# Patient Record
Sex: MALE | Race: BLACK OR AFRICAN AMERICAN | NOT HISPANIC OR LATINO | Employment: UNEMPLOYED | URBAN - METROPOLITAN AREA
[De-identification: names, ages, dates, MRNs, and addresses within clinical notes are randomized per-mention and may not be internally consistent; named-entity substitution may affect disease eponyms.]

---

## 2017-01-09 DIAGNOSIS — M25.561 ACUTE PAIN OF RIGHT KNEE: Primary | ICD-10-CM

## 2017-01-09 NOTE — TELEPHONE ENCOUNTER
Naproxen 500mg      Last Written Prescription Date: 11/26/2016  Last Fill Quantity: 60,  # refills: 1   Last Office Visit with [facility] or  Health prescribing provider: 08/31/2016                                             Percocet 5-325mg - last prescribed by CESAR shipman      Last Written Prescription Date: 8  Last Fill Quantity: 12/11/2016,  # refills: 0   Last Office Visit with [facility] or  Health prescribing provider: 08/31/2016                                             Dionna Berkowitz, Pharmacy Technician  [facility]  [phone]

## 2017-01-10 RX ORDER — OXYCODONE AND ACETAMINOPHEN 5; 325 MG/1; MG/1
1 TABLET ORAL EVERY 6 HOURS PRN
Qty: 8 TABLET | Refills: 0 | Status: SHIPPED | OUTPATIENT
Start: 2017-01-10 | End: 2017-02-07

## 2017-01-10 RX ORDER — NAPROXEN 500 MG/1
500 TABLET ORAL 2 TIMES DAILY PRN
Qty: 60 TABLET | Refills: 1 | Status: SHIPPED | OUTPATIENT
Start: 2017-01-10 | End: 2017-04-17

## 2017-01-31 ENCOUNTER — HOSPITAL ENCOUNTER (EMERGENCY)
Facility: CLINIC | Age: 47
Discharge: HOME | End: 2017-01-31
Attending: FAMILY MEDICINE | Admitting: FAMILY MEDICINE
Payer: COMMERCIAL

## 2017-01-31 VITALS
BODY MASS INDEX: 40.81 KG/M2 | RESPIRATION RATE: 18 BRPM | WEIGHT: 315 LBS | OXYGEN SATURATION: 95 % | HEART RATE: 66 BPM | TEMPERATURE: 97.3 F | DIASTOLIC BLOOD PRESSURE: 88 MMHG | SYSTOLIC BLOOD PRESSURE: 152 MMHG

## 2017-01-31 DIAGNOSIS — H65.93 BILATERAL NON-SUPPURATIVE OTITIS MEDIA: ICD-10-CM

## 2017-01-31 DIAGNOSIS — H92.02 OTALGIA OF LEFT EAR: ICD-10-CM

## 2017-01-31 PROCEDURE — 99282 EMERGENCY DEPT VISIT SF MDM: CPT

## 2017-01-31 PROCEDURE — 99283 EMERGENCY DEPT VISIT LOW MDM: CPT | Performed by: FAMILY MEDICINE

## 2017-01-31 PROCEDURE — 25000132 ZZH RX MED GY IP 250 OP 250 PS 637: Performed by: FAMILY MEDICINE

## 2017-01-31 RX ORDER — TETRACAINE HYDROCHLORIDE 5 MG/ML
4-5 SOLUTION OPHTHALMIC ONCE
Status: COMPLETED | OUTPATIENT
Start: 2017-01-31 | End: 2017-01-31

## 2017-01-31 RX ORDER — CETIRIZINE HYDROCHLORIDE, PSEUDOEPHEDRINE HYDROCHLORIDE 5; 120 MG/1; MG/1
1 TABLET, FILM COATED, EXTENDED RELEASE ORAL 2 TIMES DAILY
Qty: 14 TABLET | Refills: 0 | Status: SHIPPED | OUTPATIENT
Start: 2017-01-31 | End: 2017-11-02

## 2017-01-31 RX ORDER — AMOXICILLIN 500 MG/1
1000 CAPSULE ORAL 2 TIMES DAILY
Qty: 28 CAPSULE | Refills: 0 | Status: SHIPPED | OUTPATIENT
Start: 2017-01-31 | End: 2017-02-07

## 2017-01-31 RX ADMIN — TETRACAINE HYDROCHLORIDE 5 DROP: 5 SOLUTION OPHTHALMIC at 22:23

## 2017-01-31 ASSESSMENT — ENCOUNTER SYMPTOMS
FEVER: 0
APPETITE CHANGE: 0

## 2017-01-31 NOTE — ED AVS SNAPSHOT
[facility] Emergency Department    [address] [place] [address]    Phone:  [phone]    Fax:  [phone]                                       Humberto Roberts   MRN: [id number]    Department:  [facility] Emergency Department   Date of Visit:  1/31/2017           Patient Information     Date Of Birth          1970        Your diagnoses for this visit were:     Otalgia of left ear     Bilateral non-suppurative otitis media        You were seen by Skinny Herrera DO.      Follow-up Information     Follow up with Mariana Ortega APRN CNP.    Specialty:  NURSE PRACTITIONER - OBSTETRICS & GYNECOLOGY    Why:  As needed, if not improved in 3-5 days    Contact information:    [facility]  [address] [place] [id number] [phone]          Discharge Instructions       Please read and follow the handout(s) instructions. Return, if needed, for increased or worsening symptoms and as directed by the handout(s).    I would expect you to be improved in the next 3-4 days.    Yogurt orally twice a day while on the antibiotics may help prevent diarrhea and secondary infections caused by the antibiotic use.    Increase your fluid intake.     I hope you feel better soon!    Electronically signed, Skinny Herrera DO        Discharge References/Attachments     OTITIS MEDIA, ABX TX (ADULT) (ENGLISH)    OTITIS MEDIA (MIDDLE-EAR INFECTION) IN ADULTS (ENGLISH)      24 Hour Appointment Hotline       To make an appointment at any [facility], call [phone]). If you don't have a family doctor or clinic, we will help you find one. [facility] clinics are conveniently located to serve the needs of you and your family.             Review of your medicines      START taking        Dose / Directions Last dose taken    amoxicillin 500 MG capsule   Commonly known as:  AMOXIL   Dose:  1000 mg   Quantity:  28 capsule        Take 2 capsules (1,000 mg) by  mouth 2 times daily for 7 days   Refills:  0        cetirizine-psuedoePHEDrine 5-120 MG per 12 hr tablet   Commonly known as:  zyrTEC-D   Dose:  1 tablet   Quantity:  14 tablet        Take 1 tablet by mouth 2 times daily   Refills:  0          Our records show that you are taking the medicines listed below. If these are incorrect, please call your family doctor or clinic.        Dose / Directions Last dose taken    atenolol 50 MG tablet   Commonly known as:  TENORMIN   Dose:  50 mg   Quantity:  30 tablet        Take 1 tablet (50 mg) by mouth daily   Refills:  5        cetirizine 10 MG tablet   Commonly known as:  ZYRTEC ALLERGY   Dose:  10 mg   Quantity:  30 tablet        Take 1 tablet (10 mg) by mouth daily as needed for allergies   Refills:  6        cyclobenzaprine 10 MG tablet   Commonly known as:  FLEXERIL   Dose:  10 mg   Quantity:  30 tablet        Take 1 tablet (10 mg) by mouth 3 times daily as needed for muscle spasms   Refills:  1        diclofenac 75 MG EC tablet   Commonly known as:  VOLTAREN   Dose:  75 mg   Quantity:  60 tablet        Take 1 tablet (75 mg) by mouth 2 times daily   Refills:  1        fluticasone 50 MCG/ACT spray   Commonly known as:  FLONASE   Dose:  1-2 spray   Quantity:  1 Package        Spray 1-2 sprays into both nostrils daily   Refills:  3        hydrochlorothiazide 25 MG tablet   Commonly known as:  HYDRODIURIL   Dose:  25 mg   Quantity:  30 tablet        Take 1 tablet (25 mg) by mouth daily Due for clinic follow-up and lab work   Refills:  5        naproxen 500 MG tablet   Commonly known as:  NAPROSYN   Dose:  500 mg   Quantity:  60 tablet        Take 1 tablet (500 mg) by mouth 2 times daily as needed for moderate pain   Refills:  1        omeprazole 20 MG CR capsule   Commonly known as:  priLOSEC   Dose:  20 mg   Quantity:  180 capsule        Take 1 capsule (20 mg) by mouth 2 times daily   Refills:  2        * order for DME   Dose:  1 Device        1 Device Auto CPAP @@ 7-15 cm  with heated humidity via mask of choice   Refills:  0        * order for DME   Quantity:  1 Device        Dispensed 1 Pneumatic Walking Brace, Size large, with [facility] agreement signed by patient. Lay Contreras CMA, July 22, 2015   Refills:  0        oxyCODONE-acetaminophen 5-325 MG per tablet   Commonly known as:  PERCOCET   Dose:  1 tablet   Quantity:  8 tablet        Take 1 tablet by mouth every 6 hours as needed   Refills:  0        zolpidem 5 MG tablet   Commonly known as:  AMBIEN   Quantity:  30 tablet        Take 1 tablet at bedtime as needed for sleep   Refills:  3        * Notice:  This list has 2 medication(s) that are the same as other medications prescribed for you. Read the directions carefully, and ask your doctor or other care provider to review them with you.            Prescriptions were sent or printed at these locations (2 Prescriptions)                   [facility]   [address] [address]    Telephone:  [phone]   Fax:  [phone]   Hours:                  Printed at Department/Unit printer (1 of 2)         cetirizine-psuedoePHEDrine (ZYRTEC-D) 5-120 MG per 12 hr tablet                 These medications are not ready yet because we are checking if your insurance will help you pay for them. Call your pharmacy to confirm that your medication is ready for pickup. It may take up to 24 hours for them to receive the prescription. If the prescription is not ready within 3 business days, please contact your clinic or your provider (1 of 2)         amoxicillin (AMOXIL) 500 MG capsule                Orders Needing Specimen Collection     None      Pending Results     No orders found from 1/30/2017 to 2/1/2017.            Pending Culture Results     No orders found from 1/30/2017 to 2/1/2017.            Thank you for choosing [facility]       Thank you for choosing [facility] for your care. Our goal is always to provide you with excellent care. Hearing back  "from our patients is one way we can continue to improve our services. Please take a few minutes to complete the written survey that you may receive in the mail after you visit with us. Thank you!        [facility] Information     [employer] lets you send messages to your doctor, view your test results, renew your prescriptions, schedule appointments and more. To sign up, go to [email or web address] . Click on \"Log in\" on the left side of the screen, which will take you to the Welcome page. Then click on \"Sign up Now\" on the right side of the page.     You will be asked to enter the access code listed below, as well as some personal information. Please follow the directions to create your username and password.     Your access code is: [email or web address]  Expires: 3/11/2017  9:05 PM     Your access code will  in 90 days. If you need help or a new code, please call your [facility] clinic or [phone].        Care EveryWhere ID     This is your Care EveryWhere ID. This could be used by other organizations to access your [facility] medical records  [id number]        After Visit Summary       This is your record. Keep this with you and show to your community pharmacist(s) and doctor(s) at your next visit.                  "

## 2017-01-31 NOTE — ED AVS SNAPSHOT
[facility] Emergency Department    [address] [place] [address]    Phone:  [phone]    Fax:  [phone]                                       Humberto Roberts   MRN: [id number]    Department:  [facility] Emergency Department   Date of Visit:  1/31/2017           After Visit Summary Signature Page     I have received my discharge instructions, and my questions have been answered. I have discussed any challenges I see with this plan with the nurse or doctor.    ..........................................................................................................................................  Patient/Patient Representative Signature      ..........................................................................................................................................  Patient Representative Print Name and Relationship to Patient    ..................................................               ................................................  Date                                            Time    ..........................................................................................................................................  Reviewed by Signature/Title    ...................................................              ..............................................  Date                                                            Time

## 2017-02-01 NOTE — ED PROVIDER NOTES
History     Chief Complaint   Patient presents with     Otalgia     HPI  Humberto Roberts is a 46 year old male who presents to the ER with concerns about left ear pain that started 3 days ago.  He states that he has a muffled sound to the hearing in the left ear.  He's had cold symptoms/nasal congestion for last 3-4 days as well.   Denies fever.  Denies injury or drainage from the ear.      I have reviewed the Medications, Allergies, Past Medical and Surgical History, and Social History in the Epic system.  Patient Active Problem List   Diagnosis     Seasonal allergic rhinitis     GERD (gastroesophageal reflux disease)     Hypertension goal BP (blood pressure) < 140/90     CARDIOVASCULAR SCREENING; LDL GOAL LESS THAN 160     Elevated serum creatinine     Insomnia       Past Medical History   Diagnosis Date     Acid reflux disease since 2006     Hypertension      Back pain chronic     Cellulitis of left upper arm and forearm 11/22/2013        Past Surgical History   Procedure Laterality Date     Ent surgery         History reviewed. No pertinent family history.    Social History     Social History     Marital Status: Significant other     Spouse Name: N/A     Number of Children: N/A     Years of Education: N/A     Occupational History     Not on file.     Social History Main Topics     Smoking status: Never Smoker      Smokeless tobacco: Never Used     Alcohol Use: No     Drug Use: No     Sexual Activity:     Partners: Female     Other Topics Concern     Not on file     Social History Narrative       Current Outpatient Rx   Name  Route  Sig  Dispense  Refill     amoxicillin (AMOXIL) 500 MG capsule    Oral    Take 2 capsules (1,000 mg) by mouth 2 times daily for 7 days    28 capsule    0       cetirizine-psuedoePHEDrine (ZYRTEC-D) 5-120 MG per 12 hr tablet    Oral    Take 1 tablet by mouth 2 times daily    14 tablet    0       naproxen (NAPROSYN) 500 MG tablet    Oral    Take 1 tablet (500 mg) by mouth 2 times daily as  needed for moderate pain    60 tablet    1       hydrochlorothiazide (HYDRODIURIL) 25 MG tablet    Oral    Take 1 tablet (25 mg) by mouth daily Due for clinic follow-up and lab work    30 tablet    5       cetirizine (ZYRTEC ALLERGY) 10 MG tablet    Oral    Take 1 tablet (10 mg) by mouth daily as needed for allergies    30 tablet    6       atenolol (TENORMIN) 50 MG tablet    Oral    Take 1 tablet (50 mg) by mouth daily    30 tablet    5       omeprazole (PRILOSEC) 20 MG capsule    Oral    Take 1 capsule (20 mg) by mouth 2 times daily    180 capsule    2       cyclobenzaprine (FLEXERIL) 10 MG tablet    Oral    Take 1 tablet (10 mg) by mouth 3 times daily as needed for muscle spasms    30 tablet    1       zolpidem (AMBIEN) 5 MG tablet        Take 1 tablet at bedtime as needed for sleep    30 tablet    3       diclofenac (VOLTAREN) 75 MG EC tablet    Oral    Take 1 tablet (75 mg) by mouth 2 times daily    60 tablet    1       fluticasone (FLONASE) 50 MCG/ACT nasal spray    Both Nostrils    Spray 1-2 sprays into both nostrils daily    1 Package    3       oxyCODONE-acetaminophen (PERCOCET) 5-325 MG per tablet    Oral    Take 1 tablet by mouth every 6 hours as needed    8 tablet    0       [facility]       order for DME        1 Device Auto CPAP @@ 7-15 cm with heated humidity via mask of choice               order for DME        Dispensed 1 Pneumatic Walking Brace, Size large, with [facility] agreement signed by patient. Lay Contreras [facility], July 22, 2015    1 Device    0         No Known Allergies    Immunization History   Administered Date(s) Administered     TDAP (BOOSTRIX AGES 10-64) 11/22/2013       Review of Systems   Constitutional: Negative for fever and appetite change. Activity change: Difficult to sleep.   HENT: Positive for congestion, ear pain (left) and hearing loss (muffled hearing left).    All other systems reviewed and are negative.      Physical Exam   BP: 152/88 mmHg  Pulse: 66  Temp: 97.3  F (36.3   C)  Resp: 18  Weight: (!) 144.244 kg (318 lb)  SpO2: 95 %  Physical Exam   Constitutional: He appears well-developed and well-nourished. No distress.   HENT:   Right Ear: No drainage or swelling. Tympanic membrane is erythematous and retracted. A middle ear effusion is present.   Left Ear: No drainage or swelling. Tympanic membrane is erythematous and bulging. A middle ear effusion is present.   Nose: Mucosal edema and rhinorrhea present.       ED Course   Procedures             Critical Care time:  none                 Assessments & Plan (with Medical Decision Making)  Tetracaine ophthalmic drops placed in the left artery canal to help with his pain symptoms.  Medications prescribed for the otitis media as listed below.   Increase fluids encouraged.  Oral yogurt or other culture food products encouraged while on the antibiotic.     I have reviewed the nursing notes.    I have reviewed the findings, diagnosis, plan and need for follow up with the patient.    New Prescriptions    AMOXICILLIN (AMOXIL) 500 MG CAPSULE    Take 2 capsules (1,000 mg) by mouth 2 times daily for 7 days    CETIRIZINE-PSUEDOEPHEDRINE (ZYRTEC-D) 5-120 MG PER 12 HR TABLET    Take 1 tablet by mouth 2 times daily            I verbally discussed the findings of the evaluation today in the ER. I have verbally discussed with Humberto the suggested treatment(s) as described in the discharge instructions and handouts. I have prescribed the above listed medications and instructed him on appropriate use of these medications.      I have verbally suggested he follow-up in his clinic or return to the ER for increased symptoms. See the follow-up recommendations documented  in the after visit summary in this visit's EPIC chart.    Final diagnoses:   Otalgia of left ear   Bilateral non-suppurative otitis media       1/31/2017   [facility] EMERGENCY DEPARTMENT      Skinny Herrera,   01/31/17 [id number]

## 2017-02-01 NOTE — DISCHARGE INSTRUCTIONS
Please read and follow the handout(s) instructions. Return, if needed, for increased or worsening symptoms and as directed by the handout(s).    I would expect you to be improved in the next 3-4 days.    Yogurt orally twice a day while on the antibiotics may help prevent diarrhea and secondary infections caused by the antibiotic use.    Increase your fluid intake.     I hope you feel better soon!    Electronically signed, Skinny Herrera DO

## 2017-02-07 ENCOUNTER — OFFICE VISIT (OUTPATIENT)
Dept: FAMILY MEDICINE | Facility: CLINIC | Age: 47
End: 2017-02-07
Payer: COMMERCIAL

## 2017-02-07 VITALS
HEIGHT: 74 IN | SYSTOLIC BLOOD PRESSURE: 132 MMHG | DIASTOLIC BLOOD PRESSURE: 82 MMHG | WEIGHT: 315 LBS | BODY MASS INDEX: 40.43 KG/M2 | TEMPERATURE: 97 F | RESPIRATION RATE: 18 BRPM | HEART RATE: 80 BPM | OXYGEN SATURATION: 96 %

## 2017-02-07 DIAGNOSIS — H69.92 DYSFUNCTION OF EUSTACHIAN TUBE, LEFT: Primary | ICD-10-CM

## 2017-02-07 PROCEDURE — 99213 OFFICE O/P EST LOW 20 MIN: CPT | Performed by: FAMILY MEDICINE

## 2017-02-07 ASSESSMENT — PAIN SCALES - GENERAL: PAINLEVEL: NO PAIN (0)

## 2017-02-07 NOTE — PROGRESS NOTES
SUBJECTIVE:                                                    Humberto Roberts is a 46 year old male who presents to clinic today for the following health issues:    Acute Illness   Acute illness concerns: left ear  Onset: 10 days     Fever: no    Chills/Sweats: no    Headache (location?): YES    Sinus Pressure:no    Conjunctivitis:  no    Ear Pain: no    Rhinorrhea: YES    Congestion: YES    Sore Throat: no     Cough: YES-non-productive    Wheeze: no     Decreased Appetite: no    Nausea: no    Vomiting: no    Diarrhea:  no    Dysuria/Freq.: no    Fatigue/Achiness: no    Sick/Strep Exposure: no     Therapies Tried and outcome: amoxicillin, zyrtec-D, ear drops    Left ear.  Use Q-tip. No pain. Not able to hear out of it. No drainage.  No sinus pressure - little running or nasal congestion. Echoing.  Had similar problem before. No exposure to loud noise.  No headache or dizziness. Was treated for sinus problem a week ago and is still on Amoxicillin. No recent hx of being on high elevation or in an airplane     Problem list and histories reviewed & adjusted, as indicated.  Additional history: as documented    Patient Active Problem List   Diagnosis     Seasonal allergic rhinitis     GERD (gastroesophageal reflux disease)     Hypertension goal BP (blood pressure) < 140/90     CARDIOVASCULAR SCREENING; LDL GOAL LESS THAN 160     Elevated serum creatinine     Insomnia     Past Surgical History   Procedure Laterality Date     Ent surgery         Social History   Substance Use Topics     Smoking status: Never Smoker      Smokeless tobacco: Never Used     Alcohol Use: No     No family history on file.      Current Outpatient Prescriptions   Medication Sig Dispense Refill     amoxicillin (AMOXIL) 500 MG capsule Take 2 capsules (1,000 mg) by mouth 2 times daily for 7 days 28 capsule 0     cetirizine-psuedoePHEDrine (ZYRTEC-D) 5-120 MG per 12 hr tablet Take 1 tablet by mouth 2 times daily 14 tablet 0     naproxen (NAPROSYN) 500  "MG tablet Take 1 tablet (500 mg) by mouth 2 times daily as needed for moderate pain 60 tablet 1     hydrochlorothiazide (HYDRODIURIL) 25 MG tablet Take 1 tablet (25 mg) by mouth daily Due for clinic follow-up and lab work 30 tablet 5     atenolol (TENORMIN) 50 MG tablet Take 1 tablet (50 mg) by mouth daily 30 tablet 5     omeprazole (PRILOSEC) 20 MG capsule Take 1 capsule (20 mg) by mouth 2 times daily 180 capsule 2     cyclobenzaprine (FLEXERIL) 10 MG tablet Take 1 tablet (10 mg) by mouth 3 times daily as needed for muscle spasms 30 tablet 1     zolpidem (AMBIEN) 5 MG tablet Take 1 tablet at bedtime as needed for sleep 30 tablet 3     diclofenac (VOLTAREN) 75 MG EC tablet Take 1 tablet (75 mg) by mouth 2 times daily 60 tablet 1     order for DME 1 Device Auto CPAP @@ 7-15 cm with heated humidity via mask of choice       order for DME Dispensed 1 Pneumatic Walking Brace, Size large, with [facility] agreement signed by patient. Lay Contreras [facility], July 22, 2015 1 Device 0     fluticasone (FLONASE) 50 MCG/ACT nasal spray Spray 1-2 sprays into both nostrils daily 1 Package 3     cetirizine (ZYRTEC ALLERGY) 10 MG tablet Take 1 tablet (10 mg) by mouth daily as needed for allergies 30 tablet 6     No Known Allergies    ROS:  Constitutional, HEENT, cardiovascular, pulmonary, gi and gu systems are negative, except as otherwise noted.    OBJECTIVE:                                                    /82 mmHg  Pulse 80  Temp(Src) 97  F (36.1  C) (Temporal)  Resp 18  Ht 6' 2\" (1.88 m)  Wt 321 lb 11.2 oz (145.922 kg)  BMI 41.29 kg/m2  SpO2 96%  Body mass index is 41.29 kg/(m^2).   GENERAL: healthy, alert and no distress.   HENT: ear canals and TM's normal.  Nares are congested with clear drainage.  Oropharynx is pink and moist.  No tonsilar redness, exudate or hypertrophy.  No tender with palpation to the sinuses.  NECK: no adenopathy.  RESP: lungs clear to auscultation - no rales, rhonchi or wheezes  CV: regular rate and " rhythm, no murmur.    Diagnostic Test Results:  none      ASSESSMENT/PLAN:                                                        ICD-10-CM    1. Dysfunction of eustachian tube, left H69.82      Discussed with Humberto about the nature of the condition.  Reassure him that there is no ear infection and the symptoms are expected to resolved on their own slowly.  Continue with Zyrtec-D and Flonase.  Also recommend try to release the pressure by chewing gum, yawning or blow pressure into the ears.  Tylenol or Ibuprofen as needed for pain.  Call in or follow up if symptoms persist or worse in the next several days.  All of her questions were answered.      Calos Ty Mai, MD  [facility]

## 2017-02-07 NOTE — MR AVS SNAPSHOT
"              After Visit Summary   2017    Humberto Roberts    MRN: [id number]           Patient Information     Date Of Birth          1970        Visit Information        Provider Department      2017 8:00 AM Calos Hester MD [facility]        Today's Diagnoses     Dysfunction of eustachian tube, left    -  1        Follow-ups after your visit        Follow-up notes from your care team     Return if symptoms worsen or fail to improve.      Who to contact     If you have questions or need follow up information about today's clinic visit or your schedule please contact [facility] directly at [phone].  Normal or non-critical lab and imaging results will be communicated to you by HackerOnehart, letter or phone within 4 business days after the clinic has received the results. If you do not hear from us within 7 days, please contact the clinic through HackerOnehart or phone. If you have a critical or abnormal lab result, we will notify you by phone as soon as possible.  Submit refill requests through LineaQuattro or call your pharmacy and they will forward the refill request to us. Please allow 3 business days for your refill to be completed.          Additional Information About Your Visit        MyChart Information     LineaQuattro lets you send messages to your doctor, view your test results, renew your prescriptions, schedule appointments and more. To sign up, go to [email or web address] . Click on \"Log in\" on the left side of the screen, which will take you to the Welcome page. Then click on \"Sign up Now\" on the right side of the page.     You will be asked to enter the access code listed below, as well as some personal information. Please follow the directions to create your username and password.     Your access code is: [email or web address]  Expires: 3/11/2017  9:05 PM     Your access code will  in 90 days. If you need help or a new code, please call your [facility] or " "[phone].        Care EveryWhere ID     This is your Care EveryWhere ID. This could be used by other organizations to access your [place] medical records  [id number]        Your Vitals Were     Pulse Temperature Respirations Height BMI (Body Mass Index) Pulse Oximetry    80 97  F (36.1  C) (Temporal) 18 6' 2\" (1.88 m) 41.29 kg/m2 96%       Blood Pressure from Last 3 Encounters:   02/07/17 132/82   01/31/17 152/88   12/11/16 148/91    Weight from Last 3 Encounters:   02/07/17 321 lb 11.2 oz (145.922 kg)   01/31/17 318 lb (144.244 kg)   12/11/16 309 lb (140.161 kg)              Today, you had the following     No orders found for display       Primary Care Provider Office Phone # Fax #    RADHA Romeo [phone]       [facility]  [address] [place] [address]        Thank you!     Thank you for choosing [facility]  for your care. Our goal is always to provide you with excellent care. Hearing back from our patients is one way we can continue to improve our services. Please take a few minutes to complete the written survey that you may receive in the mail after your visit with us. Thank you!             Your Updated Medication List - Protect others around you: Learn how to safely use, store and throw away your medicines at www.disposemymeds.org.          This list is accurate as of: 2/7/17  5:07 PM.  Always use your most recent med list.                   Brand Name Dispense Instructions for use    amoxicillin 500 MG capsule    AMOXIL    28 capsule    Take 2 capsules (1,000 mg) by mouth 2 times daily for 7 days       atenolol 50 MG tablet    TENORMIN    30 tablet    Take 1 tablet (50 mg) by mouth daily       cetirizine 10 MG tablet    ZYRTEC ALLERGY    30 tablet    Take 1 tablet (10 mg) by mouth daily as needed for allergies       cetirizine-psuedoePHEDrine 5-120 MG per 12 hr tablet    zyrTEC-D    14 tablet    Take 1 tablet by mouth 2 times daily "       cyclobenzaprine 10 MG tablet    FLEXERIL    30 tablet    Take 1 tablet (10 mg) by mouth 3 times daily as needed for muscle spasms       diclofenac 75 MG EC tablet    VOLTAREN    60 tablet    Take 1 tablet (75 mg) by mouth 2 times daily       fluticasone 50 MCG/ACT spray    FLONASE    1 Package    Spray 1-2 sprays into both nostrils daily       hydrochlorothiazide 25 MG tablet    HYDRODIURIL    30 tablet    Take 1 tablet (25 mg) by mouth daily Due for clinic follow-up and lab work       naproxen 500 MG tablet    NAPROSYN    60 tablet    Take 1 tablet (500 mg) by mouth 2 times daily as needed for moderate pain       omeprazole 20 MG CR capsule    priLOSEC    180 capsule    Take 1 capsule (20 mg) by mouth 2 times daily       * order for DME      1 Device Auto CPAP @@ 7-15 cm with heated humidity via mask of choice       * order for DME     1 Device    Dispensed 1 Pneumatic Walking Brace, Size large, with [facility] agreement signed by patient. Lay Contreras CMA, July 22, 2015       zolpidem 5 MG tablet    AMBIEN    30 tablet    Take 1 tablet at bedtime as needed for sleep       * Notice:  This list has 2 medication(s) that are the same as other medications prescribed for you. Read the directions carefully, and ask your doctor or other care provider to review them with you.

## 2017-02-07 NOTE — NURSING NOTE
"Chief Complaint   Patient presents with     Ear Problem     left ear, x10 days       Initial /82 mmHg  Pulse 80  Temp(Src) 97  F (36.1  C) (Temporal)  Resp 18  Ht 6' 2\" (1.88 m)  Wt 321 lb 11.2 oz (145.922 kg)  BMI 41.29 kg/m2  SpO2 96% Estimated body mass index is 41.29 kg/(m^2) as calculated from the following:    Height as of this encounter: 6' 2\" (1.88 m).    Weight as of this encounter: 321 lb 11.2 oz (145.922 kg).  Medication Reconciliation: complete     Health Maintenance Due   Topic Date Due     INFLUENZA VACCINE (SYSTEM ASSIGNED)  09/01/2016     Jose Otto CMA      "

## 2017-02-13 DIAGNOSIS — F51.01 PRIMARY INSOMNIA: ICD-10-CM

## 2017-02-13 RX ORDER — ZOLPIDEM TARTRATE 5 MG/1
TABLET ORAL
Qty: 30 TABLET | Refills: 3 | Status: SHIPPED | OUTPATIENT
Start: 2017-02-13 | End: 2017-07-19

## 2017-02-13 NOTE — TELEPHONE ENCOUNTER
Zolpidem      Last Written Prescription Date: 5/24/16  Last Fill Quantity: 30,  # refills: 3   Last Office Visit with [facility], [facility] or [facility] prescribing provider: 2/7/17

## 2017-04-17 DIAGNOSIS — I10 ESSENTIAL HYPERTENSION WITH GOAL BLOOD PRESSURE LESS THAN 140/90: ICD-10-CM

## 2017-04-17 DIAGNOSIS — M25.561 ACUTE PAIN OF RIGHT KNEE: ICD-10-CM

## 2017-04-17 NOTE — TELEPHONE ENCOUNTER
Atenolol 50mg      Last Written Prescription Date: 8/32/2016  Last Fill Quantity: 30, # refills: 5    Last Office Visit with [facility] or [facility] prescribing provider:  8/31/2016   Future Office Visit:        BP Readings from Last 3 Encounters:   02/07/17 132/82   01/31/17 152/88   12/11/16 (!) 148/91     Hydrochlorothiazide 25mg      Last Written Prescription Date: 8/31/2016  Last Fill Quantity: 30, # refills: 5  Last Office Visit with [facility], [facility] or [facility] prescribing provider: 8/31/2016       Potassium   Date Value Ref Range Status   08/31/2016 3.8 3.4 - 5.3 mmol/L Final     Creatinine   Date Value Ref Range Status   08/31/2016 0.99 0.66 - 1.25 mg/dL Final     BP Readings from Last 3 Encounters:   02/07/17 132/82   01/31/17 152/88   12/11/16 (!) 148/91     Naproxen 500mg      Last Written Prescription Date: 1/10/2017  Last Quantity: 60, # refills: 1  Last Office Visit with [facility], [facility] or [facility] prescribing provider: 8/31/2016       Creatinine   Date Value Ref Range Status   08/31/2016 0.99 0.66 - 1.25 mg/dL Final     Lab Results   Component Value Date    AST 28.0 06/29/2011     Lab Results   Component Value Date    ALT 38.0 06/29/2011     BP Readings from Last 3 Encounters:   02/07/17 132/82   01/31/17 152/88   12/11/16 (!) 148/91     Unable to approve per medication refill protocol. Forwarded to prescriber  Labs outside of refill parameter on Naproxen for pharmacist protocol    Jessica Juarez [facility]  [phone]

## 2017-04-18 RX ORDER — ATENOLOL 50 MG/1
50 TABLET ORAL DAILY
Qty: 30 TABLET | Refills: 5 | Status: SHIPPED | OUTPATIENT
Start: 2017-04-18 | End: 2017-10-21

## 2017-04-18 RX ORDER — HYDROCHLOROTHIAZIDE 25 MG/1
25 TABLET ORAL DAILY
Qty: 30 TABLET | Refills: 5 | Status: SHIPPED | OUTPATIENT
Start: 2017-04-18 | End: 2017-10-21

## 2017-04-18 RX ORDER — NAPROXEN 500 MG/1
500 TABLET ORAL 2 TIMES DAILY PRN
Qty: 60 TABLET | Refills: 1 | Status: SHIPPED | OUTPATIENT
Start: 2017-04-18 | End: 2017-06-20

## 2017-05-19 DIAGNOSIS — J30.1 SEASONAL ALLERGIC RHINITIS DUE TO POLLEN: Primary | ICD-10-CM

## 2017-05-19 RX ORDER — CETIRIZINE HYDROCHLORIDE 10 MG/1
10 TABLET ORAL DAILY PRN
Qty: 30 TABLET | Refills: 6 | Status: SHIPPED | OUTPATIENT
Start: 2017-05-19 | End: 2018-01-22

## 2017-05-19 NOTE — TELEPHONE ENCOUNTER
Cetirizine 10mg      Last Written Prescription Date: 08/31/2016  Last Fill Quantity: 30,  # refills: 6   Last Office Visit with [facility] or [facility] prescribing provider: 02/17/2017    Dionna Berkowitz, Pharmacy Technician  [facility]  [phone]

## 2017-06-20 DIAGNOSIS — M25.561 ACUTE PAIN OF RIGHT KNEE: ICD-10-CM

## 2017-06-20 NOTE — TELEPHONE ENCOUNTER
Naproxen      Last Written Prescription Date: 4/18/17  Last Fill Quantity: 60,  # refills: 1   Last Office Visit with [facility], [facility] or [facility] prescribing provider: 2/7/17

## 2017-06-21 NOTE — TELEPHONE ENCOUNTER
Routing refill request to provider for review/approval because:  Labs not current:  AST/ALT - ordered  BP out of range             Creatinine   Date Value Ref Range Status   08/31/2016 0.99 0.66 - 1.25 mg/dL Final     Lab Results   Component Value Date    AST 28.0 06/29/2011     Lab Results   Component Value Date    ALT 38.0 06/29/2011     BP Readings from Last 3 Encounters:   02/07/17 132/82   01/31/17 152/88   12/11/16 (!) 148/91

## 2017-06-22 RX ORDER — NAPROXEN 500 MG/1
500 TABLET ORAL 2 TIMES DAILY PRN
Qty: 60 TABLET | Refills: 1 | Status: SHIPPED | OUTPATIENT
Start: 2017-06-22 | End: 2018-01-22

## 2017-07-19 DIAGNOSIS — F51.01 PRIMARY INSOMNIA: ICD-10-CM

## 2017-07-19 DIAGNOSIS — K21.9 GASTROESOPHAGEAL REFLUX DISEASE WITHOUT ESOPHAGITIS: ICD-10-CM

## 2017-07-19 NOTE — TELEPHONE ENCOUNTER
omeprazole (PRILOSEC) 20 MG CR capsule   Last Written Prescription Date: 08/31/2016  Last Fill Quantity: 180,  # refills: 2   Last Office Visit with [facility], [facility] or [facility] prescribing provider: 02/07/2017                                             Zolpidem     Last Written Prescription Date: 2/13/17  Last Fill Quantity: 30,  # refills: 3   Last Office Visit with [facility], [facility] or [facility] prescribing provider: 2/7/17    Monae Minaya  Pharmacy [facility].  [facility]  [phone]

## 2017-07-20 RX ORDER — ZOLPIDEM TARTRATE 5 MG/1
TABLET ORAL
Qty: 30 TABLET | Refills: 3 | Status: SHIPPED | OUTPATIENT
Start: 2017-07-20 | End: 2017-11-02 | Stop reason: DRUGHIGH

## 2017-08-29 ENCOUNTER — APPOINTMENT (OUTPATIENT)
Dept: GENERAL RADIOLOGY | Facility: CLINIC | Age: 47
End: 2017-08-29
Attending: PHYSICIAN ASSISTANT
Payer: COMMERCIAL

## 2017-08-29 ENCOUNTER — HOSPITAL ENCOUNTER (EMERGENCY)
Facility: CLINIC | Age: 47
Discharge: HOME | End: 2017-08-29
Attending: PHYSICIAN ASSISTANT | Admitting: PHYSICIAN ASSISTANT
Payer: COMMERCIAL

## 2017-08-29 VITALS
HEART RATE: 88 BPM | OXYGEN SATURATION: 98 % | SYSTOLIC BLOOD PRESSURE: 146 MMHG | TEMPERATURE: 98.3 F | DIASTOLIC BLOOD PRESSURE: 83 MMHG | RESPIRATION RATE: 18 BRPM

## 2017-08-29 DIAGNOSIS — M25.512 ACUTE PAIN OF LEFT SHOULDER: ICD-10-CM

## 2017-08-29 PROCEDURE — 99284 EMERGENCY DEPT VISIT MOD MDM: CPT | Mod: 25 | Performed by: PHYSICIAN ASSISTANT

## 2017-08-29 PROCEDURE — 73030 X-RAY EXAM OF SHOULDER: CPT | Mod: TC,LT

## 2017-08-29 PROCEDURE — 99284 EMERGENCY DEPT VISIT MOD MDM: CPT | Mod: Z6 | Performed by: PHYSICIAN ASSISTANT

## 2017-08-29 PROCEDURE — 96372 THER/PROPH/DIAG INJ SC/IM: CPT | Performed by: PHYSICIAN ASSISTANT

## 2017-08-29 PROCEDURE — 25000128 H RX IP 250 OP 636: Performed by: PHYSICIAN ASSISTANT

## 2017-08-29 RX ORDER — OXYCODONE AND ACETAMINOPHEN 5; 325 MG/1; MG/1
1 TABLET ORAL EVERY 6 HOURS PRN
Qty: 10 TABLET | Refills: 0 | Status: SHIPPED | OUTPATIENT
Start: 2017-08-29 | End: 2017-09-21

## 2017-08-29 RX ORDER — KETOROLAC TROMETHAMINE 30 MG/ML
30 INJECTION, SOLUTION INTRAMUSCULAR; INTRAVENOUS ONCE
Status: COMPLETED | OUTPATIENT
Start: 2017-08-29 | End: 2017-08-29

## 2017-08-29 RX ADMIN — KETOROLAC TROMETHAMINE 30 MG: 30 INJECTION, SOLUTION INTRAMUSCULAR at 23:02

## 2017-08-29 NOTE — ED AVS SNAPSHOT
[facility] Emergency Department    [address] [place] [address]    Phone:  [phone]    Fax:  [phone]                                       Humberto Roberts   MRN: [id number]    Department:  [facility] Emergency Department   Date of Visit:  8/29/2017           Patient Information     Date Of Birth          1970        Your diagnoses for this visit were:     Acute pain of left shoulder        You were seen by Alcon Kaufman PA-C.        Discharge Instructions       1.  Please ice your shoulder for 20 minutes every 2 hours if possible to help with the pain and inflammation.  2.  Please use the sling for comfort for the next 4-5 days.  3.  Please start the range of motion exercises that we discussed in the next couple of days.   4.  Please see the Sports Medicine Specialist, Dr. Dietrich, next week if your symptoms are not improving with this treatment.  5.  It is okay to take Ibuprofen 800 mg ( 4 tabs of the over the counter 200 mg tabs) every 8 hours as needed for pain and inflammation.  6.  If the Ibuprofen is not helping, then you could use the Oxycodone as needed for breakthrough pain.                                          Rotator Cuff Injury   What is a rotator cuff injury?   A rotator cuff injury is a strain or tear in the group of tendons and muscles that hold your shoulder joint together and help move your shoulder.   How does it occur?   A rotator cuff injury may result from:     using your arm to break a fall     falling onto your arm     lifting a heavy object     use of your shoulder in sports with a repetitive overhead movement, such as swimming, baseball (mainly pitchers), football, and tennis, which gradually strains the tendon     manual labor such as painting, plastering, raking leaves, or housework   What are the symptoms?   The symptoms of a torn rotator cuff are:     arm and shoulder pain     shoulder weakness     shoulder tenderness     loss of  shoulder movement, especially overhead   How is it diagnosed?   Your healthcare provider will examine you and check your shoulder for pain, tenderness, and loss of motion as you move your arm in all directions. Your provider will ask if your shoulder pain began suddenly or gradually. You may have an X-ray to make sure there are not any fractures or bone spurs.   Based on these results, you may have other tests or procedures right away or later, such as:     magnetic resonance imaging (MRI), which creates images of your shoulder and surrounding structures with sound waves     an arthrogram, which is an X-ray or MRI that is taken after a special dye has been injected into your shoulder joint to outline its soft structures     arthroscopy, a surgical procedure in which a small instrument is inserted into your shoulder joint so your provider can look directly at your rotator cuff   What is the treatment?   A tendon in your shoulder can be inflamed, partially torn, or completely torn. What is done about it depends on how torn it is and how much it hurts.   If your tear is a minor one, it can be left to heal by itself if it does not interfere with your everyday activities. Your treatment plan should include:     proper sitting posture, in which your head and shoulders are balanced     rest for your shoulder, which means avoiding strenuous activity or any overhead motion that causes pain     ice packs at least once a day, and preferably 2 or 3 times a day     doing the exercises your healthcare provider gives you     anti-inflammatory drugs. Adults aged 65 years and older should not take non-steroidal anti-inflammatory medicine for more than 7 days without their healthcare provider's approval.     physical therapy to strengthen your shoulder as it heals   If you have a bad tear, you may need to have it repaired by arthroscopy. Arthroscopy can be used to perform surgery on a joint as well as to see inside the joint. The  rough edges of a torn tendon can be trimmed and left to heal. Larger tears can be stitched back together. After surgery, your treatment plan will include physical therapy to strengthen your shoulder as it heals.   How long will the effects last?   Full recovery depends on what is torn and how it is treated.   When can I return to my normal activities?   Everyone recovers from an injury at a different rate. Return to your activities will be determined by how soon your shoulder recovers, not by how many days or weeks it has been since your injury has occurred. In general, the longer you have symptoms before you start treatment, the longer it will take to get better. The goal of rehabilitation is to return you to your normal activities as soon as is safely possible. If you return too soon you may worsen your injury.   You may safely return to your normal activities when:     Your injured shoulder has full range of motion without pain.     Your injured shoulder has regained normal strength compared to the uninjured shoulder.   What can be done to help prevent this from recurring?   The best way to prevent a recurrence is to strengthen your shoulder muscles and keep them in peak condition with shoulder exercises.           Rotator Cuff Strain Rehabilitation Exercises                  You may do all of these exercises right away.   Isometric shoulder external rotation:  a doorway with your elbow bent 90 degrees and the back of the wrist on your injured side pressed against the door frame. Try to press your hand outward into the door frame. Hold for 5 seconds. Do 3 sets of 10.   Isometric shoulder internal rotation:  a doorway with your elbow bent 90 degrees and the front of the wrist on your injured side pressed against the door frame. Try to press your palm into the door frame. Hold for 5 seconds. Do 3 sets of 10.   Wand exercise: Flexion: Stand upright and hold a stick in both hands, palms down. Stretch  your arms by lifting them over your head, keeping your arms straight. Hold for 5 seconds and return to the starting position. Repeat 10 times.   Wand exercise: Extension: Stand upright and hold a stick in both hands behind your back. Move the stick away from your back. Hold the end position for 5 seconds. Relax and return to the starting position. Repeat 10 times.   Wand exercise: External rotation: Lie on your back and hold a stick in both hands, palms up. Your upper arms should be resting on the floor with your elbows at your sides and bent 90 degrees. Use your uninjured arm to push your injured arm out away from your body. Keep the elbow of your injured arm at your side while it is being pushed. Hold the stretch for 5 seconds. Repeat 10 times.   Wand exercise: Shoulder abduction and adduction: Stand and hold a stick with both hands, palms facing away from your body. Rest the stick against the front of your thighs. Use your uninjured arm to push your injured arm out to the side and up as high as possible. Keep your arms straight. Hold for 5 seconds. Repeat 10 times.   Resisted shoulder external rotation: Stand sideways next to a door with your injured arm farther from the door. Tie a knot in the end of the tubing and shut the knot in the door at waist level. Hold the other end of the tubing with the hand of your injured arm. Rest the hand of your injured arm across your stomach. Keeping your elbow in at your side, rotate your arm outward and away from your waist. Make sure you keep your elbow bent 90 degrees and your forearm parallel to the floor. Repeat 10 times. Build up to 3 sets of 10.   Resisted shoulder internal rotation: Stand sideways next to a door with your injured arm closest to the door. Tie a knot in the end of the tubing and shut the knot in the door at waist level. Hold the other end of the tubing with the hand of your injured arm. Bend the elbow of your injured arm 90 degrees. Keeping your elbow in  "at your side, rotate your forearm across your body and then back to the starting position. Make sure you keep your forearm parallel to the floor. Do 3 sets of 10.   Scaption: Stand with your arms at your sides and with your elbows straight. Slowly raise your arms to eye level. As you raise your arms, spread them apart so that they are only slightly in front of your body (at about a 30-degree angle to the front of your body). Point your thumbs toward the ceiling. Hold for 2 seconds and lower your arms slowly. Do 3 sets of 10. Progress to holding a soup can or light weight when you are doing the exercise and increase the weight as the exercise gets easier.   Side-lying external rotation: Lie on your uninjured side with your injured arm at your side and your elbow bent 90 degrees. Keeping your elbow against your side, raise your forearm toward the ceiling and hold for 2 seconds. Slowly lower your arm. Do 3 sets of 10. You can start doing this exercise holding a soup can or light weight and gradually increase the weight as long as there is no pain.   Horizontal abduction: Lie on your stomach on a table or the edge of a bed with the arm on your injured side hanging down over the edge. Raise your arm out to the side, with your thumb pointed toward the ceiling, until your arm is parallel to the floor. Hold for 2 seconds and then lower it slowly. Start this exercise with no weight. As you get stronger, add a light weight or hold a soup can. Do 3 sets of 10.   Push-up with a plus: Begin on the floor on your hands and knees. Keep your arms a shoulder width apart and lift your feet off the floor. Arch your back as high as possible and round your shoulders (this is the \"plus\" part or the exercise). Bend your elbows and lower your body to the floor. Return to the starting position and arch your back again. Do 3 sets of 10.   Published by [employer].  This content is reviewed periodically and is subject to change as new health " information becomes available. The information is intended to inform and educate and is not a replacement for medical evaluation, advice, diagnosis or treatment by a healthcare professional.   Written by Chanell Castillo MS, PT, and Elva Balderas PT, [facility], [facility], for [facility].   ? 2010 [facility] and/or its affiliates. All Rights Reserved.   Copyright   [employer] 2011  Adult Health Advisor                   24 Hour Appointment Hotline       To make an appointment at any [facility] call [phone] ([phone]). If you don't have a family doctor or clinic, we will help you find one. [facility] clinics are conveniently located to serve the needs of you and your family.          ED Discharge Orders     ARM SLING XL           ORTHO  REFERRAL       [facility] is referring you to the Orthopedic  Services at [facility].       The  Representative will assist you in the coordination of your Orthopedic and Musculoskeletal Care as prescribed by your physician.    The  Representative will call you within 1 business day to help schedule your appointment, or you may contact the  Representative at:    All areas ~ [phone]     Type of Referral : Non Surgical       Timeframe requested: Within 2 weeks    Coverage of these services is subject to the terms and limitations of your health insurance plan.  Please call member services at your health plan with any benefit or coverage questions.      If X-rays, CT or MRI's have been performed, please contact the facility where they were done to arrange for , prior to your scheduled appointment.  Please bring this referral request to your appointment and present it to your specialist.                     Review of your medicines      START taking        Dose / Directions Last dose taken    oxyCODONE-acetaminophen 5-325 MG per tablet   Commonly known as:  PERCOCET    Dose:  1 tablet   Quantity:  10 tablet        Take 1 tablet by mouth every 6 hours as needed   Refills:  0          Our records show that you are taking the medicines listed below. If these are incorrect, please call your family doctor or clinic.        Dose / Directions Last dose taken    atenolol 50 MG tablet   Commonly known as:  TENORMIN   Dose:  50 mg   Quantity:  30 tablet        Take 1 tablet (50 mg) by mouth daily   Refills:  5        cetirizine 10 MG tablet   Commonly known as:  ZYRTEC ALLERGY   Dose:  10 mg   Quantity:  30 tablet        Take 1 tablet (10 mg) by mouth daily as needed for allergies   Refills:  6        cetirizine-psuedoePHEDrine 5-120 MG per 12 hr tablet   Commonly known as:  zyrTEC-D   Dose:  1 tablet   Quantity:  14 tablet        Take 1 tablet by mouth 2 times daily   Refills:  0        cyclobenzaprine 10 MG tablet   Commonly known as:  FLEXERIL   Dose:  10 mg   Quantity:  30 tablet        Take 1 tablet (10 mg) by mouth 3 times daily as needed for muscle spasms   Refills:  1        diclofenac 75 MG EC tablet   Commonly known as:  VOLTAREN   Dose:  75 mg   Quantity:  60 tablet        Take 1 tablet (75 mg) by mouth 2 times daily   Refills:  1        fluticasone 50 MCG/ACT spray   Commonly known as:  FLONASE   Dose:  1-2 spray   Quantity:  1 Package        Spray 1-2 sprays into both nostrils daily   Refills:  3        hydrochlorothiazide 25 MG tablet   Commonly known as:  HYDRODIURIL   Dose:  25 mg   Quantity:  30 tablet        Take 1 tablet (25 mg) by mouth daily Due for clinic follow-up and lab work   Refills:  5        naproxen 500 MG tablet   Commonly known as:  NAPROSYN   Dose:  500 mg   Quantity:  60 tablet        Take 1 tablet (500 mg) by mouth 2 times daily as needed for moderate pain   Refills:  1        omeprazole 20 MG CR capsule   Commonly known as:  priLOSEC   Quantity:  180 capsule        TAKE ONE CAPSULE BY MOUTH TWICE A DAY   Refills:  2        * order for DME   Dose:  1  Device        1 Device Auto CPAP @@ 7-15 cm with heated humidity via mask of choice   Refills:  0        * order for DME   Quantity:  1 Device        Dispensed 1 Pneumatic Walking Brace, Size large, with [facility] agreement signed by patient. Lay Contreras CMA, July 22, 2015   Refills:  0        zolpidem 5 MG tablet   Commonly known as:  AMBIEN   Quantity:  30 tablet        Take 1 tablet at bedtime as needed for sleep   Refills:  3        * Notice:  This list has 2 medication(s) that are the same as other medications prescribed for you. Read the directions carefully, and ask your doctor or other care provider to review them with you.            Prescriptions were sent or printed at these locations (1 Prescription)                   Other Prescriptions                Printed at Department/Unit printer (1 of 1)         oxyCODONE-acetaminophen (PERCOCET) 5-325 MG per tablet                Procedures and tests performed during your visit     XR Shoulder Left 3 Views      Orders Needing Specimen Collection     None      Pending Results     Date and Time Order Name Status Description    8/29/2017 2251 XR Shoulder Left 3 Views Preliminary             Pending Culture Results     No orders found from 8/27/2017 to 8/30/2017.            Pending Results Instructions     If you had any lab results that were not finalized at the time of your Discharge, you can call the ED Lab Result RN at [phone]. You will be contacted by this team for any positive Lab results or changes in treatment. The nurses are available 7 days a week from 10A to 6:30P.  You can leave a message 24 hours per day and they will return your call.        Thank you for choosing [facility]       Thank you for choosing [facility] for your care. Our goal is always to provide you with excellent care. Hearing back from our patients is one way we can continue to improve our services. Please take a few minutes to complete the written survey that you may receive in the mail  "after you visit with us. Thank you!        [email or web address] Information     [employer] lets you send messages to your doctor, view your test results, renew your prescriptions, schedule appointments and more. To sign up, go to [email or web address] . Click on \"Log in\" on the left side of the screen, which will take you to the Welcome page. Then click on \"Sign up Now\" on the right side of the page.     You will be asked to enter the access code listed below, as well as some personal information. Please follow the directions to create your username and password.     Your access code is: [email or web address]  Expires: 2017 11:43 PM     Your access code will  in 90 days. If you need help or a new code, please call your [facility] clinic or [phone].        Care EveryWhere ID     This is your Care EveryWhere ID. This could be used by other organizations to access your [facility] medical records  [id number]        Equal Access to Services     KEON URIARTE : Hadii aad ku hadasho Sonilsali, waaxda luqadaha, qaybta kaalmada adepratibha, mareila austin . So [facility] [phone].    ATENCIÓN: Si habla español, tiene a varela disposición servicios gratuitos de asistencia lingüística. Llame al [phone].    We comply with applicable federal civil rights laws and [place] laws. We do not discriminate on the basis of race, color, national origin, age, disability sex, sexual orientation or gender identity.            After Visit Summary       This is your record. Keep this with you and show to your community pharmacist(s) and doctor(s) at your next visit.                  "

## 2017-08-29 NOTE — ED AVS SNAPSHOT
[facility] Emergency Department    [address] [place] [address]    Phone:  [phone]    Fax:  [phone]                                       Humberto Roberts   MRN: [id number]    Department:  [facility] Emergency Department   Date of Visit:  8/29/2017           After Visit Summary Signature Page     I have received my discharge instructions, and my questions have been answered. I have discussed any challenges I see with this plan with the nurse or doctor.    ..........................................................................................................................................  Patient/Patient Representative Signature      ..........................................................................................................................................  Patient Representative Print Name and Relationship to Patient    ..................................................               ................................................  Date                                            Time    ..........................................................................................................................................  Reviewed by Signature/Title    ...................................................              ..............................................  Date                                                            Time

## 2017-08-30 NOTE — DISCHARGE INSTRUCTIONS
1.  Please ice your shoulder for 20 minutes every 2 hours if possible to help with the pain and inflammation.  2.  Please use the sling for comfort for the next 4-5 days.  3.  Please start the range of motion exercises that we discussed in the next couple of days.   4.  Please see the Sports Medicine Specialist, Dr. Dietrich, next week if your symptoms are not improving with this treatment.  5.  It is okay to take Ibuprofen 800 mg ( 4 tabs of the over the counter 200 mg tabs) every 8 hours as needed for pain and inflammation.  6.  If the Ibuprofen is not helping, then you could use the Oxycodone as needed for breakthrough pain.                                          Rotator Cuff Injury   What is a rotator cuff injury?   A rotator cuff injury is a strain or tear in the group of tendons and muscles that hold your shoulder joint together and help move your shoulder.   How does it occur?   A rotator cuff injury may result from:     using your arm to break a fall     falling onto your arm     lifting a heavy object     use of your shoulder in sports with a repetitive overhead movement, such as swimming, baseball (mainly pitchers), football, and tennis, which gradually strains the tendon     manual labor such as painting, plastering, raking leaves, or housework   What are the symptoms?   The symptoms of a torn rotator cuff are:     arm and shoulder pain     shoulder weakness     shoulder tenderness     loss of shoulder movement, especially overhead   How is it diagnosed?   Your healthcare provider will examine you and check your shoulder for pain, tenderness, and loss of motion as you move your arm in all directions. Your provider will ask if your shoulder pain began suddenly or gradually. You may have an X-ray to make sure there are not any fractures or bone spurs.   Based on these results, you may have other tests or procedures right away or later, such as:     magnetic resonance imaging (MRI), which creates images of  your shoulder and surrounding structures with sound waves     an arthrogram, which is an X-ray or MRI that is taken after a special dye has been injected into your shoulder joint to outline its soft structures     arthroscopy, a surgical procedure in which a small instrument is inserted into your shoulder joint so your provider can look directly at your rotator cuff   What is the treatment?   A tendon in your shoulder can be inflamed, partially torn, or completely torn. What is done about it depends on how torn it is and how much it hurts.   If your tear is a minor one, it can be left to heal by itself if it does not interfere with your everyday activities. Your treatment plan should include:     proper sitting posture, in which your head and shoulders are balanced     rest for your shoulder, which means avoiding strenuous activity or any overhead motion that causes pain     ice packs at least once a day, and preferably 2 or 3 times a day     doing the exercises your healthcare provider gives you     anti-inflammatory drugs. Adults aged 65 years and older should not take non-steroidal anti-inflammatory medicine for more than 7 days without their healthcare provider's approval.     physical therapy to strengthen your shoulder as it heals   If you have a bad tear, you may need to have it repaired by arthroscopy. Arthroscopy can be used to perform surgery on a joint as well as to see inside the joint. The rough edges of a torn tendon can be trimmed and left to heal. Larger tears can be stitched back together. After surgery, your treatment plan will include physical therapy to strengthen your shoulder as it heals.   How long will the effects last?   Full recovery depends on what is torn and how it is treated.   When can I return to my normal activities?   Everyone recovers from an injury at a different rate. Return to your activities will be determined by how soon your shoulder recovers, not by how many days or weeks it  has been since your injury has occurred. In general, the longer you have symptoms before you start treatment, the longer it will take to get better. The goal of rehabilitation is to return you to your normal activities as soon as is safely possible. If you return too soon you may worsen your injury.   You may safely return to your normal activities when:     Your injured shoulder has full range of motion without pain.     Your injured shoulder has regained normal strength compared to the uninjured shoulder.   What can be done to help prevent this from recurring?   The best way to prevent a recurrence is to strengthen your shoulder muscles and keep them in peak condition with shoulder exercises.           Rotator Cuff Strain Rehabilitation Exercises                  You may do all of these exercises right away.   Isometric shoulder external rotation:  a doorway with your elbow bent 90 degrees and the back of the wrist on your injured side pressed against the door frame. Try to press your hand outward into the door frame. Hold for 5 seconds. Do 3 sets of 10.   Isometric shoulder internal rotation:  a doorway with your elbow bent 90 degrees and the front of the wrist on your injured side pressed against the door frame. Try to press your palm into the door frame. Hold for 5 seconds. Do 3 sets of 10.   Wand exercise: Flexion: Stand upright and hold a stick in both hands, palms down. Stretch your arms by lifting them over your head, keeping your arms straight. Hold for 5 seconds and return to the starting position. Repeat 10 times.   Wand exercise: Extension: Stand upright and hold a stick in both hands behind your back. Move the stick away from your back. Hold the end position for 5 seconds. Relax and return to the starting position. Repeat 10 times.   Wand exercise: External rotation: Lie on your back and hold a stick in both hands, palms up. Your upper arms should be resting on the floor with your  elbows at your sides and bent 90 degrees. Use your uninjured arm to push your injured arm out away from your body. Keep the elbow of your injured arm at your side while it is being pushed. Hold the stretch for 5 seconds. Repeat 10 times.   Wand exercise: Shoulder abduction and adduction: Stand and hold a stick with both hands, palms facing away from your body. Rest the stick against the front of your thighs. Use your uninjured arm to push your injured arm out to the side and up as high as possible. Keep your arms straight. Hold for 5 seconds. Repeat 10 times.   Resisted shoulder external rotation: Stand sideways next to a door with your injured arm farther from the door. Tie a knot in the end of the tubing and shut the knot in the door at waist level. Hold the other end of the tubing with the hand of your injured arm. Rest the hand of your injured arm across your stomach. Keeping your elbow in at your side, rotate your arm outward and away from your waist. Make sure you keep your elbow bent 90 degrees and your forearm parallel to the floor. Repeat 10 times. Build up to 3 sets of 10.   Resisted shoulder internal rotation: Stand sideways next to a door with your injured arm closest to the door. Tie a knot in the end of the tubing and shut the knot in the door at waist level. Hold the other end of the tubing with the hand of your injured arm. Bend the elbow of your injured arm 90 degrees. Keeping your elbow in at your side, rotate your forearm across your body and then back to the starting position. Make sure you keep your forearm parallel to the floor. Do 3 sets of 10.   Scaption: Stand with your arms at your sides and with your elbows straight. Slowly raise your arms to eye level. As you raise your arms, spread them apart so that they are only slightly in front of your body (at about a 30-degree angle to the front of your body). Point your thumbs toward the ceiling. Hold for 2 seconds and lower your arms slowly. Do 3  "sets of 10. Progress to holding a soup can or light weight when you are doing the exercise and increase the weight as the exercise gets easier.   Side-lying external rotation: Lie on your uninjured side with your injured arm at your side and your elbow bent 90 degrees. Keeping your elbow against your side, raise your forearm toward the ceiling and hold for 2 seconds. Slowly lower your arm. Do 3 sets of 10. You can start doing this exercise holding a soup can or light weight and gradually increase the weight as long as there is no pain.   Horizontal abduction: Lie on your stomach on a table or the edge of a bed with the arm on your injured side hanging down over the edge. Raise your arm out to the side, with your thumb pointed toward the ceiling, until your arm is parallel to the floor. Hold for 2 seconds and then lower it slowly. Start this exercise with no weight. As you get stronger, add a light weight or hold a soup can. Do 3 sets of 10.   Push-up with a plus: Begin on the floor on your hands and knees. Keep your arms a shoulder width apart and lift your feet off the floor. Arch your back as high as possible and round your shoulders (this is the \"plus\" part or the exercise). Bend your elbows and lower your body to the floor. Return to the starting position and arch your back again. Do 3 sets of 10.   Published by [employer].  This content is reviewed periodically and is subject to change as new health information becomes available. The information is intended to inform and educate and is not a replacement for medical evaluation, advice, diagnosis or treatment by a healthcare professional.   Written by Chanell Castillo, MS, PT, and Elva Balderas PT, [facility], [facility], for [employer].   ? 2010 [employer] and/or its affiliates. All Rights Reserved.   Copyright   [employer] 2011  Adult Health Advisor                 "

## 2017-08-30 NOTE — ED PROVIDER NOTES
History     Chief Complaint   Patient presents with     Shoulder Pain     HPI  Humberto Roberts is a 47 year old male who presents for evaluation of left shoulder pain starting yesterday when she tried to back and be away from himself. He had immediate onset of pain to the superior shoulder area without an audible pop or snap. He had a similar episode of this in the past, but did improve with time. He reports that he had trouble sleeping last evening secondary to the pain. He did try ibuprofen and Tylenol. He is also attempted icing. He reports the pain being 8 on a scale of 10 currently. He is able to move the extremity, but any movement causes increased symptoms. No recent trauma to the shoulder region. He denies any chronic shoulder pain in the past.    I have reviewed the Medications, Allergies, Past Medical and Surgical History, and Social History in the Epic system.       Past Medical History:   Diagnosis Date     Acid reflux disease since 2006     Back pain chronic     Cellulitis of left upper arm and forearm 11/22/2013     Hypertension         Patient Active Problem List   Diagnosis     Seasonal allergic rhinitis     GERD (gastroesophageal reflux disease)     Hypertension goal BP (blood pressure) < 140/90     CARDIOVASCULAR SCREENING; LDL GOAL LESS THAN 160     Elevated serum creatinine     Insomnia        Past Surgical History:   Procedure Laterality Date     ENT SURGERY          No current facility-administered medications for this encounter.      Current Outpatient Prescriptions   Medication     oxyCODONE-acetaminophen (PERCOCET) 5-325 MG per tablet     atenolol (TENORMIN) 50 MG tablet     cyclobenzaprine (FLEXERIL) 10 MG tablet     omeprazole (PRILOSEC) 20 MG CR capsule     zolpidem (AMBIEN) 5 MG tablet     naproxen (NAPROSYN) 500 MG tablet     cetirizine (ZYRTEC ALLERGY) 10 MG tablet     hydrochlorothiazide (HYDRODIURIL) 25 MG tablet     cetirizine-psuedoePHEDrine (ZYRTEC-D) 5-120 MG per 12 hr tablet      diclofenac (VOLTAREN) 75 MG EC tablet     order for DME     order for DME     fluticasone (FLONASE) 50 MCG/ACT nasal spray         No Known Allergies       Review of Systems   All other systems reviewed and are negative.      Physical Exam   BP: 146/83  Pulse: 72  Temp: 98.3  F (36.8  C)  Resp: 18  SpO2: 97 %  Physical Exam  Generally healthy appearing male in NAD who is active and non-toxic appearing. Obese.  Heart:  RRR with normal S1 and S2.  No S3 or S4.  No murmur, rub, gallop, or click.  PMI is nondisplaced.   Lungs:  CTA bilaterally without wheezes, rales, or rhonchi.  Good breath sounds heard throughout all lung fields.  Tympanitic to percussion with no areas of dullness.   Chest : No chest wall deformities or tenderness.   Left shoulder:  No obvious abnormality, ecchymosis, or swelling on inspection of the shoulder.  Very limited ROM with pain.  No crepitus noted.  Nontender to palpation along all the bone structures of the shoulder.  Slight tenderness to palpation over the superior humerus.  left SHOULDER EXAM:Impingment sign with internal rotation  Pain with external rotation  Pain with flexion  Pain with abduction  weakness with external rotation, weakness with flexion and weakness with abduction  EXT:  Strength is equal and appropriate on testing of the biceps, triceps, and  strength.  Distal pulses are 2+. Sensation intact to light touch intact.       ED Course     ED Course     Procedures             Critical Care time:  none             Results for orders placed or performed during the hospital encounter of 08/29/17   XR Shoulder Left 3 Views    Narrative    XR SHOULDER LT G/E 3 VW  8/29/2017 11:03 PM      HISTORY: Left shoulder pain.    COMPARISON: None.      Impression    IMPRESSION: No acute fracture or dislocation.        Labs Ordered and Resulted from Time of ED Arrival Up to the Time of Departure from the ED - No data to display    Assessments & Plan (with Medical Decision Making)  Acute  pain of left shoulder     47 year old male presents for evaluation of shoulder pain starting yesterday when he swiped at a beat. No previous trauma. He has trialed ibuprofen and Tylenol without any significant improvement. Alternating ice and heat. He was unable to sleep last night, and he felt like tonight was going to be another night like that. On exam his blood pressure is 146/83, pulse 88, temperature 98.3. Pain with any range of motion of the shoulder. Tender to palpation of the superior humerus. X-rays of the shoulder were negative for as location, fracture, or spurring. 30 mg Toradol IM was administered with some success. He likely has a rotator cuff strain. Given his acute inflammation, it is difficult to ascertain if he has an actual rotator cuff tear. We discussed anti-inflammatory treatment with ibuprofen OTC. I did give him a small prescription for oxycodone as needed for breakthrough pain. Ice the shoulder for 20 minutes every 2-3 hours if possible. A sling was provided to give him support. Outpatient therapy exercises demonstrated and discussed with him to keep mobility and prevent adhesive capsulitis. Follow up with sports medicine in 1 week if symptoms not improving. The patient was in agreement with this plan and was suitable for discharge.      I have reviewed the nursing notes.    I have reviewed the findings, diagnosis, plan and need for follow up with the patient.       Discharge Medication List as of 8/29/2017 11:43 PM      START taking these medications    Details   oxyCODONE-acetaminophen (PERCOCET) 5-325 MG per tablet Take 1 tablet by mouth every 6 hours as needed, Disp-10 tablet, R-0, Local Print             Final diagnoses:   Acute pain of left shoulder     Disclaimer: This note consists of symbols derived from keyboarding, dictation and/or voice recognition software. As a result, there may be errors in the script that have gone undetected. Please consider this when interpreting  information found in this chart.     8/29/2017   Alcon Kaufman PA-C   [facility] EMERGENCY DEPARTMENT     Alcon Kaufman PA-C  08/30/17 0047

## 2017-08-30 NOTE — ED NOTES
Pt having pain in the left shoulder after swatting at a bee and now having uncontrolled pain and not able to sleep

## 2017-09-21 DIAGNOSIS — M25.519 ACUTE SHOULDER PAIN, UNSPECIFIED LATERALITY: Primary | ICD-10-CM

## 2017-09-21 NOTE — TELEPHONE ENCOUNTER
Percocet 5-325MG TABS      Last Written Prescription Date:  08/29/2017  Last Fill Quantity: 10,   # refills: 0  Last Office Visit with [facility] prescribing provider: 02/07/2017  Future Office visit:       Routing refill request to provider for review/approval because:  Drug not on the [facility] refill protocol or controlled substance    Thank You  Cj Casas  Pharmacy Technician  [facility]    On behalf of [facility]

## 2017-09-26 RX ORDER — OXYCODONE AND ACETAMINOPHEN 5; 325 MG/1; MG/1
1 TABLET ORAL EVERY 6 HOURS PRN
Qty: 10 TABLET | Refills: 0 | Status: SHIPPED | OUTPATIENT
Start: 2017-09-26 | End: 2017-11-02

## 2017-09-26 NOTE — TELEPHONE ENCOUNTER
Lm for pt to call clinic back, see msg below. Rx walked to [facility] pharmacy. Dolores Cuadra CMA

## 2017-09-26 NOTE — TELEPHONE ENCOUNTER
Patient called back. Info was given. He is going to make an appt with sports medicine.  Thank you,  Rachana Acosta   for [facility]

## 2017-09-26 NOTE — TELEPHONE ENCOUNTER
If he still is having pain to require narcotics, he should follow up with sports medicine as discussed in the ED. A referral has been placed. Please make appointment. I will refill prescription for a few pills

## 2017-11-02 ENCOUNTER — OFFICE VISIT (OUTPATIENT)
Dept: FAMILY MEDICINE | Facility: CLINIC | Age: 47
End: 2017-11-02
Payer: COMMERCIAL

## 2017-11-02 VITALS
DIASTOLIC BLOOD PRESSURE: 82 MMHG | TEMPERATURE: 97.8 F | SYSTOLIC BLOOD PRESSURE: 126 MMHG | BODY MASS INDEX: 40.62 KG/M2 | HEART RATE: 50 BPM | WEIGHT: 315 LBS

## 2017-11-02 DIAGNOSIS — R79.89 ELEVATED SERUM CREATININE: ICD-10-CM

## 2017-11-02 DIAGNOSIS — G47.00 INSOMNIA, UNSPECIFIED TYPE: ICD-10-CM

## 2017-11-02 DIAGNOSIS — I10 HYPERTENSION GOAL BP (BLOOD PRESSURE) < 140/90: Primary | ICD-10-CM

## 2017-11-02 DIAGNOSIS — M25.519 ACUTE SHOULDER PAIN, UNSPECIFIED LATERALITY: ICD-10-CM

## 2017-11-02 DIAGNOSIS — K21.9 GASTROESOPHAGEAL REFLUX DISEASE WITHOUT ESOPHAGITIS: ICD-10-CM

## 2017-11-02 DIAGNOSIS — J30.2 SEASONAL ALLERGIC RHINITIS, UNSPECIFIED CHRONICITY, UNSPECIFIED TRIGGER: ICD-10-CM

## 2017-11-02 DIAGNOSIS — I10 ESSENTIAL HYPERTENSION WITH GOAL BLOOD PRESSURE LESS THAN 140/90: ICD-10-CM

## 2017-11-02 LAB
ANION GAP SERPL CALCULATED.3IONS-SCNC: 5 MMOL/L (ref 3–14)
BUN SERPL-MCNC: 13 MG/DL (ref 7–30)
CALCIUM SERPL-MCNC: 9.2 MG/DL (ref 8.5–10.1)
CHLORIDE SERPL-SCNC: 104 MMOL/L (ref 94–109)
CO2 SERPL-SCNC: 30 MMOL/L (ref 20–32)
CREAT SERPL-MCNC: 1.17 MG/DL (ref 0.66–1.25)
CREAT UR-MCNC: 109 MG/DL
GFR SERPL CREATININE-BSD FRML MDRD: 67 ML/MIN/1.7M2
GLUCOSE SERPL-MCNC: 95 MG/DL (ref 70–99)
MICROALBUMIN UR-MCNC: 5 MG/L
MICROALBUMIN/CREAT UR: 4.73 MG/G CR (ref 0–17)
POTASSIUM SERPL-SCNC: 3.9 MMOL/L (ref 3.4–5.3)
SODIUM SERPL-SCNC: 139 MMOL/L (ref 133–144)

## 2017-11-02 PROCEDURE — 36415 COLL VENOUS BLD VENIPUNCTURE: CPT | Performed by: NURSE PRACTITIONER

## 2017-11-02 PROCEDURE — 99213 OFFICE O/P EST LOW 20 MIN: CPT | Performed by: NURSE PRACTITIONER

## 2017-11-02 PROCEDURE — 80048 BASIC METABOLIC PNL TOTAL CA: CPT | Performed by: NURSE PRACTITIONER

## 2017-11-02 PROCEDURE — 82043 UR ALBUMIN QUANTITATIVE: CPT | Performed by: NURSE PRACTITIONER

## 2017-11-02 RX ORDER — OXYCODONE AND ACETAMINOPHEN 5; 325 MG/1; MG/1
1 TABLET ORAL EVERY 6 HOURS PRN
Qty: 10 TABLET | Refills: 0 | Status: SHIPPED | OUTPATIENT
Start: 2017-11-02 | End: 2017-12-06

## 2017-11-02 RX ORDER — FLUTICASONE PROPIONATE 50 MCG
1-2 SPRAY, SUSPENSION (ML) NASAL DAILY
Qty: 1 BOTTLE | Refills: 11 | Status: SHIPPED | OUTPATIENT
Start: 2017-11-02 | End: 2018-11-29

## 2017-11-02 RX ORDER — ATENOLOL 50 MG/1
50 TABLET ORAL DAILY
Qty: 30 TABLET | Refills: 11 | Status: SHIPPED | OUTPATIENT
Start: 2017-11-02 | End: 2018-11-29

## 2017-11-02 RX ORDER — HYDROCHLOROTHIAZIDE 25 MG/1
25 TABLET ORAL DAILY
Qty: 30 TABLET | Refills: 11 | Status: SHIPPED | OUTPATIENT
Start: 2017-11-02 | End: 2018-11-29

## 2017-11-02 RX ORDER — ZOLPIDEM TARTRATE 10 MG/1
10 TABLET ORAL
Qty: 30 TABLET | Refills: 5 | Status: SHIPPED | OUTPATIENT
Start: 2017-11-02 | End: 2018-05-30

## 2017-11-02 NOTE — NURSING NOTE
"Chief Complaint   Patient presents with     Hypertension       Initial There were no vitals taken for this visit. Estimated body mass index is 41.3 kg/(m^2) as calculated from the following:    Height as of 2/7/17: 6' 2\" (1.88 m).    Weight as of 2/7/17: 321 lb 11.2 oz (145.9 kg).  Medication Reconciliation: complete  "

## 2017-11-02 NOTE — PROGRESS NOTES
SUBJECTIVE:   Humberto Roberts is a 47 year old male who presents to clinic today for the following health issues:      Hypertension Follow-up      Outpatient blood pressures are being checked at random, at dentist.  Results are within normal limits.    Low Salt Diet: no added salt        Amount of exercise or physical activity: None    Problems taking medications regularly: No    Medication side effects: none    Diet: regular (no restrictions)        Humberto is here for follow-up management of hypertension. He is due for lab work. Is trying to watch his diet, limiting salt. He denies chest pain or irregular heartbeats. He denies having swelling of the feet or ankles. He does on occasion have foot pain secondary to bone spurs. He has requested a refill of Percocet which he takes infrequently. Lipid profile was checked one year ago and was very good. He is not taking an anti-lipidemic. Trying to increase activity, has lost 5 pounds since spring    Problem list and histories reviewed & adjusted, as indicated.  Additional history: as documented    BP Readings from Last 3 Encounters:   11/02/17 126/82   08/29/17 146/83   02/07/17 132/82    Wt Readings from Last 3 Encounters:   11/02/17 (!) 316 lb 6.4 oz (143.5 kg)   02/07/17 (!) 321 lb 11.2 oz (145.9 kg)   01/31/17 (!) 318 lb (144.2 kg)                      Reviewed and updated as needed this visit by clinical staffTobacco  Allergies  Meds  Med Hx  Surg Hx  Fam Hx  Soc Hx      Reviewed and updated as needed this visit by Provider         ROS:  Constitutional, HEENT, cardiovascular, pulmonary, gi and gu systems are negative, except as otherwise noted.      OBJECTIVE:   /82  Pulse 50  Temp 97.8  F (36.6  C) (Tympanic)  Wt (!) 316 lb 6.4 oz (143.5 kg)  BMI 40.62 kg/m2  Body mass index is 40.62 kg/(m^2).   GENERAL: Very pleasant overweight male in no acute distress  EYES: Eyes grossly normal to inspection, PERRL and conjunctivae and sclerae normal  NECK: no  adenopathy, no asymmetry, masses, or scars and thyroid normal to palpation  RESP: lungs clear to auscultation - no rales, rhonchi or wheezes  CV: regular rate and rhythm, normal S1 S2, no S3 or S4, no murmur, click or rub, no peripheral edema and peripheral pulses strong  MS: no gross musculoskeletal defects noted, no edema    Diagnostic Test Results:  Results for orders placed or performed in visit on 11/02/17 (from the past 24 hour(s))   Basic metabolic panel   Result Value Ref Range    Sodium 139 133 - 144 mmol/L    Potassium 3.9 3.4 - 5.3 mmol/L    Chloride 104 94 - 109 mmol/L    Carbon Dioxide 30 20 - 32 mmol/L    Anion Gap 5 3 - 14 mmol/L    Glucose 95 70 - 99 mg/dL    Urea Nitrogen 13 7 - 30 mg/dL    Creatinine 1.17 0.66 - 1.25 mg/dL    GFR Estimate 67 >60 mL/min/1.7m2    GFR Estimate If Black 81 >60 mL/min/1.7m2    Calcium 9.2 8.5 - 10.1 mg/dL   Albumin Random Urine Quantitative with Creat Ratio   Result Value Ref Range    Creatinine Urine 109 mg/dL    Albumin Urine mg/L 5 mg/L    Albumin Urine mg/g Cr 4.73 0 - 17 mg/g Cr       ASSESSMENT/PLAN:     Problem List Items Addressed This Visit        Medium    Seasonal allergic rhinitis    Relevant Medications    fluticasone (FLONASE) 50 MCG/ACT spray    Insomnia    Relevant Medications    zolpidem (AMBIEN) 10 MG tablet    Hypertension goal BP (blood pressure) < 140/90 - Primary    Relevant Medications    atenolol (TENORMIN) 50 MG tablet    hydrochlorothiazide (HYDRODIURIL) 25 MG tablet    Other Relevant Orders    Basic metabolic panel (Completed)    Albumin Random Urine Quantitative with Creat Ratio (Completed)    GERD (gastroesophageal reflux disease)    Relevant Medications    omeprazole (PRILOSEC) 20 MG CR capsule    Elevated serum creatinine    Relevant Orders    Basic metabolic panel (Completed)    Albumin Random Urine Quantitative with Creat Ratio (Completed)      Other Visit Diagnoses     Essential hypertension with goal blood pressure less than 140/90         Relevant Medications    atenolol (TENORMIN) 50 MG tablet    hydrochlorothiazide (HYDRODIURIL) 25 MG tablet    Acute shoulder pain, unspecified laterality        Relevant Medications    oxyCODONE-acetaminophen (PERCOCET) 5-325 MG per tablet           Continue current medications at present dose  Continue to work on weight loss    RADHA Romeo [facility]

## 2017-11-02 NOTE — MR AVS SNAPSHOT
"              After Visit Summary   11/2/2017    Humberto Roberts    MRN: [id number]           Patient Information     Date Of Birth          1970        Visit Information        Provider Department      11/2/2017 10:00 AM Mariana Ortega APRN CNP [facility]        Today's Diagnoses     Hypertension goal BP (blood pressure) < 140/90    -  1    Elevated serum creatinine        Essential hypertension with goal blood pressure less than 140/90        Acute shoulder pain, unspecified laterality        Gastroesophageal reflux disease without esophagitis        Insomnia, unspecified type        Seasonal allergic rhinitis, unspecified chronicity, unspecified trigger           Follow-ups after your visit        Who to contact     If you have questions or need follow up information about today's clinic visit or your schedule please contact [facility] directly at [phone].  Normal or non-critical lab and imaging results will be communicated to you by MyChart, letter or phone within 4 business days after the clinic has received the results. If you do not hear from us within 7 days, please contact the clinic through St[employer] or phone. If you have a critical or abnormal lab result, we will notify you by phone as soon as possible.  Submit refill requests through [employer] or call your pharmacy and they will forward the refill request to us. Please allow 3 business days for your refill to be completed.          Additional Information About Your Visit        MyChart Information     [employer] lets you send messages to your doctor, view your test results, renew your prescriptions, schedule appointments and more. To sign up, go to [email or web address] . Click on \"Log in\" on the left side of the screen, which will take you to the Welcome page. Then click on \"Sign up Now\" on the right side of the page.     You will be asked to enter the access code listed below, as well as some personal " information. Please follow the directions to create your username and password.     Your access code is: [email or web address]  Expires: 2017 11:43 PM     Your access code will  in 90 days. If you need help or a new code, please call your [facility] clinic or [phone].        Care EveryWhere ID     This is your Care EveryWhere ID. This could be used by other organizations to access your [facility] medical records  [id number]        Your Vitals Were     Pulse Temperature BMI (Body Mass Index)             50 97.8  F (36.6  C) (Tympanic) 40.62 kg/m2          Blood Pressure from Last 3 Encounters:   17 126/82   17 146/83   17 132/82    Weight from Last 3 Encounters:   17 (!) 316 lb 6.4 oz (143.5 kg)   17 (!) 321 lb 11.2 oz (145.9 kg)   17 (!) 318 lb (144.2 kg)              We Performed the Following     Albumin Random Urine Quantitative with Creat Ratio     Basic metabolic panel          Today's Medication Changes          These changes are accurate as of: 17  1:10 PM.  If you have any questions, ask your nurse or doctor.               These medicines have changed or have updated prescriptions.        Dose/Directions    atenolol 50 MG tablet   Commonly known as:  TENORMIN   This may have changed:  additional instructions   Used for:  Essential hypertension with goal blood pressure less than 140/90   Changed by:  Mariana Ortega APRN CNP        Dose:  50 mg   Take 1 tablet (50 mg) by mouth daily   Quantity:  30 tablet   Refills:  11       hydrochlorothiazide 25 MG tablet   Commonly known as:  HYDRODIURIL   This may have changed:  additional instructions   Used for:  Essential hypertension with goal blood pressure less than 140/90   Changed by:  Mariana Ortega APRN CNP        Dose:  25 mg   Take 1 tablet (25 mg) by mouth daily   Quantity:  30 tablet   Refills:  11       omeprazole 20 MG CR capsule   Commonly known as:  priLOSEC   This may have changed:  See the new  instructions.   Used for:  Gastroesophageal reflux disease without esophagitis   Changed by:  Mariana Ortega APRN CNP        TAKE ONE CAPSULE BY MOUTH TWICE A DAY   Quantity:  60 capsule   Refills:  11       zolpidem 10 MG tablet   Commonly known as:  AMBIEN   This may have changed:    - medication strength  - how much to take  - how to take this  - when to take this  - reasons to take this  - additional instructions   Used for:  Insomnia, unspecified type   Changed by:  Mariana Ortega APRN CNP        Dose:  10 mg   Take 1 tablet (10 mg) by mouth nightly as needed for sleep   Quantity:  30 tablet   Refills:  5            Where to get your medicines      These medications were sent to [facility], [place] - [address] [facility]   [address] [place] [facility] [address]     Phone:  [phone]     atenolol 50 MG tablet    fluticasone 50 MCG/ACT spray    hydrochlorothiazide 25 MG tablet    omeprazole 20 MG CR capsule         Some of these will need a paper prescription and others can be bought over the counter.  Ask your nurse if you have questions.     Bring a paper prescription for each of these medications     oxyCODONE-acetaminophen 5-325 MG per tablet    zolpidem 10 MG tablet                Primary Care Provider Office Phone # Fax #    RADHA Romeo [phone]       [address] [facility]   [place] [address]        Equal Access to Services     KEON URIARTE : Chris callejaso Soomaali, waaxda luqadaha, qaybta kaalmada adeegyada, mariela dominguez. So [facility] [phone].    ATENCIÓN: Si habla español, tiene a varela disposición servicios gratuitos de asistencia lingüística. Claudiaame al [phone].    We comply with applicable federal civil rights laws and [place] laws. We do not discriminate on the basis of race, color, national origin, age, disability, sex, sexual orientation, or gender identity.            Thank you!     Thank you for  choosing [facility]  for your care. Our goal is always to provide you with excellent care. Hearing back from our patients is one way we can continue to improve our services. Please take a few minutes to complete the written survey that you may receive in the mail after your visit with us. Thank you!             Your Updated Medication List - Protect others around you: Learn how to safely use, store and throw away your medicines at www.disposemymeds.org.          This list is accurate as of: 11/2/17  1:10 PM.  Always use your most recent med list.                   Brand Name Dispense Instructions for use Diagnosis    atenolol 50 MG tablet    TENORMIN    30 tablet    Take 1 tablet (50 mg) by mouth daily    Essential hypertension with goal blood pressure less than 140/90       cetirizine 10 MG tablet    ZYRTEC ALLERGY    30 tablet    Take 1 tablet (10 mg) by mouth daily as needed for allergies    Seasonal allergic rhinitis due to pollen       cyclobenzaprine 10 MG tablet    FLEXERIL    30 tablet    Take 1 tablet (10 mg) by mouth 3 times daily as needed for muscle spasms    Cervicalgia       fluticasone 50 MCG/ACT spray    FLONASE    1 Bottle    Spray 1-2 sprays into both nostrils daily    Seasonal allergic rhinitis, unspecified chronicity, unspecified trigger       hydrochlorothiazide 25 MG tablet    HYDRODIURIL    30 tablet    Take 1 tablet (25 mg) by mouth daily    Essential hypertension with goal blood pressure less than 140/90       naproxen 500 MG tablet    NAPROSYN    60 tablet    Take 1 tablet (500 mg) by mouth 2 times daily as needed for moderate pain    Acute pain of right knee       omeprazole 20 MG CR capsule    priLOSEC    60 capsule    TAKE ONE CAPSULE BY MOUTH TWICE A DAY    Gastroesophageal reflux disease without esophagitis       * order for DME      1 Device Auto CPAP @@ 7-15 cm with heated humidity via mask of choice        * order for DME     1 Device    Dispensed 1 Pneumatic Walking  Brace, Size large, with [facility] agreement signed by patient. Lay Contreras [facility], July 22, 2015    Posterior tibial tendonitis, left       oxyCODONE-acetaminophen 5-325 MG per tablet    PERCOCET    10 tablet    Take 1 tablet by mouth every 6 hours as needed    Acute shoulder pain, unspecified laterality       zolpidem 10 MG tablet    AMBIEN    30 tablet    Take 1 tablet (10 mg) by mouth nightly as needed for sleep    Insomnia, unspecified type       * Notice:  This list has 2 medication(s) that are the same as other medications prescribed for you. Read the directions carefully, and ask your doctor or other care provider to review them with you.

## 2017-11-02 NOTE — LETTER
[address] [id number] [phone]       November 2, 2017    Humberto Roberts  [address] [address] [place] [id number]          Dear Humberto,    The results of your recent lab tests were normal.     Results for orders placed or performed in visit on 11/02/17   Basic metabolic panel   Result Value Ref Range    Sodium 139 133 - 144 mmol/L    Potassium 3.9 3.4 - 5.3 mmol/L    Chloride 104 94 - 109 mmol/L    Carbon Dioxide 30 20 - 32 mmol/L    Anion Gap 5 3 - 14 mmol/L    Glucose 95 70 - 99 mg/dL    Urea Nitrogen 13 7 - 30 mg/dL    Creatinine 1.17 0.66 - 1.25 mg/dL    GFR Estimate 67 >60 mL/min/1.7m2    GFR Estimate If Black 81 >60 mL/min/1.7m2    Calcium 9.2 8.5 - 10.1 mg/dL   Albumin Random Urine Quantitative with Creat Ratio   Result Value Ref Range    Creatinine Urine 109 mg/dL    Albumin Urine mg/L 5 mg/L    Albumin Urine mg/g Cr 4.73 0 - 17 mg/g Cr       It was a pleasure to see you at your last appointment.    If you have any questions or concerns, please call [phone].    Sincerely,      Mariana Ortega [facility] care team

## 2017-12-06 ENCOUNTER — OFFICE VISIT (OUTPATIENT)
Dept: FAMILY MEDICINE | Facility: CLINIC | Age: 47
End: 2017-12-06
Payer: COMMERCIAL

## 2017-12-06 VITALS
WEIGHT: 315 LBS | HEART RATE: 76 BPM | RESPIRATION RATE: 18 BRPM | TEMPERATURE: 97.7 F | OXYGEN SATURATION: 100 % | DIASTOLIC BLOOD PRESSURE: 68 MMHG | SYSTOLIC BLOOD PRESSURE: 124 MMHG | BODY MASS INDEX: 41.02 KG/M2

## 2017-12-06 DIAGNOSIS — S46.919A: Primary | ICD-10-CM

## 2017-12-06 DIAGNOSIS — E66.01 MORBID OBESITY (H): ICD-10-CM

## 2017-12-06 PROCEDURE — 99213 OFFICE O/P EST LOW 20 MIN: CPT | Performed by: FAMILY MEDICINE

## 2017-12-06 RX ORDER — METHOCARBAMOL 750 MG/1
750 TABLET, FILM COATED ORAL 3 TIMES DAILY PRN
Qty: 90 TABLET | Refills: 0 | Status: SHIPPED | OUTPATIENT
Start: 2017-12-06 | End: 2018-01-22

## 2017-12-06 ASSESSMENT — PAIN SCALES - GENERAL: PAINLEVEL: EXTREME PAIN (8)

## 2017-12-06 NOTE — NURSING NOTE
"Chief Complaint   Patient presents with     Shoulder Pain     rt x1wk       Initial /68 (BP Location: Left arm, Patient Position: Chair, Cuff Size: Adult Large)  Pulse 76  Temp 97.7  F (36.5  C) (Tympanic)  Resp 18  Wt (!) 319 lb 8 oz (144.9 kg)  SpO2 100%  BMI 41.02 kg/m2 Estimated body mass index is 41.02 kg/(m^2) as calculated from the following:    Height as of 2/7/17: 6' 2\" (1.88 m).    Weight as of this encounter: 319 lb 8 oz (144.9 kg).  Medication Reconciliation: complete   Health Maintenance Due   Topic Date Due     INFLUENZA VACCINE (SYSTEM ASSIGNED)  09/01/2017     Christy Dorman, [facility]      "

## 2017-12-06 NOTE — MR AVS SNAPSHOT
"              After Visit Summary   2017    Humberto Roberts    MRN: [id number]           Patient Information     Date Of Birth          1970        Visit Information        Provider Department      2017 5:00 PM Ángel Acosta MD [facility]        Today's Diagnoses     Morbid obesity (H)    -  1    Muscle strain of scapular region, initial encounter           Follow-ups after your visit        Follow-up notes from your care team     Return if symptoms worsen or fail to improve.      Who to contact     If you have questions or need follow up information about today's clinic visit or your schedule please contact [facility] directly at [phone].  Normal or non-critical lab and imaging results will be communicated to you by SDIhart, letter or phone within 4 business days after the clinic has received the results. If you do not hear from us within 7 days, please contact the clinic through SDIhart or phone. If you have a critical or abnormal lab result, we will notify you by phone as soon as possible.  Submit refill requests through [employer] or call your pharmacy and they will forward the refill request to us. Please allow 3 business days for your refill to be completed.          Additional Information About Your Visit        MyChart Information     [employer] lets you send messages to your doctor, view your test results, renew your prescriptions, schedule appointments and more. To sign up, go to [email or web address] . Click on \"Log in\" on the left side of the screen, which will take you to the Welcome page. Then click on \"Sign up Now\" on the right side of the page.     You will be asked to enter the access code listed below, as well as some personal information. Please follow the directions to create your username and password.     Your access code is: [email or web address]  Expires: 3/6/2018  5:25 PM     Your access code will  in 90 days. If you need help or a new code, please " call your [facility] clinic or [phone].        Care EveryWhere ID     This is your Care EveryWhere ID. This could be used by other organizations to access your [facility] medical records  [id number]        Your Vitals Were     Pulse Temperature Respirations Pulse Oximetry BMI (Body Mass Index)       76 97.7  F (36.5  C) (Tympanic) 18 100% 41.02 kg/m2        Blood Pressure from Last 3 Encounters:   12/06/17 124/68   11/02/17 126/82   08/29/17 146/83    Weight from Last 3 Encounters:   12/06/17 (!) 319 lb 8 oz (144.9 kg)   11/02/17 (!) 316 lb 6.4 oz (143.5 kg)   02/07/17 (!) 321 lb 11.2 oz (145.9 kg)              Today, you had the following     No orders found for display         Today's Medication Changes          These changes are accurate as of: 12/6/17  5:25 PM.  If you have any questions, ask your nurse or doctor.               Start taking these medicines.        Dose/Directions    methocarbamol 750 MG tablet   Commonly known as:  ROBAXIN   Used for:  Muscle strain of scapular region, initial encounter   Started by:  Ángel Acosta MD        Dose:  750 mg   Take 1 tablet (750 mg) by mouth 3 times daily as needed for muscle spasms   Quantity:  90 tablet   Refills:  0         Stop taking these medicines if you haven't already. Please contact your care team if you have questions.     cyclobenzaprine 10 MG tablet   Commonly known as:  FLEXERIL   Stopped by:  Ángel Acosta MD                Where to get your medicines      [facility] [place] [address] [facility] [place] [address]     Phone:  [phone]     methocarbamol 750 MG tablet                Primary Care Provider Office Phone # Fax #    RADHA Romeo [facility] [phone]       [address] [place] [address] [address]AR [address]        Equal Access to Services     CANDIDO URIARTE AH: Chris Ohara, cassidyda luqadaha, qaybfay faust, mariela chaudhari  keira cuevasjosephosiel calderon'aan ah. So [facility] [phone].    ATENCIÓN: Si judyla megan, tiene a varela disposición servicios gratuitos de asistencia lingüística. Teofilo tovar [phone].    We comply with applicable federal civil rights laws and [place] laws. We do not discriminate on the basis of race, color, national origin, age, disability, sex, sexual orientation, or gender identity.            Thank you!     Thank you for choosing [facility]  for your care. Our goal is always to provide you with excellent care. Hearing back from our patients is one way we can continue to improve our services. Please take a few minutes to complete the written survey that you may receive in the mail after your visit with us. Thank you!             Your Updated Medication List - Protect others around you: Learn how to safely use, store and throw away your medicines at www.disposemymeds.org.          This list is accurate as of: 12/6/17  5:25 PM.  Always use your most recent med list.                   Brand Name Dispense Instructions for use Diagnosis    atenolol 50 MG tablet    TENORMIN    30 tablet    Take 1 tablet (50 mg) by mouth daily    Essential hypertension with goal blood pressure less than 140/90       cetirizine 10 MG tablet    ZYRTEC ALLERGY    30 tablet    Take 1 tablet (10 mg) by mouth daily as needed for allergies    Seasonal allergic rhinitis due to pollen       fluticasone 50 MCG/ACT spray    FLONASE    1 Bottle    Spray 1-2 sprays into both nostrils daily    Seasonal allergic rhinitis, unspecified chronicity, unspecified trigger       hydrochlorothiazide 25 MG tablet    HYDRODIURIL    30 tablet    Take 1 tablet (25 mg) by mouth daily    Essential hypertension with goal blood pressure less than 140/90       methocarbamol 750 MG tablet    ROBAXIN    90 tablet    Take 1 tablet (750 mg) by mouth 3 times daily as needed for muscle spasms    Muscle strain of scapular region, initial encounter       naproxen 500 MG  tablet    NAPROSYN    60 tablet    Take 1 tablet (500 mg) by mouth 2 times daily as needed for moderate pain    Acute pain of right knee       omeprazole 20 MG CR capsule    priLOSEC    60 capsule    TAKE ONE CAPSULE BY MOUTH TWICE A DAY    Gastroesophageal reflux disease without esophagitis       order for DME      1 Device Auto CPAP @@ 7-15 cm with heated humidity via mask of choice        zolpidem 10 MG tablet    AMBIEN    30 tablet    Take 1 tablet (10 mg) by mouth nightly as needed for sleep    Insomnia, unspecified type

## 2017-12-06 NOTE — PROGRESS NOTES
SUBJECTIVE:   Humberto Roberts is a 47 year old male who presents to clinic today for the following health issues:      Joint Pain    Onset: x1w    Description:   Location: right shoulder (lower)  Character: Sharp- especially with movement    Intensity: severe, 8/10    Progression of Symptoms: worse    Accompanying Signs & Symptoms:  Other symptoms: tingling, weakness of rt arm in the mornings and swelling    History:   Previous similar pain: Prior history of shoulder strains.     Precipitating factors:   Trauma or overuse: Recalls pulling starter cord on  several weeks ago. Right handed.    Alleviating factors:  Improved by: rest/inactivity    Therapies Tried and outcome: naproxen and advil- didn't help at all              Problem list and histories reviewed & adjusted, as indicated.  Additional history: as documented    Patient Active Problem List   Diagnosis     Seasonal allergic rhinitis     GERD (gastroesophageal reflux disease)     Hypertension goal BP (blood pressure) < 140/90     CARDIOVASCULAR SCREENING; LDL GOAL LESS THAN 160     Elevated serum creatinine     Insomnia     Past Surgical History:   Procedure Laterality Date     ENT SURGERY         Social History   Substance Use Topics     Smoking status: Never Smoker     Smokeless tobacco: Never Used     Alcohol use No     No family history on file.      Current Outpatient Prescriptions   Medication Sig Dispense Refill     atenolol (TENORMIN) 50 MG tablet Take 1 tablet (50 mg) by mouth daily 30 tablet 11     hydrochlorothiazide (HYDRODIURIL) 25 MG tablet Take 1 tablet (25 mg) by mouth daily 30 tablet 11     omeprazole (PRILOSEC) 20 MG CR capsule TAKE ONE CAPSULE BY MOUTH TWICE A DAY 60 capsule 11     zolpidem (AMBIEN) 10 MG tablet Take 1 tablet (10 mg) by mouth nightly as needed for sleep 30 tablet 5     fluticasone (FLONASE) 50 MCG/ACT spray Spray 1-2 sprays into both nostrils daily 1 Bottle 11     naproxen (NAPROSYN) 500 MG tablet Take 1 tablet  (500 mg) by mouth 2 times daily as needed for moderate pain 60 tablet 1     cetirizine (ZYRTEC ALLERGY) 10 MG tablet Take 1 tablet (10 mg) by mouth daily as needed for allergies 30 tablet 6     cyclobenzaprine (FLEXERIL) 10 MG tablet Take 1 tablet (10 mg) by mouth 3 times daily as needed for muscle spasms 30 tablet 1     order for DME 1 Device Auto CPAP @@ 7-15 cm with heated humidity via mask of choice       No Known Allergies  Recent Labs   Lab Test  11/02/17   1033  08/31/16   0922   10/02/14   1020   06/29/11   1444   A1C   --    --    --    --    --   5.5   LDL   --   67   --   87   --   75.0   HDL   --   30*   --    --    --   27.0*   TRIG   --   150*   --    --    --   232.0*   ALT   --    --    --    --    --   38.0   CR  1.17  0.99   < >  1.08   < >   --    GFRESTIMATED  67  81   < >  74   < >   --    GFRESTBLACK  81  >90   GFR Calc     < >  90   < >   --    POTASSIUM  3.9  3.8   < >  3.7   < >   --     < > = values in this interval not displayed.      BP Readings from Last 3 Encounters:   12/06/17 124/68   11/02/17 126/82   08/29/17 146/83    Wt Readings from Last 3 Encounters:   12/06/17 (!) 319 lb 8 oz (144.9 kg)   11/02/17 (!) 316 lb 6.4 oz (143.5 kg)   02/07/17 (!) 321 lb 11.2 oz (145.9 kg)                  Labs reviewed in EPIC          Reviewed and updated as needed this visit by clinical staffTobacco  Allergies  Meds  Problems  Soc Hx      Reviewed and updated as needed this visit by Provider  Allergies  Meds  Problems         ROS:  Constitutional, HEENT, cardiovascular, pulmonary, gi and gu systems are negative, except as otherwise noted.      OBJECTIVE:   /68 (BP Location: Left arm, Patient Position: Chair, Cuff Size: Adult Large)  Pulse 76  Temp 97.7  F (36.5  C) (Tympanic)  Resp 18  Wt (!) 319 lb 8 oz (144.9 kg)  SpO2 100%  BMI 41.02 kg/m2  Body mass index is 41.02 kg/(m^2).  GENERAL: alert, no distress and obese  MS: Shoulder exam shows no impingement signs  "are present with pain at high arc of abduction and forward flexion on bilaterally. There is tenderness of the .Right rhomboid attachment to medial scapula    Diagnostic Test Results:  none     ASSESSMENT/PLAN:     Muscle strain of scapular region, initial encounter  Rhomboid strain. Rest the affected painful area as much as possible.  Apply ice for 15-20 minutes intermittently as needed and especially after any offending activity. Daily stretching.  As pain recedes, begin normal activities slowly as tolerated. The current medical regimen is effective;  continue present plan and medications.   Consider Physical Therapy if symptoms not better with symptomatic care.  - methocarbamol (ROBAXIN) 750 MG tablet; Take 1 tablet (750 mg) by mouth 3 times daily as needed for muscle spasms  Dispense: 90 tablet; Refill: 0    Morbid obesity (H)  BMI:   Estimated body mass index is 41.02 kg/(m^2) as calculated from the following:    Height as of 2/7/17: 6' 2\" (1.88 m).    Weight as of this encounter: 319 lb 8 oz (144.9 kg).   Weight management plan: Discussed healthy diet and exercise guidelines and patient will follow up in 1 month in clinic to re-evaluate.      Work on weight loss    Ángel Acosta MD  [facility]    "

## 2017-12-08 ENCOUNTER — TRANSFERRED RECORDS (OUTPATIENT)
Dept: HEALTH INFORMATION MANAGEMENT | Facility: CLINIC | Age: 47
End: 2017-12-08

## 2017-12-08 ENCOUNTER — TELEPHONE (OUTPATIENT)
Dept: FAMILY MEDICINE | Facility: CLINIC | Age: 47
End: 2017-12-08

## 2017-12-08 NOTE — LETTER
[address]   [address]  Tel. [phone] / Fax [phone]    December 8, 2017        Humberto Roberts  [address] [place] [id number]          Dear Humberto,    We are unable to reach you by phone.     Please call the clinic at your earliest convenience.    Sincerely,        Mariana Ortega, NP care team

## 2017-12-08 NOTE — TELEPHONE ENCOUNTER
Unable to reach patient regarding form. Letter mailed for patient to call clinic. Please relay message below.      Our goal is to have forms completed with 72 hours, however some forms may require a visit or additional information.    Who is the form from?: Patient  Where the form came from: Patient or family brought in     What clinic location was the form placed at?: [place]  Where the form was placed: 's Box  What number is listed as a contact on the form?: patient      Phone call message- patient request for a letter, form or note:    Date needed: as soon as possible    Has the patient signed a consent form for release of information? NO    Additional comments: patient dropped off Application for Disability Parking Certificate. Per provider, this will not be completed, patient does not meet specific requirements/criteria to have form completed. Tried to call patient, number listed is not in service. Will send a letter for patient. Alan/MA      Call taken on 12/8/2017 at 1:12 PM by Ashleigh CROSS Case    Type of letter, form or note: Application for Disability Parking Certificate

## 2017-12-12 NOTE — TELEPHONE ENCOUNTER
Patient calls to check status of request.  Is informed of provider message below. Phone number verified, and patient states this is a correct number.

## 2017-12-15 ENCOUNTER — TELEPHONE (OUTPATIENT)
Dept: FAMILY MEDICINE | Facility: CLINIC | Age: 47
End: 2017-12-15

## 2017-12-15 NOTE — TELEPHONE ENCOUNTER
Humberto returns call and message is relayed.  An appt is made for pt with Mariana to discuss shower chair.

## 2017-12-15 NOTE — TELEPHONE ENCOUNTER
Spoke with Samara at [facility]. She states she did not ask a reason as to why he needs the shower chair. He had just mentioned that it was something he discussed with his last care coordinator, so she said she would reach out to us.

## 2017-12-15 NOTE — TELEPHONE ENCOUNTER
Mariana would like to see patient in clinic to discuss. Left message for patient to call back. Please assist in scheduling and appt (30 minutes).

## 2017-12-22 ENCOUNTER — OFFICE VISIT (OUTPATIENT)
Dept: FAMILY MEDICINE | Facility: CLINIC | Age: 47
End: 2017-12-22
Payer: COMMERCIAL

## 2017-12-22 VITALS
BODY MASS INDEX: 40.49 KG/M2 | HEART RATE: 60 BPM | TEMPERATURE: 97.6 F | WEIGHT: 315 LBS | DIASTOLIC BLOOD PRESSURE: 80 MMHG | SYSTOLIC BLOOD PRESSURE: 124 MMHG

## 2017-12-22 DIAGNOSIS — M79.672 FOOT PAIN, BILATERAL: ICD-10-CM

## 2017-12-22 DIAGNOSIS — R05.9 COUGH: Primary | ICD-10-CM

## 2017-12-22 DIAGNOSIS — M79.671 FOOT PAIN, BILATERAL: ICD-10-CM

## 2017-12-22 DIAGNOSIS — E66.01 MORBID OBESITY (H): ICD-10-CM

## 2017-12-22 PROCEDURE — 99213 OFFICE O/P EST LOW 20 MIN: CPT | Performed by: NURSE PRACTITIONER

## 2017-12-22 RX ORDER — CODEINE PHOSPHATE AND GUAIFENESIN 10; 100 MG/5ML; MG/5ML
2 SOLUTION ORAL EVERY 4 HOURS PRN
Qty: 240 ML | Refills: 0 | Status: SHIPPED | OUTPATIENT
Start: 2017-12-22 | End: 2018-04-12

## 2017-12-22 NOTE — NURSING NOTE
"Chief Complaint   Patient presents with     Orders     asking for order for shower chair       Initial There were no vitals taken for this visit. Estimated body mass index is 41.02 kg/(m^2) as calculated from the following:    Height as of 2/7/17: 6' 2\" (1.88 m).    Weight as of 12/6/17: 319 lb 8 oz (144.9 kg).  Medication Reconciliation: complete  "

## 2017-12-22 NOTE — MR AVS SNAPSHOT
After Visit Summary   12/22/2017    Humberto Roberts    MRN: [id number]           Patient Information     Date Of Birth          1970        Visit Information        Provider Department      12/22/2017 8:30 AM Mariana Ortega APRN CNP [facility]        Today's Diagnoses     Cough    -  1    Morbid obesity (H)        Foot pain, bilateral           Follow-ups after your visit        Additional Services     NUTRITION REFERRAL       Your provider has referred you to: [facility]: [facility] [phone]   [email or web address]    Please be aware that coverage of these services is subject to the terms and limitations of your health insurance plan.  Call member services at your health plan with any benefit or coverage questions.      Please bring the following with you to your appointment:    (1) This referral request  (2) Any documents given to you regarding this referral  (3) Any specific questions you have about diet and/or food choices                  Who to contact     If you have questions or need follow up information about today's clinic visit or your schedule please contact [facility] directly at [phone].  Normal or non-critical lab and imaging results will be communicated to you by MyChart, letter or phone within 4 business days after the clinic has received the results. If you do not hear from us within 7 days, please contact the clinic through [facility] or phone. If you have a critical or abnormal lab result, we will notify you by phone as soon as possible.  Submit refill requests through Ember or call your pharmacy and they will forward the refill request to us. Please allow 3 business days for your refill to be completed.          Additional Information About Your Visit        MyChart Information     Ember lets you send messages to your doctor, view your test results, renew your prescriptions, schedule  "appointments and more. To sign up, go to [email or web address] . Click on \"Log in\" on the left side of the screen, which will take you to the Welcome page. Then click on \"Sign up Now\" on the right side of the page.     You will be asked to enter the access code listed below, as well as some personal information. Please follow the directions to create your username and password.     Your access code is: [email or web address]  Expires: 3/6/2018  5:25 PM     Your access code will  in 90 days. If you need help or a new code, please call your [place] clinic or [phone].        Care EveryWhere ID     This is your Care EveryWhere ID. This could be used by other organizations to access your [place] medical records  [id number]        Your Vitals Were     Pulse Temperature BMI (Body Mass Index)             60 97.6  F (36.4  C) (Tympanic) 40.49 kg/m2          Blood Pressure from Last 3 Encounters:   17 124/80   17 124/68   17 126/82    Weight from Last 3 Encounters:   17 (!) 315 lb 6.4 oz (143.1 kg)   17 (!) 319 lb 8 oz (144.9 kg)   17 (!) 316 lb 6.4 oz (143.5 kg)              We Performed the Following     NUTRITION REFERRAL          Today's Medication Changes          These changes are accurate as of: 17  9:09 AM.  If you have any questions, ask your nurse or doctor.               Start taking these medicines.        Dose/Directions    guaiFENesin-codeine 100-10 MG/5ML Soln solution   Commonly known as:  ROBITUSSIN AC   Used for:  Cough   Started by:  Mariana Ortega APRN CNP        Dose:  2 tsp.   Take 10 mLs by mouth every 4 hours as needed   Quantity:  240 mL   Refills:  0         These medicines have changed or have updated prescriptions.        Dose/Directions    * order for DME   This may have changed:  Another medication with the same name was added. Make sure you understand how and when to take each.        Dose:  1 Device   1 Device Auto CPAP @@ 7-15 cm with " heated humidity via mask of choice   Refills:  0       * order for DME   This may have changed:  You were already taking a medication with the same name, and this prescription was added. Make sure you understand how and when to take each.   Used for:  Morbid obesity (H), Foot pain, bilateral   Changed by:  Mariana Ortega APRN CNP        Equipment being ordered: shower chair   Quantity:  1 Device   Refills:  0       * Notice:  This list has 2 medication(s) that are the same as other medications prescribed for you. Read the directions carefully, and ask your doctor or other care provider to review them with you.         Where to get your medicines      Some of these will need a paper prescription and others can be bought over the counter.  Ask your nurse if you have questions.     Bring a paper prescription for each of these medications     guaiFENesin-codeine 100-10 MG/5ML Soln solution    order for DME                Primary Care Provider Office Phone # Fax #    RADHA Romeo [phone] [address] [place] [address]        Equal Access to Services     [facility] AH: Hadii aad ku hadasho Soomaali, waaxda luqadaha, qaybta kaalmada adeegyada, waxay idiin haykerrie austin . So [facility] [phone].    ATENCIÓN: Si habla español, tiene a varela disposición servicios gratuitos de asistencia lingüística. [facility] [phone].    We comply with applicable federal civil rights laws and [place] laws. We do not discriminate on the basis of race, color, national origin, age, disability, sex, sexual orientation, or gender identity.            Thank you!     Thank you for choosing [facility]  for your care. Our goal is always to provide you with excellent care. Hearing back from our patients is one way we can continue to improve our services. Please take a few minutes to complete the written survey that you may receive in the mail after your visit with us.  Thank you!             Your Updated Medication List - Protect others around you: Learn how to safely use, store and throw away your medicines at www.disposemymeds.org.          This list is accurate as of: 12/22/17  9:09 AM.  Always use your most recent med list.                   Brand Name Dispense Instructions for use Diagnosis    atenolol 50 MG tablet    TENORMIN    30 tablet    Take 1 tablet (50 mg) by mouth daily    Essential hypertension with goal blood pressure less than 140/90       cetirizine 10 MG tablet    ZYRTEC ALLERGY    30 tablet    Take 1 tablet (10 mg) by mouth daily as needed for allergies    Seasonal allergic rhinitis due to pollen       fluticasone 50 MCG/ACT spray    FLONASE    1 Bottle    Spray 1-2 sprays into both nostrils daily    Seasonal allergic rhinitis, unspecified chronicity, unspecified trigger       guaiFENesin-codeine 100-10 MG/5ML Soln solution    ROBITUSSIN AC    240 mL    Take 10 mLs by mouth every 4 hours as needed    Cough       hydrochlorothiazide 25 MG tablet    HYDRODIURIL    30 tablet    Take 1 tablet (25 mg) by mouth daily    Essential hypertension with goal blood pressure less than 140/90       methocarbamol 750 MG tablet    ROBAXIN    90 tablet    Take 1 tablet (750 mg) by mouth 3 times daily as needed for muscle spasms    Muscle strain of scapular region, initial encounter       naproxen 500 MG tablet    NAPROSYN    60 tablet    Take 1 tablet (500 mg) by mouth 2 times daily as needed for moderate pain    Acute pain of right knee       omeprazole 20 MG CR capsule    priLOSEC    60 capsule    TAKE ONE CAPSULE BY MOUTH TWICE A DAY    Gastroesophageal reflux disease without esophagitis       * order for DME      1 Device Auto CPAP @@ 7-15 cm with heated humidity via mask of choice        * order for DME     1 Device    Equipment being ordered: shower chair    Morbid obesity (H), Foot pain, bilateral       zolpidem 10 MG tablet    AMBIEN    30 tablet    Take 1 tablet (10 mg)  by mouth nightly as needed for sleep    Insomnia, unspecified type       * Notice:  This list has 2 medication(s) that are the same as other medications prescribed for you. Read the directions carefully, and ask your doctor or other care provider to review them with you.

## 2017-12-22 NOTE — PROGRESS NOTES
SUBJECTIVE:   Humberto Roberts is a 47 year old male who presents to clinic today for the following health issues:      Asking for order for shower chair. Patient states legs, feet problems, unable to stand up at times, feels like they give out    Humberto states he has difficulty standing in the shower at times because his feet become extremely painful due to periodic episodes of gout, he also has bone spurs.  He reports a similar kind of discomfort when walking any distance, such as in a parking lot.  He has previously requested disability parking, but this request was denied.  He was recently seen in clinic with a shoulder muscle issue, that has resolved  He was seen last month to follow-up hypertension.  All lab work was normal at that time.  Medication continued at present dose since his blood pressure is within goal.  We have talked about his weight  At each appointment, he did lose 5 pounds this year, but is aware this is minimal compared to where he needs to go.  He states he has quit drinking pop, has not had one can of pop since we began discussing dietary changes.  He also has decreased his intake of red meat.  He states he does not care much for sweets or snacks, portion control is probably the biggest issue.  Also, he is not in the habit of getting physical exercise, but states he is joining a gym that will be paid by his insurance.    Problem list and histories reviewed & adjusted, as indicated.  Additional history: as documented    BP Readings from Last 3 Encounters:   12/22/17 124/80   12/06/17 124/68   11/02/17 126/82    Wt Readings from Last 3 Encounters:   12/22/17 (!) 315 lb 6.4 oz (143.1 kg)   12/06/17 (!) 319 lb 8 oz (144.9 kg)   11/02/17 (!) 316 lb 6.4 oz (143.5 kg)                      Reviewed and updated as needed this visit by clinical staff       Reviewed and updated as needed this visit by Provider         ROS:  Constitutional, HEENT, cardiovascular, pulmonary, gi and gu systems are  negative, except as otherwise noted.      OBJECTIVE:   /80  Pulse 60  Temp 97.6  F (36.4  C) (Tympanic)  Wt (!) 315 lb 6.4 oz (143.1 kg)  BMI 40.49 kg/m2  Body mass index is 40.49 kg/(m^2).   GENERAL: Very pleasant morbidly obese male in no acute distress  NECK: no adenopathy, no asymmetry, masses, or scars and thyroid normal to palpation  RESP: lungs clear to auscultation - no rales, rhonchi or wheezes  CV: regular rate and rhythm, normal S1 S2, no S3 or S4, no murmur, click or rub, no peripheral edema and peripheral pulses strong  MS: no gross musculoskeletal defects noted, no edema.  Ambulatory without any difficulty        ASSESSMENT/PLAN:     Problem List Items Addressed This Visit        Medium    Morbid obesity (H)    Relevant Medications    order for DME    Other Relevant Orders    NUTRITION REFERRAL      Other Visit Diagnoses     Cough    -  Primary    Relevant Medications    guaiFENesin-codeine (ROBITUSSIN AC) 100-10 MG/5ML SOLN solution    Foot pain, bilateral        Relevant Medications    order for DME           He was given a prescription for a shower chair, will take this to a medical supply store.  I am concerned for the potential of a fall in the shower, and would not want that to happen.  In regards to having foot pain when he is ambulating from the parking lot, I re-enforced the need for weight loss, since excessive weight on his feet increases pressure on the bone spurs, which consequently is causing increased foot pain.  This is not a reason for disability parking.  I think we need to focus on the main problem here which is his weight.  Potential for heart disease, diabetes, and other obesity related health concerns was reviewed with the patient.  We talked once more about healthy diet, limiting snacks, no fatty foods, portion control.  I am making a referral to a nutritionist to further review dietary recommendations with him.  Also encouraged him to join the gym, engage in aerobic  weight loss program, such as walking or an elliptical.  Also weight lifting would be helpful to maintain muscle mass and help burn calories.  He verbalizes agreement with this plan, will hopefully make some positive strides.  We did look at the BMI index, and his weight should be around 200 pounds.    Just prior to leaving he also requested something for cough.  His lungs are clear, most likely a viral issue.  He was given a prescription for some Robitussin with [facility]

## 2018-01-22 DIAGNOSIS — J30.1 SEASONAL ALLERGIC RHINITIS DUE TO POLLEN, UNSPECIFIED CHRONICITY: Primary | ICD-10-CM

## 2018-01-22 DIAGNOSIS — M25.561 ACUTE PAIN OF RIGHT KNEE: ICD-10-CM

## 2018-01-22 DIAGNOSIS — S46.919A: ICD-10-CM

## 2018-01-22 DIAGNOSIS — J30.1 SEASONAL ALLERGIC RHINITIS DUE TO POLLEN: ICD-10-CM

## 2018-01-22 NOTE — TELEPHONE ENCOUNTER
"Requested Prescriptions   Pending Prescriptions Disp Refills     cetirizine (ZYRTEC) 10 MG tablet [Pharmacy Med Name: CETIRIZINE HCL 10MG TABS] 30 tablet 6     Sig: TAKE ONE TABLET BY MOUTH EVERY DAY AS NEEDED FOR ALLERGIES    Antihistamines Protocol Passed    1/22/2018  9:18 AM       Passed - Recent or future visit with authorizing provider's specialty    Patient had office visit in the last year or has a visit in the next 30 days with authorizing provider.  See \"Patient Info\" tab in inbasket, or \"Choose Columns\" in Meds & Orders section of the refill encounter.            Passed - Patient is age 3 or older    Apply age and/or weight-based dosing for peds patients age 3 and older.    Forward request to provider for patients under the age of 3.          naproxen (NAPROSYN) 500 MG tablet [Pharmacy Med Name: NAPROXEN 500MG TABS] 60 tablet 1     Sig: TAKE ONE TABLET BY MOUTH TWICE A DAY AS NEEDED FOR MODERATE PAIN.    NSAID Medications Failed    1/22/2018  9:18 AM       Failed - Normal ALT on file in past 12 months    Recent Labs   Lab Test  06/29/11   1444   ALT  38.0            Failed - Normal AST on file in past 12 months    Recent Labs   Lab Test  06/29/11   1444   AST  28.0            Failed - Normal CBC on file in past 12 months    Recent Labs   Lab Test  12/11/16   1957   WBC  6.6   RBC  5.39   HGB  15.0   HCT  44.1   PLT  220            Passed - Blood pressure under 140/90    BP Readings from Last 3 Encounters:   12/22/17 124/80   12/06/17 124/68   11/02/17 126/82                Passed - Recent or future visit with authorizing provider's specialty    Patient had office visit in the last year or has a visit in the next 30 days with authorizing provider.  See \"Patient Info\" tab in inbasket, or \"Choose Columns\" in Meds & Orders section of the refill encounter.            Passed - Patient is age 6-64 years       Passed - Normal serum creatinine on file in past 12 months    Recent Labs   Lab Test  11/02/17   1033 "   JOLYNN  1.17               Last Written Prescription Date:  5/19/17, 6/22/17  Last Fill Quantity: 60, 30,  # refills: 6, 1   Last Office Visit with [facility], [facility] or [facility] prescribing provider:  12/22/17   Future Office Visit:

## 2018-01-22 NOTE — TELEPHONE ENCOUNTER
Methocarbamol 750 MG       Last Written Prescription Date:  12/6/17  Last Fill Quantity: 90,   # refills: 0  Last Office Visit: 12/22/17  Future Office visit:       Routing refill request to provider for review/approval because:  Drug not on the [facility], [facility] or [facility] refill protocol or controlled substance

## 2018-01-23 PROBLEM — J30.1 SEASONAL ALLERGIC RHINITIS DUE TO POLLEN, UNSPECIFIED CHRONICITY: Status: ACTIVE | Noted: 2018-01-23

## 2018-01-23 RX ORDER — CETIRIZINE HYDROCHLORIDE 10 MG/1
TABLET ORAL
Qty: 30 TABLET | Refills: 6 | Status: SHIPPED | OUTPATIENT
Start: 2018-01-23 | End: 2018-08-29

## 2018-01-23 RX ORDER — NAPROXEN 500 MG/1
TABLET ORAL
Qty: 60 TABLET | Refills: 1 | Status: SHIPPED | OUTPATIENT
Start: 2018-01-23 | End: 2018-03-25

## 2018-01-23 RX ORDER — METHOCARBAMOL 750 MG/1
TABLET, FILM COATED ORAL
Qty: 90 TABLET | Refills: 0 | Status: SHIPPED | OUTPATIENT
Start: 2018-01-23 | End: 2018-02-22

## 2018-01-23 NOTE — TELEPHONE ENCOUNTER
Routing refill request to provider for review/approval because:  Labs out of range:  2016  Grace Chase, RN

## 2018-02-04 ENCOUNTER — APPOINTMENT (OUTPATIENT)
Dept: GENERAL RADIOLOGY | Facility: CLINIC | Age: 48
End: 2018-02-04
Attending: FAMILY MEDICINE
Payer: COMMERCIAL

## 2018-02-04 ENCOUNTER — HOSPITAL ENCOUNTER (EMERGENCY)
Facility: CLINIC | Age: 48
Discharge: HOME | End: 2018-02-04
Attending: FAMILY MEDICINE | Admitting: FAMILY MEDICINE
Payer: COMMERCIAL

## 2018-02-04 VITALS
WEIGHT: 300 LBS | DIASTOLIC BLOOD PRESSURE: 85 MMHG | BODY MASS INDEX: 38.52 KG/M2 | SYSTOLIC BLOOD PRESSURE: 134 MMHG | OXYGEN SATURATION: 98 % | TEMPERATURE: 98.4 F

## 2018-02-04 DIAGNOSIS — M10.071 ACUTE IDIOPATHIC GOUT OF RIGHT ANKLE: ICD-10-CM

## 2018-02-04 PROCEDURE — 73610 X-RAY EXAM OF ANKLE: CPT | Mod: TC,RT

## 2018-02-04 PROCEDURE — 99283 EMERGENCY DEPT VISIT LOW MDM: CPT | Performed by: FAMILY MEDICINE

## 2018-02-04 PROCEDURE — 99284 EMERGENCY DEPT VISIT MOD MDM: CPT | Mod: Z6 | Performed by: FAMILY MEDICINE

## 2018-02-04 RX ORDER — OXYCODONE AND ACETAMINOPHEN 5; 325 MG/1; MG/1
1-2 TABLET ORAL EVERY 6 HOURS PRN
Qty: 15 TABLET | Refills: 0 | Status: SHIPPED | OUTPATIENT
Start: 2018-02-04 | End: 2018-04-12

## 2018-02-04 RX ORDER — PREDNISONE 20 MG/1
20 TABLET ORAL 2 TIMES DAILY
Qty: 10 TABLET | Refills: 0 | Status: SHIPPED | OUTPATIENT
Start: 2018-02-04 | End: 2018-02-09

## 2018-02-04 NOTE — ED PROVIDER NOTES
History     Chief Complaint   Patient presents with     Ankle Pain     HPI  Humberto Roberts is a 47 year old male who presents with severe right ankle pain. There was no injury to this ankle. He woke up one day with it swollen and painful. He has been hobbling around on crutches with it. He was told in the past that he had gout and watches his diet because of this. He has had a lot of red meat lately. He has bone spurs in this foot but he is having no pain in the sole of his foot or in his Achilles tendon. The pain is well localized to the joint particularly the talofibular joint.    Problem List:    Patient Active Problem List    Diagnosis Date Noted     Seasonal allergic rhinitis due to pollen, unspecified chronicity 01/23/2018     Priority: Medium     Morbid obesity (H) 12/06/2017     Priority: Medium     Elevated serum creatinine 10/15/2015     Priority: Medium     Insomnia 10/15/2015     Priority: Medium     Hypertension goal BP (blood pressure) < 140/90 05/06/2013     Priority: Medium     CARDIOVASCULAR SCREENING; LDL GOAL LESS THAN 160 05/06/2013     Priority: Medium     Seasonal allergic rhinitis 04/24/2013     Priority: Medium     GERD (gastroesophageal reflux disease) 04/24/2013     Priority: Medium        Past Medical History:    Past Medical History:   Diagnosis Date     Acid reflux disease since 2006     Back pain chronic     Cellulitis of left upper arm and forearm 11/22/2013     Hypertension        Past Surgical History:    Past Surgical History:   Procedure Laterality Date     ENT SURGERY         Family History:    No family history on file.    Social History:  Marital Status:  Significant other [7]  Social History   Substance Use Topics     Smoking status: Never Smoker     Smokeless tobacco: Never Used     Alcohol use No        Medications:      predniSONE (DELTASONE) 20 MG tablet   oxyCODONE-acetaminophen (PERCOCET) 5-325 MG per tablet   cetirizine (ZYRTEC) 10 MG tablet   methocarbamol (ROBAXIN) 750  MG tablet   naproxen (NAPROSYN) 500 MG tablet   guaiFENesin-codeine (ROBITUSSIN AC) 100-10 MG/5ML SOLN solution   order for DME   atenolol (TENORMIN) 50 MG tablet   hydrochlorothiazide (HYDRODIURIL) 25 MG tablet   omeprazole (PRILOSEC) 20 MG CR capsule   zolpidem (AMBIEN) 10 MG tablet   fluticasone (FLONASE) 50 MCG/ACT spray   order for DME         Review of Systems   All other systems reviewed and are negative.      Physical Exam   BP: 134/85  Heart Rate: 72  Temp: 98.4  F (36.9  C)  Weight: 136.1 kg (300 lb)  SpO2: 98 %      Physical Exam   Constitutional: He is oriented to person, place, and time. He appears well-developed and well-nourished.   HENT:   Head: Normocephalic and atraumatic.   Eyes: Conjunctivae are normal.   Neck: Normal range of motion.   Cardiovascular: Normal rate.    Pulmonary/Chest: Effort normal.   Musculoskeletal:   His right ankle shows swelling as compared to his left ankle. He is exquisitely tender at the right talofibular joint. I cannot ascertain redness due to his skin color. It is definitely warm to the touch. Findings consistent with gout.   Neurological: He is alert and oriented to person, place, and time.   Skin: Skin is warm and dry.   Psychiatric: He has a normal mood and affect. His behavior is normal.   Nursing note and vitals reviewed.      ED Course     ED Course     Procedures  Results for orders placed or performed during the hospital encounter of 02/04/18 (from the past 48 hour(s))   XR Ankle Right G/E 3 Views    Narrative    RIGHT ANKLE THREE VIEWS   2/4/2018 8:38 AM     HISTORY: Trauma;     COMPARISON: None.      Impression    IMPRESSION: No evidence for fracture. Mild degenerative changes are  noted in the midfoot and benign traction spurs are seen off the  plantar fascia and Achilles tendon attachment sites.    DOM CABA MD                  Critical Care time:  none               Labs Ordered and Resulted from Time of ED Arrival Up to the Time of Departure from the  ED - No data to display    Assessments & Plan (with Medical Decision Making)   MDM--patient presents with severe ankle pain and no history of any trauma or event or activity. He has a history of gout by his report. He has a red warm swollen grossly tender right talofibular joint. I recommended prednisone 20 mg b.i.d. 5 days. He was also given a prescription of Percocet for pain control. He already has crutches. He should follow-up if he is not somewhat showing some improvement in the next few days. I also recommended changing his diet and following up with his primary care physician for further testing of uric acid etc. I have reviewed the nursing notes.    I have reviewed the findings, diagnosis, plan and need for follow up with the patient.       Discharge Medication List as of 2/4/2018  9:17 AM      START taking these medications    Details   predniSONE (DELTASONE) 20 MG tablet Take 1 tablet (20 mg) by mouth 2 times daily for 5 days, Disp-10 tablet, R-0, Local Print      oxyCODONE-acetaminophen (PERCOCET) 5-325 MG per tablet Take 1-2 tablets by mouth every 6 hours as needed for pain, Disp-15 tablet, R-0, Local Print             Final diagnoses:   Acute idiopathic gout of right ankle       2/4/2018   [facility] EMERGENCY DEPARTMENT     Ivanna, Joe DANIELLE MD  02/04/18 4045

## 2018-02-04 NOTE — ED AVS SNAPSHOT
[facility] Emergency Department    [address] [place] [address]    Phone:  [phone]    Fax:  [phone]                                       Humberto Roberts   MRN: [id number]    Department:  [facility] Emergency Department   Date of Visit:  2/4/2018           After Visit Summary Signature Page     I have received my discharge instructions, and my questions have been answered. I have discussed any challenges I see with this plan with the nurse or doctor.    ..........................................................................................................................................  Patient/Patient Representative Signature      ..........................................................................................................................................  Patient Representative Print Name and Relationship to Patient    ..................................................               ................................................  Date                                            Time    ..........................................................................................................................................  Reviewed by Signature/Title    ...................................................              ..............................................  Date                                                            Time

## 2018-02-04 NOTE — ED AVS SNAPSHOT
[facility]    [address] [place] [address]    Phone:  [phone]    Fax:  [phone]                                       Humberto Roberts   MRN: [id number]    Department:  [facility]   Date of Visit:  2/4/2018           Patient Information     Date Of Birth          1970        Your diagnoses for this visit were:     Acute idiopathic gout of right ankle        You were seen by Ivanna, Joe DANIELLE MD.      Follow-up Information     Follow up with Mariana Ortega APRN CNP.    Specialty:  Nurse Practitioner - Family    Why:  As needed    Contact information:    [address] [place] [address] [phone]          Follow up with [facility].    Specialty:  EMERGENCY MEDICINE    Why:  If symptoms worsen    Contact information:    [address] [place] [address] [phone]    Additional information:    From [address]: Exit at [address] on [address] side of [place]. Turn right on [address]. Turn left at stoplight on [address]. [facility] will be in view two blocks ahead        Discharge Instructions         Gout    Gout is an inflammation of a joint due to a build-up of gout crystals in the joint fluid. This occurs when there is an excess of uric acid (a normal waste product) in the body. Uric acid builds up in the body when the kidneys are unable to filter enough of it from the blood. This may occur with age. It is also associated with kidney disease. Gout occurs more often in people with obesity, diabetes, high blood pressure, or high levels of fats in the blood. It may run in families. Gout tends to come and go. A flare up of gout is called an attack. Drinking alcohol or eating certain foods (such as shellfish or foods with additives such as high-fructose corn syrup) may increase uric acid levels in the blood and cause a gout attack.  During a gout attack, the  affected joint may become a hot, red, swollen and painful. If you have had one attack of gout, you are likely to have another. An attack of gout can be treated with medicine. If these attacks become frequent, a daily medicine may be prescribed to help the kidneys remove uric acid from the body.  Home care  During a gout attack:    Rest painful joints. If gout affects the joints of your foot or leg, you may want to use crutches for the first few days to keep from bearing weight on the affected joint.    When sitting or lying down, raise the painful joint to a level higher than your heart.    Apply an ice pack (ice cubes in a plastic bag wrapped in a thin towel) over the injured area for 20 minutes every 1 to 2 hours the first day for pain relief. Continue this 3 to 4 times a day for swelling and pain.    Avoid alcohol and foods listed below (see Preventing attacks) during a gout attack. Drink extra fluid to help flush the uric acid through your kidneys.    If you were prescribed a medicine to treat gout, take it as your healthcare provider has instructed. Don't skip doses.    Take anti-inflammatory medicine as directed.     If pain medicines have been prescribed, take them exactly as directed.    Preventing attacks    Minimize or avoid alcohol use. Excess alcohol intake can cause a gout attack.    Limit these foods and beverages:    Organ meats, such as kidneys and liver    Certain seafoods (anchovies, sardines, shrimp, scallops, herring, mackerel)    Wild game, meat extracts and meat gravies    Foods and beverages sweetened with high-fructose corn syrup, such as sodas    Eat a healthy diet including low-fat and nonfat dairy, whole grains, and vegetables.    If you are overweight, talk to your healthcare provider about a weight reduction plan. Avoid fasting or extreme low calorie diets (less than 900 calories per day). This will increase uric acid levels in the body.    If you have diabetes or high blood pressure,  work with your doctor to manage these conditions.    Protect the joint from injury. Trauma can trigger a gout attack.  Follow-up care  Follow up with your healthcare provider, or as advised.   When to seek medical advice  Call your healthcare provider if you have any of the following:    Fever over 100.4 F (38. C) with worsening joint pain    Increasing redness around the joint    Pain developing in another joint    Repeated vomiting, abdominal pain, or blood in the vomit or stool (black or red color)  Date Last Reviewed: 3/1/2017    0641-8090 [employer]. [address] [place] [address]. All rights reserved. This information is not intended as a substitute for professional medical care. Always follow your healthcare professional's instructions.          Gout Diet  Gout is a painful condition caused by an excess of uric acid, a waste product made by the body. Uric acid forms crystals that collect in the joints. The immune response to these crystals brings on symptoms of joint pain and swelling. This is called a gout attack. Often, medications and diet changes are combined to manage gout. Below are some guidelines for changing your diet to help you manage gout and prevent attacks. Your health care provider will help you determine the best eating plan for you.     Eating to manage gout  Weight loss for those who are overweight may help reduce gout attacks.  Eat less of these foods  Eating too many foods containing purines may raise the levels of uric acid in your body. This raises your risk for a gout attack. Try to limit these foods and drinks:    Alcohol, such as beer and red wine. You may be told to avoid alcohol completely.    Soft drinks that contain sugar or high fructose corn syrup    Certain fish, including anchovies, sardines, fish eggs, and herring    Shellfish    Certain meats, such as red meat, hot dogs, luncheon meats, and turkey    Organ meats, such as liver, kidneys, and  sweetbreads    Legumes, such as dried beans and peas    Other high fat foods such as gravy, whole milk, and high fat cheeses    Vegetables such as asparagus, cauliflower, spinach, and mushrooms used to be thought to contribute to an increased risk for a gout attack, but recent studies show that high purine vegetables don't increase the risk for a gout attack.  Eat more of these foods  Other foods may be helpful for people with gout. Add some of these foods to your diet:    Cherries contain chemicals that may lower uric acid.    Omega fatty acids. These are found in some fatty fish such as salmon, certain oils (flax, olive, or nut), and nuts themselves. Omega fatty acids may help prevent inflammation due to gout.    Dairy products that are low-fat or fat-free, such as cheese and yogurt    Complex carbohydrate foods, including whole grains, brown rice, oats, and beans    Coffee, in moderation    Water, approximately 64 ounces per day  Follow-up care  Follow up with your healthcare provider as advised.  When to seek medical advice  Call your healthcare provider right away if any of these occur:    Return of gout symptoms, usually at night:    Severe pain, swelling, and heat in a joint, especially the base of the big toe    Affected joint is hard to move    Skin of the affected joint is purple or red    Fever of 100.4 F (38 C) or higher    Pain that doesn't get better even with prescribed medicine   Date Last Reviewed: 1/12/2016 2000-2017 [employer]. [address] [place] [address]. All rights reserved. This information is not intended as a substitute for professional medical care. Always follow your healthcare professional's instructions.          24 Hour Appointment Hotline       To make an appointment at any [facility], call [phone] ([phone]). If you don't have a family doctor or clinic, we will help you find one. [facility] are conveniently located to serve the  needs of you and your family.             Review of your medicines      START taking        Dose / Directions Last dose taken    oxyCODONE-acetaminophen 5-325 MG per tablet   Commonly known as:  PERCOCET   Dose:  1-2 tablet   Quantity:  15 tablet        Take 1-2 tablets by mouth every 6 hours as needed for pain   Refills:  0        predniSONE 20 MG tablet   Commonly known as:  DELTASONE   Dose:  20 mg   Quantity:  10 tablet        Take 1 tablet (20 mg) by mouth 2 times daily for 5 days   Refills:  0          Our records show that you are taking the medicines listed below. If these are incorrect, please call your family doctor or clinic.        Dose / Directions Last dose taken    atenolol 50 MG tablet   Commonly known as:  TENORMIN   Dose:  50 mg   Quantity:  30 tablet        Take 1 tablet (50 mg) by mouth daily   Refills:  11        cetirizine 10 MG tablet   Commonly known as:  zyrTEC   Quantity:  30 tablet        TAKE ONE TABLET BY MOUTH EVERY DAY AS NEEDED FOR ALLERGIES   Refills:  6        fluticasone 50 MCG/ACT spray   Commonly known as:  FLONASE   Dose:  1-2 spray   Quantity:  1 Bottle        Spray 1-2 sprays into both nostrils daily   Refills:  11        guaiFENesin-codeine 100-10 MG/5ML Soln solution   Commonly known as:  ROBITUSSIN AC   Dose:  2 tsp.   Quantity:  240 mL        Take 10 mLs by mouth every 4 hours as needed   Refills:  0        hydrochlorothiazide 25 MG tablet   Commonly known as:  HYDRODIURIL   Dose:  25 mg   Quantity:  30 tablet        Take 1 tablet (25 mg) by mouth daily   Refills:  11        methocarbamol 750 MG tablet   Commonly known as:  ROBAXIN   Quantity:  90 tablet        TAKE ONE TABLET BY MOUTH THREE TIMES A DAY AS NEEDED FOR MUSCLE SPASMS   Refills:  0        naproxen 500 MG tablet   Commonly known as:  NAPROSYN   Quantity:  60 tablet        TAKE ONE TABLET BY MOUTH TWICE A DAY AS NEEDED FOR MODERATE PAIN.   Refills:  1        omeprazole 20 MG CR capsule   Commonly known as:   priLOSEC   Quantity:  60 capsule        TAKE ONE CAPSULE BY MOUTH TWICE A DAY   Refills:  11        * order for DME   Dose:  1 Device        1 Device Auto CPAP @@ 7-15 cm with heated humidity via mask of choice   Refills:  0        * order for DME   Quantity:  1 Device        Equipment being ordered: shower chair   Refills:  0        zolpidem 10 MG tablet   Commonly known as:  AMBIEN   Dose:  10 mg   Quantity:  30 tablet        Take 1 tablet (10 mg) by mouth nightly as needed for sleep   Refills:  5        * Notice:  This list has 2 medication(s) that are the same as other medications prescribed for you. Read the directions carefully, and ask your doctor or other care provider to review them with you.            Prescriptions were sent or printed at these locations (2 Prescriptions)                   [facility] [place], [place] - [address]    [address] [facility] [address]    Telephone:  [phone]   Fax:  [phone]   Hours:                  Printed at Department/Unit printer (2 of 2)         predniSONE (DELTASONE) 20 MG tablet               oxyCODONE-acetaminophen (PERCOCET) 5-325 MG per tablet                Procedures and tests performed during your visit     XR Ankle Right G/E 3 Views      Orders Needing Specimen Collection     None      Pending Results     No orders found from 2/2/2018 to 2/5/2018.            Pending Culture Results     No orders found from 2/2/2018 to 2/5/2018.            Pending Results Instructions     If you had any lab results that were not finalized at the time of your Discharge, you can call the ED Lab Result RN at [phone]. You will be contacted by this team for any positive Lab results or changes in treatment. The nurses are available 7 days a week from 10A to 6:30P.  You can leave a message 24 hours per day and they will return your call.        Thank you for choosing [facility]       Thank you for choosing [facility] for your care. Our goal is always  "to provide you with excellent care. Hearing back from our patients is one way we can continue to improve our services. Please take a few minutes to complete the written survey that you may receive in the mail after you visit with us. Thank you!        [employer] Information     [employer] lets you send messages to your doctor, view your test results, renew your prescriptions, schedule appointments and more. To sign up, go to [email or web address] . Click on \"Log in\" on the left side of the screen, which will take you to the Welcome page. Then click on \"Sign up Now\" on the right side of the page.     You will be asked to enter the access code listed below, as well as some personal information. Please follow the directions to create your username and password.     Your access code is: [email or web address]  Expires: 3/6/2018  5:25 PM     Your access code will  in 90 days. If you need help or a new code, please call your [facility] clinic or [phone].        Care EveryWhere ID     This is your Care EveryWhere ID. This could be used by other organizations to access your [facility] medical records  [id number]        Equal Access to Services     CANDIDO URIARTE : Hadgregg Ohara, wadelano olivares, qafletcher faust, mariela dominguez. So [facility] [phone].    ATENCIÓN: Si habla español, tiene a varela disposición servicios gratuitos de asistencia lingüística. Teofilo al [phone].    We comply with applicable federal civil rights laws and [place] laws. We do not discriminate on the basis of race, color, national origin, age, disability, sex, sexual orientation, or gender identity.            After Visit Summary       This is your record. Keep this with you and show to your community pharmacist(s) and doctor(s) at your next visit.                  "

## 2018-02-22 DIAGNOSIS — S46.919A: ICD-10-CM

## 2018-02-22 RX ORDER — METHOCARBAMOL 750 MG/1
TABLET, FILM COATED ORAL
Qty: 90 TABLET | Refills: 0 | Status: SHIPPED | OUTPATIENT
Start: 2018-02-22 | End: 2018-03-25

## 2018-02-22 NOTE — TELEPHONE ENCOUNTER
Methocarbamol 750 MG       Last Written Prescription Date:  1/23/18  Last Fill Quantity: 90,   # refills: 0  Last Office Visit: 12/22/17  Future Office visit:       Routing refill request to provider for review/approval because:  Drug not on the [facility], [facility] or [facility] refill protocol or controlled substance

## 2018-03-12 ENCOUNTER — HOSPITAL ENCOUNTER (EMERGENCY)
Facility: CLINIC | Age: 48
Discharge: HOME | End: 2018-03-12
Attending: EMERGENCY MEDICINE | Admitting: EMERGENCY MEDICINE
Payer: COMMERCIAL

## 2018-03-12 VITALS
OXYGEN SATURATION: 99 % | DIASTOLIC BLOOD PRESSURE: 95 MMHG | SYSTOLIC BLOOD PRESSURE: 135 MMHG | HEART RATE: 71 BPM | RESPIRATION RATE: 20 BRPM | BODY MASS INDEX: 39.78 KG/M2 | WEIGHT: 310 LBS | HEIGHT: 74 IN

## 2018-03-12 DIAGNOSIS — M79.18 MYOFASCIAL PAIN SYNDROME: ICD-10-CM

## 2018-03-12 LAB
ALBUMIN SERPL-MCNC: 3.6 G/DL (ref 3.4–5)
ALP SERPL-CCNC: 75 U/L (ref 40–150)
ALT SERPL W P-5'-P-CCNC: 32 U/L (ref 0–70)
ANION GAP SERPL CALCULATED.3IONS-SCNC: 6 MMOL/L (ref 3–14)
AST SERPL W P-5'-P-CCNC: 20 U/L (ref 0–45)
BASOPHILS # BLD AUTO: 0.1 10E9/L (ref 0–0.2)
BASOPHILS NFR BLD AUTO: 0.7 %
BILIRUB SERPL-MCNC: 0.3 MG/DL (ref 0.2–1.3)
BUN SERPL-MCNC: 17 MG/DL (ref 7–30)
CALCIUM SERPL-MCNC: 9 MG/DL (ref 8.5–10.1)
CHLORIDE SERPL-SCNC: 103 MMOL/L (ref 94–109)
CO2 SERPL-SCNC: 29 MMOL/L (ref 20–32)
CREAT SERPL-MCNC: 0.98 MG/DL (ref 0.66–1.25)
CRP SERPL-MCNC: 7.4 MG/L (ref 0–8)
DIFFERENTIAL METHOD BLD: NORMAL
EOSINOPHIL # BLD AUTO: 0.2 10E9/L (ref 0–0.7)
EOSINOPHIL NFR BLD AUTO: 2.8 %
ERYTHROCYTE [DISTWIDTH] IN BLOOD BY AUTOMATED COUNT: 14.4 % (ref 10–15)
ERYTHROCYTE [SEDIMENTATION RATE] IN BLOOD BY WESTERGREN METHOD: 6 MM/H (ref 0–15)
GFR SERPL CREATININE-BSD FRML MDRD: 81 ML/MIN/1.7M2
GLUCOSE SERPL-MCNC: 102 MG/DL (ref 70–99)
HCT VFR BLD AUTO: 44.8 % (ref 40–53)
HGB BLD-MCNC: 14.6 G/DL (ref 13.3–17.7)
IMM GRANULOCYTES # BLD: 0 10E9/L (ref 0–0.4)
IMM GRANULOCYTES NFR BLD: 0.1 %
LYMPHOCYTES # BLD AUTO: 2.8 10E9/L (ref 0.8–5.3)
LYMPHOCYTES NFR BLD AUTO: 39.5 %
MAGNESIUM SERPL-MCNC: 2 MG/DL (ref 1.6–2.3)
MCH RBC QN AUTO: 27.1 PG (ref 26.5–33)
MCHC RBC AUTO-ENTMCNC: 32.6 G/DL (ref 31.5–36.5)
MCV RBC AUTO: 83 FL (ref 78–100)
MONOCYTES # BLD AUTO: 0.6 10E9/L (ref 0–1.3)
MONOCYTES NFR BLD AUTO: 8.8 %
NEUTROPHILS # BLD AUTO: 3.4 10E9/L (ref 1.6–8.3)
NEUTROPHILS NFR BLD AUTO: 48.1 %
PLATELET # BLD AUTO: 244 10E9/L (ref 150–450)
POTASSIUM SERPL-SCNC: 3.5 MMOL/L (ref 3.4–5.3)
PROT SERPL-MCNC: 6.8 G/DL (ref 6.8–8.8)
RBC # BLD AUTO: 5.38 10E12/L (ref 4.4–5.9)
SODIUM SERPL-SCNC: 138 MMOL/L (ref 133–144)
WBC # BLD AUTO: 7 10E9/L (ref 4–11)

## 2018-03-12 PROCEDURE — 99284 EMERGENCY DEPT VISIT MOD MDM: CPT | Mod: Z6 | Performed by: EMERGENCY MEDICINE

## 2018-03-12 PROCEDURE — 85652 RBC SED RATE AUTOMATED: CPT | Performed by: EMERGENCY MEDICINE

## 2018-03-12 PROCEDURE — 85025 COMPLETE CBC W/AUTO DIFF WBC: CPT | Performed by: EMERGENCY MEDICINE

## 2018-03-12 PROCEDURE — 96372 THER/PROPH/DIAG INJ SC/IM: CPT | Performed by: EMERGENCY MEDICINE

## 2018-03-12 PROCEDURE — 83735 ASSAY OF MAGNESIUM: CPT | Performed by: EMERGENCY MEDICINE

## 2018-03-12 PROCEDURE — 25000128 H RX IP 250 OP 636: Performed by: EMERGENCY MEDICINE

## 2018-03-12 PROCEDURE — 99284 EMERGENCY DEPT VISIT MOD MDM: CPT | Mod: 25 | Performed by: EMERGENCY MEDICINE

## 2018-03-12 PROCEDURE — 80053 COMPREHEN METABOLIC PANEL: CPT | Performed by: EMERGENCY MEDICINE

## 2018-03-12 PROCEDURE — 86140 C-REACTIVE PROTEIN: CPT | Performed by: EMERGENCY MEDICINE

## 2018-03-12 RX ORDER — KETOROLAC TROMETHAMINE 10 MG/1
10 TABLET, FILM COATED ORAL EVERY 6 HOURS PRN
Qty: 20 TABLET | Refills: 0 | Status: SHIPPED | OUTPATIENT
Start: 2018-03-12 | End: 2018-07-20

## 2018-03-12 RX ORDER — KETOROLAC TROMETHAMINE 30 MG/ML
30 INJECTION, SOLUTION INTRAMUSCULAR; INTRAVENOUS ONCE
Status: COMPLETED | OUTPATIENT
Start: 2018-03-12 | End: 2018-03-12

## 2018-03-12 RX ADMIN — KETOROLAC TROMETHAMINE 30 MG: 30 INJECTION, SOLUTION INTRAMUSCULAR at 03:01

## 2018-03-12 ASSESSMENT — ENCOUNTER SYMPTOMS
WEAKNESS: 0
NUMBNESS: 1
MYALGIAS: 1

## 2018-03-12 NOTE — ED AVS SNAPSHOT
[facility]    [address] [place] [address]    Phone:  [phone]    Fax:  [phone]                                       Humberto Roberts   MRN: [id number]    Department:  [facility]   Date of Visit:  3/12/2018           After Visit Summary Signature Page     I have received my discharge instructions, and my questions have been answered. I have discussed any challenges I see with this plan with the nurse or doctor.    ..........................................................................................................................................  Patient/Patient Representative Signature      ..........................................................................................................................................  Patient Representative Print Name and Relationship to Patient    ..................................................               ................................................  Date                                            Time    ..........................................................................................................................................  Reviewed by Signature/Title    ...................................................              ..............................................  Date                                                            Time

## 2018-03-12 NOTE — ED AVS SNAPSHOT
[facility]    [address] [place] [address]    Phone:  [phone]    Fax:  [phone]                                       Humberto Roberts   MRN: [id number]    Department:  [facility]   Date of Visit:  3/12/2018           Patient Information     Date Of Birth          1970        Your diagnoses for this visit were:     Myofascial pain syndrome        You were seen by Scott Díaz MD.      Follow-up Information     Follow up with Mariana Ortega APRN CNP.    Specialty:  Nurse Practitioner - Family    Why:  As needed    Contact information:    [address] [place] [address] [phone]        Discharge References/Attachments     MYALGIAS (ENGLISH)    MYOFASCIAL PAIN SYNDROME (ENGLISH)      24 Hour Appointment Hotline       To make an appointment at any [facility] clinic, call [phone] ([phone]). If you don't have a family doctor or clinic, we will help you find one. [facility] clinics are conveniently located to serve the needs of you and your family.             Review of your medicines      START taking        Dose / Directions Last dose taken    ketorolac 10 MG tablet   Commonly known as:  TORADOL   Dose:  10 mg   Quantity:  20 tablet        Take 1 tablet (10 mg) by mouth every 6 hours as needed   Refills:  0          Our records show that you are taking the medicines listed below. If these are incorrect, please call your family doctor or clinic.        Dose / Directions Last dose taken    atenolol 50 MG tablet   Commonly known as:  TENORMIN   Dose:  50 mg   Quantity:  30 tablet        Take 1 tablet (50 mg) by mouth daily   Refills:  11        cetirizine 10 MG tablet   Commonly known as:  zyrTEC   Quantity:  30 tablet        TAKE ONE TABLET BY MOUTH EVERY DAY AS NEEDED FOR ALLERGIES   Refills:  6        fluticasone 50 MCG/ACT spray   Commonly known as:  FLONASE   Dose:  1-2 spray   Quantity:  1 Bottle         Spray 1-2 sprays into both nostrils daily   Refills:  11        guaiFENesin-codeine 100-10 MG/5ML Soln solution   Commonly known as:  ROBITUSSIN AC   Dose:  2 tsp.   Quantity:  240 mL        Take 10 mLs by mouth every 4 hours as needed   Refills:  0        hydrochlorothiazide 25 MG tablet   Commonly known as:  HYDRODIURIL   Dose:  25 mg   Quantity:  30 tablet        Take 1 tablet (25 mg) by mouth daily   Refills:  11        methocarbamol 750 MG tablet   Commonly known as:  ROBAXIN   Quantity:  90 tablet        TAKE ONE TABLET BY MOUTH THREE TIMES A DAY AS NEEDED FOR MUSCLE SPASMS   Refills:  0        naproxen 500 MG tablet   Commonly known as:  NAPROSYN   Quantity:  60 tablet        TAKE ONE TABLET BY MOUTH TWICE A DAY AS NEEDED FOR MODERATE PAIN.   Refills:  1        omeprazole 20 MG CR capsule   Commonly known as:  priLOSEC   Quantity:  60 capsule        TAKE ONE CAPSULE BY MOUTH TWICE A DAY   Refills:  11        * order for DME   Dose:  1 Device        1 Device Auto CPAP @@ 7-15 cm with heated humidity via mask of choice   Refills:  0        * order for DME   Quantity:  1 Device        Equipment being ordered: shower chair   Refills:  0        oxyCODONE-acetaminophen 5-325 MG per tablet   Commonly known as:  PERCOCET   Dose:  1-2 tablet   Quantity:  15 tablet        Take 1-2 tablets by mouth every 6 hours as needed for pain   Refills:  0        zolpidem 10 MG tablet   Commonly known as:  AMBIEN   Dose:  10 mg   Quantity:  30 tablet        Take 1 tablet (10 mg) by mouth nightly as needed for sleep   Refills:  5        * Notice:  This list has 2 medication(s) that are the same as other medications prescribed for you. Read the directions carefully, and ask your doctor or other care provider to review them with you.            Prescriptions were sent or printed at these locations (1 Prescription)                   [facility] - [place], [place] - [address]   [address] [place] [address],  "[facility] [address]    Telephone:  [phone]   Fax:  [phone]   Hours:                  E-Prescribed (1 of 1)         ketorolac (TORADOL) 10 MG tablet                Procedures and tests performed during your visit     CBC with platelets differential    CRP inflammation    Comprehensive metabolic panel    Erythrocyte sedimentation rate auto    Magnesium      Orders Needing Specimen Collection     None      Pending Results     No orders found from 3/10/2018 to 3/13/2018.            Pending Culture Results     No orders found from 3/10/2018 to 3/13/2018.            Pending Results Instructions     If you had any lab results that were not finalized at the time of your Discharge, you can call the ED Lab Result RN at [phone]. You will be contacted by this team for any positive Lab results or changes in treatment. The nurses are available 7 days a week from 10A to 6:30P.  You can leave a message 24 hours per day and they will return your call.        Thank you for choosing [facility]       Thank you for choosing [facility] for your care. Our goal is always to provide you with excellent care. Hearing back from our patients is one way we can continue to improve our services. Please take a few minutes to complete the written survey that you may receive in the mail after you visit with us. Thank you!        [employer] Information     [employer] lets you send messages to your doctor, view your test results, renew your prescriptions, schedule appointments and more. To sign up, go to [email or web address] . Click on \"Log in\" on the left side of the screen, which will take you to the Welcome page. Then click on \"Sign up Now\" on the right side of the page.     You will be asked to enter the access code listed below, as well as some personal information. Please follow the directions to create your username and password.     Your access code is: [email or web address]  Expires: 6/10/2018  4:02 AM     Your access code will  in 90 " days. If you need help or a new code, please call your [facility] clinic or [phone].        Care EveryWhere ID     This is your Care EveryWhere ID. This could be used by other organizations to access your [facility] medical records  [id number]        Equal Access to Services     CANDIDO URIARTE : Chris Ohara, wadelano luqadaha, qafletcher kaalmagloria faust, mariela dominguez. So [facility] [phone].    ATENCIÓN: Si habla español, tiene a varela disposición servicios gratuitos de asistencia lingüística. Llame al [phone].    We comply with applicable federal civil rights laws and [place] laws. We do not discriminate on the basis of race, color, national origin, age, disability, sex, sexual orientation, or gender identity.            After Visit Summary       This is your record. Keep this with you and show to your community pharmacist(s) and doctor(s) at your next visit.

## 2018-03-12 NOTE — ED PROVIDER NOTES
History     Chief Complaint   Patient presents with     Arm Pain     The history is provided by the patient.     Humberto Roberts is a 47 year old male who presents ED for evaluation of spasms occurring in his right arm and associated with numbness started a few hours ago when he laid down to go to sleep.  The patient reportedly took his Ambien and try to lay down on these spasms started to occur.  He reports he would like a charley horse and only lasted a few seconds but were occurring frequently.  Certain movements would trigger them.  He was unable to lay on that side.  After about an hour he decided to come in to be evaluated.  He denies any injury to the region.    Problem List:    Patient Active Problem List    Diagnosis Date Noted     Seasonal allergic rhinitis due to pollen, unspecified chronicity 01/23/2018     Priority: Medium     Morbid obesity (H) 12/06/2017     Priority: Medium     Elevated serum creatinine 10/15/2015     Priority: Medium     Insomnia 10/15/2015     Priority: Medium     Hypertension goal BP (blood pressure) < 140/90 05/06/2013     Priority: Medium     CARDIOVASCULAR SCREENING; LDL GOAL LESS THAN 160 05/06/2013     Priority: Medium     Seasonal allergic rhinitis 04/24/2013     Priority: Medium     GERD (gastroesophageal reflux disease) 04/24/2013     Priority: Medium        Past Medical History:    Past Medical History:   Diagnosis Date     Acid reflux disease since 2006     Back pain chronic     Cellulitis of left upper arm and forearm 11/22/2013     Hypertension        Past Surgical History:    Past Surgical History:   Procedure Laterality Date     ENT SURGERY         Family History:    No family history on file.    Social History:  Marital Status:  Significant other [7]  Social History   Substance Use Topics     Smoking status: Never Smoker     Smokeless tobacco: Never Used     Alcohol use No        Medications:      ketorolac (TORADOL) 10 MG tablet   methocarbamol (ROBAXIN) 750 MG  "tablet   oxyCODONE-acetaminophen (PERCOCET) 5-325 MG per tablet   cetirizine (ZYRTEC) 10 MG tablet   naproxen (NAPROSYN) 500 MG tablet   guaiFENesin-codeine (ROBITUSSIN AC) 100-10 MG/5ML SOLN solution   order for DME   atenolol (TENORMIN) 50 MG tablet   hydrochlorothiazide (HYDRODIURIL) 25 MG tablet   omeprazole (PRILOSEC) 20 MG CR capsule   zolpidem (AMBIEN) 10 MG tablet   fluticasone (FLONASE) 50 MCG/ACT spray   order for DME         Review of Systems   Musculoskeletal: Positive for myalgias.   Neurological: Positive for numbness (Right forearm and hand, now gone). Negative for weakness.   All other systems reviewed and are negative.      Physical Exam   BP: (!) 135/95  Pulse: 71  Heart Rate: 71  Resp: 20  Height: 188 cm (6' 2\")  Weight: (!) 140.6 kg (310 lb)  SpO2: 99 %      Physical Exam   Constitutional: He is oriented to person, place, and time. He appears well-developed. No distress.   HENT:   Head: Atraumatic.   Mouth/Throat: Oropharynx is clear and moist.   Eyes: EOM are normal. Pupils are equal, round, and reactive to light.   Neck: Normal range of motion. Neck supple.   Musculoskeletal: Normal range of motion. He exhibits tenderness (Tenderness with palpation over the body of the right tricep).   Pain in the right tricep with flexion of the right elbow.  No evidence for cords, swelling, erythema, or increased temperature.   Neurological: He is alert and oriented to person, place, and time. He has normal reflexes. No cranial nerve deficit. He exhibits normal muscle tone. Coordination normal.   Skin: No rash noted.   Psychiatric: He has a normal mood and affect.   Nursing note and vitals reviewed.      ED Course     ED Course     Procedures                   Results for orders placed or performed during the hospital encounter of 03/12/18 (from the past 24 hour(s))   CBC with platelets differential   Result Value Ref Range    WBC 7.0 4.0 - 11.0 10e9/L    RBC Count 5.38 4.4 - 5.9 10e12/L    Hemoglobin 14.6 " "13.3 - 17.7 g/dL    Hematocrit 44.8 40.0 - 53.0 %    MCV 83 78 - 100 fl    MCH 27.1 26.5 - 33.0 pg    MCHC 32.6 31.5 - 36.5 g/dL    RDW 14.4 10.0 - 15.0 %    Platelet Count 244 150 - 450 10e9/L    Diff Method Automated Method     % Neutrophils 48.1 %    % Lymphocytes 39.5 %    % Monocytes 8.8 %    % Eosinophils 2.8 %    % Basophils 0.7 %    % Immature Granulocytes 0.1 %    Absolute Neutrophil 3.4 1.6 - 8.3 10e9/L    Absolute Lymphocytes 2.8 0.8 - 5.3 10e9/L    Absolute Monocytes 0.6 0.0 - 1.3 10e9/L    Absolute Eosinophils 0.2 0.0 - 0.7 10e9/L    Absolute Basophils 0.1 0.0 - 0.2 10e9/L    Abs Immature Granulocytes 0.0 0 - 0.4 10e9/L   Comprehensive metabolic panel   Result Value Ref Range    Sodium 138 133 - 144 mmol/L    Potassium 3.5 3.4 - 5.3 mmol/L    Chloride 103 94 - 109 mmol/L    Carbon Dioxide 29 20 - 32 mmol/L    Anion Gap 6 3 - 14 mmol/L    Glucose 102 (H) 70 - 99 mg/dL    Urea Nitrogen 17 7 - 30 mg/dL    Creatinine 0.98 0.66 - 1.25 mg/dL    GFR Estimate 81 >60 mL/min/1.7m2    GFR Estimate If Black >90 >60 mL/min/1.7m2    Calcium 9.0 8.5 - 10.1 mg/dL    Bilirubin Total 0.3 0.2 - 1.3 mg/dL    Albumin 3.6 3.4 - 5.0 g/dL    Protein Total 6.8 6.8 - 8.8 g/dL    Alkaline Phosphatase 75 40 - 150 U/L    ALT 32 0 - 70 U/L    AST 20 0 - 45 U/L   CRP inflammation   Result Value Ref Range    CRP Inflammation 7.4 0.0 - 8.0 mg/L   Erythrocyte sedimentation rate auto   Result Value Ref Range    Sed Rate 6 0 - 15 mm/h   Magnesium   Result Value Ref Range    Magnesium 2.0 1.6 - 2.3 mg/dL       Assessments & Plan (with Medical Decision Making)  Humberto Roberts is a 47-year-old male presenting to the ED for evaluation of right arm \"spasms\" and numbness that started a few hours ago and have prevented him from being able to sleep despite the use of his Ambien.  Patient denies any injury but does state that the arm went into knots in that woke him from sleep.  Since then he has been unable to go back to sleep.  On exam, he had " focal tenderness to palpation over the tricep on the right.  Pain worsens with flexion of the elbow.  There appears to be normal range of motion in all the joints.  He has good distal sensation and pulses.  Labs were obtained and show normal CBC, conference of metabolic panel, CRP, erythrocyte sedimentation rate, and magnesium.  Given the complaints problem and the findings on my exam, this most likely is an acute myofascial pain syndrome.  Patient was given IM Toradol with improvement in his symptoms.  We will continue on a as needed basis as an outpatient with Toradol 10 mg 4 times daily as needed.  I did review with him indications return the ED for evaluation.  All questions from patient were answered and is suitable for discharge in satisfactory condition.     I have reviewed the nursing notes.    I have reviewed the findings, diagnosis, plan and need for follow up with the patient.       New Prescriptions    KETOROLAC (TORADOL) 10 MG TABLET    Take 1 tablet (10 mg) by mouth every 6 hours as needed       Final diagnoses:   Myofascial pain syndrome       3/12/2018   [facility] EMERGENCY DEPARTMENT     Scott Díaz MD  03/12/18 0406

## 2018-03-25 DIAGNOSIS — S46.919A: ICD-10-CM

## 2018-03-25 DIAGNOSIS — M25.561 ACUTE PAIN OF RIGHT KNEE: ICD-10-CM

## 2018-03-26 NOTE — TELEPHONE ENCOUNTER
"Requested Prescriptions   Pending Prescriptions Disp Refills     methocarbamol (ROBAXIN) 750 MG tablet [Pharmacy Med Name: METHOCARBAMOL 750MG TABS] 90 tablet 0     Sig: TAKE ONE TABLET BY MOUTH THREE TIMES A DAY AS NEEDED FOR MUSCLE SPASMS    There is no refill protocol information for this order        naproxen (NAPROSYN) 500 MG tablet [Pharmacy Med Name: NAPROXEN 500MG TABS] 60 tablet 1     Sig: TAKE ONE TABLET BY MOUTH TWICE A DAY AS NEEDED FOR MODERATE PAIN    NSAID Medications Failed    3/25/2018 10:22 AM       Failed - Blood pressure under 140/90 in past 12 months    BP Readings from Last 3 Encounters:   03/12/18 (!) 135/95   02/04/18 134/85   12/22/17 124/80                Passed - Normal ALT on file in past 12 months    Recent Labs   Lab Test  03/12/18   0325   ALT  32            Passed - Normal AST on file in past 12 months    Recent Labs   Lab Test  03/12/18   0325   AST  20            Passed - Recent (12 mo) or future (30 days) visit within the authorizing provider's specialty    Patient had office visit in the last 12 months or has a visit in the next 30 days with authorizing provider or within the authorizing provider's specialty.  See \"Patient Info\" tab in inbasket, or \"Choose Columns\" in Meds & Orders section of the refill encounter.           Passed - Patient is age 6-64 years       Passed - Normal CBC on file in past 12 months    Recent Labs   Lab Test  03/12/18   0325   WBC  7.0   RBC  5.38   HGB  14.6   HCT  44.8   PLT  244            Passed - Normal serum creatinine on file in past 12 months    Recent Labs   Lab Test  03/12/18   0325   CR  0.98               Last Written Prescription Date:  1/23/18  Last Fill Quantity: 60,  # refills: 1   Last Office Visit with [facility], [facility] or [facility] prescribing provider:  12/22/17   Future Office Visit:     Methocarbamol 750 MG       Last Written Prescription Date:  2/22/18  Last Fill Quantity: 90,   # refills: 0  Last Office Visit: 12/22/17  Future Office visit: "       Routing refill request to provider for review/approval because:  Drug not on the [facility], [facility] or [facility] refill protocol or controlled substance

## 2018-03-28 RX ORDER — NAPROXEN 500 MG/1
TABLET ORAL
Qty: 60 TABLET | Refills: 1 | Status: SHIPPED | OUTPATIENT
Start: 2018-03-28 | End: 2018-05-29

## 2018-03-28 RX ORDER — METHOCARBAMOL 750 MG/1
TABLET, FILM COATED ORAL
Qty: 90 TABLET | Refills: 0 | Status: SHIPPED | OUTPATIENT
Start: 2018-03-28 | End: 2018-04-25

## 2018-03-28 NOTE — TELEPHONE ENCOUNTER
Routing refill request to provider for review/approval because:  Drug not on the [facility] refill protocol   135/95  Grace Chase RN

## 2018-04-12 ENCOUNTER — OFFICE VISIT (OUTPATIENT)
Dept: FAMILY MEDICINE | Facility: CLINIC | Age: 48
End: 2018-04-12
Payer: COMMERCIAL

## 2018-04-12 ENCOUNTER — HOSPITAL ENCOUNTER (OUTPATIENT)
Dept: GENERAL RADIOLOGY | Facility: CLINIC | Age: 48
Discharge: HOME | End: 2018-04-12
Attending: NURSE PRACTITIONER | Admitting: NURSE PRACTITIONER
Payer: COMMERCIAL

## 2018-04-12 ENCOUNTER — TELEPHONE (OUTPATIENT)
Dept: FAMILY MEDICINE | Facility: CLINIC | Age: 48
End: 2018-04-12

## 2018-04-12 VITALS
SYSTOLIC BLOOD PRESSURE: 136 MMHG | OXYGEN SATURATION: 100 % | HEART RATE: 72 BPM | DIASTOLIC BLOOD PRESSURE: 80 MMHG | TEMPERATURE: 97.1 F | WEIGHT: 315 LBS | BODY MASS INDEX: 40.7 KG/M2

## 2018-04-12 DIAGNOSIS — M79.672 LEFT FOOT PAIN: ICD-10-CM

## 2018-04-12 DIAGNOSIS — M79.672 LEFT FOOT PAIN: Primary | ICD-10-CM

## 2018-04-12 PROCEDURE — 99213 OFFICE O/P EST LOW 20 MIN: CPT | Performed by: NURSE PRACTITIONER

## 2018-04-12 PROCEDURE — 73630 X-RAY EXAM OF FOOT: CPT | Mod: TC

## 2018-04-12 RX ORDER — HYDROCODONE BITARTRATE AND ACETAMINOPHEN 5; 325 MG/1; MG/1
1 TABLET ORAL EVERY 6 HOURS PRN
Qty: 20 TABLET | Refills: 0 | Status: SHIPPED | OUTPATIENT
Start: 2018-04-12 | End: 2018-05-10

## 2018-04-12 RX ORDER — PREDNISONE 20 MG/1
40 TABLET ORAL DAILY
Qty: 10 TABLET | Refills: 0 | Status: SHIPPED | OUTPATIENT
Start: 2018-04-12 | End: 2018-04-17

## 2018-04-12 NOTE — TELEPHONE ENCOUNTER
Reason for Call:  Same Day Appointment, Requested Provider:  Mariana Ortega NP    PCP: Mariana Ortega    Reason for visit: Patient states he needs to be seen for left foot swelling (it was the right foot about 1 mo ago)    Duration of symptoms: 3 days    Have you been treated for this in the past? Yes    Additional comments: No access in [place], can't get to the other clinics if there are openings    Can we leave a detailed message on this number? YES    Phone number patient can be reached at: Home number on file [phone] (home)    Best Time:     Call taken on 4/12/2018 at 7:15 AM by Yissel Hackett

## 2018-04-12 NOTE — PROGRESS NOTES
SUBJECTIVE:   Humberto Roberts is a 48 year old male who presents to clinic today for the following health issues:      Concern - left foot swelling  Onset: 3 days    Description:   Left foot sore, swollen    Intensity: moderate    Progression of Symptoms:  worsening    Accompanying Signs & Symptoms:  none    Previous history of similar problem:   none    Precipitating factors:   Worsened by: walking,     Alleviating factors:  Improved by: none    Therapies Tried and outcome: naproxen, ibuprofen not helping    The patient reports a 3 day history of left foot pain.  He does not recall any traumatic incident.  He states yesterday after being on it all day, the foot became extremely painful.  He has trouble sleeping at night if the covers are putting pressure on the top of the foot.  Otherwise, he does not note pain to palpation anywhere, other than when he is standing.  He has noted some swelling, no redness or increased warmth.  He has had a history of gout in the past, but this does not seem similar to his previous gout flare    Problem list and histories reviewed & adjusted, as indicated.  Additional history: as documented    BP Readings from Last 3 Encounters:   04/12/18 136/80   03/12/18 (!) 135/95   02/04/18 134/85    Wt Readings from Last 3 Encounters:   04/12/18 317 lb (143.8 kg)   03/12/18 (!) 310 lb (140.6 kg)   02/04/18 300 lb (136.1 kg)                    Reviewed and updated as needed this visit by clinical staff  Tobacco  Allergies  Meds  Med Hx  Surg Hx  Fam Hx  Soc Hx      Reviewed and updated as needed this visit by Provider         ROS:  Constitutional, HEENT, cardiovascular, pulmonary, gi and gu systems are negative, except as otherwise noted.    OBJECTIVE:     /80 (BP Location: Right arm, Patient Position: Chair, Cuff Size: Adult Large)  Pulse 72  Temp 97.1  F (36.2  C) (Temporal)  Wt 317 lb (143.8 kg)  SpO2 100%  BMI 40.7 kg/m2  Body mass index is 40.7 kg/(m^2).   GENERAL: Pleasant  morbidly obese male in no acute distress  MS: He ambulates with an antalgic gait favoring the left foot.  Visual inspection of the left foot is unremarkable.  No obvious swelling, no color change, no increased warmth to touch.  He has no pain to palpation, but indicates area of pain to be in a band around the forefoot and distal to the ankle on the dorsal and ventral aspects of the foot when standing.  He is quite flat-footed.    X-rays of the left foot show a lucent irregular area at the proximal fifth metatarsal of uncertain significance.  No other abnormality noted    ASSESSMENT/PLAN:     Problem List Items Addressed This Visit     None      Visit Diagnoses     Left foot pain    -  Primary    Relevant Medications    predniSONE (DELTASONE) 20 MG tablet    HYDROcodone-acetaminophen (NORCO) 5-325 MG per tablet    Other Relevant Orders    XR Foot Left G/E 3 Views (Completed)    POST OP SHOE DELUXE MALE L (Completed)           He was provided with a postop shoe to provide a firm sole with significant cushioning for him.  He was wearing plain canvas tennis shoes without much of an insole.  We will put him on prednisone 40 mg daily for 5 days to help reduce inflammation, he is also given Vicodin #20 to help with pain.  Will refer to podiatry for further evaluation, he may need orthotics.  Weight loss would certainly help with his foot pain as well I believe.  He will follow-up in clinic.    RADHA Romeo [facility]

## 2018-04-12 NOTE — TELEPHONE ENCOUNTER
VIVIAN for patient to call back. Appointment was scheduled. Patient called back, appointment confirmed. Alan/MA

## 2018-04-12 NOTE — MR AVS SNAPSHOT
"              After Visit Summary   2018    Humberto Roberts    MRN: [id number]           Patient Information     Date Of Birth          1970        Visit Information        Provider Department      2018 11:30 AM Mariana Ortega APRN CNP [facility]        Today's Diagnoses     Left foot pain    -  1       Follow-ups after your visit        Future tests that were ordered for you today     Open Future Orders        Priority Expected Expires Ordered    XR Foot Left G/E 3 Views Routine 2018            Who to contact     If you have questions or need follow up information about today's clinic visit or your schedule please contact [facility] directly at [phone].  Normal or non-critical lab and imaging results will be communicated to you by MyChart, letter or phone within 4 business days after the clinic has received the results. If you do not hear from us within 7 days, please contact the clinic through [employer] or phone. If you have a critical or abnormal lab result, we will notify you by phone as soon as possible.  Submit refill requests through [employer] or call your pharmacy and they will forward the refill request to us. Please allow 3 business days for your refill to be completed.          Additional Information About Your Visit        MyChart Information     [employer] lets you send messages to your doctor, view your test results, renew your prescriptions, schedule appointments and more. To sign up, go to [email or web address] . Click on \"Log in\" on the left side of the screen, which will take you to the Welcome page. Then click on \"Sign up Now\" on the right side of the page.     You will be asked to enter the access code listed below, as well as some personal information. Please follow the directions to create your username and password.     Your access code is: [email or web address]  Expires: 6/10/2018  4:02 AM     Your access code will  in 90 " days. If you need help or a new code, please call your [facility] clinic or [phone].        Care EveryWhere ID     This is your Care EveryWhere ID. This could be used by other organizations to access your [facility] medical records  [id number]        Your Vitals Were     Pulse Temperature Pulse Oximetry BMI (Body Mass Index)          72 97.1  F (36.2  C) (Temporal) 100% 40.7 kg/m2         Blood Pressure from Last 3 Encounters:   04/12/18 136/80   03/12/18 (!) 135/95   02/04/18 134/85    Weight from Last 3 Encounters:   04/12/18 317 lb (143.8 kg)   03/12/18 (!) 310 lb (140.6 kg)   02/04/18 300 lb (136.1 kg)              We Performed the Following     POST OP SHOE DELUXE MALE L          Today's Medication Changes          These changes are accurate as of 4/12/18 12:47 PM.  If you have any questions, ask your nurse or doctor.               Start taking these medicines.        Dose/Directions    HYDROcodone-acetaminophen 5-325 MG per tablet   Commonly known as:  NORCO   Used for:  Left foot pain   Started by:  Mariana Ortega APRN CNP        Dose:  1 tablet   Take 1 tablet by mouth every 6 hours as needed for pain maximum 10 tablet(s) per day   Quantity:  20 tablet   Refills:  0       predniSONE 20 MG tablet   Commonly known as:  DELTASONE   Used for:  Left foot pain   Started by:  Mariana Ortega APRN CNP        Dose:  40 mg   Take 2 tablets (40 mg) by mouth daily for 5 days   Quantity:  10 tablet   Refills:  0            Where to get your medicines      These medications were sent to [facility] [place], [place] - [address] [place] [address]     Phone:  [phone]     predniSONE 20 MG tablet         Some of these will need a paper prescription and others can be bought over the counter.  Ask your nurse if you have questions.     Bring a paper prescription for each of these medications     HYDROcodone-acetaminophen 5-325 MG per tablet                Primary Care  Provider Office Phone # Fax #    Mariana Ortega, RADHA [facility] [phone] [address] [place] [address]        Equal Access to Services     CANDIDO URIARTE : Hadii aad ku hadvanessao Sonilsali, waaxda luqadaha, qaybta kaalmada adedrewda, mariela chenfidencio mansfieldsidra reyes geovanna dominguez. So [facility] [phone].    ATENCIÓN: Si habla español, tiene a varela disposición servicios gratuitos de asistencia lingüística. Llame al [phone].    We comply with applicable federal civil rights laws and [place] laws. We do not discriminate on the basis of race, color, national origin, age, disability, sex, sexual orientation, or gender identity.            Thank you!     Thank you for choosing [facility]  for your care. Our goal is always to provide you with excellent care. Hearing back from our patients is one way we can continue to improve our services. Please take a few minutes to complete the written survey that you may receive in the mail after your visit with us. Thank you!             Your Updated Medication List - Protect others around you: Learn how to safely use, store and throw away your medicines at www.disposemymeds.org.          This list is accurate as of 4/12/18 12:47 PM.  Always use your most recent med list.                   Brand Name Dispense Instructions for use Diagnosis    atenolol 50 MG tablet    TENORMIN    30 tablet    Take 1 tablet (50 mg) by mouth daily    Essential hypertension with goal blood pressure less than 140/90       cetirizine 10 MG tablet    zyrTEC    30 tablet    TAKE ONE TABLET BY MOUTH EVERY DAY AS NEEDED FOR ALLERGIES    Seasonal allergic rhinitis due to pollen, unspecified chronicity       fluticasone 50 MCG/ACT spray    FLONASE    1 Bottle    Spray 1-2 sprays into both nostrils daily    Seasonal allergic rhinitis, unspecified chronicity, unspecified trigger       hydrochlorothiazide 25 MG tablet    HYDRODIURIL    30 tablet    Take 1 tablet (25 mg) by mouth  daily    Essential hypertension with goal blood pressure less than 140/90       HYDROcodone-acetaminophen 5-325 MG per tablet    NORCO    20 tablet    Take 1 tablet by mouth every 6 hours as needed for pain maximum 10 tablet(s) per day    Left foot pain       ketorolac 10 MG tablet    TORADOL    20 tablet    Take 1 tablet (10 mg) by mouth every 6 hours as needed        methocarbamol 750 MG tablet    ROBAXIN    90 tablet    TAKE ONE TABLET BY MOUTH THREE TIMES A DAY AS NEEDED FOR MUSCLE SPASMS    Muscle strain of scapular region, initial encounter       naproxen 500 MG tablet    NAPROSYN    60 tablet    TAKE ONE TABLET BY MOUTH TWICE A DAY AS NEEDED FOR MODERATE PAIN    Acute pain of right knee       omeprazole 20 MG CR capsule    priLOSEC    60 capsule    TAKE ONE CAPSULE BY MOUTH TWICE A DAY    Gastroesophageal reflux disease without esophagitis       order for DME      1 Device Auto CPAP @@ 7-15 cm with heated humidity via mask of choice        order for DME     1 Device    Equipment being ordered: shower chair    Morbid obesity (H), Foot pain, bilateral       predniSONE 20 MG tablet    DELTASONE    10 tablet    Take 2 tablets (40 mg) by mouth daily for 5 days    Left foot pain       zolpidem 10 MG tablet    AMBIEN    30 tablet    Take 1 tablet (10 mg) by mouth nightly as needed for sleep    Insomnia, unspecified type

## 2018-04-25 ENCOUNTER — OFFICE VISIT (OUTPATIENT)
Dept: PODIATRY | Facility: CLINIC | Age: 48
End: 2018-04-25
Payer: COMMERCIAL

## 2018-04-25 VITALS
WEIGHT: 313 LBS | SYSTOLIC BLOOD PRESSURE: 132 MMHG | DIASTOLIC BLOOD PRESSURE: 78 MMHG | HEIGHT: 74 IN | BODY MASS INDEX: 40.17 KG/M2

## 2018-04-25 DIAGNOSIS — Q66.6 PES VALGUS: ICD-10-CM

## 2018-04-25 DIAGNOSIS — M10.9 ACUTE GOUT OF LEFT ANKLE, UNSPECIFIED CAUSE: Primary | ICD-10-CM

## 2018-04-25 DIAGNOSIS — S46.919A: ICD-10-CM

## 2018-04-25 PROCEDURE — 99214 OFFICE O/P EST MOD 30 MIN: CPT | Performed by: PODIATRIST

## 2018-04-25 RX ORDER — COLCHICINE 0.6 MG/1
TABLET ORAL
Qty: 30 TABLET | Refills: 1 | Status: SHIPPED | OUTPATIENT
Start: 2018-04-25 | End: 2018-09-12

## 2018-04-25 RX ORDER — METHOCARBAMOL 750 MG/1
TABLET, FILM COATED ORAL
Qty: 90 TABLET | Refills: 0 | Status: SHIPPED | OUTPATIENT
Start: 2018-04-25 | End: 2018-05-29

## 2018-04-25 ASSESSMENT — PAIN SCALES - GENERAL: PAINLEVEL: MODERATE PAIN (5)

## 2018-04-25 NOTE — NURSING NOTE
VO Dr. Guy-large left fracture boot given and explained.................Saima Saucedo CMA  ([facility])

## 2018-04-25 NOTE — PROGRESS NOTES
HPI:  Acute gout that started in the right ankle with pain red hot swollen right ankle.  This resolved about 2 weeks ago and now he has pain in the left ankle for about 1 week.  He is requesting refill of colchicine, fracture boot.    Not working    Weight management plan: Patient was referred to their PCP to discuss a diet and exercise plan.     Patient to follow up with Primary Care provider regarding elevated blood pressure.    ROS:  10 point ROS neg other than the symptoms noted above in the HPI.    PAST MEDICAL HISTORY:   Past Medical History:   Diagnosis Date     Acid reflux disease since 2006     Back pain chronic     Cellulitis of left upper arm and forearm 11/22/2013     Hypertension         PAST SURGICAL HISTORY:   Past Surgical History:   Procedure Laterality Date     ENT SURGERY          MEDICATIONS:   Current Outpatient Prescriptions:      colchicine (COLCRYS) 0.6 MG tablet, Take 2 tablets at the first sign of flare, take 1-2 additional tablets until symptoms improve then stop this medication.  Do not use more than a few days., Disp: 30 tablet, Rfl: 1     atenolol (TENORMIN) 50 MG tablet, Take 1 tablet (50 mg) by mouth daily, Disp: 30 tablet, Rfl: 11     cetirizine (ZYRTEC) 10 MG tablet, TAKE ONE TABLET BY MOUTH EVERY DAY AS NEEDED FOR ALLERGIES, Disp: 30 tablet, Rfl: 6     fluticasone (FLONASE) 50 MCG/ACT spray, Spray 1-2 sprays into both nostrils daily, Disp: 1 Bottle, Rfl: 11     hydrochlorothiazide (HYDRODIURIL) 25 MG tablet, Take 1 tablet (25 mg) by mouth daily, Disp: 30 tablet, Rfl: 11     HYDROcodone-acetaminophen (NORCO) 5-325 MG per tablet, Take 1 tablet by mouth every 6 hours as needed for pain maximum 10 tablet(s) per day, Disp: 20 tablet, Rfl: 0     ketorolac (TORADOL) 10 MG tablet, Take 1 tablet (10 mg) by mouth every 6 hours as needed, Disp: 20 tablet, Rfl: 0     methocarbamol (ROBAXIN) 750 MG tablet, TAKE ONE TABLET BY MOUTH THREE TIMES A DAY AS NEEDED FOR MUSCLE SPASMS, Disp: 90 tablet,  "Rfl: 0     naproxen (NAPROSYN) 500 MG tablet, TAKE ONE TABLET BY MOUTH TWICE A DAY AS NEEDED FOR MODERATE PAIN, Disp: 60 tablet, Rfl: 1     omeprazole (PRILOSEC) 20 MG CR capsule, TAKE ONE CAPSULE BY MOUTH TWICE A DAY, Disp: 60 capsule, Rfl: 11     order for DME, 1 Device Auto CPAP @@ 7-15 cm with heated humidity via mask of choice, Disp: , Rfl:      order for DME, Equipment being ordered: shower chair, Disp: 1 Device, Rfl: 0     zolpidem (AMBIEN) 10 MG tablet, Take 1 tablet (10 mg) by mouth nightly as needed for sleep, Disp: 30 tablet, Rfl: 5     ALLERGIES:  No Known Allergies     SOCIAL HISTORY:   Social History     Social History     Marital status: Significant other     Spouse name: N/A     Number of children: N/A     Years of education: N/A     Occupational History     Not on file.     Social History Main Topics     Smoking status: Never Smoker     Smokeless tobacco: Never Used     Alcohol use No     Drug use: No     Sexual activity: Yes     Partners: Female     Other Topics Concern     Not on file     Social History Narrative        FAMILY HISTORY: No family history on file.     EXAM:Vitals: /78 (BP Location: Left arm, Patient Position: Sitting, Cuff Size: Adult Large)  Ht 6' 2\" (1.88 m)  Wt 313 lb (142 kg)  BMI 40.19 kg/m2  BMI= Body mass index is 40.19 kg/(m^2).    General appearance: Patient is alert and fully cooperative with history & exam.  No sign of distress is noted during the visit.     Psychiatric: Affect is pleasant & appropriate.  Patient appears motivated to improve health.     Respiratory: Breathing is regular & unlabored while sitting.     HEENT: Hearing is intact to spoken word.  Speech is clear.  No gross evidence of visual impairment that would impact ambulation.     Vascular: DP & PT pulses are intact & regular bilaterally.  No significant edema or varicosities noted.  CFT and skin temperature is normal to both lower extremities.     Neurologic: Lower extremity sensation is intact " to light touch.  No evidence of weakness or contracture in the lower extremities.  No evidence of neuropathy.    Dermatologic: Skin is intact to both lower extremities with adequate texture, turgor and tone about the integument.  No paronychia or evidence of soft tissue infection is noted.     Musculoskeletal: Patient is ambulatory without assistive device or brace.  Pain currently noted about the anterior left ankle and dorsal navicular.  Subtle erythema and induration localized to this area as well.  No abrasion or laceration or disruption of the skin.  Slightly guarded range of motion about the left ankle.  Generalized pes valgus was slightly limited range of motion with medial column faulting upon weightbearing.    Radiograph's: 3 views left foot ankle demonstrate osteophytes at the insertion of multiple soft tissue ligaments and on the dorsal aspect of the midfoot lateral.  Generalized pes valgus noted diminished calcaneal inclination angle.     ASSESSMENT:       ICD-10-CM    1. Acute gout of left ankle, unspecified cause M10.9 colchicine (COLCRYS) 0.6 MG tablet     NUTRITION REFERRAL   2. Pes valgus Q66.6 ORTHOTICS REFERRAL        PLAN:  Reviewed patient's chart in [facility].      4/25/2018   Start low purine diet  Order for nutrition consult for low purine diet at his request.  He requests a fracture boot, this was dispensed to be utilized for a few days during acute flareup  He also requests refill of colchicine and this was ordered to be utilized 2-4 tablets per day until symptoms improve.   May consider allopurinol in time and may discuss that with PCP or rheumatologist  RTC with any continued symptoms otherwise as needed    Order for orthotics for more arch support too he has osteophytes noted throughout his foot and ankle with very significantly flat feet.      Perry Guy DPM

## 2018-04-25 NOTE — LETTER
4/25/2018         RE: Humberto Roberts  [address] [address] [place] [id number]        Dear Colleague,    Thank you for referring your patient, Humberto Roberts, to the [facility]. Please see a copy of my visit note below.    HPI:  Acute gout that started in the right ankle with pain red hot swollen right ankle.  This resolved about 2 weeks ago and now he has pain in the left ankle for about 1 week.  He is requesting refill of colchicine, fracture boot.    Not working    Weight management plan: Patient was referred to their PCP to discuss a diet and exercise plan.     Patient to follow up with Primary Care provider regarding elevated blood pressure.    ROS:  10 point ROS neg other than the symptoms noted above in the HPI.    PAST MEDICAL HISTORY:   Past Medical History:   Diagnosis Date     Acid reflux disease since 2006     Back pain chronic     Cellulitis of left upper arm and forearm 11/22/2013     Hypertension         PAST SURGICAL HISTORY:   Past Surgical History:   Procedure Laterality Date     ENT SURGERY          MEDICATIONS:   Current Outpatient Prescriptions:      colchicine (COLCRYS) 0.6 MG tablet, Take 2 tablets at the first sign of flare, take 1-2 additional tablets until symptoms improve then stop this medication.  Do not use more than a few days., Disp: 30 tablet, Rfl: 1     atenolol (TENORMIN) 50 MG tablet, Take 1 tablet (50 mg) by mouth daily, Disp: 30 tablet, Rfl: 11     cetirizine (ZYRTEC) 10 MG tablet, TAKE ONE TABLET BY MOUTH EVERY DAY AS NEEDED FOR ALLERGIES, Disp: 30 tablet, Rfl: 6     fluticasone (FLONASE) 50 MCG/ACT spray, Spray 1-2 sprays into both nostrils daily, Disp: 1 Bottle, Rfl: 11     hydrochlorothiazide (HYDRODIURIL) 25 MG tablet, Take 1 tablet (25 mg) by mouth daily, Disp: 30 tablet, Rfl: 11     HYDROcodone-acetaminophen (NORCO) 5-325 MG per tablet, Take 1 tablet by mouth every 6 hours as needed for pain maximum 10 tablet(s) per day, Disp: 20 tablet, Rfl: 0     ketorolac  "(TORADOL) 10 MG tablet, Take 1 tablet (10 mg) by mouth every 6 hours as needed, Disp: 20 tablet, Rfl: 0     methocarbamol (ROBAXIN) 750 MG tablet, TAKE ONE TABLET BY MOUTH THREE TIMES A DAY AS NEEDED FOR MUSCLE SPASMS, Disp: 90 tablet, Rfl: 0     naproxen (NAPROSYN) 500 MG tablet, TAKE ONE TABLET BY MOUTH TWICE A DAY AS NEEDED FOR MODERATE PAIN, Disp: 60 tablet, Rfl: 1     omeprazole (PRILOSEC) 20 MG CR capsule, TAKE ONE CAPSULE BY MOUTH TWICE A DAY, Disp: 60 capsule, Rfl: 11     order for DME, 1 Device Auto CPAP @@ 7-15 cm with heated humidity via mask of choice, Disp: , Rfl:      order for DME, Equipment being ordered: shower chair, Disp: 1 Device, Rfl: 0     zolpidem (AMBIEN) 10 MG tablet, Take 1 tablet (10 mg) by mouth nightly as needed for sleep, Disp: 30 tablet, Rfl: 5     ALLERGIES:  No Known Allergies     SOCIAL HISTORY:   Social History     Social History     Marital status: Significant other     Spouse name: N/A     Number of children: N/A     Years of education: N/A     Occupational History     Not on file.     Social History Main Topics     Smoking status: Never Smoker     Smokeless tobacco: Never Used     Alcohol use No     Drug use: No     Sexual activity: Yes     Partners: Female     Other Topics Concern     Not on file     Social History Narrative        FAMILY HISTORY: No family history on file.     EXAM:Vitals: /78 (BP Location: Left arm, Patient Position: Sitting, Cuff Size: Adult Large)  Ht 6' 2\" (1.88 m)  Wt 313 lb (142 kg)  BMI 40.19 kg/m2  BMI= Body mass index is 40.19 kg/(m^2).    General appearance: Patient is alert and fully cooperative with history & exam.  No sign of distress is noted during the visit.     Psychiatric: Affect is pleasant & appropriate.  Patient appears motivated to improve health.     Respiratory: Breathing is regular & unlabored while sitting.     HEENT: Hearing is intact to spoken word.  Speech is clear.  No gross evidence of visual impairment that would impact " ambulation.     Vascular: DP & PT pulses are intact & regular bilaterally.  No significant edema or varicosities noted.  CFT and skin temperature is normal to both lower extremities.     Neurologic: Lower extremity sensation is intact to light touch.  No evidence of weakness or contracture in the lower extremities.  No evidence of neuropathy.    Dermatologic: Skin is intact to both lower extremities with adequate texture, turgor and tone about the integument.  No paronychia or evidence of soft tissue infection is noted.     Musculoskeletal: Patient is ambulatory without assistive device or brace.  Pain currently noted about the anterior left ankle and dorsal navicular.  Subtle erythema and induration localized to this area as well.  No abrasion or laceration or disruption of the skin.  Slightly guarded range of motion about the left ankle.  Generalized pes valgus was slightly limited range of motion with medial column faulting upon weightbearing.    Radiograph's: 3 views left foot ankle demonstrate osteophytes at the insertion of multiple soft tissue ligaments and on the dorsal aspect of the midfoot lateral.  Generalized pes valgus noted diminished calcaneal inclination angle.     ASSESSMENT:       ICD-10-CM    1. Acute gout of left ankle, unspecified cause M10.9 colchicine (COLCRYS) 0.6 MG tablet     NUTRITION REFERRAL   2. Pes valgus Q66.6 ORTHOTICS REFERRAL        PLAN:  Reviewed patient's chart in [facility].      4/25/2018   Start low purine diet  Order for nutrition consult for low purine diet at his request.  He requests a fracture boot, this was dispensed to be utilized for a few days during acute flareup  He also requests refill of colchicine and this was ordered to be utilized 2-4 tablets per day until symptoms improve.   May consider allopurinol in time and may discuss that with PCP or rheumatologist  RTC with any continued symptoms otherwise as needed    Order for orthotics for more arch support too he has  osteophytes noted throughout his foot and ankle with very significantly flat feet.      Perry Guy DPM      Again, thank you for allowing me to participate in the care of your patient.        Sincerely,        Perry Guy DPM

## 2018-04-25 NOTE — MR AVS SNAPSHOT
After Visit Summary   4/25/2018    Humberto Roberts    MRN: [id number]           Patient Information     Date Of Birth          1970        Visit Information        Provider Department      4/25/2018 8:00 AM Perry Guy DPM [facility]        Today's Diagnoses     Acute gout of left ankle, unspecified cause    -  1    Pes valgus          Care Instructions    Gout  What Is Gout?  Gout is a disorder that results from the build-up of uric acid in the tissues or a joint. It most often affects the joint of the big toe.  Causes  Gout attacks are caused by deposits of crystallized uric acid in the joint. Uric acid is present in the blood and eliminated in the urine, but in people who have gout, uric acid accumulates and crystallizes in the joints. Uric acid is the result of the breakdown of purines, chemicals that are found naturally in our bodies and in food. Some people develop gout because their kidneys have difficulty eliminating normal amounts of uric acid, while others produce too much uric acid.  Gout occurs most commonly in the big toe because uric acid is sensitive to temperature changes. At cooler temperatures, uric acid turns into crystals. Since the toe is the part of the body that is farthest from the heart, it s also the coolest part of the body - and, thus, the most likely target of gout. However, gout can affect any joint in the body.  The tendency to accumulate uric acid is often inherited. Other factors that put a person at risk for developing gout include: high blood pressure, diabetes, obesity, surgery, chemotherapy, stress, and certain medications and vitamins. For example, the body s ability to remove uric acid can be negatively affected by taking aspirin, some diuretic medications ( water pills ), and the vitamin niacin (also called nicotinic acid). While gout is more common in men aged 40 to 60 years, it can occur in younger men as well as in women.  Consuming  foods and beverages that contain high levels of purines can trigger an attack of gout. Some foods contain more purines than others and have been associated with an increase of uric acid, which leads to gout. You may be able to reduce your chances of getting a gout attack by limiting or avoiding shellfish, organ meats (kidney, liver, etc.), red wine, beer, and red meat.  Symptoms  An attack of gout can be miserable, marked by the following symptoms:  Intense pain that comes on suddenly - often in the middle of the night or upon arising   Signs of inflammation such as redness, swelling, and warmth over the joint.   Diagnosis  To diagnose gout, the foot and ankle surgeon will ask questions about your personal and family medical history, followed by an examination of the affected joint. Laboratory tests and x-rays are sometimes ordered to determine if the inflammation is caused by something other than gout.  Treatment  Initial treatment of an attack of gout typically includes the following:  Medications. Prescription medications or injections are used to treat the pain, swelling, and inflammation.   Dietary restrictions. Foods and beverages that are high in purines should be avoided, since purines are converted in the body to uric acid.   Fluids. Drink plenty of water and other fluids each day, while also avoiding alcoholic beverages, which cause dehydration. Cherry Juice works well to help decrease pain.  Immobilize and elevate the foot. Avoid standing and walking to give your foot a rest. Also, elevate your foot (level with or slightly above the heart) to help reduce swelling.   The symptoms of gout and the inflammatory process usually resolve in three to ten days with treatment. If gout symptoms continue despite the initial treatment, or if repeated attacks occur, see your primary care physician for maintenance treatment that may involve daily medication. In cases of repeated episodes, the underlying problem must be  addressed, as the build-up of uric acid over time can cause arthritic damage to the joint.  Diet details  A gout diet reduces your intake of foods that are high in purines, which helps control your body's production of uric acid. If you're overweight or obese, lose weight. However, avoid fasting and rapid weight loss because these can promote a gout attack. Drink plenty of fluids to help flush uric acid from your body. Also avoid high-protein diets, which can cause you to produce too much uric acid (hyperuricemia).   To follow the diet:   Limit meat, poultry and fish. Animal proteins are high in purine. Avoid or severely limit high-purine foods, such as organ meats, herring, anchovies and mackerel. Red meat (beef, pork and lamb), fatty fish and seafood (tuna, shrimp, lobster and scallops) are associated with increased risk of gout. Because all meat, poultry and fish contain purines, limit your intake to 4 to 6 ounces (113 to 170 grams) daily.   Eat more plant-based proteins. You can increase your protein by including more plant-based sources, such as beans and legumes. This switch will also help you cut down on saturated fats, which may indirectly contribute to obesity and gout.   Limit or avoid alcohol. Alcohol interferes with the elimination of uric acid from your body. Drinking beer, in particular, has been linked to gout attacks. If you're having an attack, avoid alcohol. However, when you're not having an attack, drinking one or two 5-ounce (148 milliliter) servings a day of wine is not likely to increase your risk.   Drink plenty of fluids, particularly water. Fluids can help remove uric acid from your body. Aim for eight to 16 8-ounce (237 milliliter) glasses a day.   Choose low-fat or fat-free dairy products. Some studies have shown that drinking skim or low-fat milk and eating foods made with them, such as yogurt, help reduce the risk of gout. Aim for adequate dairy intake of 16 to 24 fluid ounces (473 to  710 milliliters) daily.   Choose complex carbohydrates. Eat more whole grains and fruits and vegetables and fewer refined carbohydrates, such as white bread, cakes and candy.   Limit or avoid sugar. Too many sweets can leave you with no room for plant-based proteins and low-fat or fat-free dairy products -- the foods you need to avoid gout. Sugary foods also tend to be high in calories, so they make it easier to eat more than you're likely to burn off. Although there's debate about whether sugar has a direct effect on uric acid levels, sweets are definitely linked to overweight and obesity.   There's also some evidence that drinking four to six cups of coffee a day lowers gout risk in men.   Results  Following a gout diet can help you limit your body's uric acid production and increase its elimination. It's not likely to lower the uric acid concentration in your blood enough to treat your gout without medication, but it may help decrease the number of attacks and limit their severity. Following the gout diet and limiting your calories -- particularly if you also add in moderate daily exercise, such as brisk walking -- also can improve your overall health by helping you achieve and maintain a healthy weight.              Follow-ups after your visit        Additional Services     NUTRITION REFERRAL       Your provider has referred you to: FMSHASHI: [facility] [phone]   [email or web address]/    Please be aware that coverage of these services is subject to the terms and limitations of your health insurance plan.  Call member services at your health plan with any benefit or coverage questions.      Please bring the following to your appointment:      >>   This referral request   >>   Any documents given to you for this referral  >>   Any specific questions you have about diet or food choices            ORTHOTICS REFERRAL       [facility] scheduling staff may contact patient  "to arrange appointments for casting of orthotics and often do not leave messages.  The patient may call this number for scheduling at their convenience. Scheduling Phone [phone].      One pair custom functional foot orthotics.   Flexible polypropylene shell with \" Spenco cushioned top cover, crepe rearfoot post, medial density arch fill, AIDA bottom cover.  Aerobic model.                  Who to contact     If you have questions or need follow up information about today's clinic visit or your schedule please contact [facility] directly at [phone].  Normal or non-critical lab and imaging results will be communicated to you by InDMusichart, letter or phone within 4 business days after the clinic has received the results. If you do not hear from us within 7 days, please contact the clinic through InDMusichart or phone. If you have a critical or abnormal lab result, we will notify you by phone as soon as possible.  Submit refill requests through [employer] or call your pharmacy and they will forward the refill request to us. Please allow 3 business days for your refill to be completed.          Additional Information About Your Visit        MyCharOPPRTUNITY Information     [employer] lets you send messages to your doctor, view your test results, renew your prescriptions, schedule appointments and more. To sign up, go to [email or web address] . Click on \"Log in\" on the left side of the screen, which will take you to the Welcome page. Then click on \"Sign up Now\" on the right side of the page.     You will be asked to enter the access code listed below, as well as some personal information. Please follow the directions to create your username and password.     Your access code is: [email or web address]  Expires: 6/10/2018  4:02 AM     Your access code will  in 90 days. If you need help or a new code, please call your [facility] or [phone].        Care EveryWhere ID     This is your Care EveryWhere ID. This " "could be used by other organizations to access your [facility] medical records  [id number]        Your Vitals Were     Height BMI (Body Mass Index)                6' 2\" (1.88 m) 40.19 kg/m2           Blood Pressure from Last 3 Encounters:   04/25/18 132/78   04/12/18 136/80   03/12/18 (!) 135/95    Weight from Last 3 Encounters:   04/25/18 313 lb (142 kg)   04/12/18 317 lb (143.8 kg)   03/12/18 (!) 310 lb (140.6 kg)              We Performed the Following     NUTRITION REFERRAL     ORTHOTICS REFERRAL          Today's Medication Changes          These changes are accurate as of 4/25/18 10:11 AM.  If you have any questions, ask your nurse or doctor.               Start taking these medicines.        Dose/Directions    colchicine 0.6 MG tablet   Commonly known as:  COLCRYS   Used for:  Acute gout of left ankle, unspecified cause   Started by:  Perry Guy DPM        Take 2 tablets at the first sign of flare, take 1-2 additional tablets until symptoms improve then stop this medication.  Do not use more than a few days.   Quantity:  30 tablet   Refills:  1            Where to get your medicines      These medications were sent to [facility] [place] [id number] [facility]   [address] [facility] [address]     Phone:  [phone]     methocarbamol 750 MG tablet         Some of these will need a paper prescription and others can be bought over the counter.  Ask your nurse if you have questions.     Bring a paper prescription for each of these medications     colchicine 0.6 MG tablet                Primary Care Provider Office Phone # Fax #    RADHA Romeo [facility] [phone]       [address]   [place] [address]        Equal Access to Services     [facility] AH: Hadii amy Ohara, waaxda luqadaha, qaybta kaalmada govind, mariela dominguez. So [place] [phone].    ATENCIÓN: Si habla español, tiene a varela disposición servicios " jimmy de asistencia lingüística. Teofilo tovar [phone].    We comply with applicable federal civil rights laws and [place] laws. We do not discriminate on the basis of race, color, national origin, age, disability, sex, sexual orientation, or gender identity.            Thank you!     Thank you for choosing [facility]  for your care. Our goal is always to provide you with excellent care. Hearing back from our patients is one way we can continue to improve our services. Please take a few minutes to complete the written survey that you may receive in the mail after your visit with us. Thank you!             Your Updated Medication List - Protect others around you: Learn how to safely use, store and throw away your medicines at www.disposemymeds.org.          This list is accurate as of 4/25/18 10:11 AM.  Always use your most recent med list.                   Brand Name Dispense Instructions for use Diagnosis    atenolol 50 MG tablet    TENORMIN    30 tablet    Take 1 tablet (50 mg) by mouth daily    Essential hypertension with goal blood pressure less than 140/90       cetirizine 10 MG tablet    zyrTEC    30 tablet    TAKE ONE TABLET BY MOUTH EVERY DAY AS NEEDED FOR ALLERGIES    Seasonal allergic rhinitis due to pollen, unspecified chronicity       colchicine 0.6 MG tablet    COLCRYS    30 tablet    Take 2 tablets at the first sign of flare, take 1-2 additional tablets until symptoms improve then stop this medication.  Do not use more than a few days.    Acute gout of left ankle, unspecified cause       fluticasone 50 MCG/ACT spray    FLONASE    1 Bottle    Spray 1-2 sprays into both nostrils daily    Seasonal allergic rhinitis, unspecified chronicity, unspecified trigger       hydrochlorothiazide 25 MG tablet    HYDRODIURIL    30 tablet    Take 1 tablet (25 mg) by mouth daily    Essential hypertension with goal blood pressure less than 140/90       HYDROcodone-acetaminophen 5-325 MG per tablet     NORCO    20 tablet    Take 1 tablet by mouth every 6 hours as needed for pain maximum 10 tablet(s) per day    Left foot pain       ketorolac 10 MG tablet    TORADOL    20 tablet    Take 1 tablet (10 mg) by mouth every 6 hours as needed        methocarbamol 750 MG tablet    ROBAXIN    90 tablet    TAKE ONE TABLET BY MOUTH THREE TIMES A DAY AS NEEDED FOR MUSCLE SPASMS    Muscle strain of scapular region, initial encounter       naproxen 500 MG tablet    NAPROSYN    60 tablet    TAKE ONE TABLET BY MOUTH TWICE A DAY AS NEEDED FOR MODERATE PAIN    Acute pain of right knee       omeprazole 20 MG CR capsule    priLOSEC    60 capsule    TAKE ONE CAPSULE BY MOUTH TWICE A DAY    Gastroesophageal reflux disease without esophagitis       order for DME      1 Device Auto CPAP @@ 7-15 cm with heated humidity via mask of choice        order for DME     1 Device    Equipment being ordered: shower chair    Morbid obesity (H), Foot pain, bilateral       zolpidem 10 MG tablet    AMBIEN    30 tablet    Take 1 tablet (10 mg) by mouth nightly as needed for sleep    Insomnia, unspecified type

## 2018-04-25 NOTE — TELEPHONE ENCOUNTER
Methocarbamol 750 MG       Last Written Prescription Date:  3/28/18  Last Fill Quantity: 90,   # refills: 0  Last Office Visit: 4/12/18  Future Office visit:       Routing refill request to provider for review/approval because:  Drug not on the [facility], [facility] or [facility] refill protocol or controlled substance

## 2018-04-25 NOTE — NURSING NOTE
"Chief Complaint   Patient presents with     Pain     left foot pain -more discomfort on top of foot . no Hx of injury       Initial /78 (BP Location: Left arm, Patient Position: Sitting, Cuff Size: Adult Large)  Ht 6' 2\" (1.88 m)  Wt 313 lb (142 kg)  BMI 40.19 kg/m2 Estimated body mass index is 40.19 kg/(m^2) as calculated from the following:    Height as of this encounter: 6' 2\" (1.88 m).    Weight as of this encounter: 313 lb (142 kg).  Medication Reconciliation: complete    "

## 2018-04-25 NOTE — PATIENT INSTRUCTIONS
Gout  What Is Gout?  Gout is a disorder that results from the build-up of uric acid in the tissues or a joint. It most often affects the joint of the big toe.  Causes  Gout attacks are caused by deposits of crystallized uric acid in the joint. Uric acid is present in the blood and eliminated in the urine, but in people who have gout, uric acid accumulates and crystallizes in the joints. Uric acid is the result of the breakdown of purines, chemicals that are found naturally in our bodies and in food. Some people develop gout because their kidneys have difficulty eliminating normal amounts of uric acid, while others produce too much uric acid.  Gout occurs most commonly in the big toe because uric acid is sensitive to temperature changes. At cooler temperatures, uric acid turns into crystals. Since the toe is the part of the body that is farthest from the heart, it s also the coolest part of the body - and, thus, the most likely target of gout. However, gout can affect any joint in the body.  The tendency to accumulate uric acid is often inherited. Other factors that put a person at risk for developing gout include: high blood pressure, diabetes, obesity, surgery, chemotherapy, stress, and certain medications and vitamins. For example, the body s ability to remove uric acid can be negatively affected by taking aspirin, some diuretic medications ( water pills ), and the vitamin niacin (also called nicotinic acid). While gout is more common in men aged 40 to 60 years, it can occur in younger men as well as in women.  Consuming foods and beverages that contain high levels of purines can trigger an attack of gout. Some foods contain more purines than others and have been associated with an increase of uric acid, which leads to gout. You may be able to reduce your chances of getting a gout attack by limiting or avoiding shellfish, organ meats (kidney, liver, etc.), red wine, beer, and red meat.  Symptoms  An attack of gout  can be miserable, marked by the following symptoms:  Intense pain that comes on suddenly - often in the middle of the night or upon arising   Signs of inflammation such as redness, swelling, and warmth over the joint.   Diagnosis  To diagnose gout, the foot and ankle surgeon will ask questions about your personal and family medical history, followed by an examination of the affected joint. Laboratory tests and x-rays are sometimes ordered to determine if the inflammation is caused by something other than gout.  Treatment  Initial treatment of an attack of gout typically includes the following:  Medications. Prescription medications or injections are used to treat the pain, swelling, and inflammation.   Dietary restrictions. Foods and beverages that are high in purines should be avoided, since purines are converted in the body to uric acid.   Fluids. Drink plenty of water and other fluids each day, while also avoiding alcoholic beverages, which cause dehydration. Cherry Juice works well to help decrease pain.  Immobilize and elevate the foot. Avoid standing and walking to give your foot a rest. Also, elevate your foot (level with or slightly above the heart) to help reduce swelling.   The symptoms of gout and the inflammatory process usually resolve in three to ten days with treatment. If gout symptoms continue despite the initial treatment, or if repeated attacks occur, see your primary care physician for maintenance treatment that may involve daily medication. In cases of repeated episodes, the underlying problem must be addressed, as the build-up of uric acid over time can cause arthritic damage to the joint.  Diet details  A gout diet reduces your intake of foods that are high in purines, which helps control your body's production of uric acid. If you're overweight or obese, lose weight. However, avoid fasting and rapid weight loss because these can promote a gout attack. Drink plenty of fluids to help flush uric  acid from your body. Also avoid high-protein diets, which can cause you to produce too much uric acid (hyperuricemia).   To follow the diet:   Limit meat, poultry and fish. Animal proteins are high in purine. Avoid or severely limit high-purine foods, such as organ meats, herring, anchovies and mackerel. Red meat (beef, pork and lamb), fatty fish and seafood (tuna, shrimp, lobster and scallops) are associated with increased risk of gout. Because all meat, poultry and fish contain purines, limit your intake to 4 to 6 ounces (113 to 170 grams) daily.   Eat more plant-based proteins. You can increase your protein by including more plant-based sources, such as beans and legumes. This switch will also help you cut down on saturated fats, which may indirectly contribute to obesity and gout.   Limit or avoid alcohol. Alcohol interferes with the elimination of uric acid from your body. Drinking beer, in particular, has been linked to gout attacks. If you're having an attack, avoid alcohol. However, when you're not having an attack, drinking one or two 5-ounce (148 milliliter) servings a day of wine is not likely to increase your risk.   Drink plenty of fluids, particularly water. Fluids can help remove uric acid from your body. Aim for eight to 16 8-ounce (237 milliliter) glasses a day.   Choose low-fat or fat-free dairy products. Some studies have shown that drinking skim or low-fat milk and eating foods made with them, such as yogurt, help reduce the risk of gout. Aim for adequate dairy intake of 16 to 24 fluid ounces (473 to 710 milliliters) daily.   Choose complex carbohydrates. Eat more whole grains and fruits and vegetables and fewer refined carbohydrates, such as white bread, cakes and candy.   Limit or avoid sugar. Too many sweets can leave you with no room for plant-based proteins and low-fat or fat-free dairy products -- the foods you need to avoid gout. Sugary foods also tend to be high in calories, so they make  it easier to eat more than you're likely to burn off. Although there's debate about whether sugar has a direct effect on uric acid levels, sweets are definitely linked to overweight and obesity.   There's also some evidence that drinking four to six cups of coffee a day lowers gout risk in men.   Results  Following a gout diet can help you limit your body's uric acid production and increase its elimination. It's not likely to lower the uric acid concentration in your blood enough to treat your gout without medication, but it may help decrease the number of attacks and limit their severity. Following the gout diet and limiting your calories -- particularly if you also add in moderate daily exercise, such as brisk walking -- also can improve your overall health by helping you achieve and maintain a healthy weight.

## 2018-05-10 DIAGNOSIS — M79.672 LEFT FOOT PAIN: ICD-10-CM

## 2018-05-10 NOTE — TELEPHONE ENCOUNTER
Norco 5-325 MG       Last Written Prescription Date:  4/12/18  Last Fill Quantity: 20,   # refills: 0  Last Office Visit: 4/12/18  Future Office visit:       Routing refill request to provider for review/approval because:  Drug not on the [facility], [facility] or [facility] refill protocol or controlled substance

## 2018-05-11 RX ORDER — HYDROCODONE BITARTRATE AND ACETAMINOPHEN 5; 325 MG/1; MG/1
1 TABLET ORAL EVERY 6 HOURS PRN
Qty: 20 TABLET | Refills: 0 | Status: SHIPPED | OUTPATIENT
Start: 2018-05-11 | End: 2018-09-25

## 2018-05-29 DIAGNOSIS — M25.561 ACUTE PAIN OF RIGHT KNEE: ICD-10-CM

## 2018-05-29 DIAGNOSIS — S46.919A: ICD-10-CM

## 2018-05-29 RX ORDER — NAPROXEN 500 MG/1
TABLET ORAL
Qty: 60 TABLET | Refills: 1 | Status: SHIPPED | OUTPATIENT
Start: 2018-05-29 | End: 2018-07-20

## 2018-05-29 RX ORDER — METHOCARBAMOL 750 MG/1
TABLET, FILM COATED ORAL
Qty: 90 TABLET | Refills: 0 | Status: SHIPPED | OUTPATIENT
Start: 2018-05-29 | End: 2018-06-29

## 2018-05-29 NOTE — TELEPHONE ENCOUNTER
"Requested Prescriptions   Pending Prescriptions Disp Refills     naproxen (NAPROSYN) 500 MG tablet [Pharmacy Med Name: NAPROXEN 500MG TABS] 60 tablet 1     Sig: TAKE ONE TABLET BY MOUTH TWICE A DAY AS NEEDED FOR MODERATE PAIN    NSAID Medications Passed    5/29/2018 10:01 AM       Passed - Blood pressure under 140/90 in past 12 months    BP Readings from Last 3 Encounters:   04/25/18 132/78   04/12/18 136/80   03/12/18 (!) 135/95                Passed - Normal ALT on file in past 12 months    Recent Labs   Lab Test  03/12/18   0325   ALT  32            Passed - Normal AST on file in past 12 months    Recent Labs   Lab Test  03/12/18   0325   AST  20            Passed - Recent (12 mo) or future (30 days) visit within the authorizing provider's specialty    Patient had office visit in the last 12 months or has a visit in the next 30 days with authorizing provider or within the authorizing provider's specialty.  See \"Patient Info\" tab in inbasket, or \"Choose Columns\" in Meds & Orders section of the refill encounter.           Passed - Patient is age 6-64 years       Passed - Normal CBC on file in past 12 months    Recent Labs   Lab Test  03/12/18   0325   WBC  7.0   RBC  5.38   HGB  14.6   HCT  44.8   PLT  244            Passed - Normal serum creatinine on file in past 12 months    Recent Labs   Lab Test  03/12/18   0325   CR  0.98             methocarbamol (ROBAXIN) 750 MG tablet [Pharmacy Med Name: METHOCARBAMOL 750MG TABS] 90 tablet 0     Sig: TAKE ONE TABLET BY MOUTH THREE TIMES A DAY AS NEEDED FOR MUSCLE SPASMS    There is no refill protocol information for this order          Last Written Prescription Date:  3/23/18  Last Fill Quantity: 60,  # refills: 1   Last Office Visit with [facility], [facility] or [facility] prescribing provider:  4/12/18   Future Office Visit:       Methocarbamol      Last Written Prescription Date:  4/25/18  Last Fill Quantity: 90,   # refills: 0  Last Office Visit: 4/12/18  Future Office visit:   "     Routing refill request to provider for review/approval because:  Drug not on the [facility], [facility] or [facility] refill protocol or controlled substance

## 2018-05-30 DIAGNOSIS — G47.00 INSOMNIA, UNSPECIFIED TYPE: ICD-10-CM

## 2018-05-31 NOTE — TELEPHONE ENCOUNTER
Zolpidem      Last Written Prescription Date:  11/02/2017  Last Fill Quantity: 30,   # refills: 5  Last Office Visit: 4/12/2018  Future Office visit:       Routing refill request to provider for review/approval because:  Drug not on the [facility], [facility] or [facility] refill protocol or controlled substance

## 2018-06-05 RX ORDER — ZOLPIDEM TARTRATE 10 MG/1
10 TABLET ORAL
Qty: 30 TABLET | Refills: 5 | Status: SHIPPED | OUTPATIENT
Start: 2018-06-05 | End: 2018-11-29

## 2018-06-29 DIAGNOSIS — S46.919A: ICD-10-CM

## 2018-06-29 RX ORDER — METHOCARBAMOL 750 MG/1
TABLET, FILM COATED ORAL
Qty: 90 TABLET | Refills: 0 | Status: SHIPPED | OUTPATIENT
Start: 2018-06-29 | End: 2018-07-31

## 2018-06-29 NOTE — TELEPHONE ENCOUNTER
Methocarbamol 750 MG       Last Written Prescription Date:  5/29/18  Last Fill Quantity: 90,   # refills: 0  Last Office Visit: 4/12/18  Future Office visit:       Routing refill request to provider for review/approval because:  Drug not on the [facility], [facility] or [facility] refill protocol or controlled substance

## 2018-07-20 ENCOUNTER — HOSPITAL ENCOUNTER (EMERGENCY)
Facility: CLINIC | Age: 48
Discharge: HOME | End: 2018-07-20
Attending: PHYSICIAN ASSISTANT | Admitting: PHYSICIAN ASSISTANT
Payer: COMMERCIAL

## 2018-07-20 ENCOUNTER — APPOINTMENT (OUTPATIENT)
Dept: GENERAL RADIOLOGY | Facility: CLINIC | Age: 48
End: 2018-07-20
Attending: PHYSICIAN ASSISTANT
Payer: COMMERCIAL

## 2018-07-20 VITALS
OXYGEN SATURATION: 96 % | SYSTOLIC BLOOD PRESSURE: 148 MMHG | WEIGHT: 313.8 LBS | TEMPERATURE: 97.8 F | BODY MASS INDEX: 40.29 KG/M2 | RESPIRATION RATE: 20 BRPM | DIASTOLIC BLOOD PRESSURE: 83 MMHG

## 2018-07-20 DIAGNOSIS — M77.11 RIGHT LATERAL EPICONDYLITIS: ICD-10-CM

## 2018-07-20 DIAGNOSIS — M19.049 ARTHRITIS OF CARPOMETACARPAL JOINT: ICD-10-CM

## 2018-07-20 PROCEDURE — 73080 X-RAY EXAM OF ELBOW: CPT | Mod: TC,RT

## 2018-07-20 PROCEDURE — 29125 APPL SHORT ARM SPLINT STATIC: CPT | Mod: LT | Performed by: PHYSICIAN ASSISTANT

## 2018-07-20 PROCEDURE — 73140 X-RAY EXAM OF FINGER(S): CPT | Mod: TC,LT

## 2018-07-20 PROCEDURE — 99284 EMERGENCY DEPT VISIT MOD MDM: CPT | Mod: Z6 | Performed by: PHYSICIAN ASSISTANT

## 2018-07-20 PROCEDURE — 99284 EMERGENCY DEPT VISIT MOD MDM: CPT | Performed by: PHYSICIAN ASSISTANT

## 2018-07-20 RX ORDER — MELOXICAM 15 MG/1
15 TABLET ORAL DAILY
Qty: 15 TABLET | Refills: 0 | Status: SHIPPED | OUTPATIENT
Start: 2018-07-20 | End: 2018-08-09

## 2018-07-20 NOTE — ED AVS SNAPSHOT
[facility] Emergency Department    [address] [place] [address]    Phone:  [phone]    Fax:  [phone]                                       Humberto Roberts   MRN: [id number]    Department:  [facility] Emergency Department   Date of Visit:  7/20/2018           After Visit Summary Signature Page     I have received my discharge instructions, and my questions have been answered. I have discussed any challenges I see with this plan with the nurse or doctor.    ..........................................................................................................................................  Patient/Patient Representative Signature      ..........................................................................................................................................  Patient Representative Print Name and Relationship to Patient    ..................................................               ................................................  Date                                            Time    ..........................................................................................................................................  Reviewed by Signature/Title    ...................................................              ..............................................  Date                                                            Time

## 2018-07-20 NOTE — ED AVS SNAPSHOT
[facility] Emergency Department    [address] [place] [address]    Phone:  [phone]    Fax:  [phone]                                       Humberto Roberts   MRN: [id number]    Department:  [facility] Emergency Department   Date of Visit:  7/20/2018           Patient Information     Date Of Birth          1970        Your diagnoses for this visit were:     Right lateral epicondylitis     Arthritis of carpometacarpal joint        You were seen by Akiko Prieto PA-C.      Follow-up Information     Follow up with [facility] Emergency Department.    Specialty:  EMERGENCY MEDICINE    Why:  If symptoms worsen    Contact information:    [address]   [place] [address] [phone]    Additional information:    From [address]: Exit at [email or web address] on south side of [place]. Turn right on [address]. Turn left at stoplight on [address]. [facility] will be in view two blocks ahead        Follow up with Mariana Ortega, RADHA CNP.    Specialty:  Nurse Practitioner - Family    Why:  For ER follow up    Contact information:    [address]   [place] [address]  [phone]          Discharge Instructions       Stop taking the naproxen and try the meloxicam prescribed today instead. This is a different type of anti-inflammatory medication that will hopefully help your joint pains.  Wear the wrist splint particularly at night, but you can wear it during the day as well if it helps relieve some of your thumb pain.  Ice the affected areas as well.  You can also use Tylenol as needed for pain control.  Follow-up at your scheduled appointment for reevaluation.  Return to the emergency department for any worsening symptoms.    Thank you for choosing [facility]'s Emergency Department. It was a pleasure taking care of you today. If you have any questions, please call [phone].    Akiko Prieto PA-C      Your next 10  appointments already scheduled     Jul 31, 2018  1:30 PM CDT   [facility]    [address] [address] [phone]           Bring a current list of meds and any records pertaining to this visit. For Physicals, please bring immunization records and any forms needing to be filled out. Please arrive 10 minutes early to complete paperwork.              24 Hour Appointment Hotline       To make an appointment at any [facility], call [phone] ([phone]). If you don't have a family doctor or clinic, we will help you find one. [facility] are conveniently located to serve the needs of you and your family.          ED Discharge Orders     Titan wrist support w/thumb                    Review of your medicines      START taking        Dose / Directions Last dose taken    meloxicam 15 MG tablet   Commonly known as:  MOBIC   Dose:  15 mg   Quantity:  15 tablet        Take 1 tablet (15 mg) by mouth daily for 14 days   Refills:  0          Our records show that you are taking the medicines listed below. If these are incorrect, please call your family doctor or clinic.        Dose / Directions Last dose taken    atenolol 50 MG tablet   Commonly known as:  TENORMIN   Dose:  50 mg   Quantity:  30 tablet        Take 1 tablet (50 mg) by mouth daily   Refills:  11        cetirizine 10 MG tablet   Commonly known as:  zyrTEC   Quantity:  30 tablet        TAKE ONE TABLET BY MOUTH EVERY DAY AS NEEDED FOR ALLERGIES   Refills:  6        colchicine 0.6 MG tablet   Commonly known as:  COLCRYS   Quantity:  30 tablet        Take 2 tablets at the first sign of flare, take 1-2 additional tablets until symptoms improve then stop this medication.  Do not use more than a few days.   Refills:  1        fluticasone 50 MCG/ACT spray   Commonly known as:  FLONASE   Dose:  1-2 spray   Quantity:  1 Bottle        Spray  1-2 sprays into both nostrils daily   Refills:  11        hydrochlorothiazide 25 MG tablet   Commonly known as:  HYDRODIURIL   Dose:  25 mg   Quantity:  30 tablet        Take 1 tablet (25 mg) by mouth daily   Refills:  11        HYDROcodone-acetaminophen 5-325 MG per tablet   Commonly known as:  NORCO   Dose:  1 tablet   Quantity:  20 tablet        Take 1 tablet by mouth every 6 hours as needed for pain maximum 10 tablet(s) per day   Refills:  0        methocarbamol 750 MG tablet   Commonly known as:  ROBAXIN   Quantity:  90 tablet        TAKE ONE TABLET BY MOUTH THREE TIMES A DAY AS NEEDED FOR MUSCLE SPASMS   Refills:  0        omeprazole 20 MG CR capsule   Commonly known as:  priLOSEC   Quantity:  60 capsule        TAKE ONE CAPSULE BY MOUTH TWICE A DAY   Refills:  11        order for DME   Dose:  1 Device        1 Device Auto CPAP @@ 7-15 cm with heated humidity via mask of choice   Refills:  0        order for DME   Quantity:  1 Device        Equipment being ordered: shower chair   Refills:  0        zolpidem 10 MG tablet   Commonly known as:  AMBIEN   Dose:  10 mg   Quantity:  30 tablet        Take 1 tablet (10 mg) by mouth nightly as needed for sleep   Refills:  5          STOP taking        Dose Reason for stopping Comments    ketorolac 10 MG tablet   Commonly known as:  TORADOL              naproxen 500 MG tablet   Commonly known as:  NAPROSYN                      Prescriptions were sent or printed at these locations (1 Prescription)                   [facility] [place] [id number]    [address]  [facility] [address]    Telephone:  [phone]   Fax:  [phone]   Hours:                  E-Prescribed (1 of 1)         meloxicam (MOBIC) 15 MG tablet                Procedures and tests performed during your visit     X-ray lt finger 2-3 view    X-ray rt Elbow G/E 3 vws      Orders Needing Specimen Collection     None      Pending Results     Date and Time Order Name Status  "Description    2018 2244 X-ray lt finger 2-3 view Preliminary     2018 2244 X-ray rt Elbow G/E 3 vws Preliminary             Pending Culture Results     No orders found from 2018 to 2018.            Pending Results Instructions     If you had any lab results that were not finalized at the time of your Discharge, you can call the ED Lab Result RN at [phone]. You will be contacted by this team for any positive Lab results or changes in treatment. The nurses are available 7 days a week from 10A to 6:30P.  You can leave a message 24 hours per day and they will return your call.        Thank you for choosing [facility]       Thank you for choosing [facility] for your care. Our goal is always to provide you with excellent care. Hearing back from our patients is one way we can continue to improve our services. Please take a few minutes to complete the written survey that you may receive in the mail after you visit with us. Thank you!        [facility] [employer] Information     [email or web address] lets you send messages to your doctor, view your test results, renew your prescriptions, schedule appointments and more. To sign up, go to [email or web address] . Click on \"Log in\" on the left side of the screen, which will take you to the Welcome page. Then click on \"Sign up Now\" on the right side of the page.     You will be asked to enter the access code listed below, as well as some personal information. Please follow the directions to create your username and password.     Your access code is: [email or web address]  Expires: 10/18/2018 11:30 PM     Your access code will  in 90 days. If you need help or a new code, please call your [facility] clinic or [phone].        Care EveryWhere ID     This is your Care EveryWhere ID. This could be used by other organizations to access your [facility] medical records  [id number]        Equal Access to Services     CANDIDO URIARTE AH: jean palu Davis qaybta " mariela johnson ah. So [facility] [phone].    ATENCIÓN: Si habla español, tiene a varela disposición servicios gratuitos de asistencia lingüística. Llame al [phone].    We comply with applicable federal civil rights laws and [place] laws. We do not discriminate on the basis of race, color, national origin, age, disability, sex, sexual orientation, or gender identity.            After Visit Summary       This is your record. Keep this with you and show to your community pharmacist(s) and doctor(s) at your next visit.

## 2018-07-21 NOTE — ED PROVIDER NOTES
"  History     Chief Complaint   Patient presents with     Joint Pain     HPI  Humberto Roberts is a 48 year old male who presents to the emergency department complaining of right elbow and left thumb pain. Patient reports the last several weeks to a month he has noticed pain in these areas.  The elbow pain he describes as a dull throb that comes and goes.  He cannot recall injuring the elbow but thinks maybe he bumped it or something.  He has tried working out more frequently but due to the elbow pain he has not been able to.  He has been taking naproxen, muscle relaxers, leftover hydrocodone, and Tylenol with only mild relief.  He denies any numbness or weakness in his right arm.  He is able to move the elbow fully but it is sore with certain movements.  In regard to the left thumb pain, this has also been ongoing for the last several weeks to a month.  He states when he wakes up in the morning it feels stiff and he needs to stretch it out with his other hand.  At times the thumb feels a little numb.  He denies any weakness in the thumb.  His daughter thought he may be had that \"carpal tunnel thing.\"  He has an appointment in the clinic but because pain is not well controlled he decided to present to the ED to have these looked at.        Problem List:    Patient Active Problem List    Diagnosis Date Noted     Seasonal allergic rhinitis due to pollen, unspecified chronicity 01/23/2018     Priority: Medium     Morbid obesity (H) 12/06/2017     Priority: Medium     Elevated serum creatinine 10/15/2015     Priority: Medium     Insomnia 10/15/2015     Priority: Medium     Hypertension goal BP (blood pressure) < 140/90 05/06/2013     Priority: Medium     CARDIOVASCULAR SCREENING; LDL GOAL LESS THAN 160 05/06/2013     Priority: Medium     Seasonal allergic rhinitis 04/24/2013     Priority: Medium     GERD (gastroesophageal reflux disease) 04/24/2013     Priority: Medium        Past Medical History:    Past Medical History: "   Diagnosis Date     Acid reflux disease since 2006     Back pain chronic     Cellulitis of left upper arm and forearm 11/22/2013     Hypertension        Past Surgical History:    Past Surgical History:   Procedure Laterality Date     ENT SURGERY         Family History:    No family history on file.    Social History:  Marital Status:  Significant other [7]  Social History   Substance Use Topics     Smoking status: Never Smoker     Smokeless tobacco: Never Used     Alcohol use No        Medications:      atenolol (TENORMIN) 50 MG tablet   cetirizine (ZYRTEC) 10 MG tablet   colchicine (COLCRYS) 0.6 MG tablet   fluticasone (FLONASE) 50 MCG/ACT spray   hydrochlorothiazide (HYDRODIURIL) 25 MG tablet   HYDROcodone-acetaminophen (NORCO) 5-325 MG per tablet   meloxicam (MOBIC) 15 MG tablet   methocarbamol (ROBAXIN) 750 MG tablet   omeprazole (PRILOSEC) 20 MG CR capsule   zolpidem (AMBIEN) 10 MG tablet   order for DME   order for DME         Review of Systems   All other systems reviewed and are negative.      Physical Exam   BP: (!) 167/98  Heart Rate: 61  Temp: 98.1  F (36.7  C)  Resp: 20  Weight: 142.3 kg (313 lb 12.8 oz)  SpO2: 98 %      Physical Exam   Constitutional: He is oriented to person, place, and time. No distress.   HENT:   Head: Normocephalic and atraumatic.   Eyes: Conjunctivae are normal.   Neck: Neck supple.   Cardiovascular: Intact distal pulses.    Pulmonary/Chest: Effort normal. No respiratory distress.   Musculoskeletal:   Right elbow: Normal range of motion without evidence of pain.  He has mild tenderness to the lateral aspect of the elbow.  No anterior, posterior, or medial elbow tenderness.  Pain with varus stress to the elbow.  Normal strength in right hand, normal distal pulse and sensation.  Left hand: Mild tenderness to the MCP joint of the thumb.  Negative Phalen's test, tingling elicited with Tinel test.  Normal strength in the thumb, normal range of motion, brisk capillary refill.    Neurological: He is alert and oriented to person, place, and time.   Skin: Skin is warm and dry. He is not diaphoretic.   Psychiatric: He has a normal mood and affect.   Nursing note and vitals reviewed.      ED Course     ED Course     Procedures     Results for orders placed or performed during the hospital encounter of 07/20/18 (from the past 24 hour(s))   X-ray rt Elbow G/E 3 vws    Narrative    RIGHT ELBOW THREE VIEWS   7/20/2018 10:56 PM     HISTORY: Lateral elbow pain.    COMPARISON: None.      Impression    IMPRESSION:   1. No visualized acute fracture, malalignment, or other acute osseous  abnormality of the right elbow.  2. No right elbow joint effusion.  3. A small enthesophyte is present in the olecranon process.   X-ray lt finger 2-3 view    Narrative    LEFT FIRST FINGER THREE VIEWS   7/20/2018 10:56 PM     HISTORY: Pain at the base of the thumb.    COMPARISON: None.      Impression    IMPRESSION:   1.  No visualized acute fracture, malalignment, or other acute osseous  abnormality of the left thumb.  2. Mild degenerative changes of the left first carpometacarpal joint.       Medications - No data to display    Assessments & Plan (with Medical Decision Making)  Humberto Roberts is a 48 year old male who presented to the ED complaining of left thumb pain and right elbow pain that is been ongoing for the last few weeks.  No known injury.  On exam today the left thumb had tenderness at the MCP joint and a positive Tinel test.  Right elbow with tenderness on the lateral aspect.  Neurovascularly intact in both upper extremities with normal strength and normal range of motion to areas concern.  X-ray of the thumb in the right elbow were obtained and did not show any acute bony abnormalities.  He did have mild degenerative changes in the left carpometacarpal joint.  Results were discussed with the patient.  In regard to his thumb pain, I suspect it is secondary to arthritic changes given the stiffness he  experiences in the morning.  However I cannot rule out carpal tunnel syndrome either.  Patient was agreeable to managing his symptoms in the meantime with a thumb spica Velcro splint to be worn particularly at night, but throughout the day as needed.  His right elbow exam findings suggest a lateral epicondylitis.  He reported that naproxen really does not work for him so after discussing it further he was agreeable to trying meloxicam instead for pain relief so this was prescribed.  I encouraged continued use of Tylenol, icing the affected areas, and resting the arm.  He reports that he has a follow-up appointment already scheduled with his PCP for further management.  Advise returning the ED for any worsening concerns in the meantime.  Patient in agreement with plan and was discharged home in suitable condition.     I have reviewed the nursing notes.    I have reviewed the findings, diagnosis, plan and need for follow up with the patient.      New Prescriptions    MELOXICAM (MOBIC) 15 MG TABLET    Take 1 tablet (15 mg) by mouth daily for 14 days       Final diagnoses:   Right lateral epicondylitis   Arthritis of carpometacarpal joint     Note: Chart documentation done in part with Dragon Voice Recognition software. Although reviewed after completion, some word and grammatical errors may remain.      7/20/2018   [facility] EMERGENCY DEPARTMENT     Akiko Prieto PA-C  07/20/18 [id number]

## 2018-07-21 NOTE — ED TRIAGE NOTES
Pt reports he has been having pain to right elbow and left thumb for the past couple weeks, states pain medication have not been helpful. Pt made an appointment to been seen in the clinic but states he is unable to tolerate the pain any longer.

## 2018-07-31 DIAGNOSIS — S46.919A: ICD-10-CM

## 2018-07-31 DIAGNOSIS — M25.561 ACUTE PAIN OF RIGHT KNEE: ICD-10-CM

## 2018-07-31 RX ORDER — NAPROXEN 500 MG/1
TABLET ORAL
Qty: 60 TABLET | Refills: 1 | Status: SHIPPED | OUTPATIENT
Start: 2018-07-31 | End: 2018-12-20

## 2018-07-31 RX ORDER — METHOCARBAMOL 750 MG/1
TABLET, FILM COATED ORAL
Qty: 90 TABLET | Refills: 0 | Status: SHIPPED | OUTPATIENT
Start: 2018-07-31 | End: 2018-08-29

## 2018-07-31 NOTE — TELEPHONE ENCOUNTER
"Requested Prescriptions   Pending Prescriptions Disp Refills     naproxen (NAPROSYN) 500 MG tablet [Pharmacy Med Name: NAPROXEN 500MG TABS] 60 tablet 1    Last Written Prescription Date:    Last Fill Quantity: ,   # refills:   Last Office Visit: 4/12/18  Future Office visit:    Next 5 appointments (look out 90 days)     Jul 31, 2018  1:30 PM CDT   Office Visit with RADHA Romeo CNP   [facility]    [address] [place] [address] [phone]                   Routing refill request to provider for review/approval because:  Drug not active on patient's medication list   Sig: TAKE ONE TABLET BY MOUTH TWICE A DAY AS NEEDED FOR MODERATE PAIN    NSAID Medications Failed    7/31/2018  8:27 AM       Failed - Blood pressure under 140/90 in past 12 months    BP Readings from Last 3 Encounters:   07/20/18 148/83   04/25/18 132/78   04/12/18 136/80                Passed - Normal ALT on file in past 12 months    Recent Labs   Lab Test  03/12/18   0325   ALT  32            Passed - Normal AST on file in past 12 months    Recent Labs   Lab Test  03/12/18   0325   AST  20            Passed - Recent (12 mo) or future (30 days) visit within the authorizing provider's specialty    Patient had office visit in the last 12 months or has a visit in the next 30 days with authorizing provider or within the authorizing provider's specialty.  See \"Patient Info\" tab in inbasket, or \"Choose Columns\" in Meds & Orders section of the refill encounter.           Passed - Patient is age 6-64 years       Passed - Normal CBC on file in past 12 months    Recent Labs   Lab Test  03/12/18   0325   WBC  7.0   RBC  5.38   HGB  14.6   HCT  44.8   PLT  244       For [facility] ONLY: [id number] = WBC, [id number] = RBC         Passed - Normal serum creatinine on file in past 12 months    Recent Labs   Lab Test  03/12/18   0325   CR  0.98             methocarbamol (ROBAXIN) 750 MG tablet [Pharmacy Med " Name: METHOCARBAMOL 750MG TABS] 90 tablet 0    Last Written Prescription Date:  6-29-18  Last Fill Quantity: 90,  # refills: 0   Last office visit: 4/12/2018 with prescribing provider:  4/12/18   Future Office Visit:   Next 5 appointments (look out 90 days)     Jul 31, 2018  1:30 PM CDT   Office Visit with RADHA Romeo CNP   [facility]    [address] [place] [id number] [phone]                  Sig: TAKE ONE TABLET BY MOUTH THREE TIMES A DAY AS NEEDED FOR MUSCLE SPASMS    There is no refill protocol information for this order

## 2018-08-09 DIAGNOSIS — M77.01 MEDIAL EPICONDYLITIS OF ELBOW, RIGHT: Primary | ICD-10-CM

## 2018-08-09 NOTE — TELEPHONE ENCOUNTER
"Requested Prescriptions   Pending Prescriptions Disp Refills     meloxicam (MOBIC) 15 MG tablet 15 tablet 0    Last Written Prescription Date:  07/20/2018  Last Fill Quantity: 15,  # refills: 0   Last office visit: 4/12/2018 with prescribing provider:  04/12/2018   Future Office Visit:     Sig: Take 1 tablet (15 mg) by mouth daily    NSAID Medications Failed    8/9/2018  4:28 PM       Failed - Blood pressure under 140/90 in past 12 months    BP Readings from Last 3 Encounters:   07/20/18 148/83   04/25/18 132/78   04/12/18 136/80                Passed - Normal ALT on file in past 12 months    Recent Labs   Lab Test  03/12/18   0325   ALT  32            Passed - Normal AST on file in past 12 months    Recent Labs   Lab Test  03/12/18   0325   AST  20            Passed - Recent (12 mo) or future (30 days) visit within the authorizing provider's specialty    Patient had office visit in the last 12 months or has a visit in the next 30 days with authorizing provider or within the authorizing provider's specialty.  See \"Patient Info\" tab in inbasket, or \"Choose Columns\" in Meds & Orders section of the refill encounter.           Passed - Patient is age 6-64 years       Passed - Normal CBC on file in past 12 months    Recent Labs   Lab Test  03/12/18   0325   WBC  7.0   RBC  5.38   HGB  14.6   HCT  44.8   PLT  244       For [facility] ONLY: [id number] = WBC, [id number] = RBC         Passed - Normal serum creatinine on file in past 12 months    Recent Labs   Lab Test  03/12/18   0325   CR  0.98               "

## 2018-08-13 NOTE — TELEPHONE ENCOUNTER
Routing refill request to provider for review/approval because:  BP not in range, see below.     .Destiny Kingston RN. . .  8/13/2018, 3:41 PM

## 2018-08-14 RX ORDER — MELOXICAM 15 MG/1
15 TABLET ORAL DAILY
Qty: 15 TABLET | Refills: 0 | Status: SHIPPED | OUTPATIENT
Start: 2018-08-14 | End: 2018-09-04

## 2018-08-29 DIAGNOSIS — S46.919A: ICD-10-CM

## 2018-08-29 DIAGNOSIS — J30.1 SEASONAL ALLERGIC RHINITIS DUE TO POLLEN, UNSPECIFIED CHRONICITY: ICD-10-CM

## 2018-08-30 RX ORDER — METHOCARBAMOL 750 MG/1
TABLET, FILM COATED ORAL
Qty: 90 TABLET | Refills: 0 | Status: SHIPPED | OUTPATIENT
Start: 2018-08-30 | End: 2018-09-29

## 2018-08-30 RX ORDER — CETIRIZINE HYDROCHLORIDE 10 MG/1
TABLET ORAL
Qty: 30 TABLET | Refills: 6 | Status: SHIPPED | OUTPATIENT
Start: 2018-08-30 | End: 2019-04-03

## 2018-08-30 NOTE — TELEPHONE ENCOUNTER
"Requested Prescriptions   Pending Prescriptions Disp Refills     methocarbamol (ROBAXIN) 750 MG tablet [Pharmacy Med Name: METHOCARBAMOL 750MG TABS] 90 tablet 0     Sig: TAKE ONE TABLET BY MOUTH THREE TIMES A DAY AS NEEDED FOR MUSCLE SPASMS    There is no refill protocol information for this order        cetirizine (ZYRTEC) 10 MG tablet [Pharmacy Med Name: CETIRIZINE HCL 10MG TABS] 30 tablet 6     Sig: TAKE ONE TABLET BY MOUTH EVERY DAY AS NEEDED FOR ALLERGIES    Antihistamines Protocol Passed    8/29/2018  8:02 PM       Passed - Patient is 3-64 years of age    Apply weight-based dosing for peds patients age 3 - 12 years of age.    Forward request to provider for patients under the age of 3 or over the age of 64.         Passed - Recent (12 mo) or future (30 days) visit within the authorizing provider's specialty    Patient had office visit in the last 12 months or has a visit in the next 30 days with authorizing provider or within the authorizing provider's specialty.  See \"Patient Info\" tab in inbasket, or \"Choose Columns\" in Meds & Orders section of the refill encounter.                    methocarbamol,   Last Written Prescription Date:  7/31/18  Last Fill Quantity: 90,   # refills: 0  Last Office Visit: 4/12/18  Future Office visit:       Routing refill request to provider for review/approval because:  Drug not on the [facility], P or [facility] refill protocol or controlled substance          zyrtec      Last Written Prescription Date:  1/23/18  Last Fill Quantity: 30,  # refills: 6   Last office visit: 4/12/2018 with prescribing provider:     Future Office Visit:      "

## 2018-09-04 DIAGNOSIS — M77.01 MEDIAL EPICONDYLITIS OF ELBOW, RIGHT: ICD-10-CM

## 2018-09-05 NOTE — TELEPHONE ENCOUNTER
"Requested Prescriptions   Pending Prescriptions Disp Refills     meloxicam (MOBIC) 15 MG tablet [Pharmacy Med Name: MELOXICAM 15MG TABS] 15 tablet 0    Last Written Prescription Date:  8/14/18  Last Fill Quantity: 15,  # refills: 0   Last office visit: 4/12/2018 with prescribing provider:  4/12/18   Future Office Visit:     Sig: TAKE ONE TABLET BY MOUTH EVERY DAY    NSAID Medications Failed    9/4/2018  6:27 PM       Failed - Blood pressure under 140/90 in past 12 months    BP Readings from Last 3 Encounters:   07/20/18 148/83   04/25/18 132/78   04/12/18 136/80                Passed - Normal ALT on file in past 12 months    Recent Labs   Lab Test  03/12/18   0325   ALT  32            Passed - Normal AST on file in past 12 months    Recent Labs   Lab Test  03/12/18   0325   AST  20            Passed - Recent (12 mo) or future (30 days) visit within the authorizing provider's specialty    Patient had office visit in the last 12 months or has a visit in the next 30 days with authorizing provider or within the authorizing provider's specialty.  See \"Patient Info\" tab in inbasket, or \"Choose Columns\" in Meds & Orders section of the refill encounter.           Passed - Patient is age 6-64 years       Passed - Normal CBC on file in past 12 months    Recent Labs   Lab Test  03/12/18   0325   WBC  7.0   RBC  5.38   HGB  14.6   HCT  44.8   PLT  244       For [facility] ONLY: [id number] = WBC, [id number] = RBC         Passed - Normal serum creatinine on file in past 12 months    Recent Labs   Lab Test  03/12/18   0325   CR  0.98               "

## 2018-09-06 RX ORDER — MELOXICAM 15 MG/1
TABLET ORAL
Qty: 15 TABLET | Refills: 0 | Status: SHIPPED | OUTPATIENT
Start: 2018-09-06 | End: 2018-09-25

## 2018-09-06 NOTE — TELEPHONE ENCOUNTER
Routing refill request to provider for review/approval because:  Last Bp out of goal range.   Last written 8/14/18 for 15 tabs and 0 refills    Diamante Arroyo, RN, BSN

## 2018-09-12 DIAGNOSIS — M10.9 ACUTE GOUT OF LEFT ANKLE, UNSPECIFIED CAUSE: ICD-10-CM

## 2018-09-13 RX ORDER — COLCHICINE 0.6 MG/1
TABLET ORAL
Qty: 30 TABLET | Refills: 1 | Status: SHIPPED | OUTPATIENT
Start: 2018-09-13 | End: 2018-10-31

## 2018-09-13 NOTE — TELEPHONE ENCOUNTER
Notified patient that rx was refilled and hard copy taken to [facility]. Lay Contreras CMA, September 13, 2018

## 2018-09-25 ENCOUNTER — OFFICE VISIT (OUTPATIENT)
Dept: FAMILY MEDICINE | Facility: CLINIC | Age: 48
End: 2018-09-25
Payer: COMMERCIAL

## 2018-09-25 VITALS
TEMPERATURE: 96.8 F | WEIGHT: 312.2 LBS | SYSTOLIC BLOOD PRESSURE: 130 MMHG | HEART RATE: 57 BPM | OXYGEN SATURATION: 99 % | BODY MASS INDEX: 40.08 KG/M2 | DIASTOLIC BLOOD PRESSURE: 82 MMHG

## 2018-09-25 DIAGNOSIS — M79.645 THUMB PAIN, LEFT: Primary | ICD-10-CM

## 2018-09-25 DIAGNOSIS — M77.11 LATERAL EPICONDYLITIS OF RIGHT ELBOW: ICD-10-CM

## 2018-09-25 DIAGNOSIS — M65.30 TRIGGER FINGER, ACQUIRED: ICD-10-CM

## 2018-09-25 PROCEDURE — 99213 OFFICE O/P EST LOW 20 MIN: CPT | Mod: 25 | Performed by: FAMILY MEDICINE

## 2018-09-25 PROCEDURE — 20600 DRAIN/INJ JOINT/BURSA W/O US: CPT | Performed by: FAMILY MEDICINE

## 2018-09-25 RX ORDER — MELOXICAM 15 MG/1
15 TABLET ORAL DAILY
Qty: 30 TABLET | Refills: 0 | Status: SHIPPED | OUTPATIENT
Start: 2018-09-25 | End: 2018-10-31

## 2018-09-25 RX ORDER — TRIAMCINOLONE ACETONIDE 40 MG/ML
40 INJECTION, SUSPENSION INTRA-ARTICULAR; INTRAMUSCULAR ONCE
Qty: 1 ML | Refills: 0 | COMMUNITY
Start: 2018-09-25 | End: 2018-12-20

## 2018-09-25 ASSESSMENT — PAIN SCALES - GENERAL: PAINLEVEL: EXTREME PAIN (8)

## 2018-09-25 NOTE — PROGRESS NOTES
SUBJECTIVE:                                                      Chief Complaint   Patient presents with     Musculoskeletal Problem     right arm and hand pain         Location:right arm and hand   Duration of pain-few months   Intensity- Currently 8/10, Worst 15/10  Radiating- n/a  What relives the pain-mobic  Cause-unknown         Humberto is a 48 year old returns with pain.  He has had tennis elbow for a number of years and not improving despite Mobic.  And some rest.  Also had complaints of left thumb pain.  Reviewed his x-rays from a while back.  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    ROS:  Constitutional, HEENT, cardiovascular, pulmonary, gi and gu systems are negative, except as otherwise noted.    OBJECTIVE:                                                    /82 (BP Location: Left arm, Patient Position: Chair, Cuff Size: Adult Regular)  Pulse 57  Temp 96.8  F (36  C) (Temporal)  Wt 312 lb 3.2 oz (141.6 kg)  SpO2 99%  BMI 40.08 kg/m2  Body mass index is 40.08 kg/(m^2).    Well-appearing male no distress.  Left thumb is unremarkable in appearance.  Normal range of motion.  Negative grind test.  Negative Finkelstein's.  He has tenderness about the lateral epicondyle.  Especially with resisted flexion at the wrist.    Procedure-after informed consent I prepped the base left thumb with chlorhexidine and then injected 20 mg Kenalog with one half mL 1% lidocaine into the CMC joint.  He good relief.  Dressing applied.     ASSESSMENT/PLAN:                                                        ICD-10-CM    1. Thumb pain, left M79.645    2. Lateral epicondylitis of right elbow M77.11 meloxicam (MOBIC) 15 MG tablet     OCCUPATIONAL THERAPY REFERRAL   3. Trigger finger, acquired M65.30 Kenalog 40 MG  []     triamcinolone acetonide (KENALOG) 40 MG/ML injection       Thumb pain I think is CMC arthritis.  Given an injection for diagnostic and therapeutic purposes.  See how he does with that.   Occupational therapy referral for his persistent lateral epicondylitis.  See how that goes and may return for injection as well if that does not improve things.  Continue with icing as needed.  Follow-up as needed    Seth Gramajo MD  [facility]

## 2018-09-25 NOTE — NURSING NOTE
Health Maintenance Due   Topic Date Due     HIV SCREEN (SYSTEM ASSIGNED)  03/24/1988     INFLUENZA VACCINE (1) 09/01/2018       Health Maintenance reviewed at today's visit patient asked to schedule/complete:   Patient is aware.    Soila Vargas, CMA

## 2018-09-25 NOTE — MR AVS SNAPSHOT
After Visit Summary   9/25/2018    Humberto Roberts    MRN: [id number]           Patient Information     Date Of Birth          1970        Visit Information        Provider Department      9/25/2018 9:40 AM Seth Gramajo MD [facility]        Today's Diagnoses     Thumb pain, left    -  1    Lateral epicondylitis of right elbow        Trigger finger, acquired           Follow-ups after your visit        Additional Services     OCCUPATIONAL THERAPY REFERRAL       If you have not heard from the scheduling office within 2 business days, please call [phone] for all locations, with the exception of [place], please call [phone] and [place], please call [phone].    Please be aware that coverage of these services is subject to the terms and limitations of your health insurance plan.  Call member services at your health plan with any benefit or coverage questions.                  Future tests that were ordered for you today     Open Future Orders        Priority Expected Expires Ordered    OCCUPATIONAL THERAPY REFERRAL Routine  9/25/2019 9/25/2018            Who to contact     If you have questions or need follow up information about today's clinic visit or your schedule please contact [facility] directly at [phone].  Normal or non-critical lab and imaging results will be communicated to you by MyChart, letter or phone within 4 business days after the clinic has received the results. If you do not hear from us within 7 days, please contact the clinic through MyChart or phone. If you have a critical or abnormal lab result, we will notify you by phone as soon as possible.  Submit refill requests through [employer] or call your pharmacy and they will forward the refill request to us. Please allow 3 business days for your refill to be completed.          Additional Information About Your Visit        Care EveryWhere ID     This is your Care EveryWhere  ID. This could be used by other organizations to access your [place] medical records  [id number]        Your Vitals Were     Pulse Temperature Pulse Oximetry BMI (Body Mass Index)          57 96.8  F (36  C) (Temporal) 99% 40.08 kg/m2         Blood Pressure from Last 3 Encounters:   09/25/18 130/82   07/20/18 148/83   04/25/18 132/78    Weight from Last 3 Encounters:   09/25/18 312 lb 3.2 oz (141.6 kg)   07/20/18 313 lb 12.8 oz (142.3 kg)   04/25/18 313 lb (142 kg)              We Performed the Following     Kenalog 40 MG  []          Today's Medication Changes          These changes are accurate as of 9/25/18  1:00 PM.  If you have any questions, ask your nurse or doctor.               These medicines have changed or have updated prescriptions.        Dose/Directions    meloxicam 15 MG tablet   Commonly known as:  MOBIC   This may have changed:  See the new instructions.   Used for:  Lateral epicondylitis of right elbow   Changed by:  Seth Gramajo MD        Dose:  15 mg   Take 1 tablet (15 mg) by mouth daily   Quantity:  30 tablet   Refills:  0         Stop taking these medicines if you haven't already. Please contact your care team if you have questions.     HYDROcodone-acetaminophen 5-325 MG per tablet   Commonly known as:  NORCO   Stopped by:  Seth Gramajo MD                Where to get your medicines      These medications [facility] [place] [address] [facility] [place]   [facility] [place] , [facility] [address]     Phone:  [phone]     meloxicam 15 MG tablet                Primary Care Provider Office Phone # Fax #    RADHA Romeo [phone]       [facility] [address]        Equal Access to Services     CANDIDO URIARTE AH: Chris Ohara, wacicida marshall, qaybta kaalmada govind, mariela dominguez. So [facility] [phone].    ATENCIÓN: Si habla español, tiene a varela disposición  servicios gratuitos de asistencia lingüística. Teofilo tovar [phone].    We comply with applicable federal civil rights laws and [place] laws. We do not discriminate on the basis of race, color, national origin, age, disability, sex, sexual orientation, or gender identity.            Thank you!     Thank you for choosing [facility]  for your care. Our goal is always to provide you with excellent care. Hearing back from our patients is one way we can continue to improve our services. Please take a few minutes to complete the written survey that you may receive in the mail after your visit with us. Thank you!             Your Updated Medication List - Protect others around you: Learn how to safely use, store and throw away your medicines at www.disposemymeds.org.          This list is accurate as of 9/25/18  1:00 PM.  Always use your most recent med list.                   Brand Name Dispense Instructions for use Diagnosis    atenolol 50 MG tablet    TENORMIN    30 tablet    Take 1 tablet (50 mg) by mouth daily    Essential hypertension with goal blood pressure less than 140/90       cetirizine 10 MG tablet    zyrTEC    30 tablet    TAKE ONE TABLET BY MOUTH EVERY DAY AS NEEDED FOR ALLERGIES    Seasonal allergic rhinitis due to pollen, unspecified chronicity       colchicine 0.6 MG tablet    COLCRYS    30 tablet    Take 2 tablets at the first sign of flare, take 1-2 additional tablets until symptoms improve then stop this medication.  Do not use more than a few days.    Acute gout of left ankle, unspecified cause       fluticasone 50 MCG/ACT spray    FLONASE    1 Bottle    Spray 1-2 sprays into both nostrils daily    Seasonal allergic rhinitis, unspecified chronicity, unspecified trigger       hydrochlorothiazide 25 MG tablet    HYDRODIURIL    30 tablet    Take 1 tablet (25 mg) by mouth daily    Essential hypertension with goal blood pressure less than 140/90       KENALOG 40 MG/ML injection   Generic  drug:  triamcinolone acetonide     1 mL    1 mL (40 mg) by INTRA-ARTICULAR route once for 1 dose    Trigger finger, acquired       meloxicam 15 MG tablet    MOBIC    30 tablet    Take 1 tablet (15 mg) by mouth daily    Lateral epicondylitis of right elbow       methocarbamol 750 MG tablet    ROBAXIN    90 tablet    TAKE ONE TABLET BY MOUTH THREE TIMES A DAY AS NEEDED FOR MUSCLE SPASMS    Muscle strain of scapular region, initial encounter       naproxen 500 MG tablet    NAPROSYN    60 tablet    TAKE ONE TABLET BY MOUTH TWICE A DAY AS NEEDED FOR MODERATE PAIN    Acute pain of right knee       omeprazole 20 MG CR capsule    priLOSEC    60 capsule    TAKE ONE CAPSULE BY MOUTH TWICE A DAY    Gastroesophageal reflux disease without esophagitis       order for DME      1 Device Auto CPAP @@ 7-15 cm with heated humidity via mask of choice        order for DME     1 Device    Equipment being ordered: shower chair    Morbid obesity (H), Foot pain, bilateral       zolpidem 10 MG tablet    AMBIEN    30 tablet    Take 1 tablet (10 mg) by mouth nightly as needed for sleep    Insomnia, unspecified type

## 2018-09-28 ENCOUNTER — HOSPITAL ENCOUNTER (OUTPATIENT)
Dept: OCCUPATIONAL THERAPY | Facility: CLINIC | Age: 48
Setting detail: THERAPIES SERIES
End: 2018-09-28
Attending: FAMILY MEDICINE
Payer: COMMERCIAL

## 2018-09-28 DIAGNOSIS — M77.11 RIGHT LATERAL EPICONDYLITIS: Primary | ICD-10-CM

## 2018-09-28 PROCEDURE — 40000839 ZZH STATISTIC HAND THERAPY VISIT: Performed by: OCCUPATIONAL THERAPIST

## 2018-09-28 PROCEDURE — 97110 THERAPEUTIC EXERCISES: CPT | Mod: GO | Performed by: OCCUPATIONAL THERAPIST

## 2018-09-28 PROCEDURE — 97167 OT EVAL HIGH COMPLEX 60 MIN: CPT | Mod: GO | Performed by: OCCUPATIONAL THERAPIST

## 2018-09-28 NOTE — PROGRESS NOTES
"                                  Occupational Therapy Evaluation     09/28/18 0808   General Information/History   Start Of Care Date 09/28/18   Referring Physician Dr Seth Gramajo   Orders Evaluate And Treat As Indicated   Orders Date 09/25/18   Medical Diagnosis right lateral epiconyliitis   Additional Occupational Profile Info/Pertinent history of current problem The patient is a 47 y/o male who has been experieincing increase right elbow pain and left thumb pain the last 6 months.  He is unable to identify any activities which may have contributed to pain exasperation.  On 9/25, he did receive an injection into the left thumb provided by the physician and he reports at this time minimal change in the thumb pain.  He describes right elbow pain as intense and has limited most of his activiites and is resorting to using the left hand/arm more for daily activities which seems to be exasperating the left shoulder per his report.  Decreased functional use of the right hand/arm for BADL/IADLs.   Previous treatment or current condition thumb spica brace (noc), ice,    Past medical history arthritis left CMC joint, trigger finger, ankle and back pain   How/Where did it occur From insidious onset   Onset date of current episode/exacerbation 07/20/18   Chronicity New   Hand Dominance Right   Affected side Right   Functional limitations perform activities of daily living;perform desired leisure / sports activities   Reported Symptoms Pain;Loss of Motion/Stiffness;Loss of strength;Tingling;Numbness;Edema   QuickDASH [Functional Disability Questionnaire; 0-100 (0=no dysfunction; 100=dysfunction)] (UEFI: 30/80)   Prior level of function Independent ADL;Independent IADL   Important Activities children/granchildren, playing/sport, garage cleaning/working, chopping wood/cooking   [facility]   Patient role/Employment history Unemployed   Patient/Family goals statement \"get arm back where it was and use it\" "   Fall Risk Screen   Fall screen completed by OT   Have you fallen 2 or more times in the past year? No   Pain   Pain Primary Pain Report   Primary Pain Report   Location right elbow   Radiation Does Not Radiate   Pain Quality Burning;Sharp   Frequency Constant   Scale 10/10  (rest 4-5/10)   Pain Is Exacerbated By Activity/movement;Gripping;Pushing;Carrying   Pain Is Relieved By Splints;Ice   Progression Since Onset Unchanged   Edema   Overall  - Right Mild   Palpation   Palpation Assessed   Right Hand Tenderness Present At: ECRL;ECRB Insertion;Lateral epicondyle   ROM   ROM AROM   AROM   Comments All planes of movement with pain increased.   Strength   Strength Strength    Avg - Left 65#    Avg - Right 80#   Lateral Pinch - Left 18#   Lateral Pinch - Right 22#   Education Assessment   Preferred Learning Style Listening;Demonstration   Barriers to Learning No barriers   Therapy Interventions   Planned Therapy Interventions Ultrasound;Iontophoresis (list drug);Strengthening;Stretching;Manual Therapy;Home Program   Therapy plan comments dexamethasone   Clinical Impression   Criteria for Skilled Therapeutic Interventions Met yes;treatment indicated   OT Diagnosis Decreased pain free functional use of the right hand/arm for daily tasks/actiiites during BADL/IADLs   Influenced by the following impairments Pain;Edema;Decreased range of motion;Decreased strength;Impaired sensation   Assessment of Occupational Performance 5 or more Performance Deficits   Identified Performance Deficits dressing, grooming, hygiene, meal prep/clean up, home mgmt, driving, sleeping,  and play, leisure activities.   Clinical Decision Making (Complexity) High complexity   Therapy Frequency 2x week   Predicted Duration of Therapy Intervention (days/wks) 8 week   Risks and Benefits of Treatment have been explained. Yes   Patient, Family & other staff in agreement with plan of care Yes   Hand Goals   Hand Goals  Sleeping;Hygiene/Toileting;Sports/Recreation;Dressing   Dressing   Current Functional Task Dressing LB;Buttoning   Previous Performance Level Independent   Current Performance Level Moderate difficulty   Goal Target Task Don/Doff pants;Don/Doff socks   Goal Target Performance Level No difficulty   Due Date 11/28/18   Hygiene/Toileting   Current Functional Task Brushing/combing hair;Brushing teeth;Shaving;Wiping   Previous Performance Level Independent   Current Performance Level Severe difficulty   Goal Target Task Hold toothbrush and brush teeth;Hold brush/comb and brush/comb hair;Hold razor and shave;Reach around to wipe   Goal Target Performance Level No difficulty   Due Date 11/28/18   Sleeping   Current Functional Task Sleeping through the night   Previous Performance Level No difficulty   Current Performance Level Severe difficulty   Goal Target Task Staying asleep   Goal Target Performance Level No difficulty   Due Date 11/28/18   Sports/Recreation   Current Functional Task Weight bearing;Throwing;Playing   Previous Performance Level Independent   Curent Performance Level Severe difficulty   Goal Target Task (basketball, play with grandchildren)   Goal Target Performance Level No difficulty   Due Date 11/28/18   Total Evaluation Time   Total Evaluation Time (Minutes) 30       Thank you for referring Humberto  To OT services to assist with decrease pain and improve functional use for BADL/IADLs.    If you have any questions or concerns, please contact me at [phone].    Drea Galeas MA, OTR/L  [facility]

## 2018-09-29 DIAGNOSIS — S46.919A: ICD-10-CM

## 2018-10-01 RX ORDER — METHOCARBAMOL 750 MG/1
TABLET, FILM COATED ORAL
Qty: 90 TABLET | Refills: 0 | Status: SHIPPED | OUTPATIENT
Start: 2018-10-01 | End: 2019-09-16

## 2018-10-01 NOTE — TELEPHONE ENCOUNTER
Methocarbamol 750 MG       Last Written Prescription Date:  8/30/18  Last Fill Quantity: 90,   # refills: 0  Last Office Visit: 4/12/18  Future Office visit:       Routing refill request to provider for review/approval because:  Drug not on the [facility], P or [facility] refill protocol or controlled substance

## 2018-10-02 ENCOUNTER — HOSPITAL ENCOUNTER (OUTPATIENT)
Dept: OCCUPATIONAL THERAPY | Facility: CLINIC | Age: 48
Setting detail: THERAPIES SERIES
End: 2018-10-02
Attending: FAMILY MEDICINE
Payer: COMMERCIAL

## 2018-10-02 PROCEDURE — 97140 MANUAL THERAPY 1/> REGIONS: CPT | Mod: GO | Performed by: OCCUPATIONAL THERAPIST

## 2018-10-02 PROCEDURE — 97110 THERAPEUTIC EXERCISES: CPT | Mod: GO | Performed by: OCCUPATIONAL THERAPIST

## 2018-10-02 PROCEDURE — 40000839 ZZH STATISTIC HAND THERAPY VISIT: Performed by: OCCUPATIONAL THERAPIST

## 2018-10-02 PROCEDURE — 97033 APP MDLTY 1+IONTPHRSIS EA 15: CPT | Mod: GO | Performed by: OCCUPATIONAL THERAPIST

## 2018-10-02 PROCEDURE — 97035 APP MDLTY 1+ULTRASOUND EA 15: CPT | Mod: GO | Performed by: OCCUPATIONAL THERAPIST

## 2018-10-08 ENCOUNTER — HOSPITAL ENCOUNTER (OUTPATIENT)
Dept: OCCUPATIONAL THERAPY | Facility: CLINIC | Age: 48
Setting detail: THERAPIES SERIES
End: 2018-10-08
Attending: FAMILY MEDICINE
Payer: COMMERCIAL

## 2018-10-08 PROCEDURE — 97035 APP MDLTY 1+ULTRASOUND EA 15: CPT | Mod: GO | Performed by: OCCUPATIONAL THERAPIST

## 2018-10-08 PROCEDURE — 97140 MANUAL THERAPY 1/> REGIONS: CPT | Mod: GO | Performed by: OCCUPATIONAL THERAPIST

## 2018-10-08 PROCEDURE — 97033 APP MDLTY 1+IONTPHRSIS EA 15: CPT | Mod: GO | Performed by: OCCUPATIONAL THERAPIST

## 2018-10-08 PROCEDURE — 40000839 ZZH STATISTIC HAND THERAPY VISIT: Performed by: OCCUPATIONAL THERAPIST

## 2018-10-10 ENCOUNTER — HOSPITAL ENCOUNTER (OUTPATIENT)
Dept: OCCUPATIONAL THERAPY | Facility: CLINIC | Age: 48
Setting detail: THERAPIES SERIES
End: 2018-10-10
Attending: FAMILY MEDICINE
Payer: COMMERCIAL

## 2018-10-10 PROCEDURE — 97140 MANUAL THERAPY 1/> REGIONS: CPT | Mod: GO | Performed by: OCCUPATIONAL THERAPIST

## 2018-10-10 PROCEDURE — 97035 APP MDLTY 1+ULTRASOUND EA 15: CPT | Mod: GO | Performed by: OCCUPATIONAL THERAPIST

## 2018-10-10 PROCEDURE — 40000839 ZZH STATISTIC HAND THERAPY VISIT: Performed by: OCCUPATIONAL THERAPIST

## 2018-10-10 PROCEDURE — 97033 APP MDLTY 1+IONTPHRSIS EA 15: CPT | Mod: GO | Performed by: OCCUPATIONAL THERAPIST

## 2018-10-18 ENCOUNTER — HOSPITAL ENCOUNTER (OUTPATIENT)
Dept: OCCUPATIONAL THERAPY | Facility: CLINIC | Age: 48
Setting detail: THERAPIES SERIES
End: 2018-10-18
Attending: FAMILY MEDICINE
Payer: COMMERCIAL

## 2018-10-18 DIAGNOSIS — M77.11 LATERAL EPICONDYLITIS OF RIGHT ELBOW: ICD-10-CM

## 2018-10-18 PROCEDURE — 97035 APP MDLTY 1+ULTRASOUND EA 15: CPT | Mod: GO | Performed by: OCCUPATIONAL THERAPIST

## 2018-10-18 PROCEDURE — 97033 APP MDLTY 1+IONTPHRSIS EA 15: CPT | Mod: GO | Performed by: OCCUPATIONAL THERAPIST

## 2018-10-18 PROCEDURE — 97110 THERAPEUTIC EXERCISES: CPT | Mod: GO | Performed by: OCCUPATIONAL THERAPIST

## 2018-10-18 PROCEDURE — 40000839 ZZH STATISTIC HAND THERAPY VISIT: Performed by: OCCUPATIONAL THERAPIST

## 2018-10-18 PROCEDURE — 97140 MANUAL THERAPY 1/> REGIONS: CPT | Mod: GO | Performed by: OCCUPATIONAL THERAPIST

## 2018-10-22 ENCOUNTER — HOSPITAL ENCOUNTER (OUTPATIENT)
Dept: OCCUPATIONAL THERAPY | Facility: CLINIC | Age: 48
Setting detail: THERAPIES SERIES
End: 2018-10-22
Attending: FAMILY MEDICINE
Payer: COMMERCIAL

## 2018-10-22 PROCEDURE — 97110 THERAPEUTIC EXERCISES: CPT | Mod: GO | Performed by: OCCUPATIONAL THERAPIST

## 2018-10-22 PROCEDURE — 97035 APP MDLTY 1+ULTRASOUND EA 15: CPT | Mod: GO | Performed by: OCCUPATIONAL THERAPIST

## 2018-10-22 PROCEDURE — 97140 MANUAL THERAPY 1/> REGIONS: CPT | Mod: GO | Performed by: OCCUPATIONAL THERAPIST

## 2018-10-22 PROCEDURE — 40000839 ZZH STATISTIC HAND THERAPY VISIT: Performed by: OCCUPATIONAL THERAPIST

## 2018-10-24 ENCOUNTER — HOSPITAL ENCOUNTER (OUTPATIENT)
Dept: OCCUPATIONAL THERAPY | Facility: CLINIC | Age: 48
Setting detail: THERAPIES SERIES
End: 2018-10-24
Attending: FAMILY MEDICINE
Payer: COMMERCIAL

## 2018-10-24 PROCEDURE — 97140 MANUAL THERAPY 1/> REGIONS: CPT | Mod: GO | Performed by: OCCUPATIONAL THERAPIST

## 2018-10-24 PROCEDURE — 40000839 ZZH STATISTIC HAND THERAPY VISIT: Performed by: OCCUPATIONAL THERAPIST

## 2018-10-24 PROCEDURE — 97110 THERAPEUTIC EXERCISES: CPT | Mod: GO | Performed by: OCCUPATIONAL THERAPIST

## 2018-10-24 PROCEDURE — 97035 APP MDLTY 1+ULTRASOUND EA 15: CPT | Mod: GO | Performed by: OCCUPATIONAL THERAPIST

## 2018-10-31 ENCOUNTER — HOSPITAL ENCOUNTER (OUTPATIENT)
Dept: OCCUPATIONAL THERAPY | Facility: CLINIC | Age: 48
Setting detail: THERAPIES SERIES
End: 2018-10-31
Attending: FAMILY MEDICINE
Payer: COMMERCIAL

## 2018-10-31 DIAGNOSIS — M10.9 ACUTE GOUT OF LEFT ANKLE, UNSPECIFIED CAUSE: ICD-10-CM

## 2018-10-31 DIAGNOSIS — M77.11 LATERAL EPICONDYLITIS OF RIGHT ELBOW: ICD-10-CM

## 2018-10-31 PROCEDURE — 40000839 ZZH STATISTIC HAND THERAPY VISIT: Performed by: OCCUPATIONAL THERAPIST

## 2018-10-31 PROCEDURE — 97035 APP MDLTY 1+ULTRASOUND EA 15: CPT | Mod: GO | Performed by: OCCUPATIONAL THERAPIST

## 2018-10-31 PROCEDURE — 97140 MANUAL THERAPY 1/> REGIONS: CPT | Mod: GO | Performed by: OCCUPATIONAL THERAPIST

## 2018-10-31 RX ORDER — COLCHICINE 0.6 MG/1
TABLET ORAL
Qty: 30 TABLET | Refills: 0 | Status: SHIPPED | OUTPATIENT
Start: 2018-10-31 | End: 2019-05-29

## 2018-10-31 NOTE — TELEPHONE ENCOUNTER
"colchicine  Last Written Prescription Date:  09/13/2018  Last Fill Quantity: 30,  # refills: 1   Last office visit: 9/25/2018 with prescribing provider:      Future Office Visit:      Requested Prescriptions   Pending Prescriptions Disp Refills     colchicine (COLCRYS) 0.6 MG tablet 30 tablet 1     Sig: Take 2 tablets at the first sign of flare, take 1-2 additional tablets until symptoms improve then stop this medication.  Do not use more than a few days.    Gout Agents Protocol Failed    10/31/2018 11:23 AM       Failed - Has Uric Acid on file in past 12 months and value is less than 6    Recent Labs   Lab Test  12/11/16 1957   URIC  7.1     If level is 6mg/dL or greater, ok to refill one time and refer to provider.          Passed - CBC on file in past 12 months    Recent Labs   Lab Test  03/12/18   0325   WBC  7.0   RBC  5.38   HGB  14.6   HCT  44.8   PLT  244                Passed - ALT on file in past 12 months    Recent Labs   Lab Test  03/12/18   0325   ALT  32            Passed - Recent (12 mo) or future (30 days) visit within the authorizing provider's specialty    Patient had office visit in the last 12 months or has a visit in the next 30 days with authorizing provider or within the authorizing provider's specialty.  See \"Patient Info\" tab in inbasket, or \"Choose Columns\" in Meds & Orders section of the refill encounter.             Passed - Patient is age 18 or older       Passed - Normal serum creatinine on file in the past 12 months    Recent Labs   Lab Test  03/12/18   0325   CR  0.98                 Medication is being filled for 1 time refill only due to:  Patient needs labs Uric acid level.    Grace Chase RN on 10/31/2018 at 11:56 AM    "

## 2018-11-01 RX ORDER — MELOXICAM 15 MG/1
TABLET ORAL
Qty: 30 TABLET | Refills: 4 | Status: SHIPPED | OUTPATIENT
Start: 2018-11-01 | End: 2019-08-05

## 2018-11-01 NOTE — TELEPHONE ENCOUNTER
"FLOR  Last Written Prescription Date:  9/25/18  Last Fill Quantity: 30,  # refills: 0   Last office visit: 9/25/2018 with prescribing provider:     Future Office Visit:  NONE    Requested Prescriptions   Pending Prescriptions Disp Refills     meloxicam (MOBIC) 15 MG tablet [Pharmacy Med Name: MELOXICAM 15MG TABS] 30 tablet 0     Sig: TAKE ONE TABLET BY MOUTH EVERY DAY    NSAID Medications Passed    10/31/2018  8:38 AM       Passed - Blood pressure under 140/90 in past 12 months    BP Readings from Last 3 Encounters:   09/25/18 130/82   07/20/18 148/83   04/25/18 132/78                Passed - Normal ALT on file in past 12 months    Recent Labs   Lab Test  03/12/18   0325   ALT  32            Passed - Normal AST on file in past 12 months    Recent Labs   Lab Test  03/12/18   0325   AST  20            Passed - Recent (12 mo) or future (30 days) visit within the authorizing provider's specialty    Patient had office visit in the last 12 months or has a visit in the next 30 days with authorizing provider or within the authorizing provider's specialty.  See \"Patient Info\" tab in inbasket, or \"Choose Columns\" in Meds & Orders section of the refill encounter.             Passed - Patient is age 6-64 years       Passed - Normal CBC on file in past 12 months    Recent Labs   Lab Test  03/12/18   0325   WBC  7.0   RBC  5.38   HGB  14.6   HCT  44.8   PLT  244                Passed - Normal serum creatinine on file in past 12 months    Recent Labs   Lab Test  03/12/18   0325   CR  0.98             Prescription approved per [facility] [place] [facility] Refill Protocol.  REJI Sanchez      "

## 2018-11-02 ENCOUNTER — HOSPITAL ENCOUNTER (OUTPATIENT)
Dept: OCCUPATIONAL THERAPY | Facility: CLINIC | Age: 48
Setting detail: THERAPIES SERIES
End: 2018-11-02
Attending: FAMILY MEDICINE
Payer: COMMERCIAL

## 2018-11-02 DIAGNOSIS — M10.9 ACUTE GOUT OF LEFT ANKLE, UNSPECIFIED CAUSE: ICD-10-CM

## 2018-11-02 LAB — URATE SERPL-MCNC: 8.4 MG/DL (ref 3.5–7.2)

## 2018-11-02 PROCEDURE — 84550 ASSAY OF BLOOD/URIC ACID: CPT | Performed by: NURSE PRACTITIONER

## 2018-11-02 PROCEDURE — 40000839 ZZH STATISTIC HAND THERAPY VISIT: Performed by: OCCUPATIONAL THERAPIST

## 2018-11-02 PROCEDURE — 97035 APP MDLTY 1+ULTRASOUND EA 15: CPT | Mod: GO | Performed by: OCCUPATIONAL THERAPIST

## 2018-11-02 PROCEDURE — 97110 THERAPEUTIC EXERCISES: CPT | Mod: GO | Performed by: OCCUPATIONAL THERAPIST

## 2018-11-02 PROCEDURE — 97140 MANUAL THERAPY 1/> REGIONS: CPT | Mod: GO | Performed by: OCCUPATIONAL THERAPIST

## 2018-11-02 PROCEDURE — 36415 COLL VENOUS BLD VENIPUNCTURE: CPT | Performed by: NURSE PRACTITIONER

## 2018-11-06 ENCOUNTER — HOSPITAL ENCOUNTER (OUTPATIENT)
Dept: OCCUPATIONAL THERAPY | Facility: CLINIC | Age: 48
Setting detail: THERAPIES SERIES
End: 2018-11-06
Attending: FAMILY MEDICINE
Payer: COMMERCIAL

## 2018-11-06 PROCEDURE — 97110 THERAPEUTIC EXERCISES: CPT | Mod: GO | Performed by: OCCUPATIONAL THERAPIST

## 2018-11-06 PROCEDURE — 97035 APP MDLTY 1+ULTRASOUND EA 15: CPT | Mod: GO | Performed by: OCCUPATIONAL THERAPIST

## 2018-11-06 PROCEDURE — 40000839 ZZH STATISTIC HAND THERAPY VISIT: Performed by: OCCUPATIONAL THERAPIST

## 2018-11-06 PROCEDURE — 97140 MANUAL THERAPY 1/> REGIONS: CPT | Mod: GO | Performed by: OCCUPATIONAL THERAPIST

## 2018-11-07 NOTE — PROGRESS NOTES
Occupational Therapy Progress Note     11/06/18 0951   Notes   Note Type Progress Note   Signing Clinician's Name / Credentials   Signing clinician's name / credentials Drea METCALF/L   Session Number   Session Number 10   Providers   Referring Physician Dr Seth Gramajo   General Information   Rxs Used 10   Medical Diagnosis right lateral epicondyliitis   Orders Evaluate And Treat As Indicated   Start Of Care Date 09/28/18   Onset date of current episode/exacerbation 07/20/18   Subjective Measures   Subjective Patient agrees to continue OT for strengthening. He will continue HEP and pain mgmt.   Initial Pain level 1/10   Patient Reported % Functional Improvement 50%   Functional Improvement Reported Self care activities   QuickDASH [Functional Disability Questionnaire; 0-100 (0=no dysfunction; 100=dysfunction)] (UEFI: 44/80 Improved)   Objective Measures   Objective Measures Strength;Objective Measure 1   Strength   Location Right    88#  (improved)   Modalities   Modalities Ultrasound   Ultrasound -Type Continuous;5 cm sound head   Skilled Interventions To Increase Blood Flow For Improved Healing;To Increase Tissue Extensibility;To Decrease Pain;To Decrease Inflammation   Intensity 1.0 cm2   Duration (does not include the 3-5 min set up/clean up time) 12 min  (3 minutes set up/clean up)   Frequency 3 MHz   Location right radialis longus and common extnesor mm groups   Positioning sitting   Therapeutic Exercise   Therapeutic Exercise Other Exercises/Activities   Skilled Interventions To Increase Blood Flow For Improved Healing;To Increase Strength   Minutes of Treatment 10   Other Exer/Activities/Educ   Exercise 1 Reviewed home programming , reassessed objectivites and goals   Manual   Manual STM;Friction Massage   Skilled Interventions To Increase Blood Flow For Improved Healing;To Increase Tissue Extensibility;To Increase Tissue Excursion;To Decrease  Pain;To Decrease Inflammation   Minutes of Treatment 19   STM Tool Assisted;Extensors   Position Sitting   Description/ Technique Reviewed the importance of HP and self massage. fMedium Gau Sha tool applied with moderate pressure to right forearm; radialis longus and common extensor mm groups, along with tricep.  Followed by STM/trigger release of the radialis longus mm and insertion point areas   Friction Massage Tricep;Extensors   Position Sitting   Description/ Technique all cross friction patterns; transverse, ossilation CW/CCW, followed by long stroke pattern   Hand Goals   Hand Goals Sleeping;Hygiene/Toileting;Sports/Recreation;Dressing   Dressing   Current Functional Task Dressing LB;Buttoning   Previous Performance Level Independent   Current Performance Level Mild difficulty   Goal Target Task Don/Doff pants;Don/Doff socks   Goal Target Performance Level No difficulty   Due Date 12/28/18  (extended)   Hygiene/Toileting   Current Functional Task Brushing/combing hair;Brushing teeth;Shaving;Wiping   Previous Performance Level Independent   Goal Target Task Hold toothbrush and brush teeth;Hold brush/comb and brush/comb hair;Hold razor and shave;Reach around to wipe   Goal Target Performance Level No difficulty   Due Date 11/28/18   Date Goal Met 11/06/18   Sleeping   Current Functional Task Sleeping through the night   Previous Performance Level No difficulty   Goal Target Task Staying asleep   Goal Target Performance Level No difficulty   Due Date 11/28/18   Date Goal Met 11/06/18   Sports/Recreation   Current Functional Task Weight bearing;Throwing;Playing   Previous Performance Level Independent   Curent Performance Level Severe difficulty   Goal Target Task (basketball, play with grandchildren)   Goal Target Performance Level No difficulty   Due Date 12/28/18  (extended)   Assessment   Clinical Impression(s) Comments Patient continues to have increased pain in the right elbow with straight arm lifting or  crossing over body actions and activities.  He does report decreased pain, after OT session.   Response to Therapy: Improvements ROM;Pain;Self Care Skills   Education   Learner Patient   Readiness Acceptance   Method Explanation;Demonstration   Response Verbalizes understanding   Education Notes initate UE gross motor strengtheing as refered above   Plan   Home program as instrcted   Updates to plan of care Continue POC   Plan US, , STM/tool assist, UE TE/TA and home programming   Total Session Time   Timed Code Treatment Minutes 44   Total Treatment Time (sum of timed and untimed services) 44       Thank you for referring Humberto  To OT services to assist with decrease right arm pain and improve functional use for daily tasks/activites.    If you have any questions or concerns, please contact me at [phone].    Drea Galeas MA, OTR/L  [facility]

## 2018-11-14 ENCOUNTER — HOSPITAL ENCOUNTER (OUTPATIENT)
Dept: OCCUPATIONAL THERAPY | Facility: CLINIC | Age: 48
Setting detail: THERAPIES SERIES
End: 2018-11-14
Attending: FAMILY MEDICINE
Payer: COMMERCIAL

## 2018-11-14 PROCEDURE — 97110 THERAPEUTIC EXERCISES: CPT | Mod: GO | Performed by: OCCUPATIONAL THERAPIST

## 2018-11-14 PROCEDURE — 97140 MANUAL THERAPY 1/> REGIONS: CPT | Mod: GO | Performed by: OCCUPATIONAL THERAPIST

## 2018-11-14 PROCEDURE — 40000839 ZZH STATISTIC HAND THERAPY VISIT: Performed by: OCCUPATIONAL THERAPIST

## 2018-11-14 PROCEDURE — 97035 APP MDLTY 1+ULTRASOUND EA 15: CPT | Mod: GO | Performed by: OCCUPATIONAL THERAPIST

## 2018-11-16 ENCOUNTER — HOSPITAL ENCOUNTER (OUTPATIENT)
Dept: OCCUPATIONAL THERAPY | Facility: CLINIC | Age: 48
Setting detail: THERAPIES SERIES
End: 2018-11-16
Attending: FAMILY MEDICINE
Payer: COMMERCIAL

## 2018-11-16 PROCEDURE — 97110 THERAPEUTIC EXERCISES: CPT | Mod: GO | Performed by: OCCUPATIONAL THERAPIST

## 2018-11-16 PROCEDURE — 40000839 ZZH STATISTIC HAND THERAPY VISIT: Performed by: OCCUPATIONAL THERAPIST

## 2018-11-16 PROCEDURE — 97035 APP MDLTY 1+ULTRASOUND EA 15: CPT | Mod: GO | Performed by: OCCUPATIONAL THERAPIST

## 2018-11-16 PROCEDURE — 97140 MANUAL THERAPY 1/> REGIONS: CPT | Mod: GO | Performed by: OCCUPATIONAL THERAPIST

## 2018-11-16 NOTE — PROGRESS NOTES
Occupational Therapy Discharge Progress Note     11/16/18 0734   Notes   Note Type Discharge Summary   Signing Clinician's Name / Credentials   Signing clinician's name / credentials Drea DAVIS   Session Number   Session Number 12   Providers   Referring Physician Dr Seth Gramajo   General Information   Rxs Used 12   Medical Diagnosis right lateral epicondyliitis   Orders Evaluate And Treat As Indicated   Start Of Care Date 09/28/18   Onset date of current episode/exacerbation 07/20/18   Subjective Measures   Subjective Patient reported lifting a suitcase with no increased pain used good ergonomics and bilateral hand/arms.  Humberto agrees to discharge OT this date and continue HEP and recommendations IND.   Initial Pain level 0/10   Patient Reported % Functional Improvement 75%+   Functional Improvement Reported Leisure Activities;Self care activities;Gripping;Carrying;Pushing   QuickDASH [Functional Disability Questionnaire; 0-100 (0=no dysfunction; 100=dysfunction)] (UEFI: 73/80 Improved)   Strength   Location Right    90#  (improved)   Modalities   Modalities Ultrasound   Ultrasound -Type Continuous;5 cm sound head   Skilled Interventions To Increase Blood Flow For Improved Healing;To Increase Tissue Extensibility;To Decrease Pain;To Decrease Inflammation   Intensity 1.0 cm2   Duration (does not include the 3-5 min set up/clean up time) 12 min  (3 minutes set up/clean up)   Frequency 3 MHz   Location right radialis longus and common extnesor mm groups   Positioning sitting   Therapeutic Exercise   Therapeutic Exercise Other Exercises/Activities   Skilled Interventions To Increase Blood Flow For Improved Healing;To Increase Strength   Minutes of Treatment 15   Other Exer/Activities/Educ   Exercise 1 Reviewed home programming , reassessed objectivites and goals   Exercise 2 discharge and home recommendations   Manual   Manual STM;Friction Massage   Skilled Interventions  To Increase Blood Flow For Improved Healing;To Increase Tissue Extensibility;To Increase Tissue Excursion;To Decrease Pain;To Decrease Inflammation   Minutes of Treatment 15   STM Tool Assisted;Extensors   Position Sitting   Description/ Technique Reviewed the importance of HP and self massage. fMedium Gau Sha tool applied with moderate pressure to right forearm; radialis longus and common extensor mm groups, along with tricep.  Followed by STM/trigger release of the radialis longus mm and insertion point areas   Friction Massage Tricep;Extensors   Position Sitting   Description/ Technique all cross friction patterns; transverse, ossilation CW/CCW, followed by long stroke pattern   Hand Goals   Hand Goals Sleeping;Hygiene/Toileting;Sports/Recreation;Dressing   Dressing   Current Functional Task Dressing LB;Buttoning   Previous Performance Level Independent   Current Performance Level Mild difficulty   Goal Target Task Don/Doff pants;Don/Doff socks   Goal Target Performance Level No difficulty   Due Date 12/28/18  (extended)   Hygiene/Toileting   Current Functional Task Brushing/combing hair;Brushing teeth;Shaving;Wiping   Previous Performance Level Independent   Goal Target Task Hold toothbrush and brush teeth;Hold brush/comb and brush/comb hair;Hold razor and shave;Reach around to wipe   Goal Target Performance Level No difficulty   Due Date 11/28/18   Date Goal Met 11/06/18   Sleeping   Current Functional Task Sleeping through the night   Previous Performance Level No difficulty   Goal Target Task Staying asleep   Goal Target Performance Level No difficulty   Due Date 11/28/18   Date Goal Met 11/06/18   Sports/Recreation   Current Functional Task Weight bearing;Throwing;Playing   Previous Performance Level Independent   Curent Performance Level Severe difficulty   Goal Target Task (basketball, play with grandchildren)   Goal Target Performance Level No difficulty   Due Date 12/28/18  (extended)   Assessment    Clinical Impression(s) Comments Improved pain free daily activities with BADL/IADLs.   Response to Therapy: Improvements ROM;Pain;Self Care Skills;Sensitivity;Strength;Edema   Education   Learner Patient   Readiness Acceptance   Method Explanation;Demonstration   Response Verbalizes understanding   Education Notes initate UE gross motor strengtheing as refered above   Plan   Home program as instrcted   Updates to plan of care Discharge   Total Session Time   Timed Code Treatment Minutes 45   Total Treatment Time (sum of timed and untimed services) 45       Thank you for referring Humberto  To OT services to assist with decrease pain and improve functional use of the arm for daily tasks/activites.    If you have any questions or concerns, please contact me at [phone].    Drea Galeas MA, OTR/L  [facility]

## 2018-11-29 DIAGNOSIS — K21.9 GASTROESOPHAGEAL REFLUX DISEASE WITHOUT ESOPHAGITIS: ICD-10-CM

## 2018-11-29 DIAGNOSIS — G47.00 INSOMNIA, UNSPECIFIED TYPE: ICD-10-CM

## 2018-11-29 DIAGNOSIS — J30.2 SEASONAL ALLERGIC RHINITIS: ICD-10-CM

## 2018-11-29 DIAGNOSIS — I10 ESSENTIAL HYPERTENSION WITH GOAL BLOOD PRESSURE LESS THAN 140/90: ICD-10-CM

## 2018-11-30 RX ORDER — HYDROCHLOROTHIAZIDE 25 MG/1
TABLET ORAL
Qty: 30 TABLET | Refills: 9 | Status: SHIPPED | OUTPATIENT
Start: 2018-11-30 | End: 2019-10-09

## 2018-11-30 RX ORDER — FLUTICASONE PROPIONATE 50 MCG
SPRAY, SUSPENSION (ML) NASAL
Qty: 16 G | Refills: 9 | Status: SHIPPED | OUTPATIENT
Start: 2018-11-30 | End: 2019-10-09

## 2018-11-30 RX ORDER — ZOLPIDEM TARTRATE 10 MG/1
10 TABLET ORAL
Qty: 30 TABLET | Refills: 0 | Status: SHIPPED | OUTPATIENT
Start: 2018-11-30 | End: 2019-01-02

## 2018-11-30 RX ORDER — ATENOLOL 50 MG/1
TABLET ORAL
Qty: 30 TABLET | Refills: 9 | Status: SHIPPED | OUTPATIENT
Start: 2018-11-30 | End: 2019-10-09

## 2018-11-30 NOTE — TELEPHONE ENCOUNTER
Unable to reach pt- just get busy signal. Will try again later.  Thank you,  Mala Lord- Pt Rep.

## 2018-11-30 NOTE — TELEPHONE ENCOUNTER
Requested Prescriptions   Pending Prescriptions Disp Refills     zolpidem (AMBIEN) 10 MG tablet 30 tablet 5     Sig: Take 1 tablet (10 mg) by mouth nightly as needed for sleep    There is no refill protocol information for this order        Last Written Prescription Date:  6/5/18  Last Fill Quantity: 30,  # refills: 5   Last office visit: 9/25/2018 with prescribing provider:  11/2/17 for this Dx   Future Office Visit:      Routing refill request to provider for review/approval because:  Drug not on the [facility] refill protocol   Patient needs to be seen because it has been more than 1 year since last office visit.  T'd up 1 month for provider review.    Will forward to schedulers to schedule patient for OV.  Berneice Marx RN

## 2018-11-30 NOTE — TELEPHONE ENCOUNTER
"hctz  Last Written Prescription Date:  11/02/2017  Last Fill Quantity: 30,  # refills: 11   Last office visit: 9/25/2018 with prescribing provider:      Future Office Visit:      Requested Prescriptions   Pending Prescriptions Disp Refills     hydrochlorothiazide (HYDRODIURIL) 25 MG tablet [Pharmacy Med Name: HYDROCHLOROTHIAZIDE 25MG TABS] 30 tablet 11     Sig: TAKE ONE TABLET BY MOUTH EVERY DAY    Diuretics (Including Combos) Protocol Passed    11/29/2018  2:08 PM       Passed - Blood pressure under 140/90 in past 12 months    BP Readings from Last 3 Encounters:   09/25/18 130/82   07/20/18 148/83   04/25/18 132/78                Passed - Recent (12 mo) or future (30 days) visit within the authorizing provider's specialty    Patient had office visit in the last 12 months or has a visit in the next 30 days with authorizing provider or within the authorizing provider's specialty.  See \"Patient Info\" tab in inbasket, or \"Choose Columns\" in Meds & Orders section of the refill encounter.             Passed - Patient is age 18 or older       Passed - Normal serum creatinine on file in past 12 months    Recent Labs   Lab Test  03/12/18   0325   CR  0.98             Passed - Normal serum potassium on file in past 12 months    Recent Labs   Lab Test  03/12/18   0325   POTASSIUM  3.5            Passed - Normal serum sodium on file in past 12 months    Recent Labs   Lab Test  03/12/18   0325   NA  138         Prescription approved per [facility] Refill Protocol.          Atenolol  Last Written Prescription Date:  11/2/2017  Last Fill Quantity: 30,  # refills: 11   Last office visit: 9/25/2018 with prescribing provider:      Future Office Visit:         atenolol (TENORMIN) 50 MG tablet [Pharmacy Med Name: ATENOLOL 50MG TABS] 30 tablet 11     Sig: TAKE ONE TABLET BY MOUTH EVERY DAY    Beta-Blockers Protocol Passed    11/29/2018  2:08 PM       Passed - Blood pressure under 140/90 in past 12 months    BP Readings from Last 3 Encounters: " "  09/25/18 130/82   07/20/18 148/83   04/25/18 132/78          Passed - Patient is age 6 or older       Passed - Recent (12 mo) or future (30 days) visit within the authorizing provider's specialty    Patient had office visit in the last 12 months or has a visit in the next 30 days with authorizing provider or within the authorizing provider's specialty.  See \"Patient Info\" tab in inbasket, or \"Choose Columns\" in Meds & Orders section of the refill encounter.         Prescription approved per [facility] Refill Protocol.          Fluticasone  Last Written Prescription Date:  11/2/2017  Last Fill Quantity: 1,  # refills: 11   Last office visit: 9/25/2018 with prescribing provider:      Future Office Visit:             fluticasone (FLONASE) 50 MCG/ACT nasal spray [Pharmacy Med Name: FLUTICASONE PROPIONATE 50 SUSP] 16 g 11     Sig: USE ONE TO TWO SPRAYS IN EACH NOSTRIL EVERY DAY    Inhaled Steroids Protocol Passed    11/29/2018  2:08 PM       Passed - Patient is age 12 or older       Passed - Recent (12 mo) or future (30 days) visit within the authorizing provider's specialty    Patient had office visit in the last 12 months or has a visit in the next 30 days with authorizing provider or within the authorizing provider's specialty.  See \"Patient Info\" tab in inbasket, or \"Choose Columns\" in Meds & Orders section of the refill encounter.         Prescription approved per [facility] Refill Protocol.      Omeprazole  Last Written Prescription Date:  11/2/2017  Last Fill Quantity: 60,  # refills: 11   Last office visit: 9/25/2018 with prescribing provider:      Future Office Visit:         omeprazole (PRILOSEC) 20 MG DR capsule [Pharmacy Med Name: OMEPRAZOLE 20MG CPDR] 60 capsule 11     Sig: TAKE ONE CAPSULE BY MOUTH TWICE A DAY    PPI Protocol Passed    11/29/2018  2:08 PM       Passed - Not on Clopidogrel (unless Pantoprazole ordered)       Passed - No diagnosis of osteoporosis on record       Passed - Recent (12 mo) or future (30 " "days) visit within the authorizing provider's specialty    Patient had office visit in the last 12 months or has a visit in the next 30 days with authorizing provider or within the authorizing provider's specialty.  See \"Patient Info\" tab in inbasket, or \"Choose Columns\" in Meds & Orders section of the refill encounter.             Passed - Patient is age 18 or older          Prescription approved per [facility] [place] Protocol.      Grace Chase RN on 11/30/2018 at 2:36 PM    "

## 2018-12-20 ENCOUNTER — OFFICE VISIT (OUTPATIENT)
Dept: FAMILY MEDICINE | Facility: CLINIC | Age: 48
End: 2018-12-20
Payer: COMMERCIAL

## 2018-12-20 ENCOUNTER — TELEPHONE (OUTPATIENT)
Dept: FAMILY MEDICINE | Facility: CLINIC | Age: 48
End: 2018-12-20

## 2018-12-20 VITALS
WEIGHT: 315 LBS | RESPIRATION RATE: 16 BRPM | DIASTOLIC BLOOD PRESSURE: 88 MMHG | TEMPERATURE: 97.2 F | SYSTOLIC BLOOD PRESSURE: 134 MMHG | HEART RATE: 68 BPM | BODY MASS INDEX: 40.6 KG/M2 | OXYGEN SATURATION: 98 %

## 2018-12-20 DIAGNOSIS — Z11.3 SCREEN FOR STD (SEXUALLY TRANSMITTED DISEASE): ICD-10-CM

## 2018-12-20 DIAGNOSIS — E66.01 MORBID OBESITY (H): ICD-10-CM

## 2018-12-20 DIAGNOSIS — I10 HTN (HYPERTENSION): Primary | ICD-10-CM

## 2018-12-20 DIAGNOSIS — M65.90 SYNOVITIS AND TENOSYNOVITIS: ICD-10-CM

## 2018-12-20 DIAGNOSIS — R25.3 EYELID TWITCH: ICD-10-CM

## 2018-12-20 DIAGNOSIS — H02.401 EYELID DROOPING DISEASE, RIGHT: Primary | ICD-10-CM

## 2018-12-20 DIAGNOSIS — I10 HYPERTENSION GOAL BP (BLOOD PRESSURE) < 140/90: ICD-10-CM

## 2018-12-20 LAB
ALBUMIN SERPL-MCNC: 3.9 G/DL (ref 3.4–5)
ALP SERPL-CCNC: 67 U/L (ref 40–150)
ALT SERPL W P-5'-P-CCNC: 46 U/L (ref 0–70)
ANION GAP SERPL CALCULATED.3IONS-SCNC: 4 MMOL/L (ref 3–14)
AST SERPL W P-5'-P-CCNC: 22 U/L (ref 0–45)
BASOPHILS # BLD AUTO: 0.1 10E9/L (ref 0–0.2)
BASOPHILS NFR BLD AUTO: 0.8 %
BILIRUB SERPL-MCNC: 0.4 MG/DL (ref 0.2–1.3)
BUN SERPL-MCNC: 12 MG/DL (ref 7–30)
CALCIUM SERPL-MCNC: 8.8 MG/DL (ref 8.5–10.1)
CHLORIDE SERPL-SCNC: 105 MMOL/L (ref 94–109)
CHOLEST SERPL-MCNC: 148 MG/DL
CO2 SERPL-SCNC: 31 MMOL/L (ref 20–32)
CREAT SERPL-MCNC: 1.28 MG/DL (ref 0.66–1.25)
DIFFERENTIAL METHOD BLD: NORMAL
EOSINOPHIL NFR BLD AUTO: 2.6 %
ERYTHROCYTE [DISTWIDTH] IN BLOOD BY AUTOMATED COUNT: 13.6 % (ref 10–15)
ERYTHROCYTE [SEDIMENTATION RATE] IN BLOOD BY WESTERGREN METHOD: 5 MM/H (ref 0–15)
GFR SERPL CREATININE-BSD FRML MDRD: 65 ML/MIN/{1.73_M2}
GLUCOSE SERPL-MCNC: 90 MG/DL (ref 70–99)
HCT VFR BLD AUTO: 47 % (ref 40–53)
HDLC SERPL-MCNC: 30 MG/DL
HGB BLD-MCNC: 15.7 G/DL (ref 13.3–17.7)
HIV 1+2 AB+HIV1 P24 AG SERPL QL IA: NONREACTIVE
IMM GRANULOCYTES # BLD: 0 10E9/L (ref 0–0.4)
IMM GRANULOCYTES NFR BLD: 0.5 %
LDLC SERPL CALC-MCNC: 97 MG/DL
LYMPHOCYTES # BLD AUTO: 2.5 10E9/L (ref 0.8–5.3)
LYMPHOCYTES NFR BLD AUTO: 40.8 %
MCH RBC QN AUTO: 28.2 PG (ref 26.5–33)
MCHC RBC AUTO-ENTMCNC: 33.4 G/DL (ref 31.5–36.5)
MCV RBC AUTO: 84 FL (ref 78–100)
MONOCYTES # BLD AUTO: 0.5 10E9/L (ref 0–1.3)
MONOCYTES NFR BLD AUTO: 8.7 %
NEUTROPHILS # BLD AUTO: 2.9 10E9/L (ref 1.6–8.3)
NEUTROPHILS NFR BLD AUTO: 46.6 %
NONHDLC SERPL-MCNC: 118 MG/DL
NRBC # BLD AUTO: 0 10*3/UL
NRBC BLD AUTO-RTO: 0 /100
PLATELET # BLD AUTO: 233 10E9/L (ref 150–450)
POTASSIUM SERPL-SCNC: 4 MMOL/L (ref 3.4–5.3)
PROT SERPL-MCNC: 7.5 G/DL (ref 6.8–8.8)
RBC # BLD AUTO: 5.57 10E12/L (ref 4.4–5.9)
SODIUM SERPL-SCNC: 140 MMOL/L (ref 133–144)
TRIGL SERPL-MCNC: 105 MG/DL
TSH SERPL DL<=0.005 MIU/L-ACNC: 1.14 MU/L (ref 0.4–4)
WBC # BLD AUTO: 6.2 10E9/L (ref 4–11)

## 2018-12-20 PROCEDURE — 84443 ASSAY THYROID STIM HORMONE: CPT | Performed by: FAMILY MEDICINE

## 2018-12-20 PROCEDURE — 36415 COLL VENOUS BLD VENIPUNCTURE: CPT | Performed by: FAMILY MEDICINE

## 2018-12-20 PROCEDURE — 80061 LIPID PANEL: CPT | Performed by: FAMILY MEDICINE

## 2018-12-20 PROCEDURE — 85652 RBC SED RATE AUTOMATED: CPT | Performed by: FAMILY MEDICINE

## 2018-12-20 PROCEDURE — 99214 OFFICE O/P EST MOD 30 MIN: CPT | Performed by: FAMILY MEDICINE

## 2018-12-20 PROCEDURE — 80053 COMPREHEN METABOLIC PANEL: CPT | Performed by: FAMILY MEDICINE

## 2018-12-20 PROCEDURE — 85025 COMPLETE CBC W/AUTO DIFF WBC: CPT | Performed by: FAMILY MEDICINE

## 2018-12-20 PROCEDURE — 87389 HIV-1 AG W/HIV-1&-2 AB AG IA: CPT | Performed by: FAMILY MEDICINE

## 2018-12-20 ASSESSMENT — PAIN SCALES - GENERAL: PAINLEVEL: NO PAIN (0)

## 2018-12-20 NOTE — TELEPHONE ENCOUNTER
Tried to reach patient, left message for patient to call the clinic back.      Jose Otto, [facility]

## 2018-12-20 NOTE — TELEPHONE ENCOUNTER
Pt informed. He is wondering what he needs to do as far as monitoring the kidney function? Please advise.

## 2018-12-20 NOTE — Clinical Note
Please refer her to the neurology as soon as possible for twitching eye and right eyelids drooping.  Thanks.

## 2018-12-20 NOTE — TELEPHONE ENCOUNTER
----- Message from Claos Ty Mai, MD sent at 12/20/2018 12:44 PM CST -----  Please let patient know that his labs showed normal thyroid level.  His kidney function test is slightly elevated and therefore will need to be monitored closely.  Because of this, it is very important to have his blood pressure control at all times.  Electrolyte was normal as well as his liver function test.  No diabetes.  Cholesterol looks good, is about the same as it was 2 years ago.  The good cholesterol level is low, this could be improved by exercise and healthy diet.  The sed rate level were normal.  CBC was also normal, no anemia.

## 2018-12-20 NOTE — NURSING NOTE
Chief Complaint   Patient presents with     Eye Problem     left eyelid closes x2-3 months     Musculoskeletal Problem     left arm is tingling and the skin color is changing, x2-3 months     Health Maintenance Due   Topic Date Due     HIV SCREEN (SYSTEM ASSIGNED)  03/24/1988     INFLUENZA VACCINE (1) 09/01/2018     Jose Otto, [facility]

## 2018-12-20 NOTE — PROGRESS NOTES
"  SUBJECTIVE:   Humberto Roberts is a 48 year old male who presents to clinic today for the following health issues:    Concern - right eye lid closes, left arm numbness and left hand skin color changing  Onset: 2-3 months    Description:   Patient states that his right eye lid closes without him noticing or trying. He states that his left arm is tingling and there is numbness for about 2-3 months. His left hand has started to change color within the last month.     Intensity: moderate    Progression of Symptoms:  worsening      Humberto is a 40-year-old -American gentleman with high blood pressure and morbid obesity who is here today for several concerns.  First is the right and left arm pain.  He has it for several months and is getting worse slowly.  Pain started at the base of the right thumb which radiates up to the forearm at times what he moves thumb at certain way and rarely he would discomfort up to his left shoulder.  It is a \"strong pain\" with burning and tingling sensation.  The left feels stiff in the morning; it locks up on him frequently; has to unlock it manually.  Xray recently suggested of arthritis.  Had steroid injection to the area with no effect.  No dropping - has good .  no neck, shoulder, elbow or wrist pain. No history of carpal tunnel.  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  No unusual activity and known triggering factor.  No history of wrist or thumb injury.  Never had this before.    He also noted the skin become hypopigmented at the base of the thumb where it had the steroid injection.  Again never had that before.  No irritation ot itchy.  No exposure to the rash.  No change in lotion, detergent or shampoo.  No new medication.    In the last several months, he also has been having episodes of right eye twitching and then it closes on his own - he has no control over it.  It usually lasted for about 2-3 minutes and resolved on its own. Happens randomly anytime of the day.  A lot of times he " does not even aware if it;  his life point it out to most of the time.  His physical therapist noted it the other day as well and was recommended to follow up.  No heavy feeling of his eyelids.  No headache or dizziness.  No associated change in his visions, no blurry or double vision.  Does not wear contact lenses or glasses glasses.  He has an appointment with eye doctor early next week.  No personal or family history of heart disease, stroke or MG.  No history of seizure.  Denies of drug use.  Does not smoke or drinking alcohol.  No problem with speech.  No facial drooping or focal weakness.  His high blood pressure is well controlled with the atenolol and hydrochlorothiazide.  Again, no chest pain or shortness of breath.  No leg swelling, orthopnea or dyspnea.  No family history of autoimmune disease or neurological problem include MS.  No sinus pain or pressure.  No runny nose, nasal congestion.   No fever or chill. No other concerns today.    Problem list and histories reviewed & adjusted, as indicated.  Additional history: as documented    Current Outpatient Medications   Medication Sig Dispense Refill     aspirin (ASA) 81 MG tablet Take 1 tablet (81 mg) by mouth daily 90 tablet 3     atenolol (TENORMIN) 50 MG tablet TAKE ONE TABLET BY MOUTH EVERY DAY 30 tablet 9     cetirizine (ZYRTEC) 10 MG tablet TAKE ONE TABLET BY MOUTH EVERY DAY AS NEEDED FOR ALLERGIES 30 tablet 6     fluticasone (FLONASE) 50 MCG/ACT nasal spray USE ONE TO TWO SPRAYS IN EACH NOSTRIL EVERY DAY 16 g 9     hydrochlorothiazide (HYDRODIURIL) 25 MG tablet TAKE ONE TABLET BY MOUTH EVERY DAY 30 tablet 9     meloxicam (MOBIC) 15 MG tablet TAKE ONE TABLET BY MOUTH EVERY DAY 30 tablet 4     methocarbamol (ROBAXIN) 750 MG tablet TAKE ONE TABLET BY MOUTH THREE TIMES A DAY AS NEEDED FOR MUSCLE SPASMS 90 tablet 0     omeprazole (PRILOSEC) 20 MG DR capsule TAKE ONE CAPSULE BY MOUTH TWICE A DAY 60 capsule 9     order for DME Equipment being ordered:  shower chair 1 Device 0     order for DME 1 Device Auto CPAP @@ 7-15 cm with heated humidity via mask of choice       zolpidem (AMBIEN) 10 MG tablet Take 1 tablet (10 mg) by mouth nightly as needed for sleep Appointment needed for additional refills. 30 tablet 0     colchicine (COLCRYS) 0.6 MG tablet Take 2 tablets at the first sign of flare, take 1-2 additional tablets until symptoms improve then stop this medication.  Do not use more than a few days. (Patient not taking: Reported on 12/20/2018) 30 tablet 0     No Known Allergies    Reviewed and updated as needed this visit by clinical staff  Tobacco  Allergies  Meds  Soc Hx      Reviewed and updated as needed this visit by Provider         ROS:  Constitutional, HEENT, cardiovascular, pulmonary, gi and gu systems are negative, except as otherwise noted.    OBJECTIVE:     /88 (BP Location: Right arm, Patient Position: Sitting, Cuff Size: Adult Large)   Pulse 68   Temp 97.2  F (36.2  C) (Temporal)   Resp 16   Wt 143.4 kg (316 lb 3.2 oz)   SpO2 98%   BMI 40.60 kg/m    Body mass index is 40.6 kg/m .  GENERAL: healthy, alert and no distress.  EYES: Eyes grossly normal to inspection, PERRL and conjunctivae and sclerae normal.  No nystagmus and all four visual field are intact.  Eyelids are normal, but right eye twitching on and off noted.  No ptosis,  No problem with open and close his eyes actively and passively.  HENT: ear canals and TM's normal.  Nares are non-congested. Oropharynx is pink and moist. No tender with palpation to the sinuses. No facial drooping or tongue deviation. Symmetrical raised eyebrows noted.  No tender with palpation to the scalp.  NECK: no adenopathy, supple, no thyromegaly.  No tender with palpation to the cervical spine or its paraspinous muscle.  No JV dispensed or carotid bruits.  RESP: lungs clear to auscultation - no rales, rhonchi or wheezes  CV: regular rate and rhythm, no murmur, no peripheral edema and peripheral pulses  strong  ABDOMEN: soft, nontender, obese, no palpable masses or organomegaly with normal bowel sounds.  MS: no gross musculoskeletal defects noted, no edema.  No focal weakness.  Left shoulder, wrist and elbow exams were normal.  Both hands are equally in strength.  Fingers in the normal range of motion.  Tender with palpation to the base of the left thumb - no redness or swelling.  No snuffbox tenderness.  Positive Finkelstein sign.  No tender with palpation to thoracic or cervical spine.    SKIN: Benign looking hypopigmented rash on the base of the left thumb - about 2 cm in diameter.  NEURO: Normal strength and tone, mentation intact and speech normal.  Cranial nerves II through XII intact.  DTRs +2 throughout.  No focal neurological deficit.  PSYCH: mentation appears normal, affect normal/bright.  Speech was normal, easily comprehended.  Thought was intact.    Diagnostic Test Results:  Results for orders placed or performed in visit on 12/20/18   CBC with platelets and differential   Result Value Ref Range    WBC 6.2 4.0 - 11.0 10e9/L    RBC Count 5.57 4.4 - 5.9 10e12/L    Hemoglobin 15.7 13.3 - 17.7 g/dL    Hematocrit 47.0 40.0 - 53.0 %    MCV 84 78 - 100 fl    MCH 28.2 26.5 - 33.0 pg    MCHC 33.4 31.5 - 36.5 g/dL    RDW 13.6 10.0 - 15.0 %    Platelet Count 233 150 - 450 10e9/L    Diff Method Automated Method     % Neutrophils 46.6 %    % Lymphocytes 40.8 %    % Monocytes 8.7 %    % Eosinophils 2.6 %    % Basophils 0.8 %    % Immature Granulocytes 0.5 %    Nucleated RBCs 0 0 /100    Absolute Neutrophil 2.9 1.6 - 8.3 10e9/L    Absolute Lymphocytes 2.5 0.8 - 5.3 10e9/L    Absolute Monocytes 0.5 0.0 - 1.3 10e9/L    Absolute Basophils 0.1 0.0 - 0.2 10e9/L    Abs Immature Granulocytes 0.0 0 - 0.4 10e9/L    Absolute Nucleated RBC 0.0    ESR: Erythrocyte sedimentation rate   Result Value Ref Range    Sed Rate 5 0 - 15 mm/h   Comprehensive metabolic panel (BMP + Alb, Alk Phos, ALT, AST, Total. Bili, TP)   Result Value  Ref Range    Sodium 140 133 - 144 mmol/L    Potassium 4.0 3.4 - 5.3 mmol/L    Chloride 105 94 - 109 mmol/L    Carbon Dioxide 31 20 - 32 mmol/L    Anion Gap 4 3 - 14 mmol/L    Glucose 90 70 - 99 mg/dL    Urea Nitrogen 12 7 - 30 mg/dL    Creatinine 1.28 (H) 0.66 - 1.25 mg/dL    GFR Estimate 65 >60 mL/min/[1.73_m2]    GFR Estimate If Black 76 >60 mL/min/[1.73_m2]    Calcium 8.8 8.5 - 10.1 mg/dL    Bilirubin Total 0.4 0.2 - 1.3 mg/dL    Albumin 3.9 3.4 - 5.0 g/dL    Protein Total 7.5 6.8 - 8.8 g/dL    Alkaline Phosphatase 67 40 - 150 U/L    ALT 46 0 - 70 U/L    AST 22 0 - 45 U/L   TSH with free T4 reflex   Result Value Ref Range    TSH 1.14 0.40 - 4.00 mU/L   Lipid panel reflex to direct LDL Fasting   Result Value Ref Range    Cholesterol 148 <200 mg/dL    Triglycerides 105 <150 mg/dL    HDL Cholesterol 30 (L) >39 mg/dL    LDL Cholesterol Calculated 97 <100 mg/dL    Non HDL Cholesterol 118 <130 mg/dL   HIV Antigen Antibody Combo   Result Value Ref Range    HIV Antigen Antibody Combo Nonreactive NR^Nonreactive           ASSESSMENT/PLAN:       1. Eyelid drooping disease, right  Also has right eyelid twitching.  Comes and goes with no known triggering factor and usually last 2-3 minutes.  No associated acute visual change.  Has had this for couple months; not getting worse or better.  No other associated neurological or weakness.  No personal/family history is of seizure, stroke, CAD or neurologic problem including MG or MS. He does not smoke and denied of alcohol or drug use. Physical exam today was pretty normal except of twitching of the right eyelid; no focal neurological deficit.  He was recommended to start low-dose aspirin daily.  Labs as ordered which include sed rate, CMP, CBC and TSH and they were normal.  Also sent him for a carotid ultrasound.  Refer him to neurology for further evaluation.  Instructed to go to the ER if develop speech problem, headache, acute change in his vision and/or worsening of the  symptom or if he has any concern.  He feels comfortable with the plan.    - aspirin (ASA) 81 MG tablet; Take 1 tablet (81 mg) by mouth daily  Dispense: 90 tablet; Refill: 3  - ESR: Erythrocyte sedimentation rate  - Comprehensive metabolic panel (BMP + Alb, Alk Phos, ALT, AST, Total. Bili, TP)  - TSH with free T4 reflex  - US Carotid Bilateral; Future    2. Eyelid twitch  Please see #1 above for further details.    - CBC with platelets and differential  - ESR: Erythrocyte sedimentation rate  - Comprehensive metabolic panel (BMP + Alb, Alk Phos, ALT, AST, Total. Bili, TP)  - TSH with free T4 reflex  - US Carotid Bilateral; Future    3. Synovitis and tenosynovitis  Clinical presentation and physical exams today suggest of tenosynovitis of the left wrist.  Discussed with him about nature of the condition.  Recommended to continue with his normal activities as tolerated.  Start the wrist and pica wrist brace.  Ibuprofen as needed.  Call in if not improve or gets worse.  All of him questions were answered.  Handout about tendonitis was given.    - CBC with platelets and differential    4. Morbid obesity (H)  Discussed with patient about the nature of the condition and its long term and short term effects.  Discussed with him about ways that may help to loose weight include daily exercise, healthy and portioned diet.  Discussed with patient the goal for his weight and his BMI.  TSH level was checked today and it was normal.     5. Hypertension goal BP (blood pressure) < 140/90  Stable and controlled. BP is normal today.  Encouraged him to keep up the good work.  Continue with the current doses of Atenolol and HCTZ.  Been tolerating them well.  Emphasized on healthy/low salt diet, daily excercise and weight loss. Avoid high sugar/caffeine intake. Lab as ordered.  Follow up in 6 month, earlier as needed.    - aspirin (ASA) 81 MG tablet; Take 1 tablet (81 mg) by mouth daily  Dispense: 90 tablet; Refill: 3  - Lipid panel  reflex to direct LDL Fasting    6. Screen for STD (sexually transmitted disease)  Safe sex and STD prevention discussed.  - HIV Antigen Antibody Combo      Hypopigmented lesion at the base of his left thumb is benign in nature.  Patient was reassured and will monitor for now.      Calos Ty Mai, MD  [facility]

## 2018-12-20 NOTE — TELEPHONE ENCOUNTER
Tried to reach patient, left message for patient to call the clinic back. Lab order placed for future.      Jose Otto, CMA

## 2018-12-24 ENCOUNTER — TELEPHONE (OUTPATIENT)
Dept: FAMILY MEDICINE | Facility: CLINIC | Age: 48
End: 2018-12-24

## 2018-12-24 NOTE — TELEPHONE ENCOUNTER
Claos Hester MD  [facility]             Please let patient know that his test for HIV was negative.

## 2018-12-31 ENCOUNTER — TELEPHONE (OUTPATIENT)
Dept: FAMILY MEDICINE | Facility: CLINIC | Age: 48
End: 2018-12-31

## 2018-12-31 DIAGNOSIS — G24.5 EYE TWITCH: Primary | ICD-10-CM

## 2018-12-31 DIAGNOSIS — H02.409 DROOPING EYELID: ICD-10-CM

## 2018-12-31 DIAGNOSIS — G47.00 INSOMNIA, UNSPECIFIED TYPE: ICD-10-CM

## 2018-12-31 NOTE — TELEPHONE ENCOUNTER
Calos Hester MD [facility]             Please refer her to the neurology as soon as possible for twitching eye and right eyelids drooping.  Thanks.

## 2019-01-02 RX ORDER — ZOLPIDEM TARTRATE 10 MG/1
10 TABLET ORAL
Qty: 30 TABLET | Refills: 0 | Status: SHIPPED | OUTPATIENT
Start: 2019-01-02 | End: 2019-01-29

## 2019-01-02 NOTE — TELEPHONE ENCOUNTER
Ambien 10 MG       Last Written Prescription Date:  11/30/18  Last Fill Quantity: 30,   # refills: 0  Last Office Visit: 12/20/18  Future Office visit:       Routing refill request to provider for review/approval because:  Drug not on the [facility], [facility] or [facility] refill protocol or controlled substance

## 2019-01-28 DIAGNOSIS — G47.00 INSOMNIA, UNSPECIFIED TYPE: ICD-10-CM

## 2019-01-28 NOTE — TELEPHONE ENCOUNTER
Ambien 10 MG       Last Written Prescription Date:  1/2/19  Last Fill Quantity: 30,   # refills: 0  Last Office Visit: 4/12/18  Future Office visit:       Routing refill request to provider for review/approval because:  Drug not on the [facility], [facility] or [facility] refill protocol or controlled substance

## 2019-01-29 RX ORDER — ZOLPIDEM TARTRATE 10 MG/1
10 TABLET ORAL
Qty: 30 TABLET | Refills: 0 | Status: SHIPPED | OUTPATIENT
Start: 2019-01-29 | End: 2019-03-05

## 2019-02-11 ENCOUNTER — TELEPHONE (OUTPATIENT)
Dept: FAMILY MEDICINE | Facility: CLINIC | Age: 49
End: 2019-02-11

## 2019-02-11 NOTE — TELEPHONE ENCOUNTER
Reason for Call:  Same Day Appointment, Requested Provider:  Calos Hester M.D.     PCP: Mariana Ortega    Reason for visit: legs cramping and pain     Duration of symptoms:  Two weeks     Have you been treated for this in the past? No    Additional comments:  Humberto would like to see you this week for leg pain and cramping     Can we leave a detailed message on this number? YES    Phone number patient can be reached at: Cell number on file:    Telephone Information:   Mobile [phone]       Best Time:      Call taken on 2/11/2019 at 8:48 AM by Eliana Chase

## 2019-02-15 ENCOUNTER — OFFICE VISIT (OUTPATIENT)
Dept: FAMILY MEDICINE | Facility: CLINIC | Age: 49
End: 2019-02-15
Payer: COMMERCIAL

## 2019-02-15 VITALS
BODY MASS INDEX: 41.75 KG/M2 | HEIGHT: 73 IN | OXYGEN SATURATION: 97 % | HEART RATE: 74 BPM | WEIGHT: 315 LBS | TEMPERATURE: 97.9 F | SYSTOLIC BLOOD PRESSURE: 136 MMHG | RESPIRATION RATE: 14 BRPM | DIASTOLIC BLOOD PRESSURE: 82 MMHG

## 2019-02-15 DIAGNOSIS — R25.2 BILATERAL LEG CRAMPS: Primary | ICD-10-CM

## 2019-02-15 DIAGNOSIS — I10 HYPERTENSION GOAL BP (BLOOD PRESSURE) < 140/90: ICD-10-CM

## 2019-02-15 LAB
ANION GAP SERPL CALCULATED.3IONS-SCNC: 6 MMOL/L (ref 3–14)
BUN SERPL-MCNC: 11 MG/DL (ref 7–30)
CALCIUM SERPL-MCNC: 9.1 MG/DL (ref 8.5–10.1)
CHLORIDE SERPL-SCNC: 103 MMOL/L (ref 94–109)
CO2 SERPL-SCNC: 33 MMOL/L (ref 20–32)
CREAT SERPL-MCNC: 1.12 MG/DL (ref 0.66–1.25)
GFR SERPL CREATININE-BSD FRML MDRD: 77 ML/MIN/{1.73_M2}
GLUCOSE SERPL-MCNC: 105 MG/DL (ref 70–99)
POTASSIUM SERPL-SCNC: 3.9 MMOL/L (ref 3.4–5.3)
SODIUM SERPL-SCNC: 142 MMOL/L (ref 133–144)

## 2019-02-15 PROCEDURE — 80048 BASIC METABOLIC PNL TOTAL CA: CPT | Performed by: FAMILY MEDICINE

## 2019-02-15 PROCEDURE — 99213 OFFICE O/P EST LOW 20 MIN: CPT | Performed by: FAMILY MEDICINE

## 2019-02-15 PROCEDURE — 36415 COLL VENOUS BLD VENIPUNCTURE: CPT | Performed by: FAMILY MEDICINE

## 2019-02-15 RX ORDER — GABAPENTIN 300 MG/1
300 CAPSULE ORAL AT BEDTIME
Qty: 30 CAPSULE | Refills: 3 | Status: SHIPPED | OUTPATIENT
Start: 2019-02-15 | End: 2019-02-25

## 2019-02-15 ASSESSMENT — PAIN SCALES - GENERAL: PAINLEVEL: MODERATE PAIN (5)

## 2019-02-15 ASSESSMENT — MIFFLIN-ST. JEOR: SCORE: 2370.84

## 2019-02-15 NOTE — PROGRESS NOTES
SUBJECTIVE:   Humberto Roberts is a 48 year old male who presents to clinic today for the following health issues:        Joint Pain    Onset: x couple months    Description:   Location: both legs upper and lower   Character: Cramping and shock feeling    Intensity: mild    Progression of Symptoms: same, intermittent    Accompanying Signs & Symptoms:  Other symptoms: cramping of both legs    History:   Previous similar pain: no       Precipitating factors:   Trauma or overuse: no     Alleviating factors:  Improved by: nothing    Therapies Tried and outcome: stretching     Humberto is in today for pain in his legs. Started two months ago and getting worse. Pain starts in the back of both legs and radiates down to his ankles. Described as cramping, electricity-like, sharp pain. Sometimes feels achy and like a charley horse. Has some numbness when sitting as something crawling under his skin. Is worse when sitting for long periods of time or laying in bed. Feel the urge to move and feel better with movements.  Happened mainly in the evening or at night. Not worse with activity. He has never it before. No back pain outside of normal chronic pain. No hip or knee pain. No swelling or redness in area. No bruising. No history of trauma or unusual activities. No weakness. No fever or chills. Has been stretching with some improvement. Used tylenol but did not help. Pain is keeping him up at night - he tosses and turns. He is taking Ambien for sleeping - no issues. He is not currently working but pain bothers him with housework. He is taking atenolol and hydrochlorothiazide for HCTZ - has tolerated it well before. No recent changes to medications. No other concerns today.         Problem list and histories reviewed & adjusted, as indicated.  Additional history: as documented    Current Outpatient Medications   Medication Sig Dispense Refill     aspirin (ASA) 81 MG tablet Take 1 tablet (81 mg) by mouth daily 90 tablet 3      "atenolol (TENORMIN) 50 MG tablet TAKE ONE TABLET BY MOUTH EVERY DAY 30 tablet 9     cetirizine (ZYRTEC) 10 MG tablet TAKE ONE TABLET BY MOUTH EVERY DAY AS NEEDED FOR ALLERGIES 30 tablet 6     colchicine (COLCRYS) 0.6 MG tablet Take 2 tablets at the first sign of flare, take 1-2 additional tablets until symptoms improve then stop this medication.  Do not use more than a few days. 30 tablet 0     fluticasone (FLONASE) 50 MCG/ACT nasal spray USE ONE TO TWO SPRAYS IN EACH NOSTRIL EVERY DAY 16 g 9     gabapentin (NEURONTIN) 300 MG capsule Take 1 capsule (300 mg) by mouth At Bedtime 30 capsule 3     hydrochlorothiazide (HYDRODIURIL) 25 MG tablet TAKE ONE TABLET BY MOUTH EVERY DAY 30 tablet 9     meloxicam (MOBIC) 15 MG tablet TAKE ONE TABLET BY MOUTH EVERY DAY 30 tablet 4     methocarbamol (ROBAXIN) 750 MG tablet TAKE ONE TABLET BY MOUTH THREE TIMES A DAY AS NEEDED FOR MUSCLE SPASMS 90 tablet 0     omeprazole (PRILOSEC) 20 MG DR capsule TAKE ONE CAPSULE BY MOUTH TWICE A DAY 60 capsule 9     order for DME Equipment being ordered: shower chair 1 Device 0     order for DME 1 Device Auto CPAP @@ 7-15 cm with heated humidity via mask of choice       zolpidem (AMBIEN) 10 MG tablet Take 1 tablet (10 mg) by mouth nightly as needed for sleep Appointment needed for additional refills. 30 tablet 0     No Known Allergies    Reviewed and updated as needed this visit by clinical staff  Tobacco  Allergies  Meds  Med Hx  Surg Hx  Fam Hx  Soc Hx      Reviewed and updated as needed this visit by Provider         ROS:  Constitutional, HEENT, cardiovascular, pulmonary, gi and gu systems are negative, except as otherwise noted.    OBJECTIVE:     /82   Pulse 74   Temp 97.9  F (36.6  C) (Temporal)   Resp 14   Ht 1.86 m (6' 1.23\")   Wt 144.3 kg (318 lb 3.2 oz)   SpO2 97%   BMI 41.72 kg/m    Body mass index is 41.72 kg/m .  GENERAL: healthy, alert and no distress  NECK: no adenopathy, supple, no lymphadenopathy or " thyromegaly.  RESP: lungs clear to auscultation - no rales, rhonchi or wheezes  CV: regular rate and rhythm, no murmur, no peripheral edema and peripheral pulses strong  ABDOMEN: soft, nontender, normal bowel sounds.  MS: no gross musculoskeletal defects noted, no edema.  Walk without limping.  Both legs are equally in strength.  Hips, knees and ankle exams were normal.  No tender with palpation to calf or behind the thigh.  No leg swelling.  Negative straight leg maneuver.  No tender with palpation to lumbar sacral spine.  SKIN: no suspicious lesions or rashes  NEURO: Normal strength and tone, mentation intact and speech normal.  Cranial nerves II through XII intact.  DTRs +2 throughout.  No focal neurological deficit.  PSYCH: mentation appears normal, affect normal/bright    Diagnostic Test Results:  Results for orders placed or performed in visit on 02/15/19   **Basic metabolic panel FUTURE 14d   Result Value Ref Range    Sodium 142 133 - 144 mmol/L    Potassium 3.9 3.4 - 5.3 mmol/L    Chloride 103 94 - 109 mmol/L    Carbon Dioxide 33 (H) 20 - 32 mmol/L    Anion Gap 6 3 - 14 mmol/L    Glucose 105 (H) 70 - 99 mg/dL    Urea Nitrogen 11 7 - 30 mg/dL    Creatinine 1.12 0.66 - 1.25 mg/dL    GFR Estimate 77 >60 mL/min/[1.73_m2]    GFR Estimate If Black 89 >60 mL/min/[1.73_m2]    Calcium 9.1 8.5 - 10.1 mg/dL       ASSESSMENT/PLAN:     1. Bilateral leg cramps  Clinical presentation is more suggestive of restless leg syndrome.  Unlikely it is claudication.  He has high blood pressure and is on hydrochlorothiazide.  Labs as ordered and BMP was normal, no abnormal electrolytes.  Clinical presentation and physical exam did not suggest of DVT.  Will sent for JIGAR.  Continue his normal activities as tolerated.  Started him on gabapentin at bedtime.  Side effect discussed.  Encouraged to hold off on the Ambien when taking the gabapentin.  Call in or follow-up if the symptoms persist or if has any question or concern.    -  gabapentin (NEURONTIN) 300 MG capsule; Take 1 capsule (300 mg) by mouth At Bedtime  Dispense: 30 capsule; Refill: 3    2. Hypertension goal BP (blood pressure) < 140/90  Stable and is doing well.  Continue with the atenolol and hydrochlorothiazide.        Calos Ty Mai, MD  [facility]

## 2019-02-18 ENCOUNTER — TELEPHONE (OUTPATIENT)
Dept: FAMILY MEDICINE | Facility: CLINIC | Age: 49
End: 2019-02-18

## 2019-02-18 NOTE — TELEPHONE ENCOUNTER
Calos Hester MD  [facility]             Please have him to set up for JIGAR.  Ordered.

## 2019-02-25 ENCOUNTER — TELEPHONE (OUTPATIENT)
Dept: FAMILY MEDICINE | Facility: CLINIC | Age: 49
End: 2019-02-25

## 2019-02-25 DIAGNOSIS — F51.04 PSYCHOPHYSIOLOGICAL INSOMNIA: Primary | ICD-10-CM

## 2019-02-25 RX ORDER — TRAZODONE HYDROCHLORIDE 50 MG/1
50-100 TABLET, FILM COATED ORAL AT BEDTIME
Qty: 60 TABLET | Refills: 3 | Status: SHIPPED | OUTPATIENT
Start: 2019-02-25 | End: 2019-07-06

## 2019-02-25 NOTE — TELEPHONE ENCOUNTER
Received a form from [facility] that patient was referred and failed his scheduled appointment on 02/13/19.     Lauren IYER

## 2019-02-25 NOTE — TELEPHONE ENCOUNTER
Patient called today.    Patient was prescribed medication for leg cramps and help with sleep with Dr. Kacie mcmanus given on February 15, 2019.    Patient states medication is working for leg cramps, but is not able to sleep.    Patient also states he is off of his sleeping medication Gabapentin per provider request.    Needs to know what he can do to get some sleep tonight.    Please contact patient.    Thank you.    Central Scheduling  Candice ESPINOZA

## 2019-03-04 DIAGNOSIS — G47.00 INSOMNIA, UNSPECIFIED TYPE: ICD-10-CM

## 2019-03-05 RX ORDER — ZOLPIDEM TARTRATE 10 MG/1
10 TABLET ORAL
Qty: 30 TABLET | Refills: 0 | Status: SHIPPED | OUTPATIENT
Start: 2019-03-05 | End: 2019-04-08

## 2019-03-05 NOTE — TELEPHONE ENCOUNTER
Zolpidem 10 MG       Last Written Prescription Date:  1/29/19  Last Fill Quantity: 30,   # refills: 0  Last Office Visit: 2/15/19  Future Office visit:       Routing refill request to provider for review/approval because:  Drug not on the [facility], [facility] or [facility] refill protocol or controlled substance

## 2019-04-03 DIAGNOSIS — J30.1 SEASONAL ALLERGIC RHINITIS DUE TO POLLEN: ICD-10-CM

## 2019-04-05 RX ORDER — CETIRIZINE HYDROCHLORIDE 10 MG/1
TABLET ORAL
Qty: 30 TABLET | Refills: 3 | Status: SHIPPED | OUTPATIENT
Start: 2019-04-05 | End: 2019-08-05

## 2019-04-05 NOTE — TELEPHONE ENCOUNTER
"Requested Prescriptions   Pending Prescriptions Disp Refills     cetirizine (ZYRTEC) 10 MG tablet [Pharmacy Med Name: CETIRIZINE HCL 10MG TABS] 30 tablet 6    Last Written Prescription Date:  8/30/18  Last Fill Quantity: 30,  # refills: 6   Last office visit: 2/15/2019 with provider:   Kacie  Future Office Visit:     Sig: TAKE ONE TABLET BY MOUTH EVERY DAY AS NEEDED FOR ALLERGIES    Antihistamines Protocol Passed - 4/3/2019  8:33 AM       Passed - Patient is 3-64 years of age    Apply weight-based dosing for peds patients age 3 - 12 years of age.    Forward request to provider for patients under the age of 3 or over the age of 64.         Passed - Recent (12 mo) or future (30 days) visit within the authorizing provider's specialty    Patient had office visit in the last 12 months or has a visit in the next 30 days with authorizing provider or within the authorizing provider's specialty.  See \"Patient Info\" tab in inbasket, or \"Choose Columns\" in Meds & Orders section of the refill encounter.             Passed - Medication is active on med list      Prescription approved per [facility] Refill Protocol.  Hollie Sosa RN      "

## 2019-04-08 ENCOUNTER — HOSPITAL ENCOUNTER (EMERGENCY)
Facility: CLINIC | Age: 49
Discharge: HOME | End: 2019-04-08
Attending: EMERGENCY MEDICINE | Admitting: EMERGENCY MEDICINE
Payer: COMMERCIAL

## 2019-04-08 VITALS
DIASTOLIC BLOOD PRESSURE: 100 MMHG | HEART RATE: 63 BPM | HEIGHT: 74 IN | OXYGEN SATURATION: 100 % | WEIGHT: 310 LBS | RESPIRATION RATE: 17 BRPM | BODY MASS INDEX: 39.78 KG/M2 | SYSTOLIC BLOOD PRESSURE: 164 MMHG

## 2019-04-08 DIAGNOSIS — E66.01 MORBID OBESITY (H): ICD-10-CM

## 2019-04-08 DIAGNOSIS — R03.0 ELEVATED BLOOD PRESSURE READING: ICD-10-CM

## 2019-04-08 DIAGNOSIS — M54.50 ACUTE BILATERAL LOW BACK PAIN WITHOUT SCIATICA: ICD-10-CM

## 2019-04-08 DIAGNOSIS — M54.59 MECHANICAL LOW BACK PAIN: ICD-10-CM

## 2019-04-08 PROCEDURE — 99284 EMERGENCY DEPT VISIT MOD MDM: CPT | Mod: Z6 | Performed by: EMERGENCY MEDICINE

## 2019-04-08 PROCEDURE — 99283 EMERGENCY DEPT VISIT LOW MDM: CPT | Performed by: EMERGENCY MEDICINE

## 2019-04-08 RX ORDER — HYDROCODONE BITARTRATE AND ACETAMINOPHEN 5; 325 MG/1; MG/1
1 TABLET ORAL EVERY 6 HOURS PRN
Qty: 15 TABLET | Refills: 0 | Status: SHIPPED | OUTPATIENT
Start: 2019-04-08 | End: 2019-09-16

## 2019-04-08 ASSESSMENT — ENCOUNTER SYMPTOMS
SHORTNESS OF BREATH: 0
NAUSEA: 0
ROS GI COMMENTS: NO BOWEL INCONTINENCE
DIARRHEA: 0
FLANK PAIN: 1
ACTIVITY CHANGE: 1
NUMBNESS: 0
FEVER: 0
DIFFICULTY URINATING: 0
VOMITING: 0
CHILLS: 0

## 2019-04-08 ASSESSMENT — MIFFLIN-ST. JEOR: SCORE: 2340.9

## 2019-04-08 NOTE — DISCHARGE INSTRUCTIONS
Recommend lift restrictions of maximum weight of 25 pounds  Avoid repetitive bending and twisting  Return if you start having difficulty with bladder or bowel control  Return if you start having referred pain into both lower extremities  Aleve 200 mg tablets 2 tablets twice daily.  Take with food  Prescriptions been provided for hydrocodone.  This is a narcotic.  Please take as directed.  Do not mix with alcohol.  Avoid driving or operating machinery if using narcotics.  Narcotics can cause constipation.  If you are prone to constipation recommend starting a stool softener such as Colace.  If your back discomfort does not return to baseline would recommend follow-up with your primary care provider in 10 days.  Given that you have never had any advanced imaging of your spine would discuss this with your primary doctor at that time..

## 2019-04-08 NOTE — ED PROVIDER NOTES
History     Chief Complaint   Patient presents with     Flank Pain     Back Pain     HPI  Humberto Roberts is a 49 year old male who presents with bilateral back and flank pain. He has a history of chronic left lower back pain that has been going on for years. He has never had imaging of his back. He was helping take down trees and cutting them up for firewood yesterday. There was no specific instant in which the pain got worse but he noticed his pain was worse after hauling branches. Pain has not improved, still 10/10. He has tried many things for the pain including ice, heat, icyhot, stretching, muscle relaxant, and ibuprofen, but none of them helped. The pain is usually in the left lower back and left flank but he can feel it in his right side now too. He has noticed that it is hard for him to get up from lying down. The sensation feels like a ball or tightness in his back. Has not had any changes in urination or bowel patterns. Denies numbness in the saddle area. No pain radiation including down his legs. No numbness or tingling in his legs. No fevers or chills. No history of kidney stones.     Allergies:  No Known Allergies    Problem List:    Patient Active Problem List    Diagnosis Date Noted     Seasonal allergic rhinitis due to pollen, unspecified chronicity 01/23/2018     Priority: Medium     Morbid obesity (H) 12/06/2017     Priority: Medium     Elevated serum creatinine 10/15/2015     Priority: Medium     Insomnia 10/15/2015     Priority: Medium     Hypertension goal BP (blood pressure) < 140/90 05/06/2013     Priority: Medium     CARDIOVASCULAR SCREENING; LDL GOAL LESS THAN 160 05/06/2013     Priority: Medium     GERD (gastroesophageal reflux disease) 04/24/2013     Priority: Medium        Past Medical History:    Past Medical History:   Diagnosis Date     Acid reflux disease since 2006     Back pain chronic     Cellulitis of left upper arm and forearm 11/22/2013     Hypertension        Past Surgical  "History:    Past Surgical History:   Procedure Laterality Date     ENT SURGERY         Family History:    History reviewed. No pertinent family history.    Social History:  Marital Status:  Significant other [7]  Social History     Tobacco Use     Smoking status: Never Smoker     Smokeless tobacco: Never Used   Substance Use Topics     Alcohol use: No     Alcohol/week: 0.0 oz     Drug use: No        Medications:      aspirin (ASA) 81 MG tablet   atenolol (TENORMIN) 50 MG tablet   cetirizine (ZYRTEC) 10 MG tablet   colchicine (COLCRYS) 0.6 MG tablet   fluticasone (FLONASE) 50 MCG/ACT nasal spray   hydrochlorothiazide (HYDRODIURIL) 25 MG tablet   meloxicam (MOBIC) 15 MG tablet   methocarbamol (ROBAXIN) 750 MG tablet   omeprazole (PRILOSEC) 20 MG DR capsule   order for DME   order for DME   traZODone (DESYREL) 50 MG tablet   zolpidem (AMBIEN) 10 MG tablet         Review of Systems   Constitutional: Positive for activity change. Negative for chills and fever.   Respiratory: Negative for shortness of breath.    Cardiovascular: Negative for chest pain.   Gastrointestinal: Negative for diarrhea, nausea and vomiting.        No bowel incontinence   Genitourinary: Positive for flank pain. Negative for decreased urine volume, difficulty urinating and urgency.        No urinary incontinence    Neurological: Negative for numbness.       Physical Exam   BP: (!) 164/100  Pulse: 63  Resp: 17  Height: 188 cm (6' 2\")  Weight: 140.6 kg (310 lb)  SpO2: 100 %      Physical Exam   Constitutional: He appears well-developed and well-nourished.   HENT:   Head: Normocephalic.   Eyes: Conjunctivae and EOM are normal.   Cardiovascular: Normal rate, regular rhythm and normal heart sounds.   Pulmonary/Chest: Effort normal and breath sounds normal.   Musculoskeletal:   Pain to palpation of left flank and lower back  No CVA tenderness   Neurological: He is alert. No sensory deficit.   Reflex Scores:       Patellar reflexes are 1+ on the right side " and 1+ on the left side.       Achilles reflexes are 1+ on the right side and 1+ on the left side.  Able to walk on tiptoes- S1 motor intact  Normal strength extensor hallucis longus bilaterally- L5 motor intact         ED Course        Procedures              Assessments & Plan (with Medical Decision Making)  49-year-old male presents with mid low back pain.  Does not radiate to the buttock or down either legs.  Tension bladder bowel control.  No loss of strength in either extremity.  No change in sensory either extremity.  He has had chronic low back pain.  Managed conservatively.  No previous advanced spinal imaging.  States he has taken a prescribed muscle relaxer in the past with minimal benefit.  Has been using ibuprofen and ice with also having ongoing pain.  Onset was after he did some heavy lifting while moving trees that were cut down.  He rates his discomfort about 6/10 intensity with movement.  On examination overweight male.  Had no difficulty moving from sitting to standing position but he did use arm strength, push off on the knees to get out of the chair.  Once upright he had normal posture.  Inspection of his lumbar spine did show that he had lordotic curve although it appeared to be diminished consistent with some paralumbar muscle spasm.  He had no midline pain to palpation of the lumbar spine.  No pain extending to the SI joints.  There is no leg length discrepancy.  Straight leg raise testing was negative bilateral.  DTRs were 1/4 for both lower extremities at the Achilles and patellar location.  He was able to heel and toe walk.  No weakness of the extensor hallux longus.  Patient's presentation is consistent mechanical low back pain.  I suspect he probably has some underlying degenerative joint disease and degenerative disc disease but is not having any central canal stenosis symptoms nor radicular symptoms.  Conservative care is recommended.  He was discharged home with Norco 1-2 tablets  every 6 hours severe pain.  Did not put him on a Medrol Dosepak if that is not having any radicular discogenic symptoms.  He is to do light stretching and icing.  Is not improving he should follow with his primary clinic provider.  Avoid lifting no more than 20-30 pounds.  He was educated on central canal stenosis symptoms and radicular symptoms if they present he needs to see his doctor the clinic or come to the emergency room.     I have reviewed the nursing notes.    I have reviewed the findings, diagnosis, plan and need for follow up with the patient.         Medication List      There are no discharge medications for this visit.         Final diagnoses:   Acute bilateral low back pain without sciatica   Mechanical low back pain   Morbid obesity (H)   Elevated blood pressure reading       4/8/2019   [facility] EMERGENCY DEPARTMENT     Bebeto Chase DO  04/08/19 1823

## 2019-04-08 NOTE — ED TRIAGE NOTES
Left lower back/flank pain for the last few days and worse last night.  Denies N/T to lower extremity

## 2019-05-03 RX ORDER — HYDROCODONE BITARTRATE AND ACETAMINOPHEN 5; 325 MG/1; MG/1
1 TABLET ORAL EVERY 6 HOURS PRN
Qty: 15 TABLET | Refills: 0 | OUTPATIENT
Start: 2019-05-03

## 2019-05-03 NOTE — TELEPHONE ENCOUNTER
LM asking patient to return our call.  Please inform him of the message below and assist with scheduling.  Syeda Villalobos, RONI

## 2019-05-03 NOTE — TELEPHONE ENCOUNTER
Controlled Substance Refill Request for Norco  Problem List Complete:  No     PROVIDER TO CONSIDER COMPLETION OF PROBLEM LIST AND OVERVIEW/CONTROLLED SUBSTANCE AGREEMENT    Last Written Prescription Date:  04/08/2019 - Given in ED.   Last Fill Quantity: 15,   # refills: 0    THE MOST RECENT OFFICE VISIT MUST BE WITHIN THE PAST 3 MONTHS. AT LEAST ONE FACE TO FACE VISIT MUST OCCUR EVERY 6 MONTHS. ADDITIONAL VISITS CAN BE VIRTUAL.  (THIS STATEMENT SHOULD BE DELETED.)    [facility] with [facility]: 02/15/2019    Future Office visit:     Controlled substance agreement:   Encounter-Level CSA:    There are no encounter-level csa.     Patient-Level CSA:    There are no patient-level csa.         Last Urine Drug Screen: No results found for: CDAUT, No results found for: COMDAT, No results found for: THC13, PCP13, COC13, MAMP13, OPI13, AMP13, BZO13, TCA13, MTD13, BAR13, OXY13, PPX13, BUP13     [email or web address]     checked in past 3 months?  Yes 05/03/2019.    Joseline Mims RN

## 2019-05-06 NOTE — TELEPHONE ENCOUNTER
Left message informing patient that we received a refill request but he needs an appointment before getting a refill.  Told him to give us a call and we can assist in getting him scheduled.  Syeda Villalobos, [facility]

## 2019-05-26 DIAGNOSIS — M10.9 ACUTE GOUT OF LEFT ANKLE, UNSPECIFIED CAUSE: ICD-10-CM

## 2019-05-28 NOTE — TELEPHONE ENCOUNTER
"COLCHICINE  Last Written Prescription Date:  10/13/18  Last Fill Quantity: 30,  # refills: 0   Last office visit: 2/15/2019 with prescribing provider:     Future Office Visit:  NONE  Requested Prescriptions   Pending Prescriptions Disp Refills     colchicine (COLCRYS) 0.6 MG tablet 30 tablet 0     Sig: Take 2 tablets at the first sign of flare, take 1-2 additional tablets until symptoms improve then stop this medication.  Do not use more than a few days.       Gout Agents Protocol Failed - 5/26/2019 12:48 PM        Failed - Has Uric Acid on file in past 12 months and value is less than 6     Recent Labs   Lab Test 11/02/18  1129   URIC 8.4*   If level is 6mg/dL or greater, ok to refill one time and refer to provider.           Passed - CBC on file in past 12 months     Recent Labs   Lab Test 12/20/18  0830   WBC 6.2   RBC 5.57   HGB 15.7   HCT 47.0              Passed - ALT on file in past 12 months     Recent Labs   Lab Test 12/20/18  0830   ALT 46           Passed - Recent (12 mo) or future (30 days) visit within the authorizing provider's specialty     Patient had office visit in the last 12 months or has a visit in the next 30 days with authorizing provider or within the authorizing provider's specialty.  See \"Patient Info\" tab in inbasket, or \"Choose Columns\" in Meds & Orders section of the refill encounter.            Passed - Medication is active on med list        Passed - Patient is age 18 or older        Passed - Normal serum creatinine on file in the past 12 months     Recent Labs   Lab Test 02/15/19  1241   CR 1.12           Routing refill request to provider for review/approval because:  A break in medication  REJI Sanchez              "

## 2019-05-29 ENCOUNTER — TELEPHONE (OUTPATIENT)
Dept: FAMILY MEDICINE | Facility: CLINIC | Age: 49
End: 2019-05-29

## 2019-05-29 RX ORDER — COLCHICINE 0.6 MG/1
TABLET ORAL
Qty: 30 TABLET | Refills: 0 | Status: SHIPPED | OUTPATIENT
Start: 2019-05-29 | End: 2019-11-01

## 2019-07-06 DIAGNOSIS — F51.04 PSYCHOPHYSIOLOGICAL INSOMNIA: ICD-10-CM

## 2019-07-08 RX ORDER — TRAZODONE HYDROCHLORIDE 50 MG/1
TABLET, FILM COATED ORAL
Qty: 60 TABLET | Refills: 1 | Status: SHIPPED | OUTPATIENT
Start: 2019-07-08 | End: 2019-09-08

## 2019-07-08 NOTE — TELEPHONE ENCOUNTER
"Trazodone  Last Written Prescription Date:  2/25/2019  Last Fill Quantity: 60,  # refills: 3   Last office visit: 2/15/2019 with prescribing provider:  Kacie   Future Office Visit:      Requested Prescriptions   Pending Prescriptions Disp Refills     traZODone (DESYREL) 50 MG tablet [Pharmacy Med Name: TRAZODONE HCL 50MG TABS] 60 tablet 3     Sig: TAKE ONE TO TWO TABLETS BY MOUTH AT BEDTIME. PLEASE STOP THE GABAPENTIN       Serotonin Modulators Passed - 7/6/2019  9:10 AM        Passed - Recent (12 mo) or future (30 days) visit within the authorizing provider's specialty     Patient had office visit in the last 12 months or has a visit in the next 30 days with authorizing provider or within the authorizing provider's specialty.  See \"Patient Info\" tab in inbasket, or \"Choose Columns\" in Meds & Orders section of the refill encounter.              Passed - Medication is active on med list        Passed - Patient is age 18 or older        Ute refill given per RN protocol.   Please contact patient to have them schedule the following:  Due for office visit: HTN follow up in August    Diamante Arroyo RN on 7/8/2019 at 12:33 PM    "

## 2019-08-05 DIAGNOSIS — M77.11 LATERAL EPICONDYLITIS OF RIGHT ELBOW: ICD-10-CM

## 2019-08-05 DIAGNOSIS — J30.1 SEASONAL ALLERGIC RHINITIS DUE TO POLLEN: ICD-10-CM

## 2019-08-05 RX ORDER — MELOXICAM 15 MG/1
TABLET ORAL
Qty: 30 TABLET | Refills: 4 | Status: SHIPPED | OUTPATIENT
Start: 2019-08-05 | End: 2019-12-04

## 2019-08-05 NOTE — TELEPHONE ENCOUNTER
"Mobic  Last Written Prescription Date:  11/01/2018  Last Fill Quantity: 30,  # refills: 4   Last office visit: 2/15/2019 with prescribing provider:  Kacie   Future Office Visit:  None  Prescription approved per [facility] Refill Protocol.    Requested Prescriptions   Pending Prescriptions Disp Refills     meloxicam (MOBIC) 15 MG tablet [Pharmacy Med Name: MELOXICAM 15MG TABS] 30 tablet 4     Sig: TAKE ONE TABLET BY MOUTH EVERY DAY       NSAID Medications Failed - 8/5/2019 12:39 PM        Failed - Blood pressure under 140/90 in past 12 months     BP Readings from Last 3 Encounters:   04/08/19 (!) 164/100   02/15/19 136/82   12/20/18 134/88           Passed - Normal ALT on file in past 12 months     Recent Labs   Lab Test 12/20/18  0830   ALT 46           Passed - Normal AST on file in past 12 months     Recent Labs   Lab Test 12/20/18  0830   AST 22           Passed - Recent (12 mo) or future (30 days) visit within the authorizing provider's specialty     Patient had office visit in the last 12 months or has a visit in the next 30 days with authorizing provider or within the authorizing provider's specialty.  See \"Patient Info\" tab in inbasket, or \"Choose Columns\" in Meds & Orders section of the refill encounter.          Passed - Patient is age 6-64 years        Passed - Normal CBC on file in past 12 months     Recent Labs   Lab Test 12/20/18  0830   WBC 6.2   RBC 5.57   HGB 15.7   HCT 47.0              Passed - Medication is active on med list        Passed - Normal serum creatinine on file in past 12 months     Recent Labs   Lab Test 02/15/19  1241   CR 1.12         Joseline Mims Rn   "

## 2019-08-06 RX ORDER — CETIRIZINE HYDROCHLORIDE 10 MG/1
TABLET ORAL
Qty: 30 TABLET | Refills: 5 | Status: SHIPPED | OUTPATIENT
Start: 2019-08-06

## 2019-08-06 NOTE — TELEPHONE ENCOUNTER
"Requested Prescriptions   Pending Prescriptions Disp Refills     cetirizine (ZYRTEC) 10 MG tablet [Pharmacy Med Name: CETIRIZINE HCL 10MG TABS] 30 tablet 3     Sig: TAKE ONE TABLET BY MOUTH ONCE DAILY AS NEEDED FOR ALLERGIES   Last Written Prescription Date:  4/5/2019  Last Fill Quantity: 30,  # refills: 3   Last office visit: 2/15/2019 with prescribing provider:     Future Office Visit:        Antihistamines Protocol Passed - 8/5/2019 12:39 PM        Passed - Patient is 3-64 years of age     Apply weight-based dosing for peds patients age 3 - 12 years of age.    Forward request to provider for patients under the age of 3 or over the age of 64.          Passed - Recent (12 mo) or future (30 days) visit within the authorizing provider's specialty     Patient had office visit in the last 12 months or has a visit in the next 30 days with authorizing provider or within the authorizing provider's specialty.  See \"Patient Info\" tab in inbasket, or \"Choose Columns\" in Meds & Orders section of the refill encounter.              Passed - Medication is active on med list        Prescription approved per [facility] Refill Protocol.  Hollie Sosa RN    "

## 2019-09-08 DIAGNOSIS — F51.04 PSYCHOPHYSIOLOGICAL INSOMNIA: ICD-10-CM

## 2019-09-09 RX ORDER — TRAZODONE HYDROCHLORIDE 50 MG/1
TABLET, FILM COATED ORAL
Qty: 60 TABLET | Refills: 2 | Status: SHIPPED | OUTPATIENT
Start: 2019-09-09 | End: 2019-09-16

## 2019-09-09 NOTE — TELEPHONE ENCOUNTER
"Trazodone  Last Written Prescription Date:  07/08/2019  Last Fill Quantity: 60,  # refills: 1   Last office visit: 2/15/2019 with prescribing provider:  Kacie   Future Office Visit:  None  Prescription approved per [facility] Refill Protocol.    Requested Prescriptions   Pending Prescriptions Disp Refills     traZODone (DESYREL) 50 MG tablet [Pharmacy Med Name: TRAZODONE HCL 50MG TABS] 60 tablet 1     Sig: TAKE ONE TO TWO TABLETS BY MOUTH AT BEDTIME (PLEASE STOP THE GABAPENTIN)       Serotonin Modulators Passed - 9/8/2019 10:43 AM        Passed - Recent (12 mo) or future (30 days) visit within the authorizing provider's specialty     Patient had office visit in the last 12 months or has a visit in the next 30 days with authorizing provider or within the authorizing provider's specialty.  See \"Patient Info\" tab in inbasket, or \"Choose Columns\" in Meds & Orders section of the refill encounter.          Passed - Medication is active on med list        Passed - Patient is age 18 or older      Joseline Mims RN   "

## 2019-09-10 ENCOUNTER — TELEPHONE (OUTPATIENT)
Dept: FAMILY MEDICINE | Facility: CLINIC | Age: 49
End: 2019-09-10

## 2019-09-10 NOTE — TELEPHONE ENCOUNTER
Norco  Last Written Prescription Date:  04/08/2019  Last Fill Quantity: 15,  # refills: 0   Last office visit: 2/15/2019 with prescribing provider:  Kacie   Future Office Visit:  None    Routing refill request to provider for review/approval because:  Drug not on the [facility] refill protocol     Joseline Mims RN

## 2019-09-11 RX ORDER — HYDROCODONE BITARTRATE AND ACETAMINOPHEN 5; 325 MG/1; MG/1
1 TABLET ORAL EVERY 6 HOURS PRN
Qty: 15 TABLET | Refills: 0 | OUTPATIENT
Start: 2019-09-11

## 2019-09-11 NOTE — TELEPHONE ENCOUNTER
I left a message asking patient to return our call.  Please inform patient of the message below.  Syeda Villalobos, CMA

## 2019-09-11 NOTE — TELEPHONE ENCOUNTER
Patient calling back, gave message below. Patient states his back is hurting again, states it is the same issue he has had in the past. Patient has tried over the counter pain medications, and states they are not helping with the pain. Patient is willing to come see you, but provider has no openings until 9/25/19. Patient would like to know if you can work him in sooner. Please call to advise.

## 2019-09-16 ENCOUNTER — OFFICE VISIT (OUTPATIENT)
Dept: FAMILY MEDICINE | Facility: CLINIC | Age: 49
End: 2019-09-16
Payer: COMMERCIAL

## 2019-09-16 ENCOUNTER — HOSPITAL ENCOUNTER (OUTPATIENT)
Dept: GENERAL RADIOLOGY | Facility: CLINIC | Age: 49
End: 2019-09-16
Attending: FAMILY MEDICINE
Payer: COMMERCIAL

## 2019-09-16 VITALS
DIASTOLIC BLOOD PRESSURE: 82 MMHG | OXYGEN SATURATION: 97 % | TEMPERATURE: 97.1 F | BODY MASS INDEX: 40.88 KG/M2 | WEIGHT: 315 LBS | HEART RATE: 75 BPM | SYSTOLIC BLOOD PRESSURE: 130 MMHG | RESPIRATION RATE: 16 BRPM

## 2019-09-16 DIAGNOSIS — F51.04 PSYCHOPHYSIOLOGICAL INSOMNIA: ICD-10-CM

## 2019-09-16 DIAGNOSIS — G89.29 CHRONIC BILATERAL LOW BACK PAIN WITHOUT SCIATICA: Primary | ICD-10-CM

## 2019-09-16 DIAGNOSIS — M54.50 CHRONIC BILATERAL LOW BACK PAIN WITHOUT SCIATICA: Primary | ICD-10-CM

## 2019-09-16 DIAGNOSIS — S46.919A: ICD-10-CM

## 2019-09-16 PROCEDURE — 72100 X-RAY EXAM L-S SPINE 2/3 VWS: CPT | Mod: TC

## 2019-09-16 PROCEDURE — 99214 OFFICE O/P EST MOD 30 MIN: CPT | Performed by: FAMILY MEDICINE

## 2019-09-16 RX ORDER — HYDROCODONE BITARTRATE AND ACETAMINOPHEN 5; 325 MG/1; MG/1
1 TABLET ORAL EVERY 6 HOURS PRN
Qty: 10 TABLET | Refills: 0 | Status: SHIPPED | OUTPATIENT
Start: 2019-09-16 | End: 2019-12-04

## 2019-09-16 RX ORDER — TRAZODONE HYDROCHLORIDE 100 MG/1
100 TABLET ORAL AT BEDTIME
Qty: 30 TABLET | Refills: 6 | Status: SHIPPED | OUTPATIENT
Start: 2019-09-16 | End: 2020-07-08

## 2019-09-16 RX ORDER — METHYLPREDNISOLONE 4 MG
TABLET, DOSE PACK ORAL
Qty: 21 TABLET | Refills: 0 | Status: SHIPPED | OUTPATIENT
Start: 2019-09-16 | End: 2019-12-04

## 2019-09-16 RX ORDER — METHOCARBAMOL 750 MG/1
750 TABLET, FILM COATED ORAL 2 TIMES DAILY PRN
Qty: 90 TABLET | Refills: 0 | Status: SHIPPED | OUTPATIENT
Start: 2019-09-16 | End: 2019-12-04

## 2019-09-16 ASSESSMENT — PAIN SCALES - GENERAL: PAINLEVEL: WORST PAIN (10)
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
## 2019-09-17 ENCOUNTER — TELEPHONE (OUTPATIENT)
Dept: FAMILY MEDICINE | Facility: CLINIC | Age: 49
End: 2019-09-17

## 2019-09-17 NOTE — TELEPHONE ENCOUNTER
I have the order placed in the visit with Dr. Hester yesterday. They will contact him with a date and time    Lauren IYER

## 2019-09-17 NOTE — TELEPHONE ENCOUNTER
Called patient and left a voicemail to call the clinic back, when the patient calls back please relay the message below.  Please send back patient's response as well.     Soila Vargas CMA     Notes recorded by Calos Hester MD on 9/17/2019 at 11:23 AM CDT  Please call with results. Please ensure that his back x-ray showed that he has arthritis.  I recommended physical therapy and please let me know if he is interested.      Calos Hester MD.

## 2019-09-22 PROBLEM — G47.33 OSA (OBSTRUCTIVE SLEEP APNEA): Status: ACTIVE | Noted: 2019-09-22

## 2019-10-09 ENCOUNTER — HOSPITAL ENCOUNTER (OUTPATIENT)
Dept: PHYSICAL THERAPY | Facility: CLINIC | Age: 49
Setting detail: THERAPIES SERIES
End: 2019-10-09
Attending: FAMILY MEDICINE
Payer: COMMERCIAL

## 2019-10-09 DIAGNOSIS — K21.9 GASTROESOPHAGEAL REFLUX DISEASE WITHOUT ESOPHAGITIS: ICD-10-CM

## 2019-10-09 DIAGNOSIS — G89.29 CHRONIC BILATERAL LOW BACK PAIN WITHOUT SCIATICA: ICD-10-CM

## 2019-10-09 DIAGNOSIS — J30.2 SEASONAL ALLERGIC RHINITIS: ICD-10-CM

## 2019-10-09 DIAGNOSIS — I10 ESSENTIAL HYPERTENSION WITH GOAL BLOOD PRESSURE LESS THAN 140/90: ICD-10-CM

## 2019-10-09 DIAGNOSIS — M54.50 CHRONIC BILATERAL LOW BACK PAIN WITHOUT SCIATICA: ICD-10-CM

## 2019-10-09 PROCEDURE — 97162 PT EVAL MOD COMPLEX 30 MIN: CPT | Mod: GP | Performed by: PHYSICAL THERAPIST

## 2019-10-09 PROCEDURE — 97110 THERAPEUTIC EXERCISES: CPT | Mod: GP | Performed by: PHYSICAL THERAPIST

## 2019-10-09 RX ORDER — ATENOLOL 50 MG/1
TABLET ORAL
Qty: 30 TABLET | Refills: 9 | Status: SHIPPED | OUTPATIENT
Start: 2019-10-09 | End: 2019-12-04

## 2019-10-09 RX ORDER — FLUTICASONE PROPIONATE 50 MCG
SPRAY, SUSPENSION (ML) NASAL
Qty: 16 G | Refills: 9 | Status: SHIPPED | OUTPATIENT
Start: 2019-10-09 | End: 2020-11-09

## 2019-10-09 RX ORDER — HYDROCHLOROTHIAZIDE 25 MG/1
TABLET ORAL
Qty: 30 TABLET | Refills: 5 | Status: SHIPPED | OUTPATIENT
Start: 2019-10-09 | End: 2019-12-04

## 2019-10-09 NOTE — TELEPHONE ENCOUNTER
"Requested Prescriptions   Pending Prescriptions Disp Refills     atenolol (TENORMIN) 50 MG tablet [Pharmacy Med Name: ATENOLOL 50MG TABS] 30 tablet 9     Sig: TAKE ONE TABLET BY MOUTH ONCE DAILY   Last Written Prescription Date:  11/30/2018  Last Fill Quantity: 30,  # refills: 9   Last office visit: 9/16/2019 with prescribing provider:     Future Office Visit:        Beta-Blockers Protocol Passed - 10/9/2019  5:10 PM        Passed - Blood pressure under 140/90 in past 12 months     BP Readings from Last 3 Encounters:   09/16/19 130/82   04/08/19 (!) 164/100   02/15/19 136/82           Passed - Patient is age 6 or older        Passed - Recent (12 mo) or future (30 days) visit within the authorizing provider's specialty     Patient has had an office visit with the authorizing provider or a provider within the authorizing providers department within the previous 12 mos or has a future within next 30 days. See \"Patient Info\" tab in inbasket, or \"Choose Columns\" in Meds & Orders section of the refill encounter.          Passed - Medication is active on med list   Prescription approved per [facility] Refill Protocol.        hydrochlorothiazide (HYDRODIURIL) 25 MG tablet [Pharmacy Med Name: HYDROCHLOROTHIAZIDE 25MG TABS] 30 tablet 9     Sig: TAKE ONE TABLET BY MOUTH ONCE DAILY   Last Written Prescription Date:  11/30/2018  Last Fill Quantity: 30,  # refills: 9   Last office visit: 9/16/2019 with prescribing provider:     Future Office Visit:        Diuretics (Including Combos) Protocol Passed - 10/9/2019  5:10 PM        Passed - Blood pressure under 140/90 in past 12 months     BP Readings from Last 3 Encounters:   09/16/19 130/82   04/08/19 (!) 164/100   02/15/19 136/82           Passed - Recent (12 mo) or future (30 days) visit within the authorizing provider's specialty     Patient has had an office visit with the authorizing provider or a provider within the authorizing providers department within the previous 12 mos or has a " "future within next 30 days. See \"Patient Info\" tab in inasket, or \"Choose Columns\" in Meds & Orders section of the refill encounter.          Passed - Medication is active on med list        Passed - Patient is age 18 or older        Passed - Normal serum creatinine on file in past 12 months     Recent Labs   Lab Test 02/15/19  1241   CR 1.12            Passed - Normal serum potassium on file in past 12 months     Recent Labs   Lab Test 02/15/19  1241   POTASSIUM 3.9            Passed - Normal serum sodium on file in past 12 months     Recent Labs   Lab Test 02/15/19  1241          Prescription approved per [facility] Refill Protocol.       omeprazole (PRILOSEC) 20 MG DR capsule [Pharmacy Med Name: OMEPRAZOLE 20MG CPDR] 60 capsule 9     Sig: TAKE ONE CAPSULE BY MOUTH TWICE A DAY   Last Written Prescription Date:  11/30/2018  Last Fill Quantity: 60,  # refills: 9   Last office visit: 9/16/2019 with prescribing provider:     Future Office Visit:        PPI Protocol Passed - 10/9/2019  5:10 PM        Passed - Not on Clopidogrel (unless Pantoprazole ordered)        Passed - No diagnosis of osteoporosis on record        Passed - Recent (12 mo) or future (30 days) visit within the authorizing provider's specialty     Patient has had an office visit with the authorizing provider or a provider within the authorizing providers department within the previous 12 mos or has a future within next 30 days. See \"Patient Info\" tab in inbasket, or \"Choose Columns\" in Meds & Orders section of the refill encounter.          Passed - Medication is active on med list        Passed - Patient is age 18 or older   Prescription approved per [facility] Refill Protocol.       fluticasone (FLONASE) 50 MCG/ACT nasal spray [Pharmacy Med Name: FLUTICASONE NASAL SPRAY] 16 g 9     Sig: USE ONE TO TWO SPRAYS IN EACH NOSTRIL EVERY DAY   Last Written Prescription Date:  11/30/2018  Last Fill Quantity: 16g,  # refills: 9   Last office visit: 9/16/2019 with " "prescribing provider:     Future Office Visit:        Inhaled Steroids Protocol Passed - 10/9/2019  5:10 PM        Passed - Patient is age 12 or older        Passed - Recent (12 mo) or future (30 days) visit within the authorizing provider's specialty     Patient has had an office visit with the authorizing provider or a provider within the authorizing providers department within the previous 12 mos or has a future within next 30 days. See \"Patient Info\" tab in inbasket, or \"Choose Columns\" in Meds & Orders section of the refill encounter.            Passed - Medication is active on med list      Prescription approved per [facility] Refill Protocol.  Hollie Sosa RN      "
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
## 2019-10-22 ENCOUNTER — HOSPITAL ENCOUNTER (OUTPATIENT)
Dept: PHYSICAL THERAPY | Facility: CLINIC | Age: 49
Setting detail: THERAPIES SERIES
End: 2019-10-22
Attending: FAMILY MEDICINE
Payer: COMMERCIAL

## 2019-10-22 PROCEDURE — 97110 THERAPEUTIC EXERCISES: CPT | Mod: GP | Performed by: PHYSICAL THERAPIST

## 2019-11-01 DIAGNOSIS — M10.9 ACUTE GOUT OF LEFT ANKLE, UNSPECIFIED CAUSE: ICD-10-CM

## 2019-11-01 RX ORDER — COLCHICINE 0.6 MG/1
TABLET ORAL
Qty: 30 TABLET | Refills: 0 | Status: SHIPPED | OUTPATIENT
Start: 2019-11-01 | End: 2020-06-04

## 2019-11-01 NOTE — TELEPHONE ENCOUNTER
Routing refill request to provider for review/approval because:  Labs not current:  Uric acid.    Katiana Geller RN on 11/1/2019 at 11:59 AM

## 2019-11-01 NOTE — TELEPHONE ENCOUNTER
"Requested Prescriptions   Pending Prescriptions Disp Refills     colchicine (COLCRYS) 0.6 MG tablet 30 tablet 0     Sig: Take 2 tablets at the first sign of flare, take 1-2 additional tablets until symptoms improve then stop.  Do not use more than 5 days.   Last Written Prescription Date:  5/29/19  Last Fill Quantity: 30,  # refills: 0   Last office visit: 9/16/2019 with prescribing provider:  Kacie   Future Office Visit:        Gout Agents Protocol Failed - 11/1/2019 11:45 AM        Failed - Has Uric Acid on file in past 12 months and value is less than 6     Recent Labs   Lab Test 11/02/18  1129   URIC 8.4*     If level is 6mg/dL or greater, ok to refill one time and refer to provider.           Passed - CBC on file in past 12 months     Recent Labs   Lab Test 12/20/18  0830   WBC 6.2   RBC 5.57   HGB 15.7   HCT 47.0                    Passed - ALT on file in past 12 months     Recent Labs   Lab Test 12/20/18  0830   ALT 46             Passed - Recent (12 mo) or future (30 days) visit within the authorizing provider's specialty     Patient has had an office visit with the authorizing provider or a provider within the authorizing providers department within the previous 12 mos or has a future within next 30 days. See \"Patient Info\" tab in inbasket, or \"Choose Columns\" in Meds & Orders section of the refill encounter.              Passed - Medication is active on med list        Passed - Patient is age 18 or older        Passed - Normal serum creatinine on file in the past 12 months     Recent Labs   Lab Test 02/15/19  1241   CR 1.12               "

## 2019-11-25 DIAGNOSIS — G47.00 INSOMNIA, UNSPECIFIED TYPE: Primary | ICD-10-CM

## 2019-11-25 RX ORDER — ZOLPIDEM TARTRATE 10 MG/1
10 TABLET ORAL
Qty: 30 TABLET | Refills: 0 | Status: SHIPPED | OUTPATIENT
Start: 2019-11-25 | End: 2019-12-04

## 2019-12-04 ENCOUNTER — OFFICE VISIT (OUTPATIENT)
Dept: FAMILY MEDICINE | Facility: CLINIC | Age: 49
End: 2019-12-04
Payer: COMMERCIAL

## 2019-12-04 VITALS
OXYGEN SATURATION: 100 % | BODY MASS INDEX: 40.91 KG/M2 | WEIGHT: 315 LBS | SYSTOLIC BLOOD PRESSURE: 136 MMHG | DIASTOLIC BLOOD PRESSURE: 84 MMHG | RESPIRATION RATE: 16 BRPM | HEART RATE: 68 BPM | TEMPERATURE: 98.1 F

## 2019-12-04 DIAGNOSIS — G47.33 OSA (OBSTRUCTIVE SLEEP APNEA): ICD-10-CM

## 2019-12-04 DIAGNOSIS — Z00.00 ROUTINE GENERAL MEDICAL EXAMINATION AT A HEALTH CARE FACILITY: Primary | ICD-10-CM

## 2019-12-04 DIAGNOSIS — M65.939 TENOSYNOVITIS OF WRIST: ICD-10-CM

## 2019-12-04 DIAGNOSIS — K21.9 GASTROESOPHAGEAL REFLUX DISEASE WITHOUT ESOPHAGITIS: ICD-10-CM

## 2019-12-04 DIAGNOSIS — I10 HYPERTENSION GOAL BP (BLOOD PRESSURE) < 140/90: ICD-10-CM

## 2019-12-04 DIAGNOSIS — E79.0 HYPERURICEMIA: ICD-10-CM

## 2019-12-04 DIAGNOSIS — M54.50 CHRONIC BILATERAL LOW BACK PAIN WITHOUT SCIATICA: ICD-10-CM

## 2019-12-04 DIAGNOSIS — E55.9 VITAMIN D DEFICIENCY: ICD-10-CM

## 2019-12-04 DIAGNOSIS — G89.29 CHRONIC BILATERAL LOW BACK PAIN WITHOUT SCIATICA: ICD-10-CM

## 2019-12-04 DIAGNOSIS — E66.01 MORBID OBESITY (H): ICD-10-CM

## 2019-12-04 DIAGNOSIS — G47.00 INSOMNIA, UNSPECIFIED TYPE: ICD-10-CM

## 2019-12-04 LAB
ALBUMIN SERPL-MCNC: 3.9 G/DL (ref 3.4–5)
ALP SERPL-CCNC: 71 U/L (ref 40–150)
ALT SERPL W P-5'-P-CCNC: 40 U/L (ref 0–70)
ANION GAP SERPL CALCULATED.3IONS-SCNC: 5 MMOL/L (ref 3–14)
AST SERPL W P-5'-P-CCNC: 19 U/L (ref 0–45)
BILIRUB SERPL-MCNC: 0.4 MG/DL (ref 0.2–1.3)
BUN SERPL-MCNC: 12 MG/DL (ref 7–30)
CALCIUM SERPL-MCNC: 8.9 MG/DL (ref 8.5–10.1)
CHLORIDE SERPL-SCNC: 105 MMOL/L (ref 94–109)
CHOLEST SERPL-MCNC: 154 MG/DL
CO2 SERPL-SCNC: 30 MMOL/L (ref 20–32)
CREAT SERPL-MCNC: 1.07 MG/DL (ref 0.66–1.25)
GFR SERPL CREATININE-BSD FRML MDRD: 81 ML/MIN/{1.73_M2}
GLUCOSE SERPL-MCNC: 80 MG/DL (ref 70–99)
HDLC SERPL-MCNC: 34 MG/DL
LDLC SERPL CALC-MCNC: 90 MG/DL
NONHDLC SERPL-MCNC: 120 MG/DL
POTASSIUM SERPL-SCNC: 3.9 MMOL/L (ref 3.4–5.3)
PROT SERPL-MCNC: 7.4 G/DL (ref 6.8–8.8)
PSA SERPL-ACNC: 0.63 UG/L (ref 0–4)
SODIUM SERPL-SCNC: 140 MMOL/L (ref 133–144)
TRIGL SERPL-MCNC: 151 MG/DL
TSH SERPL DL<=0.005 MIU/L-ACNC: 1.57 MU/L (ref 0.4–4)
URATE SERPL-MCNC: 8.2 MG/DL (ref 3.5–7.2)

## 2019-12-04 PROCEDURE — 80061 LIPID PANEL: CPT | Performed by: FAMILY MEDICINE

## 2019-12-04 PROCEDURE — G0103 PSA SCREENING: HCPCS | Performed by: FAMILY MEDICINE

## 2019-12-04 PROCEDURE — 84550 ASSAY OF BLOOD/URIC ACID: CPT | Performed by: FAMILY MEDICINE

## 2019-12-04 PROCEDURE — 99396 PREV VISIT EST AGE 40-64: CPT | Performed by: FAMILY MEDICINE

## 2019-12-04 PROCEDURE — 80053 COMPREHEN METABOLIC PANEL: CPT | Performed by: FAMILY MEDICINE

## 2019-12-04 PROCEDURE — 84443 ASSAY THYROID STIM HORMONE: CPT | Performed by: FAMILY MEDICINE

## 2019-12-04 PROCEDURE — 99214 OFFICE O/P EST MOD 30 MIN: CPT | Mod: 25 | Performed by: FAMILY MEDICINE

## 2019-12-04 PROCEDURE — 36415 COLL VENOUS BLD VENIPUNCTURE: CPT | Performed by: FAMILY MEDICINE

## 2019-12-04 PROCEDURE — 82306 VITAMIN D 25 HYDROXY: CPT | Performed by: FAMILY MEDICINE

## 2019-12-04 RX ORDER — DICLOFENAC SODIUM 75 MG/1
75 TABLET, DELAYED RELEASE ORAL 2 TIMES DAILY PRN
Qty: 60 TABLET | Refills: 3 | Status: SHIPPED | OUTPATIENT
Start: 2019-12-04 | End: 2020-05-10

## 2019-12-04 RX ORDER — ATENOLOL 50 MG/1
50 TABLET ORAL DAILY
Qty: 90 TABLET | Refills: 1 | Status: SHIPPED | OUTPATIENT
Start: 2019-12-04 | End: 2020-06-04

## 2019-12-04 RX ORDER — TIZANIDINE HYDROCHLORIDE 4 MG/1
CAPSULE, GELATIN COATED ORAL
Qty: 60 CAPSULE | Refills: 1 | Status: SHIPPED | OUTPATIENT
Start: 2019-12-04 | End: 2020-03-16

## 2019-12-04 RX ORDER — HYDROCHLOROTHIAZIDE 25 MG/1
25 TABLET ORAL DAILY
Qty: 90 TABLET | Refills: 1 | Status: SHIPPED | OUTPATIENT
Start: 2019-12-04 | End: 2020-06-22

## 2019-12-04 RX ORDER — HYDROCODONE BITARTRATE AND ACETAMINOPHEN 5; 325 MG/1; MG/1
1 TABLET ORAL EVERY 6 HOURS PRN
Qty: 13 TABLET | Refills: 0 | Status: SHIPPED | OUTPATIENT
Start: 2019-12-04 | End: 2020-06-04

## 2019-12-04 ASSESSMENT — PAIN SCALES - GENERAL: PAINLEVEL: WORST PAIN (10)

## 2019-12-04 NOTE — PROGRESS NOTES
3  SUBJECTIVE:   CC: Humberto Roberts is an 49 year old male who presents for preventive health visit.     Healthy Habits:    Do you get at least three servings of calcium containing foods daily (dairy, green leafy vegetables, etc.)? yes    Amount of exercise or daily activities, outside of work: 0 day(s) per week    Problems taking medications regularly No    Medication side effects: No    Have you had an eye exam in the past two years? yes    Do you see a dentist twice per year? yes    Do you have sleep apnea, excessive snoring or daytime drowsiness?yes      Humberto is here today for his general physical with several concerns.  He recently had a annual review by his health insurance with along with the recommendation that need to be addressed today.    1.  First concern is about the chronic low back pain.  He has it for over a year and it is getting worse.  Chronic constant achy pain with significant stiffness in the morning.  Hard to get in and out of bed.  The pain also keeps him up at night.  Localized to the lower back.  Physical therapy for short period time which did not help.  He has a minor back injury about 30 years ago - never came in for it.  The pain is worst with kneeling, bending or prolonged walking/standing which lead to sharp and burning pain.  No weakness.  No hip pain.  No fever or chills.  Mobic has not helped.  Norco and Percocet worked well.  No unusual activities or recent history of trauma. The provider from his health insurance recommended him to get a handicap parking permit and he requested to get one today.    2.  He also the pain at the base of his left thumb for over a year.  No known triggering factor.  Again, it is a constant achy pain that is worse with using his his thumb.  At times, the pain radiates up to the forearm.  Had an injection to the base of the thumb once which helped for while.  The pain is worse in the morning with significant stiffness.  No personal or family history  of rheumatoid arthritis.    3.  He also has sleep apnea.  The last sleep study was couple years ago.  He has a CPAP but has not been using it much because it makes him feel uncomfortable.  Continue feeling fatigue and excessive tiredness throughout the day. Also been have been gaining some weight.    4.  Next concern is about his weight.  He has problem with his weight for most of his life.  Been getting worse which he thinks is part of his back pain.  Has not been able to exercise because of back pain.  Interested to see dietitians as well as looking into bariatric iatric surgery.    5.  His other medical conditions include high blood pressure, GERD and hyperuricemia.  He has not had a gout flare for a while.  He has colchicine to be taken as needed.  His heartburn is stable and is doing well.  He does not check his blood pressure at home.  No headache or dizziness.  No leg swelling.  No orthopnea or dyspnea.    6. Otherwise, he is doing well. His last physical exam was over a year ago and it was normal.  No major medical care or procedure done since the last physical. No headache or dizziness.  No acute visual change. No URI symptoms include running nose, nasal congestion, coughing, fever or chill. No CP/SOB. No N/V/D/C. No problem with urination.  No abdominal pain.  Denied of STD risks.  No leg swelling. Does not exercises. No alcohol or drug.  No smoking. No problem with sleeping.  Feels safe at home - not working.  He lives with his wife.  Denied of being depressed. No other concern today      Today's PHQ-2 Score:   PHQ-2 ( 1999 [employer]) 12/4/2019 9/16/2019   Q1: Little interest or pleasure in doing things 0 0   Q2: Feeling down, depressed or hopeless 0 0   PHQ-2 Score 0 0       Abuse: Current or Past(Physical, Sexual or Emotional)- No  Do you feel safe in your environment? Yes        Social History     Tobacco Use     Smoking status: Never Smoker     Smokeless tobacco: Never Used   Substance Use Topics      Alcohol use: No     Alcohol/week: 0.0 standard drinks     If you drink alcohol do you typically have >3 drinks per day or >7 drinks per week? No                      Last PSA: No results found for: PSA    Reviewed orders with patient. Reviewed health maintenance and updated orders accordingly - Yes  BP Readings from Last 3 Encounters:   12/04/19 136/84   09/16/19 130/82   04/08/19 (!) 164/100    Wt Readings from Last 3 Encounters:   12/04/19 144.5 kg (318 lb 9.6 oz)   09/16/19 144.4 kg (318 lb 6.4 oz)   04/08/19 140.6 kg (310 lb)                  Patient Active Problem List   Diagnosis     GERD (gastroesophageal reflux disease)     Hypertension goal BP (blood pressure) < 140/90     Elevated serum creatinine     Insomnia     Morbid obesity (H)     Seasonal allergic rhinitis due to pollen, unspecified chronicity     RICARDO (obstructive sleep apnea) - on cpap     Chronic bilateral low back pain without sciatica     Vitamin D deficiency     Hyperuricemia     Past Surgical History:   Procedure Laterality Date     ENT SURGERY         Social History     Tobacco Use     Smoking status: Never Smoker     Smokeless tobacco: Never Used   Substance Use Topics     Alcohol use: No     Alcohol/week: 0.0 standard drinks     Family History   Problem Relation Age of Onset     Diabetes Mother      Hypertension Mother      Heart Disease Mother      Heart Disease Father      Unknown/Adopted Maternal Grandmother      Unknown/Adopted Maternal Grandfather      Unknown/Adopted Paternal Grandmother      Unknown/Adopted Paternal Grandfather      No Known Problems Sister      No Known Problems Son      No Known Problems Daughter      No Known Problems Sister      No Known Problems Son      No Known Problems Daughter          Current Outpatient Medications   Medication Sig Dispense Refill     aspirin (ASA) 81 MG tablet Take 1 tablet (81 mg) by mouth daily 90 tablet 3     atenolol (TENORMIN) 50 MG tablet Take 1 tablet (50 mg) by mouth daily 90  tablet 1     cetirizine (ZYRTEC) 10 MG tablet TAKE ONE TABLET BY MOUTH ONCE DAILY AS NEEDED FOR ALLERGIES 30 tablet 5     colchicine (COLCRYS) 0.6 MG tablet Take 2 tablets at the first sign of flare, take 1-2 additional tablets until symptoms improve then stop.  Do not use more than 5 days. 30 tablet 0     diclofenac (VOLTAREN) 75 MG EC tablet Take 1 tablet (75 mg) by mouth 2 times daily as needed for moderate pain Please stop the Meloxicam 60 tablet 3     fluticasone (FLONASE) 50 MCG/ACT nasal spray USE ONE TO TWO SPRAYS IN EACH NOSTRIL EVERY DAY 16 g 9     hydrochlorothiazide (HYDRODIURIL) 25 MG tablet Take 1 tablet (25 mg) by mouth daily 90 tablet 1     omeprazole (PRILOSEC) 20 MG DR capsule TAKE ONE CAPSULE BY MOUTH TWICE A DAY 60 capsule 9     order for DME 1 Device Auto CPAP @@ 7-15 cm with heated humidity via mask of choice       tiZANidine (ZANAFLEX) 4 MG capsule Take 1 tablet at bedtime and 1 tablet in am as needed. Please stop the Methocarbamol 60 capsule 1     traZODone (DESYREL) 100 MG tablet Take 1 tablet (100 mg) by mouth At Bedtime 30 tablet 6     allopurinol (ZYLOPRIM) 100 MG tablet Take 1 tablet (100 mg) by mouth 2 times daily 60 tablet 1     order for DME Equipment being ordered: shower chair (Patient not taking: Reported on 12/4/2019) 1 Device 0     vitamin D2 (ERGOCALCIFEROL) 24221 units (1250 mcg) capsule Take 1 capsule (50,000 Units) by mouth once a week for 12 doses 12 capsule 0     No Known Allergies  Recent Labs   Lab Test 12/04/19  1550 02/15/19  1241 12/20/18  0830 03/12/18  0325  08/31/16  0922   LDL 90  --  97  --   --  67   HDL 34*  --  30*  --   --  30*   TRIG 151*  --  105  --   --  150*   ALT 40  --  46 32  --   --    CR 1.07 1.12 1.28* 0.98   < > 0.99   GFRESTIMATED 81 77 65 81   < > 81   GFRESTBLACK >90 89 76 >90   < > >90  African American GFR Calc     POTASSIUM 3.9 3.9 4.0 3.5   < > 3.8   TSH 1.57  --  1.14  --   --   --     < > = values in this interval not displayed.         Reviewed and updated as needed this visit by clinical staff  Tobacco  Allergies  Meds         Reviewed and updated as needed this visit by Provider        Past Medical History:   Diagnosis Date     Acid reflux disease since 2006     Back pain chronic     Cellulitis of left upper arm and forearm 11/22/2013     Hypertension       Past Surgical History:   Procedure Laterality Date     ENT SURGERY         ROS:  CONSTITUTIONAL: NEGATIVE for fever, chills, change in weight  INTEGUMENTARY/SKIN: NEGATIVE for worrisome rashes, moles or lesions  EYES: NEGATIVE for vision changes or irritation  ENT: NEGATIVE for ear, mouth and throat problems  RESP: NEGATIVE for significant cough or SOB  CV: NEGATIVE for chest pain, palpitations or peripheral edema  GI: NEGATIVE for nausea, abdominal pain, heartburn, or change in bowel habits   male: negative for dysuria, hematuria, decreased urinary stream, erectile dysfunction, urethral discharge  NEURO: NEGATIVE for weakness, dizziness or paresthesias  ENDOCRINE: NEGATIVE for temperature intolerance, skin/hair changes  HEME/ALLERGY/IMMUNE: NEGATIVE for bleeding problems  PSYCHIATRIC: NEGATIVE for changes in mood or affect    OBJECTIVE:   /84   Pulse 68   Temp 98.1  F (36.7  C) (Temporal)   Resp 16   Wt 144.5 kg (318 lb 9.6 oz)   SpO2 100%   BMI 40.91 kg/m    EXAM:  GENERAL: healthy, alert and no distress  EYES: Eyes grossly normal to inspection, PERRL and conjunctivae and sclerae normal.  No nystagmus.  All 4 visual fields are intact  HENT: Ear canals and TM's normal.  Nares are non-congested. Oropharynx is pink and moist. No tender with palpation to the sinuses.  NECK: no adenopathy, supple, no lymphadenopathy or thyromegaly.  No tender with palpation to the cervical spine or its paraspinous muscle.  RESP: lungs clear to auscultation - no rales, rhonchi or wheezes  CV: regular rate and rhythm, no murmur.  ABDOMEN: soft, nondistended, nontender, obese, no palpable  masses or organomegaly with normal bowel sounds.  MS: no gross musculoskeletal defects noted, no edema.  Walk with no limping, normal gait.  All 4 extremities are equally in strength. Ankle, knees, hips, shoulders, elbows and wrists exams normal.  Normal fine motor skills on fingers.  Tender with palpation to the base of the right thumb with positive Finkelstein sign.  No obtainable patient to snuffbox.  Back is straight, no lordosis or scoliosis.  Tender with palpation lower spine is paraspinous muscle.  SKIN: no suspicious lesions or rashes  NEURO: Normal strength and tone, mentation intact and speech normal.  Cranial nerves II through XII intact, DTR +2 throughout, no focal neurological deficit.  PSYCH: mentation appears normal, affect normal/bright.  Thoughts intact, no hallucination.  No suicidal or homicidal ideation.  LYMPH: no cervical, supraclavicular or axillary adenopathy.   (male): normal male genitalia without lesions or urethral discharge, no hernia. Testes are descended bilaterally with no testicular mass.      Diagnostic Test Results:  Labs reviewed in Epic  Results for orders placed or performed in visit on 12/04/19   Uric acid     Status: Abnormal   Result Value Ref Range    Uric Acid 8.2 (H) 3.5 - 7.2 mg/dL   Comprehensive metabolic panel (BMP + Alb, Alk Phos, ALT, AST, Total. Bili, TP)     Status: None   Result Value Ref Range    Sodium 140 133 - 144 mmol/L    Potassium 3.9 3.4 - 5.3 mmol/L    Chloride 105 94 - 109 mmol/L    Carbon Dioxide 30 20 - 32 mmol/L    Anion Gap 5 3 - 14 mmol/L    Glucose 80 70 - 99 mg/dL    Urea Nitrogen 12 7 - 30 mg/dL    Creatinine 1.07 0.66 - 1.25 mg/dL    GFR Estimate 81 >60 mL/min/[1.73_m2]    GFR Estimate If Black >90 >60 mL/min/[1.73_m2]    Calcium 8.9 8.5 - 10.1 mg/dL    Bilirubin Total 0.4 0.2 - 1.3 mg/dL    Albumin 3.9 3.4 - 5.0 g/dL    Protein Total 7.4 6.8 - 8.8 g/dL    Alkaline Phosphatase 71 40 - 150 U/L    ALT 40 0 - 70 U/L    AST 19 0 - 45 U/L   TSH  with free T4 reflex     Status: None   Result Value Ref Range    TSH 1.57 0.40 - 4.00 mU/L   PSA, screen     Status: None   Result Value Ref Range    PSA 0.63 0 - 4 ug/L   Lipid panel reflex to direct LDL Fasting     Status: Abnormal   Result Value Ref Range    Cholesterol 154 <200 mg/dL    Triglycerides 151 (H) <150 mg/dL    HDL Cholesterol 34 (L) >39 mg/dL    LDL Cholesterol Calculated 90 <100 mg/dL    Non HDL Cholesterol 120 <130 mg/dL   Vitamin D Deficiency     Status: Abnormal   Result Value Ref Range    Vitamin D Deficiency screening 7 (L) 20 - 75 ug/L       ASSESSMENT/PLAN:   1. Routine general medical examination at a health care facility  Humberto has multiple complex medical history but overall he is doing well.  UTD for immunization; recommended flu vaccination but he declined.  Risks and benefits discussed, he is willing to take the risk.  Topics appropriate for his age discussed include safety issue and healthy lifestyle modification as well as alcohol misuse, falling, STD, or depression prevention. Recommended daily exercise for at least 30 minutes, more as tolerated.  Emphasized on healthy diet with adequate fluid intake and resting. Feels safe at home.  Follow in 1 year, earlier as needed.  Labs as ordered below.  Plan for colonoscopy next year when he is 50.  Discussed with him about the pros and cons of prostate screening cancer with PSA.  Also educated about the potential false positive which may require unnecessary work-up.  He was informed of negative/normal PSA leve does not rule out prostate cancer completely although it is very unlikely.  He understands and is willing to take the risk.  All of his questions were answered.    - Comprehensive metabolic panel (BMP + Alb, Alk Phos, ALT, AST, Total. Bili, TP)  - TSH with free T4 reflex  - PSA, screen  - Lipid panel reflex to direct LDL Fasting  - Vitamin D Deficiency    2. Chronic bilateral low back pain without sciatica  Been having the pain for  over a year and it is getting worse.  X-ray previous to showed arthritis.  The pain has affected him quite a bit.  No focal neurological deficit today.  Referral for MRI of the spine today.  Continue his normal activity as tolerated.  Continue with diclofenac twice a day as needed for pain.  Adding Zanaflex for muscle relaxant.  Side effect discussed.  Emphasized on weight loss and stretching exercise.  He was given 13 tablet of Norco to be taken as needed for severe pain.  He was informed that Norco is not intended for long-term treatment.  Consider to restart physical therapy if the MRI was negative.    - MR Lumbar Spine w/o Contrast; Future  - diclofenac (VOLTAREN) 75 MG EC tablet; Take 1 tablet (75 mg) by mouth 2 times daily as needed for moderate pain Please stop the Meloxicam  Dispense: 60 tablet; Refill: 3  - tiZANidine (ZANAFLEX) 4 MG capsule; Take 1 tablet at bedtime and 1 tablet in am as needed. Please stop the Methocarbamol  Dispense: 60 capsule; Refill: 1  - HYDROcodone-acetaminophen (NORCO) 5-325 MG tablet; Take 1 tablet by mouth every 6 hours as needed for severe pain  Dispense: 13 tablet; Refill: 0    3. RICARDO (obstructive sleep apnea) - on cpap  Last sleep study was 3 years ago.  Been gaining some weight since then.  Has not been using the CPAP faithfully as prescribed.  Referral sleep medicine for further evaluation.  Encouraged him to try to use the CPAP regularly as prescribed.    - SLEEP EVALUATION & MANAGEMENT REFERRAL - [facility] [phone] (Age 13 and up if over 100 lbs); Future    4. Morbid obesity (H)  BMI of 40 today.  Has gained about 6 pounds since the last in the last year.  Discussed with patient about the nature of the condition and its potential long term and short term effects.  Encourage to start daily aerobic exercise - 30 minutes a day, more as tolerated.  Also discussed about healthy/portioned diet.  Recommend him to set a goal of losing 2-4 a  month through healthy life style modification.  Discussed with patient the goal for his weight and his BMI.  Referred him to dietitian.  If failed with dietary, will refer for surgical consultation.  TSH level was checked today and it was normal.    - TSH with free T4 reflex  - NUTRITION REFERRAL    5. Gastroesophageal reflux disease without esophagitis  Stable and is doing well.  Continue with the Prilosec.  Encouraged to avoid greasy/spicy food as well as late meals.  Also avoid high caffeine/sugar intake.    6. Insomnia, unspecified type  Good sleeping hygiene discussed.  Emphasized on increased physical activity.  Zanaflex at night for his back pain which may also help him with his sleeping problem.  Call in if the symptom persists or gets worse.    7. Hypertension goal BP (blood pressure) < 140/90  BP is normal and stable. Encouraged him to keep up the good work with taking the medications.  Continue with the current doses of atenolol and hydrochlorothiazide.  Been tolerating them well.  Emphasized on healthy/low salt diet, daily excercise and weight loss as above. Avoid high sugar/caffeine intake. Lab as ordered.  Follow up in 6 month, earlier as needed.    - Comprehensive metabolic panel (BMP + Alb, Alk Phos, ALT, AST, Total. Bili, TP)  - Lipid panel reflex to direct LDL Fasting  - aspirin (ASA) 81 MG tablet; Take 1 tablet (81 mg) by mouth daily  Dispense: 90 tablet; Refill: 3    8. Hyperuricemia  Uric acid level has been high.  Known to have gout; has not had a gout flareup for a while.  Recheck the uric acid level today and it remained to be elevated.  Started him on allopurinol.  No problem with his kidney function test.    - Uric acid    9. Tenosynovitis of wrist  Discussed with him about the nature condition.  Diclofenac as needed.  Start the wrist brace.  Also referred to occupational therapy.    10. Vitamin D deficiency  Start a vitamin D supplement.  Food with high vitamin D level discussed and  "recommended.      COUNSELING:  Reviewed preventive health counseling, as reflected in patient instructions       Regular exercise       Healthy diet/nutrition       Vision screening       Hearing screening       Immunizations    Declined: Influenza due to Conscientious objector               Aspirin Prophylaxsis       Alcohol Use       Safe sex practices/STD prevention       Colon cancer screening       Prostate cancer screening    Estimated body mass index is 40.91 kg/m  as calculated from the following:    Height as of 4/8/19: 1.88 m (6' 2\").    Weight as of this encounter: 144.5 kg (318 lb 9.6 oz).    Weight management plan: Patient referred to endocrine and/or weight management specialty Discussed healthy diet and exercise guidelines     reports that he has never smoked. He has never used smokeless tobacco.      Counseling Resources:  ATP IV Guidelines  Pooled Cohorts Equation Calculator  FRAX Risk Assessment  ICSI Preventive Guidelines  Dietary Guidelines for Americans, 2010  USDA's MyPlate  ASA Prophylaxis  Lung CA Screening    Calos Ty Mai, MD  [facility]  "

## 2019-12-05 ENCOUNTER — TELEPHONE (OUTPATIENT)
Dept: FAMILY MEDICINE | Facility: CLINIC | Age: 49
End: 2019-12-05

## 2019-12-05 ENCOUNTER — TRANSFERRED RECORDS (OUTPATIENT)
Dept: HEALTH INFORMATION MANAGEMENT | Facility: CLINIC | Age: 49
End: 2019-12-05

## 2019-12-05 DIAGNOSIS — E79.0 HYPERURICEMIA: Primary | ICD-10-CM

## 2019-12-05 LAB — DEPRECATED CALCIDIOL+CALCIFEROL SERPL-MC: 7 UG/L (ref 20–75)

## 2019-12-05 NOTE — TELEPHONE ENCOUNTER
----- Message from Calos Ty Mai, MD sent at 12/4/2019  6:06 PM CST -----  Please call with results. Please ensure that his labs showed normal prostate enzyme and thyroid level.  His kidney function test, liver function test and electrolyte were normal.  Cholesterol looks good except of the good cholesterol level is low.  One way to increase it is to exercise, healthy diet and weight loss.  Uric acid level was high risk which will put him at higher risk for having acute gout flare up.  Recommend to consider treating with allopurinol, let me know if he is interested.      Calos Hester MD.

## 2019-12-06 ENCOUNTER — HOSPITAL ENCOUNTER (OUTPATIENT)
Dept: MRI IMAGING | Facility: CLINIC | Age: 49
Discharge: HOME | End: 2019-12-06
Attending: FAMILY MEDICINE | Admitting: FAMILY MEDICINE
Payer: COMMERCIAL

## 2019-12-06 DIAGNOSIS — E55.9 VITAMIN D DEFICIENCY: Primary | ICD-10-CM

## 2019-12-06 DIAGNOSIS — G89.29 CHRONIC BILATERAL LOW BACK PAIN WITHOUT SCIATICA: ICD-10-CM

## 2019-12-06 DIAGNOSIS — M54.50 CHRONIC BILATERAL LOW BACK PAIN WITHOUT SCIATICA: ICD-10-CM

## 2019-12-06 PROCEDURE — 72148 MRI LUMBAR SPINE W/O DYE: CPT

## 2019-12-06 RX ORDER — ALLOPURINOL 100 MG/1
100 TABLET ORAL 2 TIMES DAILY
Qty: 60 TABLET | Refills: 1 | Status: SHIPPED | OUTPATIENT
Start: 2019-12-06 | End: 2020-03-16

## 2019-12-06 RX ORDER — ERGOCALCIFEROL 1.25 MG/1
50000 CAPSULE, LIQUID FILLED ORAL WEEKLY
Qty: 12 CAPSULE | Refills: 0 | Status: SHIPPED | OUTPATIENT
Start: 2019-12-06 | End: 2020-02-22

## 2019-12-08 PROBLEM — E55.9 VITAMIN D DEFICIENCY: Status: ACTIVE | Noted: 2019-12-08

## 2019-12-08 PROBLEM — E79.0 HYPERURICEMIA: Status: ACTIVE | Noted: 2019-12-08

## 2019-12-09 ENCOUNTER — TELEPHONE (OUTPATIENT)
Dept: FAMILY MEDICINE | Facility: CLINIC | Age: 49
End: 2019-12-09

## 2019-12-09 DIAGNOSIS — G89.29 CHRONIC BILATERAL LOW BACK PAIN WITHOUT SCIATICA: Primary | ICD-10-CM

## 2019-12-09 DIAGNOSIS — M54.50 CHRONIC BILATERAL LOW BACK PAIN WITHOUT SCIATICA: Primary | ICD-10-CM

## 2019-12-09 NOTE — TELEPHONE ENCOUNTER
Patient was notified. Patient is okay with PT. Patient wants to know what to do about the meds and the pain does he keep taking the medication.    Angelita Albarran MA 12/9/2019

## 2019-12-09 NOTE — TELEPHONE ENCOUNTER
----- Message from Calos Ty Mai, MD sent at 12/7/2019  9:18 AM CST -----  Please call with results. Please ensure that the spine MRI showed mild arthritis.  Recommend to start physical therapy. Continue with his normal activities as tolerated.       Calos Hester MD.

## 2019-12-10 NOTE — TELEPHONE ENCOUNTER
He can can continue with the diclofenac as needed.  Continue with the Zanaflex at night.  Continue his normal activities as tolerated.

## 2019-12-11 ENCOUNTER — TELEPHONE (OUTPATIENT)
Dept: NUTRITION | Facility: CLINIC | Age: 49
End: 2019-12-11

## 2019-12-11 NOTE — PROGRESS NOTES
Nutrition Note Brief: Telephone Encounter    RD received a nutrition referral for patient on 12/4/19 from Calos Ty Mai, MD in regards to Morbid Obesity: nutrition instruction for weight loss. Called Pt today to schedule an appointment. This is the first attempt at contacting patient to set up nutrition appointment.     Pt did not answer phone call. Left non-descriptive voicemail.    Tejal Boss MBA, RD LD  Clinical Dietitian  [phone]

## 2019-12-13 ENCOUNTER — TELEPHONE (OUTPATIENT)
Dept: FAMILY MEDICINE | Facility: CLINIC | Age: 49
End: 2019-12-13

## 2019-12-13 ENCOUNTER — HOSPITAL ENCOUNTER (OUTPATIENT)
Dept: NUTRITION | Facility: CLINIC | Age: 49
Discharge: HOME | End: 2019-12-13
Attending: FAMILY MEDICINE | Admitting: FAMILY MEDICINE
Payer: COMMERCIAL

## 2019-12-13 PROCEDURE — 97802 MEDICAL NUTRITION INDIV IN: CPT

## 2019-12-13 NOTE — TELEPHONE ENCOUNTER
You saw patient on 12/04/19 and discussed back pain but no mention on cortisone shot.  Should I schedule patient with you for this?  Syeda Villalobos, CMA

## 2019-12-13 NOTE — PROGRESS NOTES
"[facility]  Medical Nutrition Therapy    Visit Type: Initial Assessment    Humberto Roberts referred by Calos Ty Mai, MD for MNT related to Obesity      Nutrition Assessment:  Anthropometrics  Height: 6' 2\"    BMI: 41.3        Weight: 322 lbs  (146.4 kg)   -during encounter       IBW: 190 lbs (86.4 kg) IBW %: 169        Wt Readings from Last 8 Encounters:   12/04/19 144.5 kg (318 lb 9.6 oz)   09/16/19 144.4 kg (318 lb 6.4 oz)   04/08/19 140.6 kg (310 lb)   02/15/19 144.3 kg (318 lb 3.2 oz)   12/20/18 143.4 kg (316 lb 3.2 oz)   09/25/18 141.6 kg (312 lb 3.2 oz)   07/20/18 142.3 kg (313 lb 12.8 oz)   04/25/18 142 kg (313 lb)   -mostly stable with a slight increase since a little over 12 months ago      Nutrition History     Patient Active Problem List   Diagnosis     GERD (gastroesophageal reflux disease)     Hypertension goal BP (blood pressure) < 140/90     Elevated serum creatinine     Insomnia     Morbid obesity (H)     Seasonal allergic rhinitis due to pollen, unspecified chronicity     RICARDO (obstructive sleep apnea) - on cpap     Chronic bilateral low back pain without sciatica     Vitamin D deficiency     Hyperuricemia     Labs: reviewed  Cholesterol (mg/dL)   Date Value   12/04/2019 154     HDL Cholesterol (mg/dL)   Date Value   12/04/2019 34 (L)     LDL Cholesterol Calculated (mg/dL)   Date Value   12/04/2019 90     Triglycerides (mg/dL)   Date Value   12/04/2019 151 (H)     Hemoglobin A1C (%)   Date Value   06/29/2011 5.5     Glucose (mg/dL)   Date Value   12/04/2019 80     TSH (mU/L)   Date Value   12/04/2019 1.57     Vitamin D Deficiency screening (ug/L)   Date Value   12/04/2019 7 (L)       Meds: reviewed  Current Outpatient Medications   Medication     atenolol (TENORMIN)      hydrochlorothiazide (HYDRODIURIL)      omeprazole (PRILOSEC)     traZODone (DESYREL)      vitamin D2 (ERGOCALCIFEROL) 54266 units    -additional over the counter vitamin d3 taken daily recommended by physician    Social: " lives with wife, has 15 grand kids and 6 live with pt so meals are large. Pt likes to wake early and go to bed early. Pt and wife shop together. Pt enjoys cooking and good food. Pt gets sporadic sleep in between dog, insomnia, and family needs    -has tried a calorie-restriction low variety diet in past and lost a considerable amount of wt, but has since gained it back    -watches shows and sees the mention of eating smaller portions frequently through out the day as a main message prior to today's visit    Physical Activity  -only typically daily activities, but this does include shoveling snow and heavier home activities  -pt does have weights and a treadmill; bone spurs and medical issues have reduced capacity to run and do cardio         Nutrition Prescription  Energy: 0325-5523 kcal/day    mifflin-st.jeor with 1.3 sedentary AF and 500-1000 kcal deficit for wt loss         Protein: 104-130 gm/day      1.2-1.5 gm/kg IBW      Fluid: 8179-4567 mL/day    1mL/kcal         Fat:      unsaturated subsitutes    Carbohydrate:      1-2 carb portions for grains at meals and option to include fruit/dairy for additional carb units    Fiber:      -increase         Micronutrient:      -vitamin D rich foods   -low purine foods during Gout flare         Food Record   -a couple meals a day. Not a big snacker. Portions at meals are quite large. Pt reports this is the biggest component to being overweight and probably the biggest challenge as well.     -pt enjoys rice, meats (not pork), and savory foods, asian cuisine, mexican cuisine, and chicken wings. Pt does not consume many sweets      -sometimes consumes sprite. Does not consume milk and does not consume much dairy at all. Working on consuming more water, consumes a bit, but probably not enough per pt       -pt does not have aversions to fruits and vegetables, but does not eat a large quantity or variety of them    Nutrition Diagnosis:Obesity related to Food and  Nutrition-related knowledge deficit as evidenced by pt questions during encounter, pt nutrition history, BMI of 41        Nutrition Intervention:   -Educated pt on using MyPlate for meal planning and discussed portion sizes (HO provided) portion size was a large component of intervention in addition to discussing strategies to support smaller portion sizes for cooking, eating at home and at restaurants  -Discussed recommended and not recommended foods (choosing WGs, lean meats, fresh fruits & veggies, unsat fats, and limiting high-sodium and refined sugar foods) (HO provided)  -Educated pt on metabolism and used the fire analogy; talked about the importance of consuming consistent meals/snacks throughout the day and listening to hunger/fullness cues (handout provided) the idea of increasing times eating and lowering amount along with not going more than 3/4 hours without eating really spoke to pt and seemed like a good challenge to begin wt loss journey  -Discussed healthy snack options- protein+complex CHO (HO provided)  -Educated pt on reading food labels (HO provided) focused on higher fiber and lower sodium options as well as serving size  -Discussed ways to make healthy choices when dining out and cooking (choosing baked, broiled, or grilled, unbreaded meats w/o sauces/gravies or choosing them on the side to control amount used)  -Encouraged pt to drink more water throughout the day and explained the importance of hydration.   -Discussed value of good sleep on metabolism and body systems  -Provided handouts on vitamin D rich foods and low Purine foods  Future appt:   -Encouraged pt to increase daily PA- include cardiovascular activities w/ultimate goal of 60 min per day      Nutrition Goals:  1) begin portioning similar to myPlate with variety, food groups, quadrants of plate and 1/2 c to 1 cup serving sizes  2) 9 cups of water a day up from 6 cups    Nutrition Follow Up / Monitoring:   food intake,  weight    Nutrition Recommendations:   Patient to follow-up with RD in 4-8 weeks.  Patient has RD contact information to call/email if needed.    Tejal Boss MBA, RD LD  Clinical Dietitian  [phone]

## 2019-12-13 NOTE — TELEPHONE ENCOUNTER
Please let patient know that the MRI showed minimal degenerative joint disease, unlikely the steroid injection will help much.  I recommend a trial of physical therapy first.  Thank you

## 2019-12-13 NOTE — TELEPHONE ENCOUNTER
Reason for call:  Other   Patient called regarding (reason for call): call back  Additional comments: Patient is inquiring if they can get schedule to have a cortizone shot for their back or if they need to come in to see Dr Hester again    Phone number to reach patient:  Home number on file [phone] (home)    Best Time:  anytime    Can we leave a detailed message on this number?  YES

## 2019-12-17 ENCOUNTER — HOSPITAL ENCOUNTER (EMERGENCY)
Facility: CLINIC | Age: 49
Discharge: HOME | End: 2019-12-17
Attending: FAMILY MEDICINE | Admitting: FAMILY MEDICINE
Payer: COMMERCIAL

## 2019-12-17 VITALS
TEMPERATURE: 97.2 F | RESPIRATION RATE: 18 BRPM | OXYGEN SATURATION: 98 % | SYSTOLIC BLOOD PRESSURE: 135 MMHG | HEART RATE: 61 BPM | DIASTOLIC BLOOD PRESSURE: 83 MMHG

## 2019-12-17 DIAGNOSIS — M62.830 SPASM OF BACK MUSCLES: ICD-10-CM

## 2019-12-17 LAB
ALBUMIN UR-MCNC: NEGATIVE MG/DL
APPEARANCE UR: CLEAR
BILIRUB UR QL STRIP: NEGATIVE
COLOR UR AUTO: YELLOW
GLUCOSE UR STRIP-MCNC: NEGATIVE MG/DL
HGB UR QL STRIP: NEGATIVE
KETONES UR STRIP-MCNC: NEGATIVE MG/DL
LEUKOCYTE ESTERASE UR QL STRIP: NEGATIVE
NITRATE UR QL: NEGATIVE
PH UR STRIP: 6 PH (ref 5–7)
SOURCE: NORMAL
SP GR UR STRIP: 1.01 (ref 1–1.03)
UROBILINOGEN UR STRIP-MCNC: 0 MG/DL (ref 0–2)

## 2019-12-17 PROCEDURE — 81003 URINALYSIS AUTO W/O SCOPE: CPT | Performed by: FAMILY MEDICINE

## 2019-12-17 PROCEDURE — 25000132 ZZH RX MED GY IP 250 OP 250 PS 637: Performed by: FAMILY MEDICINE

## 2019-12-17 PROCEDURE — 99283 EMERGENCY DEPT VISIT LOW MDM: CPT | Performed by: FAMILY MEDICINE

## 2019-12-17 PROCEDURE — 99284 EMERGENCY DEPT VISIT MOD MDM: CPT | Mod: Z6 | Performed by: FAMILY MEDICINE

## 2019-12-17 RX ORDER — LIDOCAINE 4 G/G
1 PATCH TOPICAL ONCE
Status: DISCONTINUED | OUTPATIENT
Start: 2019-12-17 | End: 2019-12-17 | Stop reason: HOSPADM

## 2019-12-17 RX ORDER — LIDOCAINE 4 G/G
1 PATCH TOPICAL EVERY 12 HOURS PRN
Qty: 24 PATCH | Refills: 1 | Status: SHIPPED | OUTPATIENT
Start: 2019-12-17 | End: 2020-01-06

## 2019-12-17 RX ADMIN — LIDOCAINE 1 PATCH: 560 PATCH PERCUTANEOUS; TOPICAL; TRANSDERMAL at 02:05

## 2019-12-17 ASSESSMENT — ENCOUNTER SYMPTOMS
FLANK PAIN: 1
DYSURIA: 0
BACK PAIN: 1
NEUROLOGICAL NEGATIVE: 1
FEVER: 0
SLEEP DISTURBANCE: 1
EYES NEGATIVE: 1
CONSTITUTIONAL NEGATIVE: 1
CARDIOVASCULAR NEGATIVE: 1
RESPIRATORY NEGATIVE: 1
HEMATURIA: 0
GASTROINTESTINAL NEGATIVE: 1

## 2019-12-17 NOTE — DISCHARGE INSTRUCTIONS
Please read and follow the handout(s) instructions. Return, if needed, for increased or worsening symptoms and as directed by the handout(s).    It is important for you to ice the area of pain/spasm if not using the Lidocaine patch.  Use the ice for 10-15 minutes every 1-2 hours as needed for the pain. Gently stretch the area after the ice while the area is still cold (see the stretching handout). Swim or walk daily. This will help prevent the muscle from weakening which will eventually cause more risk of spasm/pain. Take your medications as directed only (see the med list).Return, if needed, for increased symptoms as directed your handout(s).     Skinny Herrera DO

## 2019-12-17 NOTE — ED AVS SNAPSHOT
[facility]  [address] [place] [address]  Phone:  [phone]  Fax:  [phone]                                    Humberto Roberts   MRN: [id number]    Department:  [facility]   Date of Visit:  12/17/2019           After Visit Summary Signature Page    I have received my discharge instructions, and my questions have been answered. I have discussed any challenges I see with this plan with the nurse or doctor.    ..........................................................................................................................................  Patient/Patient Representative Signature      ..........................................................................................................................................  Patient Representative Print Name and Relationship to Patient    ..................................................               ................................................  Date                                   Time    ..........................................................................................................................................  Reviewed by Signature/Title    ...................................................              ..............................................  Date                                               Time          22EPIC Rev 08/18

## 2019-12-17 NOTE — ED PROVIDER NOTES
History     Chief Complaint   Patient presents with     Flank Pain     HPI  Humberto Roberts is a 49 year old male who presents to the ER with concerns about right sided low back and flank area pain.  Patient's has had pain on and off for several weeks.  He underwent an MRI scan of his lumbar area on 12/6/2019 with results that were reassuring without significant disc herniation or nerve impingement signs.  Mild arthritic change noted.  Patient complaining of right-sided flank area pain today.  He denies any pain with urination or hematuria.  He denies fever or chills.  Denies any reinjury but just states that his pain continues despite the pain medicines he was given.  Patient was scheduled for physical therapy later this week and plans to start that.        Component      Latest Ref Rng & Units 12/4/2019   Sodium      133 - 144 mmol/L 140   Potassium      3.4 - 5.3 mmol/L 3.9   Chloride      94 - 109 mmol/L 105   Carbon Dioxide      20 - 32 mmol/L 30   Anion Gap      3 - 14 mmol/L 5   Glucose      70 - 99 mg/dL 80   Urea Nitrogen      7 - 30 mg/dL 12   Creatinine      0.66 - 1.25 mg/dL 1.07   GFR Estimate      >60 mL/min/1.73:m2 81   GFR Estimate If Black      >60 mL/min/1.73:m2 >90   Calcium      8.5 - 10.1 mg/dL 8.9   Bilirubin Total      0.2 - 1.3 mg/dL 0.4   Albumin      3.4 - 5.0 g/dL 3.9   Protein Total      6.8 - 8.8 g/dL 7.4   Alkaline Phosphatase      40 - 150 U/L 71   ALT      0 - 70 U/L 40   AST      0 - 45 U/L 19   Cholesterol      <200 mg/dL 154   Triglycerides      <150 mg/dL 151 (H)   HDL Cholesterol      >39 mg/dL 34 (L)   LDL Cholesterol Calculated      <100 mg/dL 90   Non HDL Cholesterol      <130 mg/dL 120   Uric Acid      3.5 - 7.2 mg/dL 8.2 (H)   TSH      0.40 - 4.00 mU/L 1.57   PSA      0 - 4 ug/L 0.63   Vitamin D Deficiency screening      20 - 75 ug/L 7 (L)     Allergies:  No Known Allergies    Problem List:    Patient Active Problem List    Diagnosis Date Noted     Vitamin D deficiency  12/08/2019     Priority: Medium     Hyperuricemia 12/08/2019     Priority: Medium     RICARDO (obstructive sleep apnea) - on cpap 09/22/2019     Priority: Medium     Chronic bilateral low back pain without sciatica 09/22/2019     Priority: Medium     Seasonal allergic rhinitis due to pollen, unspecified chronicity 01/23/2018     Priority: Medium     Morbid obesity (H) 12/06/2017     Priority: Medium     Elevated serum creatinine 10/15/2015     Priority: Medium     Insomnia 10/15/2015     Priority: Medium     Hypertension goal BP (blood pressure) < 140/90 05/06/2013     Priority: Medium     GERD (gastroesophageal reflux disease) 04/24/2013     Priority: Medium        Past Medical History:    Past Medical History:   Diagnosis Date     Acid reflux disease since 2006     Back pain chronic     Cellulitis of left upper arm and forearm 11/22/2013     Hypertension        Past Surgical History:    Past Surgical History:   Procedure Laterality Date     ENT SURGERY         Family History:    Family History   Problem Relation Age of Onset     Diabetes Mother      Hypertension Mother      Heart Disease Mother      Heart Disease Father      Unknown/Adopted Maternal Grandmother      Unknown/Adopted Maternal Grandfather      Unknown/Adopted Paternal Grandmother      Unknown/Adopted Paternal Grandfather      No Known Problems Sister      No Known Problems Son      No Known Problems Daughter      No Known Problems Sister      No Known Problems Son      No Known Problems Daughter        Social History:  Marital Status:   [2]  Social History     Tobacco Use     Smoking status: Never Smoker     Smokeless tobacco: Never Used   Substance Use Topics     Alcohol use: No     Alcohol/week: 0.0 standard drinks     Drug use: No        Medications:    diclofenac (VOLTAREN) 75 MG EC tablet  Lidocaine (LIDOCARE) 4 % Patch  tiZANidine (ZANAFLEX) 4 MG capsule  allopurinol (ZYLOPRIM) 100 MG tablet  aspirin (ASA) 81 MG tablet  atenolol (TENORMIN)  50 MG tablet  cetirizine (ZYRTEC) 10 MG tablet  colchicine (COLCRYS) 0.6 MG tablet  fluticasone (FLONASE) 50 MCG/ACT nasal spray  hydrochlorothiazide (HYDRODIURIL) 25 MG tablet  omeprazole (PRILOSEC) 20 MG DR capsule  order for DME  order for DME  traZODone (DESYREL) 100 MG tablet  vitamin D2 (ERGOCALCIFEROL) 75644 units (1250 mcg) capsule      Review of Systems   Constitutional: Negative.  Negative for fever.   HENT: Negative.    Eyes: Negative.    Respiratory: Negative.    Cardiovascular: Negative.    Gastrointestinal: Negative.    Genitourinary: Positive for flank pain. Negative for dysuria, hematuria, penile swelling and testicular pain.   Musculoskeletal: Positive for back pain.   Skin: Negative.    Neurological: Negative.    Psychiatric/Behavioral: Positive for sleep disturbance.   All other systems reviewed and are negative.      Physical Exam   BP: 135/83  Pulse: 61  Temp: 97.2  F (36.2  C)  Resp: 18  SpO2: 98 %      Physical Exam  Vitals signs and nursing note reviewed.   Constitutional:       General: He is in acute distress (Mild.).      Appearance: He is obese.   HENT:      Head: Normocephalic and atraumatic.   Eyes:      Conjunctiva/sclera: Conjunctivae normal.      Pupils: Pupils are equal, round, and reactive to light.   Neck:      Musculoskeletal: Normal range of motion and neck supple.   Cardiovascular:      Rate and Rhythm: Normal rate.      Pulses: Normal pulses.   Pulmonary:      Effort: Pulmonary effort is normal. No respiratory distress.   Abdominal:      Tenderness: There is no abdominal tenderness.   Musculoskeletal:      Lumbar back: He exhibits tenderness, pain and spasm. He exhibits no bony tenderness, no swelling, no edema, no deformity, no laceration and normal pulse.        Back:    Skin:     Capillary Refill: Capillary refill takes less than 2 seconds.      Findings: No rash.   Neurological:      General: No focal deficit present.      Mental Status: He is alert and oriented to person,  place, and time.      Motor: No weakness.      Gait: Gait normal.         ED Course        Procedures               Critical Care time:  none               Results for orders placed or performed during the hospital encounter of 12/17/19   UA reflex to Microscopic     Status: None   Result Value Ref Range    Color Urine Yellow     Appearance Urine Clear     Glucose Urine Negative NEG^Negative mg/dL    Bilirubin Urine Negative NEG^Negative    Ketones Urine Negative NEG^Negative mg/dL    Specific Gravity Urine 1.015 1.003 - 1.035    Blood Urine Negative NEG^Negative    pH Urine 6.0 5.0 - 7.0 pH    Protein Albumin Urine Negative NEG^Negative mg/dL    Urobilinogen mg/dL 0.0 0.0 - 2.0 mg/dL    Nitrite Urine Negative NEG^Negative    Leukocyte Esterase Urine Negative NEG^Negative    Source Midstream Urine          Medications   Lidocaine (LIDOCARE) 4 % Patch 1 patch (1 patch Transdermal Given 12/17/19 0205)       Assessments & Plan (with Medical Decision Making)  49-year-old male to the ER secondary concerns of right flank area pain.  Patient with history of pain symptoms for several weeks.  Recent MRI scan was reassuring.  Exam findings consistent with right quadratus lumborum area musculature spasm.  Urinalysis unremarkable for abnormality.  Patient had a Lidoderm patch placed over the area of maximal pain.  He is encouraged to follow-up with his physical therapy as planned later this week.  Gentle stretching and ice therapy recommended if not using the patch.  To follow-up with his clinic physician as planned.     I have reviewed the nursing notes.    I have reviewed the findings, diagnosis, plan and need for follow up with the patient.       New Prescriptions    LIDOCAINE (LIDOCARE) 4 % PATCH    Place 1 patch onto the skin every 12 hours as needed for moderate pain (apply the patch over the area of pain) Salon Pas is the brand name of the patch and can be purchased without a prescription.          I verbally discussed  the findings of the evaluation today in the ER. I have verbally discussed with Humberto the suggested treatment(s) as described in the discharge instructions and handouts. I have prescribed the above listed medications and instructed him on appropriate use of these medications.      I have verbally suggested he follow-up in his clinic or return to the ER for increased symptoms. See the follow-up recommendations documented  in the after visit summary in this visit's EPIC chart.      Final diagnoses:   Spasm of back muscles - right quadratus lumborum muscle spasm       12/17/2019   [facility] EMERGENCY DEPARTMENT     Skinny Herrera,   12/17/19 0341

## 2019-12-17 NOTE — ED TRIAGE NOTES
Pt reports pain in the right flank that started on Sunday night and is rating at 8/10, reports he takes pain medication and muscle relaxers for his back and that is not helping

## 2019-12-19 ENCOUNTER — HOSPITAL ENCOUNTER (OUTPATIENT)
Dept: PHYSICAL THERAPY | Facility: CLINIC | Age: 49
Setting detail: THERAPIES SERIES
End: 2019-12-19
Attending: FAMILY MEDICINE
Payer: COMMERCIAL

## 2019-12-19 DIAGNOSIS — G89.29 CHRONIC BILATERAL LOW BACK PAIN WITHOUT SCIATICA: ICD-10-CM

## 2019-12-19 DIAGNOSIS — M54.50 CHRONIC BILATERAL LOW BACK PAIN WITHOUT SCIATICA: ICD-10-CM

## 2019-12-19 PROCEDURE — 97110 THERAPEUTIC EXERCISES: CPT | Mod: GP

## 2019-12-19 NOTE — PROGRESS NOTES
Outpatient Physical Therapy Progress Note     Patient: Humberto Roberts  : 1970    Beginning/End Dates of Reporting Period:  10/9/19 to 2019    Referring Provider: Calos Hester MD    Therapy Diagnosis: Chronic bilateral low back pain without sciatica     Client Self Report: Pt reports that back has worsened since the last time he was here and it has spread more to the right side. He went to the ED due to pain a few wks ago, pt believes that pain is due to arthritis in the spine. He admits to not keeping up with his exercise program from prior PT     Objective Measurements:  Objective Measure: pain  Details: 8    Objective Measure: Strength/Mobility  Details: Core/lumbar weakness noted during exercises and decreased mobility noted with functional movements including ambulation and sit <> stand.       Goals:  Goal Identifier #1   Goal Description Pt will demonstrate ability to put his own shoes, socks, and pants independently and <3/10 pain.   Target Date 19   Date Met      Progress:     Goal Identifier #2   Goal Description Pt will demonstrate ability to stand for 30 minutes without increased pain in order to socialize with friends and family.   Target Date 19   Date Met      Progress:     Goal Identifier #3   Goal Description Pt will demonstrate <20% on the DIO demonstrating improved functional mobility and less pain with daily activities.   Target Date 19   Date Met      Progress:         Progress Toward Goals:   Progress this reporting period: Pt has made little progress towards goals at this time, he admits that he was not keeping up with his home exercise program initially but now that the pain has worsened, he is more motivated than before.           Plan:  Continue therapy per current plan of care.    Discharge:  No

## 2020-01-07 ENCOUNTER — HOSPITAL ENCOUNTER (OUTPATIENT)
Dept: PHYSICAL THERAPY | Facility: CLINIC | Age: 50
Setting detail: THERAPIES SERIES
End: 2020-01-07
Attending: FAMILY MEDICINE
Payer: COMMERCIAL

## 2020-01-07 PROCEDURE — 97110 THERAPEUTIC EXERCISES: CPT | Mod: GP

## 2020-01-28 ENCOUNTER — HOSPITAL ENCOUNTER (OUTPATIENT)
Dept: PHYSICAL THERAPY | Facility: CLINIC | Age: 50
Setting detail: THERAPIES SERIES
End: 2020-01-28
Attending: FAMILY MEDICINE
Payer: COMMERCIAL

## 2020-01-28 PROCEDURE — 97110 THERAPEUTIC EXERCISES: CPT | Mod: GP

## 2020-02-20 DIAGNOSIS — E55.9 VITAMIN D DEFICIENCY: ICD-10-CM

## 2020-02-20 NOTE — TELEPHONE ENCOUNTER
Vitamin D 44707 unit       Last Written Prescription Date:  12/6/19  Last Fill Quantity: 12,   # refills: 0  Last Office Visit: 12/4/19  Future Office visit:       Routing refill request to provider for review/approval because:  Drug not on the [facility], [facility] or [facility] refill protocol or controlled substance

## 2020-02-21 RX ORDER — ERGOCALCIFEROL 1.25 MG/1
CAPSULE, LIQUID FILLED ORAL
Qty: 12 CAPSULE | Refills: 0 | OUTPATIENT
Start: 2020-02-21

## 2020-02-21 NOTE — TELEPHONE ENCOUNTER
I informed patient of the message below.  No further questions at this time.  Syeda Villalobos, CMA

## 2020-02-21 NOTE — TELEPHONE ENCOUNTER
I spoke with patient and his wife.  She thought that would be too much Vit D for patient.  I huddled with Dr. Hester and he said he recommends the 2000 units daily.  He said he can take 1000 units if he wants but recommending the 2000 units because his levels were low.  They agree to plan, nothing further at this time.  Syeda Villalobos, CMA

## 2020-02-21 NOTE — TELEPHONE ENCOUNTER
Humberto is calling back asking to speak to a nurse regarding Dr Hester's recommendations for his vitamin D.

## 2020-03-13 ENCOUNTER — TELEPHONE (OUTPATIENT)
Dept: FAMILY MEDICINE | Facility: CLINIC | Age: 50
End: 2020-03-13

## 2020-03-13 DIAGNOSIS — E79.0 HYPERURICEMIA: ICD-10-CM

## 2020-03-13 DIAGNOSIS — G89.29 CHRONIC BILATERAL LOW BACK PAIN WITHOUT SCIATICA: ICD-10-CM

## 2020-03-13 DIAGNOSIS — M54.50 CHRONIC BILATERAL LOW BACK PAIN WITHOUT SCIATICA: ICD-10-CM

## 2020-03-16 RX ORDER — ALLOPURINOL 100 MG/1
TABLET ORAL
Qty: 180 TABLET | Refills: 0 | Status: SHIPPED | OUTPATIENT
Start: 2020-03-16 | End: 2020-10-15

## 2020-03-16 NOTE — TELEPHONE ENCOUNTER
Please add dx and sign future lab orders.  Patient informed of message below and will call at a later time to schedule lab appt.  Syeda Villalobos, CMA

## 2020-03-20 NOTE — PROGRESS NOTES
Outpatient Physical Therapy Discharge Note     Patient: Humberto Roberts  : 1970    Beginning/End Dates of Reporting Period:  10/9/19 to 3/20/2020    Referring Provider: Calos Hester MD    Therapy Diagnosis: Decreased mobility and weakness of lumbar and core regions.     Client Self Report: Pt states that back has been feeling pretty good, more pain on R side. Every once in awhile, when laying on R side, it is difficult to roll over. Back pain has been present for about 20 years, only time he rememebers no pain is after steriod pills awhile ago. Pt reports that standing exercises have continued to be better than the supine exercises.     Objective Measurements:  Objective Measure: pain  Details: 5/10, feels like something is pinching slightly on the R side       Goals:  Goal Identifier #1   Goal Description Pt will demonstrate ability to put his own shoes, socks, and pants independently and <3/10 pain.   Target Date 19   Date Met      Progress: Goal not met: able to federico and doff clothing but has increased pain     Goal Identifier #2   Goal Description Pt will demonstrate ability to stand for 30 minutes without increased pain in order to socialize with friends and family.   Target Date 19   Date Met      Progress: Goal not met: pt has increased pain when standing for longer than 10-15 min     Goal Identifier #3   Goal Description Pt will demonstrate <20% on the DIO demonstrating improved functional mobility and less pain with daily activities.   Target Date 19   Date Met      Progress: Goal not met: did not return to take DIO         Progress Toward Goals:   Progress limited due to pt not scheduling more appointments. Also limited by inconsistent attendance and variable consistency with HEP.          Plan:  Discharge from therapy.    Discharge:    Reason for Discharge: Patient chooses to discontinue therapy.  Patient has not made expected progress due to interrupted treatment  attendance.  Patient has failed to schedule further appointments.    Equipment Issued: HEP    Discharge Plan: Patient to continue home program.

## 2020-06-02 DIAGNOSIS — G89.29 CHRONIC BILATERAL LOW BACK PAIN WITHOUT SCIATICA: ICD-10-CM

## 2020-06-02 DIAGNOSIS — M54.50 CHRONIC BILATERAL LOW BACK PAIN WITHOUT SCIATICA: ICD-10-CM

## 2020-06-02 DIAGNOSIS — M10.9 ACUTE GOUT OF LEFT ANKLE, UNSPECIFIED CAUSE: ICD-10-CM

## 2020-06-02 DIAGNOSIS — I10 HTN (HYPERTENSION): Primary | ICD-10-CM

## 2020-06-04 RX ORDER — ATENOLOL 50 MG/1
TABLET ORAL
Qty: 90 TABLET | Refills: 1 | Status: SHIPPED | OUTPATIENT
Start: 2020-06-04 | End: 2020-12-09

## 2020-06-04 RX ORDER — COLCHICINE 0.6 MG/1
TABLET ORAL
Qty: 30 TABLET | Refills: 0 | Status: SHIPPED | OUTPATIENT
Start: 2020-06-04 | End: 2020-08-05

## 2020-06-04 RX ORDER — HYDROCODONE BITARTRATE AND ACETAMINOPHEN 5; 325 MG/1; MG/1
TABLET ORAL
Qty: 13 TABLET | Refills: 0 | Status: SHIPPED | OUTPATIENT
Start: 2020-06-04 | End: 2021-02-24

## 2020-06-04 RX ORDER — METHYLPREDNISOLONE 4 MG
TABLET, DOSE PACK ORAL
Qty: 21 TABLET | Refills: 0 | Status: SHIPPED | OUTPATIENT
Start: 2020-06-04 | End: 2021-02-24

## 2020-06-04 NOTE — TELEPHONE ENCOUNTER
Norco  Last Written Prescription Date:  12/04/2019  Last Fill Quantity: 13,  # refills: 0   Last office visit: 12/4/2019 with prescribing provider:  Kacie   Routing refill request to provider for review/approval because:  Drug not on the [facility] refill protocol     Colchicine  Last Written Prescription Date:  11/01/2019  Last Fill Quantity: 30,  # refills: 0   Last office visit: 12/4/2019 with prescribing provider:  Kacie   Routing refill request to provider for review/approval because:  Labs not current:  CBC, Uric Acid.     Atenolol  Last Written Prescription Date:  12/04/2019  Last Fill Quantity: 90,  # refills: 1   Last office visit: 12/4/2019 with prescribing provider:  Kacie   Prescription approved per [facility] Refill Protocol.    Medrol DosePak  Last office visit: 12/4/2019 with prescribing provider:  Kacie   Routing refill request to provider for review/approval because:  Drug not on the [facility] refill protocol   Drug not active on patient's medication list    Future Office Visit:   Next 5 appointments (look out 90 days)    Jun 05, 2020  8:00 AM CDT  SHORT with Calos Ty Mai, MD  [facility] [address] [place] [id number] [phone]         Requested Prescriptions   Pending Prescriptions Disp Refills     HYDROcodone-acetaminophen (NORCO) 5-325 MG tablet [Pharmacy Med Name: HYDROCODONE/APAP 5-325MG TAB] 13 tablet 0     Sig: TAKE ONE TABLET BY MOUTH EVERY 6 HOURS AS NEEDED FOR SEVERE PAIN       There is no refill protocol information for this order        atenolol (TENORMIN) 50 MG tablet [Pharmacy Med Name: ATENOLOL 50MG TABS] 90 tablet 1     Sig: TAKE ONE TABLET BY MOUTH ONCE DAILY       Beta-Blockers Protocol Passed - 6/2/2020  5:34 PM        Passed - Blood pressure under 140/90 in past 12 months     BP Readings from Last 3 Encounters:   12/17/19 135/83   12/04/19 136/84   09/16/19 130/82           Passed - Patient is age 6 or older        Passed - Recent (12 mo) or  "future (30 days) visit within the authorizing provider's specialty     Patient has had an office visit with the authorizing provider or a provider within the authorizing providers department within the previous 12 mos or has a future within next 30 days. See \"Patient Info\" tab in inbasket, or \"Choose Columns\" in Meds & Orders section of the refill encounter.          Passed - Medication is active on med list           methylPREDNISolone (MEDROL DOSEPAK) 4 MG tablet therapy pack [Pharmacy Med Name: METHYLPREDNISOLONE 4MG TBPK] 21 tablet 0     Sig: FOLLOW PACKAGE DIRECTIONS       There is no refill protocol information for this order        colchicine (COLCRYS) 0.6 MG tablet 30 tablet 0     Sig: Take 2 tablets at the first sign of flare, take 1-2 additional tablets until symptoms improve then stop.  Do not use more than 5 days.       Gout Agents Protocol Failed - 6/2/2020  5:34 PM        Failed - CBC on file in past 12 months     Recent Labs   Lab Test 12/20/18  0830   WBC 6.2   RBC 5.57   HGB 15.7   HCT 47.0              Failed - Has Uric Acid on file in past 12 months and value is less than 6     Recent Labs   Lab Test 12/04/19  1550   URIC 8.2*     If level is 6mg/dL or greater, ok to refill one time and refer to provider.         Passed - ALT on file in past 12 months     Recent Labs   Lab Test 12/04/19  1550   ALT 40           Passed - Recent (12 mo) or future (30 days) visit within the authorizing provider's specialty     Patient has had an office visit with the authorizing provider or a provider within the authorizing providers department within the previous 12 mos or has a future within next 30 days. See \"Patient Info\" tab in inbasket, or \"Choose Columns\" in Meds & Orders section of the refill encounter.          Passed - Medication is active on med list        Passed - Patient is age 18 or older        Passed - Normal serum creatinine on file in the past 12 months     Recent Labs   Lab Test " 12/04/19  1550   CR 1.07     Ok to refill medication if creatinine is low         Joseline Mims RN

## 2020-06-22 DIAGNOSIS — I10 HTN (HYPERTENSION): Primary | ICD-10-CM

## 2020-06-22 RX ORDER — HYDROCHLOROTHIAZIDE 25 MG/1
TABLET ORAL
Qty: 90 TABLET | Refills: 1 | Status: SHIPPED | OUTPATIENT
Start: 2020-06-22 | End: 2021-01-12

## 2020-06-22 NOTE — TELEPHONE ENCOUNTER
Prescription approved per [facility] [place]ill Protocol.    Joseline Mims RN

## 2020-06-22 NOTE — TELEPHONE ENCOUNTER
Prescription approved per [facility] Refill Protocol.    Joseline Mims RN

## 2020-07-08 DIAGNOSIS — F51.04 PSYCHOPHYSIOLOGICAL INSOMNIA: ICD-10-CM

## 2020-07-08 RX ORDER — TRAZODONE HYDROCHLORIDE 100 MG/1
TABLET ORAL
Qty: 30 TABLET | Refills: 6 | Status: SHIPPED | OUTPATIENT
Start: 2020-07-08 | End: 2021-02-24

## 2020-07-08 NOTE — TELEPHONE ENCOUNTER
Prescription approved per [facility] Refill Protocol.  Grace Chase RN

## 2020-08-03 DIAGNOSIS — M10.9 ACUTE GOUT OF LEFT ANKLE, UNSPECIFIED CAUSE: ICD-10-CM

## 2020-08-04 NOTE — TELEPHONE ENCOUNTER
"Requested Prescriptions   Pending Prescriptions Disp Refills    colchicine (COLCRYS) 0.6 MG tablet 30 tablet 0     Sig: Take 2 tablets at the first sign of flare, take 1-2 additional tablets until symptoms improve then stop.  Do not use more than 5 days.       Gout Agents Protocol Failed - 8/3/2020  1:34 PM        Failed - CBC on file in past 12 months     Recent Labs   Lab Test 12/20/18  0830   WBC 6.2   RBC 5.57   HGB 15.7   HCT 47.0                    Failed - Has Uric Acid on file in past 12 months and value is less than 6     Recent Labs   Lab Test 12/04/19  1550   URIC 8.2*     If level is 6mg/dL or greater, ok to refill one time and refer to provider.           Passed - ALT on file in past 12 months     Recent Labs   Lab Test 12/04/19  1550   ALT 40             Passed - Recent (12 mo) or future (30 days) visit within the authorizing provider's specialty     Patient has had an office visit with the authorizing provider or a provider within the authorizing providers department within the previous 12 mos or has a future within next 30 days. See \"Patient Info\" tab in inbasket, or \"Choose Columns\" in Meds & Orders section of the refill encounter.              Passed - Medication is active on med list        Passed - Patient is age 18 or older        Passed - Normal serum creatinine on file in the past 12 months     Recent Labs   Lab Test 12/04/19  1550   CR 1.07       Ok to refill medication if creatinine is low             Routing refill request to provider for review/approval because:  Labs not current:    Mamta Dash,RN,BSN  [facility]          "

## 2020-08-05 RX ORDER — COLCHICINE 0.6 MG/1
TABLET ORAL
Qty: 30 TABLET | Refills: 0 | Status: SHIPPED | OUTPATIENT
Start: 2020-08-05 | End: 2020-09-09

## 2020-09-05 ENCOUNTER — TELEPHONE (OUTPATIENT)
Dept: FAMILY MEDICINE | Facility: CLINIC | Age: 50
End: 2020-09-05

## 2020-09-05 DIAGNOSIS — I10 HYPERTENSION GOAL BP (BLOOD PRESSURE) < 140/90: ICD-10-CM

## 2020-09-05 DIAGNOSIS — M10.9 ACUTE GOUT OF LEFT ANKLE, UNSPECIFIED CAUSE: ICD-10-CM

## 2020-09-09 RX ORDER — ASPIRIN 81 MG/1
TABLET, COATED ORAL
Qty: 90 TABLET | Refills: 3 | OUTPATIENT
Start: 2020-09-09

## 2020-09-09 RX ORDER — COLCHICINE 0.6 MG/1
TABLET ORAL
Qty: 90 TABLET | Refills: 0 | Status: SHIPPED | OUTPATIENT
Start: 2020-09-09 | End: 2020-12-09

## 2020-09-09 NOTE — TELEPHONE ENCOUNTER
Routing refill request to provider for review/approval because:  Labs not current:  CBC  Labs currently out of range:  Uric Acid    Joseline Mims RN

## 2020-10-15 ENCOUNTER — TELEPHONE (OUTPATIENT)
Dept: FAMILY MEDICINE | Facility: CLINIC | Age: 50
End: 2020-10-15

## 2020-10-15 DIAGNOSIS — E79.0 HYPERURICEMIA: ICD-10-CM

## 2020-10-15 RX ORDER — ALLOPURINOL 100 MG/1
TABLET ORAL
Qty: 60 TABLET | Refills: 1 | Status: SHIPPED | OUTPATIENT
Start: 2020-10-15 | End: 2020-12-09

## 2020-10-15 NOTE — TELEPHONE ENCOUNTER
Routing refill request to provider for review/approval because:  Labs out of range:  Uric Acid   Labs not current:  CBC    Joseline Mims RN

## 2020-10-15 NOTE — TELEPHONE ENCOUNTER
Left message for patient to call back and speak with any .    Thank you,  Rachana Acosta   for [facility]

## 2020-11-04 ENCOUNTER — TRANSFERRED RECORDS (OUTPATIENT)
Dept: HEALTH INFORMATION MANAGEMENT | Facility: CLINIC | Age: 50
End: 2020-11-04

## 2020-11-09 DIAGNOSIS — J30.2 SEASONAL ALLERGIC RHINITIS: ICD-10-CM

## 2020-11-09 RX ORDER — FLUTICASONE PROPIONATE 50 MCG
SPRAY, SUSPENSION (ML) NASAL
Qty: 16 G | Refills: 5 | Status: SHIPPED | OUTPATIENT
Start: 2020-11-09

## 2020-11-09 NOTE — TELEPHONE ENCOUNTER
"  Requested Prescriptions   Pending Prescriptions Disp Refills     fluticasone (FLONASE) 50 MCG/ACT nasal spray [Pharmacy Med Name: FLUTICASONE NASAL SPRAY]  9     Sig: SPRAY 1-2 SPRAYS IN EACH NOSTRIL EVERY DAY   12/4/2019     Nasal Allergy Protocol Passed - 11/9/2020  8:52 AM        Passed - Patient is age 12 or older        Passed - Recent (12 mo) or future (30 days) visit within the authorizing provider's specialty     Patient has had an office visit with the authorizing provider or a provider within the authorizing providers department within the previous 12 mos or has a future within next 30 days. See \"Patient Info\" tab in inbasket, or \"Choose Columns\" in Meds & Orders section of the refill encounter.              Passed - Medication is active on med list         Prescription approved per [facility] Refill Protocol.  Hollie Sosa RN'    "

## 2020-11-24 ENCOUNTER — TRANSFERRED RECORDS (OUTPATIENT)
Dept: HEALTH INFORMATION MANAGEMENT | Facility: CLINIC | Age: 50
End: 2020-11-24

## 2020-12-08 ENCOUNTER — TELEPHONE (OUTPATIENT)
Dept: FAMILY MEDICINE | Facility: CLINIC | Age: 50
End: 2020-12-08

## 2020-12-08 NOTE — TELEPHONE ENCOUNTER
Candida from [employer] is calling to confirm that a fax sent to this office and addressed to Dr. Kacie MD was received.  She stated in the Care plan they had sent over for the patient, they were requesting a prescription for a toilet riser and a shower chair.  She stated the original fax was sent on 11/24/2020.  I requested that she send the fax again today, and this RN would place in [facility].  This RN informed Candida, that Dr. Kacie MD was unavailable at this time, she stated understanding.      Will wait for fax.    Hollie Sosa, REJI

## 2020-12-08 NOTE — TELEPHONE ENCOUNTER
Received fax at 9:03am from [employer].  Will place in provider basket for review.    Hollie Sosa RN

## 2020-12-09 DIAGNOSIS — I10 HYPERTENSION GOAL BP (BLOOD PRESSURE) < 140/90: ICD-10-CM

## 2020-12-09 DIAGNOSIS — I10 HTN (HYPERTENSION): ICD-10-CM

## 2020-12-09 DIAGNOSIS — G89.29 CHRONIC BILATERAL LOW BACK PAIN WITHOUT SCIATICA: ICD-10-CM

## 2020-12-09 DIAGNOSIS — M54.50 CHRONIC BILATERAL LOW BACK PAIN WITHOUT SCIATICA: ICD-10-CM

## 2020-12-09 DIAGNOSIS — M10.9 ACUTE GOUT OF LEFT ANKLE, UNSPECIFIED CAUSE: ICD-10-CM

## 2020-12-09 DIAGNOSIS — E79.0 HYPERURICEMIA: ICD-10-CM

## 2020-12-09 RX ORDER — ALLOPURINOL 100 MG/1
TABLET ORAL
Qty: 60 TABLET | Refills: 1 | Status: SHIPPED | OUTPATIENT
Start: 2020-12-09 | End: 2021-02-15

## 2020-12-09 RX ORDER — ATENOLOL 50 MG/1
TABLET ORAL
Qty: 90 TABLET | Refills: 0 | Status: SHIPPED | OUTPATIENT
Start: 2020-12-09 | End: 2021-03-17

## 2020-12-09 RX ORDER — COLCHICINE 0.6 MG/1
TABLET ORAL
Qty: 90 TABLET | Refills: 0 | Status: SHIPPED | OUTPATIENT
Start: 2020-12-09 | End: 2021-01-12

## 2020-12-09 RX ORDER — ASPIRIN 81 MG/1
TABLET, COATED ORAL
Qty: 90 TABLET | Refills: 0 | Status: SHIPPED | OUTPATIENT
Start: 2020-12-09 | End: 2021-06-24

## 2020-12-09 RX ORDER — DICLOFENAC SODIUM 75 MG/1
TABLET, DELAYED RELEASE ORAL
Qty: 60 TABLET | Refills: 1 | Status: SHIPPED | OUTPATIENT
Start: 2020-12-09 | End: 2021-02-15

## 2020-12-09 NOTE — TELEPHONE ENCOUNTER
Atenolol and  ASA  Prescription approved per [facility] Refill Protocol.    Joseline Mims RN

## 2020-12-09 NOTE — TELEPHONE ENCOUNTER
PCP is currently out of office, will route to covering provider.  Pa    Allopurinol  Routing refill request to provider for review/approval because:  Labs not current:  CBC, ALT, Uric Acid, Cr.     Colchicine  Routing refill request to provider for review/approval because:  Labs not current:  CBC, ALT, Uric Acid, Creatinine.     Voltaren  Routing refill request to provider for review/approval because:  Labs not current:  ALT, AST, CBC, Creatinine.     Joseline Mims RN

## 2021-01-12 DIAGNOSIS — I10 ESSENTIAL HYPERTENSION: Primary | ICD-10-CM

## 2021-01-12 DIAGNOSIS — I10 HTN (HYPERTENSION): ICD-10-CM

## 2021-01-12 DIAGNOSIS — M10.9 ACUTE GOUT OF LEFT ANKLE, UNSPECIFIED CAUSE: ICD-10-CM

## 2021-01-12 RX ORDER — COLCHICINE 0.6 MG/1
TABLET ORAL
Qty: 20 TABLET | Refills: 0 | Status: SHIPPED | OUTPATIENT
Start: 2021-01-12 | End: 2022-06-20

## 2021-01-12 RX ORDER — HYDROCHLOROTHIAZIDE 25 MG/1
TABLET ORAL
Qty: 30 TABLET | Refills: 0 | Status: SHIPPED | OUTPATIENT
Start: 2021-01-12 | End: 2021-03-08

## 2021-01-12 NOTE — TELEPHONE ENCOUNTER
Routing refill request to provider for review/approval because:  Labs not current:  CBC, creatinine, uric acid, ALT    Joseline Mims RN

## 2021-01-12 NOTE — TELEPHONE ENCOUNTER
Routing refill request to provider for review/approval because:  Labs not current:  Creatinine, potassium  Patient needs to be seen because it has been more than 1 year since last office visit.    Joseline Mims RN

## 2021-01-13 NOTE — TELEPHONE ENCOUNTER
Spoke with patient and informed of note below. Patient understood and appointment made for 01/25/2021  Jessica rOtiz MA

## 2021-01-13 NOTE — TELEPHONE ENCOUNTER
Spoke with patient and informed of note below. Patient understood and physical appointment made for 01/25/2021  Jessica Ortiz MA

## 2021-02-12 ENCOUNTER — TELEPHONE (OUTPATIENT)
Dept: FAMILY MEDICINE | Facility: CLINIC | Age: 51
End: 2021-02-12

## 2021-02-12 DIAGNOSIS — I10 ESSENTIAL HYPERTENSION: ICD-10-CM

## 2021-02-12 DIAGNOSIS — M54.50 CHRONIC BILATERAL LOW BACK PAIN WITHOUT SCIATICA: ICD-10-CM

## 2021-02-12 DIAGNOSIS — F51.04 PSYCHOPHYSIOLOGICAL INSOMNIA: ICD-10-CM

## 2021-02-12 DIAGNOSIS — M10.9 ACUTE GOUT OF LEFT ANKLE, UNSPECIFIED CAUSE: ICD-10-CM

## 2021-02-12 DIAGNOSIS — G89.29 CHRONIC BILATERAL LOW BACK PAIN WITHOUT SCIATICA: ICD-10-CM

## 2021-02-12 DIAGNOSIS — E79.0 HYPERURICEMIA: ICD-10-CM

## 2021-02-15 RX ORDER — DICLOFENAC SODIUM 75 MG/1
TABLET, DELAYED RELEASE ORAL
Qty: 60 TABLET | Refills: 0 | Status: SHIPPED | OUTPATIENT
Start: 2021-02-15 | End: 2022-04-06

## 2021-02-15 RX ORDER — ALLOPURINOL 100 MG/1
TABLET ORAL
Qty: 60 TABLET | Refills: 0 | Status: SHIPPED | OUTPATIENT
Start: 2021-02-15 | End: 2021-08-30

## 2021-02-15 NOTE — TELEPHONE ENCOUNTER
Must follow-up before the med run out.  He has multiple no shows recently.  Recommended to follow-up for physical as well.

## 2021-02-15 NOTE — TELEPHONE ENCOUNTER
Voltaren  Routing refill request to provider for review/approval because:  Labs not current:  ALT, AST, CBC Creatinine  Patient needs appointment.    Allopurinol  Routing refill request to provider for review/approval because:  Labs not current:  CBC, Uric acid  Patient needs appointment.     Joseline Mims Rn

## 2021-02-16 RX ORDER — TRAZODONE HYDROCHLORIDE 100 MG/1
TABLET ORAL
Qty: 30 TABLET | Refills: 6 | OUTPATIENT
Start: 2021-02-16

## 2021-02-16 RX ORDER — COLCHICINE 0.6 MG/1
TABLET ORAL
Qty: 20 TABLET | Refills: 0 | OUTPATIENT
Start: 2021-02-16

## 2021-02-16 RX ORDER — HYDROCHLOROTHIAZIDE 25 MG/1
TABLET ORAL
Qty: 30 TABLET | Refills: 0 | OUTPATIENT
Start: 2021-02-16

## 2021-02-16 NOTE — TELEPHONE ENCOUNTER
LM for patient to return call. Please give message below from Dr. Hester, and assist in scheduling appointment

## 2021-02-16 NOTE — TELEPHONE ENCOUNTER
Message left for patient to call and schedule appointment prior to needing any refills on medications.    Keisha Olivares XRO/

## 2021-02-24 ENCOUNTER — OFFICE VISIT (OUTPATIENT)
Dept: FAMILY MEDICINE | Facility: CLINIC | Age: 51
End: 2021-02-24
Payer: COMMERCIAL

## 2021-02-24 VITALS
SYSTOLIC BLOOD PRESSURE: 122 MMHG | TEMPERATURE: 97.8 F | HEART RATE: 72 BPM | DIASTOLIC BLOOD PRESSURE: 74 MMHG | BODY MASS INDEX: 40.43 KG/M2 | OXYGEN SATURATION: 97 % | RESPIRATION RATE: 18 BRPM | WEIGHT: 315 LBS | HEIGHT: 74 IN

## 2021-02-24 DIAGNOSIS — G47.33 OSA (OBSTRUCTIVE SLEEP APNEA): ICD-10-CM

## 2021-02-24 DIAGNOSIS — F51.04 PSYCHOPHYSIOLOGICAL INSOMNIA: ICD-10-CM

## 2021-02-24 DIAGNOSIS — E66.01 MORBID OBESITY (H): ICD-10-CM

## 2021-02-24 DIAGNOSIS — I10 HYPERTENSION GOAL BP (BLOOD PRESSURE) < 140/90: ICD-10-CM

## 2021-02-24 DIAGNOSIS — Z00.00 ROUTINE GENERAL MEDICAL EXAMINATION AT A HEALTH CARE FACILITY: Primary | ICD-10-CM

## 2021-02-24 DIAGNOSIS — M54.50 CHRONIC BILATERAL LOW BACK PAIN WITHOUT SCIATICA: ICD-10-CM

## 2021-02-24 DIAGNOSIS — L83 ACANTHOSIS NIGRICANS: ICD-10-CM

## 2021-02-24 DIAGNOSIS — E79.0 HYPERURICEMIA: ICD-10-CM

## 2021-02-24 DIAGNOSIS — K21.9 GASTROESOPHAGEAL REFLUX DISEASE, UNSPECIFIED WHETHER ESOPHAGITIS PRESENT: ICD-10-CM

## 2021-02-24 DIAGNOSIS — L91.8 SKIN TAG: ICD-10-CM

## 2021-02-24 DIAGNOSIS — R73.03 PRE-DIABETES: ICD-10-CM

## 2021-02-24 DIAGNOSIS — E55.9 VITAMIN D DEFICIENCY: ICD-10-CM

## 2021-02-24 DIAGNOSIS — R79.89 ELEVATED SERUM CREATININE: ICD-10-CM

## 2021-02-24 DIAGNOSIS — G89.29 CHRONIC BILATERAL LOW BACK PAIN WITHOUT SCIATICA: ICD-10-CM

## 2021-02-24 LAB
ALBUMIN SERPL-MCNC: 4 G/DL (ref 3.4–5)
ALP SERPL-CCNC: 86 U/L (ref 40–150)
ALT SERPL W P-5'-P-CCNC: 46 U/L (ref 0–70)
ANION GAP SERPL CALCULATED.3IONS-SCNC: 3 MMOL/L (ref 3–14)
AST SERPL W P-5'-P-CCNC: 24 U/L (ref 0–45)
BILIRUB SERPL-MCNC: 0.3 MG/DL (ref 0.2–1.3)
BUN SERPL-MCNC: 10 MG/DL (ref 7–30)
CALCIUM SERPL-MCNC: 9.1 MG/DL (ref 8.5–10.1)
CHLORIDE SERPL-SCNC: 108 MMOL/L (ref 94–109)
CHOLEST SERPL-MCNC: 150 MG/DL
CO2 SERPL-SCNC: 29 MMOL/L (ref 20–32)
CREAT SERPL-MCNC: 1.01 MG/DL (ref 0.66–1.25)
GFR SERPL CREATININE-BSD FRML MDRD: 86 ML/MIN/{1.73_M2}
GLUCOSE SERPL-MCNC: 103 MG/DL (ref 70–99)
HBA1C MFR BLD: 6.1 % (ref 0–5.6)
HDLC SERPL-MCNC: 30 MG/DL
LDLC SERPL CALC-MCNC: 82 MG/DL
MAGNESIUM SERPL-MCNC: 2 MG/DL (ref 1.6–2.3)
NONHDLC SERPL-MCNC: 120 MG/DL
POTASSIUM SERPL-SCNC: 3.7 MMOL/L (ref 3.4–5.3)
PROT SERPL-MCNC: 7.2 G/DL (ref 6.8–8.8)
PSA SERPL-ACNC: 0.76 UG/L (ref 0–4)
SODIUM SERPL-SCNC: 140 MMOL/L (ref 133–144)
TRIGL SERPL-MCNC: 190 MG/DL
TSH SERPL DL<=0.005 MIU/L-ACNC: 1.26 MU/L (ref 0.4–4)
URATE SERPL-MCNC: 5.8 MG/DL (ref 3.5–7.2)

## 2021-02-24 PROCEDURE — G0103 PSA SCREENING: HCPCS | Performed by: FAMILY MEDICINE

## 2021-02-24 PROCEDURE — 84550 ASSAY OF BLOOD/URIC ACID: CPT | Performed by: FAMILY MEDICINE

## 2021-02-24 PROCEDURE — 83036 HEMOGLOBIN GLYCOSYLATED A1C: CPT | Performed by: FAMILY MEDICINE

## 2021-02-24 PROCEDURE — 36415 COLL VENOUS BLD VENIPUNCTURE: CPT | Performed by: FAMILY MEDICINE

## 2021-02-24 PROCEDURE — 80061 LIPID PANEL: CPT | Performed by: FAMILY MEDICINE

## 2021-02-24 PROCEDURE — 83735 ASSAY OF MAGNESIUM: CPT | Performed by: FAMILY MEDICINE

## 2021-02-24 PROCEDURE — 82306 VITAMIN D 25 HYDROXY: CPT | Performed by: FAMILY MEDICINE

## 2021-02-24 PROCEDURE — 84443 ASSAY THYROID STIM HORMONE: CPT | Performed by: FAMILY MEDICINE

## 2021-02-24 PROCEDURE — 80053 COMPREHEN METABOLIC PANEL: CPT | Performed by: FAMILY MEDICINE

## 2021-02-24 PROCEDURE — 99396 PREV VISIT EST AGE 40-64: CPT | Performed by: FAMILY MEDICINE

## 2021-02-24 PROCEDURE — 99214 OFFICE O/P EST MOD 30 MIN: CPT | Mod: 25 | Performed by: FAMILY MEDICINE

## 2021-02-24 PROCEDURE — 86803 HEPATITIS C AB TEST: CPT | Performed by: FAMILY MEDICINE

## 2021-02-24 RX ORDER — PANTOPRAZOLE SODIUM 40 MG/1
40 TABLET, DELAYED RELEASE ORAL DAILY
Qty: 90 TABLET | Refills: 1 | Status: SHIPPED | OUTPATIENT
Start: 2021-02-24 | End: 2021-08-30

## 2021-02-24 RX ORDER — QUETIAPINE FUMARATE 25 MG/1
25-50 TABLET, FILM COATED ORAL AT BEDTIME
Qty: 60 TABLET | Refills: 0 | Status: SHIPPED | OUTPATIENT
Start: 2021-02-24 | End: 2021-07-27

## 2021-02-24 ASSESSMENT — MIFFLIN-ST. JEOR: SCORE: 2417.55

## 2021-02-24 ASSESSMENT — PAIN SCALES - GENERAL: PAINLEVEL: SEVERE PAIN (6)

## 2021-02-24 NOTE — H&P (VIEW-ONLY)
SUBJECTIVE:   CC: Humberto Roberts is an 50 year old male who presents for preventative health visit.       Patient has been advised of split billing requirements and indicates understanding: Yes  Healthy Habits:     Getting at least 3 servings of Calcium per day:  Yes    Bi-annual eye exam:  Yes    Dental care twice a year:  Yes    Sleep apnea or symptoms of sleep apnea:  Sleep apnea    Diet:  Regular (no restrictions)    Frequency of exercise:  None    Taking medications regularly:  Yes    Medication side effects:  None    PHQ-2 Total Score: 0    Additional concerns today:  Yes      Humberto is a 50-year-old -American male who is here today for his general physical with several concerns.    1.  First is to follow-up on his high blood pressure for which he takes atenolol and hydrochlorothiazide.  Been taking them as prescribed with no side effect.  Not checking his blood pressure at home.  No headache or dizziness.  No chest pain, shortness of breath, dyspnea, leg swelling or orthopnea.  Not exercising.  Denied of excessive salt/caffeine intake.    2.  Second concern is about acid reflux.  Has had it for years.  Still has a lot of acid reflux symptoms despite of taking omeprazole regularly.  Been tasting the acid in his throat at night.  Also been feeling something stuck on his throat when he swallows, especially the capsule pills.  No choking.  No excessive bloatedness.  No melena or coffee-ground emesis.  No abdominal pain.  Not tolerable tto greasy or spicy food.  He takes diclofenac as needed for his back pain.  Has chronic back pain and is known to have arthritis.  Overall the back pain is tolerable with the diclofenac and Zanaflex.  Also has high uric acid level with history of gout.  Allopurinol has been working very well.  He has colchicine to take as needed for flare up and has been working well as well.    3.  He takes trazodone for sleeping which has been working very well.  However if you take it  more than 3 days in a row, he would have significant leg cramping.  He is interested to try something different.  He has trouble with falling and maintaining sleep when not taking the trazodone.  Ambien worked well for him in the past.    3.   Not tolerating CPAP well for sleep apnea and therefore has not used it.  Last the sleep study was in 2015.  Has significant weight gain since then.  Was referred to sleep specialist but the appointment was canceled because of the Covid pandemic.  Interested to see the sleep specialist again.    4.  Otherwise, he is doing well.  No major medical care or procedure done since the last physical over a year ago. No headache or dizziness.  No running nose, nasal congestion, coughing, fever or chill. No CP/SOB. No N/V/D/C. No problem with urination.  No abdominal pain. Denied of STD risks.  No leg swelling.  Not exercising.  No alcohol, drugs or tobacco use. Feels safe at home, not working and lives with his wife.  Denied of being depressed. No other concern today    Today's PHQ-2 Score:   PHQ-2 ( 1999 [employer]) 2/24/2021   Q1: Little interest or pleasure in doing things 0   Q2: Feeling down, depressed or hopeless 0   PHQ-2 Score 0   Q1: Little interest or pleasure in doing things Not at all   Q2: Feeling down, depressed or hopeless Not at all   PHQ-2 Score 0       Abuse: Current or Past(Physical, Sexual or Emotional)- No  Do you feel safe in your environment? Yes    Have you ever done Advance Care Planning? (For example, a Health Directive, POLST, or a discussion with a medical provider or your loved ones about your wishes): No, advance care planning information given to patient to review.  Advanced care planning was discussed at today's visit.    Social History     Tobacco Use     Smoking status: Never Smoker     Smokeless tobacco: Never Used   Substance Use Topics     Alcohol use: No     Alcohol/week: 0.0 standard drinks     If you drink alcohol do you typically have >3 drinks per  day or >7 drinks per week? Not applicable    Alcohol Use 2/24/2021   Prescreen: >3 drinks/day or >7 drinks/week? No   No flowsheet data found.    Last PSA:   PSA   Date Value Ref Range Status   12/04/2019 0.63 0 - 4 ug/L Final     Comment:     Assay Method:  Chemiluminescence using Siemens Vista analyzer       Reviewed orders with patient. Reviewed health maintenance and updated orders accordingly - Yes  BP Readings from Last 3 Encounters:   02/24/21 122/74   12/17/19 135/83   12/04/19 136/84    Wt Readings from Last 3 Encounters:   02/24/21 148.8 kg (328 lb)   12/04/19 144.5 kg (318 lb 9.6 oz)   09/16/19 144.4 kg (318 lb 6.4 oz)                  Patient Active Problem List   Diagnosis     GERD (gastroesophageal reflux disease)     Hypertension goal BP (blood pressure) < 140/90     Elevated serum creatinine     Insomnia     Morbid obesity (H)     Seasonal allergic rhinitis due to pollen, unspecified chronicity     RICARDO (obstructive sleep apnea) - on cpap     Chronic bilateral low back pain without sciatica     Vitamin D deficiency     Hyperuricemia     Past Surgical History:   Procedure Laterality Date     ENT SURGERY         Social History     Tobacco Use     Smoking status: Never Smoker     Smokeless tobacco: Never Used   Substance Use Topics     Alcohol use: No     Alcohol/week: 0.0 standard drinks     Family History   Problem Relation Age of Onset     Diabetes Mother      Hypertension Mother      Heart Disease Mother      Heart Disease Father      Diabetes Father      Unknown/Adopted Maternal Grandmother      Unknown/Adopted Maternal Grandfather      Unknown/Adopted Paternal Grandmother      Unknown/Adopted Paternal Grandfather      No Known Problems Sister      No Known Problems Son      No Known Problems Daughter      No Known Problems Sister      No Known Problems Son      No Known Problems Daughter          Current Outpatient Medications   Medication Sig Dispense Refill     allopurinol (ZYLOPRIM) 100 MG  tablet TAKE ONE TABLET BY MOUTH TWICE A DAY 60 tablet 0     ASPIRIN LOW DOSE 81 MG EC tablet TAKE ONE TABLET BY MOUTH ONCE DAILY 90 tablet 0     atenolol (TENORMIN) 50 MG tablet TAKE ONE TABLET BY MOUTH ONCE DAILY 90 tablet 0     cetirizine (ZYRTEC) 10 MG tablet TAKE ONE TABLET BY MOUTH ONCE DAILY AS NEEDED FOR ALLERGIES 30 tablet 5     colchicine (COLCYRS) 0.6 MG tablet TAKE 2 TABLETS BY MOUTH AT THE FIRST SIGN OF FLARE, TAKE 1-2 ADDITIONAL TABLETS PER DAY UNTIL SYMPTOMS IMPROVE THEN STOP THIS MEDICATION. DO NOT USE MORE THAN A FIVE DAYS 20 tablet 0     diclofenac (VOLTAREN) 75 MG EC tablet TAKE ONE TABLET BY MOUTH TWICE A DAY AS NEEDED FOR MODERATE PAIN 60 tablet 0     fluticasone (FLONASE) 50 MCG/ACT nasal spray SPRAY 1-2 SPRAYS IN EACH NOSTRIL EVERY DAY 16 g 5     hydrochlorothiazide (HYDRODIURIL) 25 MG tablet TAKE ONE TABLET BY MOUTH ONCE DAILY 30 tablet 0     order for DME 1 Device Auto CPAP @@ 7-15 cm with heated humidity via mask of choice       pantoprazole (PROTONIX) 40 MG EC tablet Take 1 tablet (40 mg) by mouth daily Stop omeprazole 90 tablet 1     QUEtiapine (SEROQUEL) 25 MG tablet Take 1-2 tablets (25-50 mg) by mouth At Bedtime 60 tablet 0     tiZANidine (ZANAFLEX) 4 MG tablet TAKE ONE TABLET BY MOUTH EVERY NIGHT AT BEDTIME AND TAKE ONE TABLET BY MOUTH EVERY MORNING AS NEEDEd 180 tablet 0     order for DME Equipment being ordered: shower chair (Patient not taking: Reported on 12/4/2019) 1 Device 0     No Known Allergies  Recent Labs   Lab Test 02/24/21  1452 12/04/19  1550 12/20/18  0830 12/20/18  0830   A1C 6.1*  --   --   --    LDL 82 90  --  97   HDL 30* 34*  --  30*   TRIG 190* 151*  --  105   ALT 46 40  --  46   CR 1.01 1.07   < > 1.28*   GFRESTIMATED 86 81   < > 65   GFRESTBLACK >90 >90   < > 76   POTASSIUM 3.7 3.9   < > 4.0   TSH 1.26 1.57  --  1.14    < > = values in this interval not displayed.        Reviewed and updated as needed this visit by clinical staff  Tobacco  Allergies  Meds    "Med Hx  Surg Hx  Fam Hx  Soc Hx        Reviewed and updated as needed this visit by Provider                Past Medical History:   Diagnosis Date     Acid reflux disease since 2006     Back pain chronic     Cellulitis of left upper arm and forearm 11/22/2013     Hypertension       Past Surgical History:   Procedure Laterality Date     ENT SURGERY         Review of Systems  Please see subjective otherwise the complete review of system was negative.    OBJECTIVE:   /74   Pulse 72   Temp 97.8  F (36.6  C) (Temporal)   Resp 18   Ht 1.88 m (6' 2\")   Wt 148.8 kg (328 lb)   SpO2 97%   BMI 42.11 kg/m      Physical Exam   GENERAL: healthy, alert and no distress  EYES: Eyes grossly normal to inspection, PERRL and conjunctivae and sclerae normal.  No nystagmus.  All 4 visual fields are intact  HENT: Ear canals and TM's normal.  Nares are non-congested. Oropharynx is pink and moist. No tender with palpation to the sinuses.  NECK: no adenopathy, supple, no lymphadenopathy or thyromegaly.  No tender with palpation to the cervical spine or its paraspinous muscle.  RESP: lungs clear to auscultation - no rales, rhonchi or wheezes  CV: regular rate and rhythm, no murmur.  ABDOMEN: soft, nondistended, nontender, no palpable masses or organomegaly with normal bowel sounds.  MS: no gross musculoskeletal defects noted, no edema.  Walk with no limping, normal gait.  All 4 extremities are equally in strength. Ankle, knees, hips, shoulders, elbows and wrists exams normal.  Normal fine motor skills on fingers.  Back is straight, no lordosis or scoliosis.  No tender with palpation.  SKIN: no suspicious lesions or rashes.  Nigricans acanthosis noted.  Multiple large skin tags noted on the trunk and neck  NEURO: Normal strength and tone, mentation intact and speech normal.  Cranial nerves II through XII intact, DTR +2 throughout, no focal neurological deficit.  PSYCH: mentation appears normal, affect normal/bright.  Thoughts " intact, no hallucination.  No suicidal or homicidal ideation.  LYMPH: no cervical, supraclavicular or axillary adenopathy.   (male): Offered and recommended but he declined.  He has no concern about it.    Diagnostic Test Results:  Labs reviewed in Epic  Results for orders placed or performed in visit on 02/24/21   Comprehensive metabolic panel     Status: Abnormal   Result Value Ref Range    Sodium 140 133 - 144 mmol/L    Potassium 3.7 3.4 - 5.3 mmol/L    Chloride 108 94 - 109 mmol/L    Carbon Dioxide 29 20 - 32 mmol/L    Anion Gap 3 3 - 14 mmol/L    Glucose 103 (H) 70 - 99 mg/dL    Urea Nitrogen 10 7 - 30 mg/dL    Creatinine 1.01 0.66 - 1.25 mg/dL    GFR Estimate 86 >60 mL/min/[1.73_m2]    GFR Estimate If Black >90 >60 mL/min/[1.73_m2]    Calcium 9.1 8.5 - 10.1 mg/dL    Bilirubin Total 0.3 0.2 - 1.3 mg/dL    Albumin 4.0 3.4 - 5.0 g/dL    Protein Total 7.2 6.8 - 8.8 g/dL    Alkaline Phosphatase 86 40 - 150 U/L    ALT 46 0 - 70 U/L    AST 24 0 - 45 U/L   Hemoglobin A1c     Status: Abnormal   Result Value Ref Range    Hemoglobin A1C 6.1 (H) 0 - 5.6 %   Lipid panel reflex to direct LDL Fasting     Status: Abnormal   Result Value Ref Range    Cholesterol 150 <200 mg/dL    Triglycerides 190 (H) <150 mg/dL    HDL Cholesterol 30 (L) >39 mg/dL    LDL Cholesterol Calculated 82 <100 mg/dL    Non HDL Cholesterol 120 <130 mg/dL   TSH with free T4 reflex     Status: None   Result Value Ref Range    TSH 1.26 0.40 - 4.00 mU/L   PROSTATE SPEC ANTIGEN SCREEN     Status: None   Result Value Ref Range    PSA 0.76 0 - 4 ug/L   Vitamin D Deficiency     Status: None   Result Value Ref Range    Vitamin D Deficiency screening 25 20 - 75 ug/L   Hepatitis C antibody     Status: None   Result Value Ref Range    Hepatitis C Antibody Nonreactive NR^Nonreactive   Uric acid     Status: None   Result Value Ref Range    Uric Acid 5.8 3.5 - 7.2 mg/dL   Magnesium     Status: None   Result Value Ref Range    Magnesium 2.0 1.6 - 2.3 mg/dL        ASSESSMENT/PLAN:   1. Routine general medical examination at a health care facility  Overall Humberto overall is doing well with multiple chronic medical conditions.  Recommended but declined the shingles and flu vaccination.  Discussed about safety issue, healthy diet, exercising, weight loss, good sleeping hygiene, substance/alcohol history prevention, and depression prevention. Recommended daily exercise for at least 30 minutes.  Emphasized on healthy diet with adequate fluid intake and resting. Feels safe at home. Follow in 1 year, earlier as needed.  Labs as ordered below.  Discussed about colon cancer screening and he is interested in colonoscopy which therefore referred today. Discussed with him about the pros and cons of prostate screening cancer with PSA.  Also educated about the potential false positive which may require unnecessary work-up.  He was informed of negative/normal PSA leve does not rule out prostate cancer completely although it is very unlikely.  He understands and is willing to take the risk..  All of his questions were answered.    - Comprehensive metabolic panel  - Hemoglobin A1c  - Lipid panel reflex to direct LDL Fasting  - TSH with free T4 reflex  - PROSTATE SPEC ANTIGEN SCREEN  - Vitamin D Deficiency  - Hepatitis C antibody  - GASTROENTEROLOGY ADULT REF PROCEDURE ONLY; Future  - Magnesium    2. Hypertension goal BP (blood pressure) < 140/90  BP is normal and stable. Continue with the current doses of atenolol and hydrochlorothiazide, been tolerating them well.  Emphasized on healthy/low salt diet, daily excercise and weight loss.  Avoid high sugar/caffeine intake. Lab as ordered.  Follow up in 6 month, earlier as needed.    - COMPREHENSIVE WEIGHT MANAGEMENT    3. Chronic bilateral low back pain without sciatica  Known to have arthritis of the spine.  Overall is doing well.  Continue with the diclofenac and Zanaflex as needed.  Emphasized on importance weight loss.  Continue with  his normal activities as tolerated.    - COMPREHENSIVE WEIGHT MANAGEMENT    4. Gastroesophageal reflux disease, unspecified whether esophagitis present  Not controlled despite of taking the omeprazole.  Has had it for years.  Been having a lot of acid taste with globus sensation.  Switched to Protonix and sent for upper endoscopy.  Encouraged to avoid spicy/greasy food.  Also avoid late meals as well as high caffeine/sugar intake.    - GASTROENTEROLOGY ADULT REF PROCEDURE ONLY; Future  - pantoprazole (PROTONIX) 40 MG EC tablet; Take 1 tablet (40 mg) by mouth daily Stop omeprazole  Dispense: 90 tablet; Refill: 1    5. Morbid obesity (H)  Today's BMI was 42 with multiple comorbidities include high blood pressure, sleep apnea, chronic low back pain, and prediabetes.  Discussed with him about the nature of the condition and its long term and short term effects.  Encouraged exercising and healthy diet as discussed above.  Also discussed about formal logical and surgical intervention.  He is interested to look into surgery and therefore refer him to gastric bypass surge kay for further evaluation.  Discussed about the goal for his weight and his BMI.  TSH level was checked today and it was normal.    - COMPREHENSIVE WEIGHT MANAGEMENT  - SLEEP ENT REFERRAL    6. RICARDO (obstructive sleep apnea) - on cpap  Encouraged weight loss.  Refer to sleep specialist for reevaluation.  Not tolerated CPAP well.  Last the sleep study was in 2015.    - COMPREHENSIVE WEIGHT MANAGEMENT  - SLEEP ENT REFERRAL    7. Vitamin D deficiency  Resolving.  Continue with the vitamin D supplement.    8. Hyperuricemia  Controlled.  Tolerated allopurinol well.  Uric acid level was normal today.  kidney function is also stable/normal.  Will continue with the allopurinol.    - Uric acid    9. Elevated serum creatinine  Resolved.    10. Psychophysiological insomnia  Discussed about good sleeping hygiene.  Stop the trazodone, will have try him try the  "Seroquel.    - QUEtiapine (SEROQUEL) 25 MG tablet; Take 1-2 tablets (25-50 mg) by mouth At Bedtime  Dispense: 60 tablet; Refill: 0    11. Acanthosis nigricans  Discussed with him about the nature of the condition.  Inform him that with this, he is at a higher risk for developing DM/HTN in the future and therefore they need to be monitored closely.  Emphasize on healthy life style modification and strongly encourage aerobic exercise daily.  Also recommend healthy diet and weight loss.  Recommend to check for DM at least once a year. Labs today as ordered      12. Skin tag  Reassured him that it is benign in nature.  Return for their removal if they become irritated/bothersome.    13. Pre-diabetes  Her hemoglobin A1c is 6.1.  Again, emphasized on healthy lifestyle modification as discussed above.  Will continue to monitor closely, checking hemoglobin A1c every 6 months.      Patient has been advised of split billing requirements and indicates understanding: Yes  COUNSELING:   Reviewed preventive health counseling, as reflected in patient instructions       Regular exercise       Healthy diet/nutrition       Immunizations    Declined: Influenza and Zoster due to Conscientious objector               Aspirin prophylaxis        Safe sex practices/STD prevention       Consider Hep C screening for all patients one time for ages 18-79 years       Colon cancer screening       Prostate cancer screening       Osteoporosis prevention/bone health       Advance Care Planning    Estimated body mass index is 42.11 kg/m  as calculated from the following:    Height as of this encounter: 1.88 m (6' 2\").    Weight as of this encounter: 148.8 kg (328 lb).     Weight management plan: Patient referred to endocrine and/or weight management specialty    He reports that he has never smoked. He has never used smokeless tobacco.      Counseling Resources:  ATP IV Guidelines  Pooled Cohorts Equation Calculator  FRAX Risk Assessment  ICSI " Preventive Guidelines  Dietary Guidelines for Americans, 2010  USDA's MyPlate  ASA Prophylaxis  Lung CA Screening    Calos Ty Mai, MD  [facility]

## 2021-02-24 NOTE — LETTER
February 25, 2021      Humberto Roberts  [address] [address] [place] [id number]        Dear ,    We are writing to inform you of your test results.    Your thyroid level was normal, kidney function & liver enzymes and electrolytes were also normal.  Your cholesterol is slightly elevated, no medication for now.  Please work on exercising, healthy diet and weight loss as discussed.  Recheck in a year.  Prostate enzyme was normal.  Uric acid and magnesium levels were also normal.  Hemoglobin A1c indicated that you are prediabetic with the hemoglobin A1c of 6.1.  Very important for you to work on exercise, healthy diet and weight loss to prevent developing full diabetes.     Resulted Orders   Comprehensive metabolic panel   Result Value Ref Range    Sodium 140 133 - 144 mmol/L    Potassium 3.7 3.4 - 5.3 mmol/L    Chloride 108 94 - 109 mmol/L    Carbon Dioxide 29 20 - 32 mmol/L    Anion Gap 3 3 - 14 mmol/L    Glucose 103 (H) 70 - 99 mg/dL    Urea Nitrogen 10 7 - 30 mg/dL    Creatinine 1.01 0.66 - 1.25 mg/dL    GFR Estimate 86 >60 mL/min/[1.73_m2]      Comment:      Non  GFR Calc  Starting 12/18/2018, serum creatinine based estimated GFR (eGFR) will be   calculated using the Chronic Kidney Disease Epidemiology Collaboration   (CKD-EPI) equation.      GFR Estimate If Black >90 >60 mL/min/[1.73_m2]      Comment:       GFR Calc  Starting 12/18/2018, serum creatinine based estimated GFR (eGFR) will be   calculated using the Chronic Kidney Disease Epidemiology Collaboration   (CKD-EPI) equation.      Calcium 9.1 8.5 - 10.1 mg/dL    Bilirubin Total 0.3 0.2 - 1.3 mg/dL    Albumin 4.0 3.4 - 5.0 g/dL    Protein Total 7.2 6.8 - 8.8 g/dL    Alkaline Phosphatase 86 40 - 150 U/L    ALT 46 0 - 70 U/L    AST 24 0 - 45 U/L   Hemoglobin A1c   Result Value Ref Range    Hemoglobin A1C 6.1 (H) 0 - 5.6 %      Comment:      Normal <5.7% Prediabetes 5.7-6.4%  Diabetes 6.5% or higher - adopted from ADA    consensus guidelines.     Lipid panel reflex to direct LDL Fasting   Result Value Ref Range    Cholesterol 150 <200 mg/dL    Triglycerides 190 (H) <150 mg/dL      Comment:      Borderline high:  150-199 mg/dl  High:             200-499 mg/dl  Very high:       >499 mg/dl      HDL Cholesterol 30 (L) >39 mg/dL    LDL Cholesterol Calculated 82 <100 mg/dL      Comment:      Desirable:       <100 mg/dl    Non HDL Cholesterol 120 <130 mg/dL   TSH with free T4 reflex   Result Value Ref Range    TSH 1.26 0.40 - 4.00 mU/L   PROSTATE SPEC ANTIGEN SCREEN   Result Value Ref Range    PSA 0.76 0 - 4 ug/L      Comment:      Assay Method:  Chemiluminescence using Siemens Vista analyzer   Uric acid   Result Value Ref Range    Uric Acid 5.8 3.5 - 7.2 mg/dL   Magnesium   Result Value Ref Range    Magnesium 2.0 1.6 - 2.3 mg/dL       If you have any questions or concerns, please call the clinic at the number listed above.     Sincerely,    Calos Ty Mai, MD

## 2021-02-25 ENCOUNTER — TELEPHONE (OUTPATIENT)
Dept: FAMILY MEDICINE | Facility: CLINIC | Age: 51
End: 2021-02-25

## 2021-02-25 PROBLEM — L91.8 SKIN TAG: Status: ACTIVE | Noted: 2021-02-25

## 2021-02-25 PROBLEM — R73.03 PRE-DIABETES: Status: ACTIVE | Noted: 2021-02-25

## 2021-02-25 PROBLEM — L83 ACANTHOSIS NIGRICANS: Status: ACTIVE | Noted: 2021-02-25

## 2021-02-25 LAB
DEPRECATED CALCIDIOL+CALCIFEROL SERPL-MC: 25 UG/L (ref 20–75)
HCV AB SERPL QL IA: NONREACTIVE

## 2021-02-25 NOTE — TELEPHONE ENCOUNTER
Calos Hester MD  [facility]             Refer him to ENT sleep specialist and gastric bypass surgeon.  Referral ordered.

## 2021-02-25 NOTE — RESULT ENCOUNTER NOTE
Letter sent to patient informing of results/recommendations.  ................Hank Yang LPN,   February 25, 2021,      8:16 AM,   [facility]

## 2021-02-25 NOTE — TELEPHONE ENCOUNTER
Left message for patient to return call to schedule colonoscopy or EGD. If Angie or Leora are unavailable, please transfer to the surgery center.

## 2021-02-26 NOTE — TELEPHONE ENCOUNTER
Left message for patient to return call to schedule EGD/colonoscopy. If Leora or Angie are not available, please transfer to same day surgery        x2 letter mailed

## 2021-02-26 NOTE — TELEPHONE ENCOUNTER
Date of procedure: 3/25/21  Colonoscopy  Surgeon: Dr. Antoine  Prep:Miralax  Packet:Colonoscopy/EGD instructions mailed to patient's home address.   Date: 2/26/2021      Surgery Scheduler

## 2021-03-08 DIAGNOSIS — Z11.59 ENCOUNTER FOR SCREENING FOR OTHER VIRAL DISEASES: ICD-10-CM

## 2021-03-08 DIAGNOSIS — I10 ESSENTIAL HYPERTENSION: ICD-10-CM

## 2021-03-08 RX ORDER — HYDROCHLOROTHIAZIDE 25 MG/1
TABLET ORAL
Qty: 30 TABLET | Refills: 5 | Status: SHIPPED | OUTPATIENT
Start: 2021-03-08 | End: 2021-10-07

## 2021-03-16 DIAGNOSIS — I10 HTN (HYPERTENSION): ICD-10-CM

## 2021-03-17 RX ORDER — ATENOLOL 50 MG/1
TABLET ORAL
Qty: 90 TABLET | Refills: 1 | Status: SHIPPED | OUTPATIENT
Start: 2021-03-17 | End: 2021-09-22

## 2021-03-17 NOTE — TELEPHONE ENCOUNTER
Prescription approved per [facility] Refill Protocol.    Joseline Mims RN

## 2021-03-22 DIAGNOSIS — Z11.59 ENCOUNTER FOR SCREENING FOR OTHER VIRAL DISEASES: ICD-10-CM

## 2021-03-22 LAB
LABORATORY COMMENT REPORT: NORMAL
SARS-COV-2 RNA RESP QL NAA+PROBE: NEGATIVE
SARS-COV-2 RNA RESP QL NAA+PROBE: NORMAL
SPECIMEN SOURCE: NORMAL
SPECIMEN SOURCE: NORMAL

## 2021-03-22 PROCEDURE — U0003 INFECTIOUS AGENT DETECTION BY NUCLEIC ACID (DNA OR RNA); SEVERE ACUTE RESPIRATORY SYNDROME CORONAVIRUS 2 (SARS-COV-2) (CORONAVIRUS DISEASE [COVID-19]), AMPLIFIED PROBE TECHNIQUE, MAKING USE OF HIGH THROUGHPUT TECHNOLOGIES AS DESCRIBED BY CMS-2020-01-R: HCPCS | Performed by: SURGERY

## 2021-03-22 PROCEDURE — U0005 INFEC AGEN DETEC AMPLI PROBE: HCPCS | Performed by: SURGERY

## 2021-03-24 ENCOUNTER — ANESTHESIA EVENT (OUTPATIENT)
Dept: GASTROENTEROLOGY | Facility: CLINIC | Age: 51
End: 2021-03-24
Payer: COMMERCIAL

## 2021-03-25 ENCOUNTER — ANESTHESIA (OUTPATIENT)
Dept: GASTROENTEROLOGY | Facility: CLINIC | Age: 51
End: 2021-03-25
Payer: COMMERCIAL

## 2021-03-25 ENCOUNTER — HOSPITAL ENCOUNTER (OUTPATIENT)
Facility: CLINIC | Age: 51
Discharge: HOME | End: 2021-03-25
Attending: SURGERY | Admitting: SURGERY
Payer: COMMERCIAL

## 2021-03-25 ENCOUNTER — TELEPHONE (OUTPATIENT)
Dept: SURGERY | Facility: CLINIC | Age: 51
End: 2021-03-25

## 2021-03-25 VITALS
SYSTOLIC BLOOD PRESSURE: 139 MMHG | OXYGEN SATURATION: 98 % | DIASTOLIC BLOOD PRESSURE: 93 MMHG | RESPIRATION RATE: 16 BRPM | TEMPERATURE: 98.3 F | HEART RATE: 77 BPM

## 2021-03-25 DIAGNOSIS — Z12.11 ENCOUNTER FOR SCREENING COLONOSCOPY FOR NON-HIGH-RISK PATIENT: Primary | ICD-10-CM

## 2021-03-25 LAB
COLONOSCOPY: NORMAL
UPPER GI ENDOSCOPY: NORMAL

## 2021-03-25 PROCEDURE — 45378 DIAGNOSTIC COLONOSCOPY: CPT | Performed by: SURGERY

## 2021-03-25 PROCEDURE — 88342 IMHCHEM/IMCYTCHM 1ST ANTB: CPT | Mod: 26 | Performed by: PATHOLOGY

## 2021-03-25 PROCEDURE — 250N000011 HC RX IP 250 OP 636: Performed by: NURSE ANESTHETIST, CERTIFIED REGISTERED

## 2021-03-25 PROCEDURE — 45378 DIAGNOSTIC COLONOSCOPY: CPT | Mod: 53 | Performed by: SURGERY

## 2021-03-25 PROCEDURE — 88305 TISSUE EXAM BY PATHOLOGIST: CPT | Mod: TC | Performed by: SURGERY

## 2021-03-25 PROCEDURE — 43235 EGD DIAGNOSTIC BRUSH WASH: CPT | Performed by: SURGERY

## 2021-03-25 PROCEDURE — 88341 IMHCHEM/IMCYTCHM EA ADD ANTB: CPT | Mod: TC | Performed by: SURGERY

## 2021-03-25 PROCEDURE — 258N000003 HC RX IP 258 OP 636: Performed by: NURSE ANESTHETIST, CERTIFIED REGISTERED

## 2021-03-25 PROCEDURE — 88305 TISSUE EXAM BY PATHOLOGIST: CPT | Mod: 26 | Performed by: PATHOLOGY

## 2021-03-25 PROCEDURE — G0121 COLON CA SCRN NOT HI RSK IND: HCPCS | Performed by: SURGERY

## 2021-03-25 PROCEDURE — 43239 EGD BIOPSY SINGLE/MULTIPLE: CPT | Mod: 51 | Performed by: SURGERY

## 2021-03-25 PROCEDURE — 370N000017 HC ANESTHESIA TECHNICAL FEE, PER MIN: Performed by: SURGERY

## 2021-03-25 PROCEDURE — 88342 IMHCHEM/IMCYTCHM 1ST ANTB: CPT | Mod: TC | Performed by: SURGERY

## 2021-03-25 PROCEDURE — 250N000009 HC RX 250: Performed by: NURSE ANESTHETIST, CERTIFIED REGISTERED

## 2021-03-25 RX ORDER — PROCHLORPERAZINE MALEATE 5 MG
10 TABLET ORAL EVERY 6 HOURS PRN
Status: CANCELLED | OUTPATIENT
Start: 2021-03-25

## 2021-03-25 RX ORDER — LIDOCAINE HYDROCHLORIDE 20 MG/ML
INJECTION, SOLUTION INFILTRATION; PERINEURAL PRN
Status: DISCONTINUED | OUTPATIENT
Start: 2021-03-25 | End: 2021-03-25

## 2021-03-25 RX ORDER — LIDOCAINE 40 MG/G
CREAM TOPICAL
Status: DISCONTINUED | OUTPATIENT
Start: 2021-03-25 | End: 2021-03-25 | Stop reason: HOSPADM

## 2021-03-25 RX ORDER — NALOXONE HYDROCHLORIDE 0.4 MG/ML
0.2 INJECTION, SOLUTION INTRAMUSCULAR; INTRAVENOUS; SUBCUTANEOUS
Status: CANCELLED | OUTPATIENT
Start: 2021-03-25

## 2021-03-25 RX ORDER — SODIUM CHLORIDE, SODIUM LACTATE, POTASSIUM CHLORIDE, CALCIUM CHLORIDE 600; 310; 30; 20 MG/100ML; MG/100ML; MG/100ML; MG/100ML
INJECTION, SOLUTION INTRAVENOUS CONTINUOUS
Status: DISCONTINUED | OUTPATIENT
Start: 2021-03-25 | End: 2021-03-25 | Stop reason: HOSPADM

## 2021-03-25 RX ORDER — ONDANSETRON 4 MG/1
4 TABLET, ORALLY DISINTEGRATING ORAL EVERY 6 HOURS PRN
Status: CANCELLED | OUTPATIENT
Start: 2021-03-25

## 2021-03-25 RX ORDER — ONDANSETRON 2 MG/ML
4 INJECTION INTRAMUSCULAR; INTRAVENOUS EVERY 6 HOURS PRN
Status: CANCELLED | OUTPATIENT
Start: 2021-03-25

## 2021-03-25 RX ORDER — PROPOFOL 10 MG/ML
INJECTION, EMULSION INTRAVENOUS PRN
Status: DISCONTINUED | OUTPATIENT
Start: 2021-03-25 | End: 2021-03-25

## 2021-03-25 RX ORDER — ONDANSETRON 2 MG/ML
4 INJECTION INTRAMUSCULAR; INTRAVENOUS
Status: DISCONTINUED | OUTPATIENT
Start: 2021-03-25 | End: 2021-03-25 | Stop reason: HOSPADM

## 2021-03-25 RX ORDER — PROPOFOL 10 MG/ML
INJECTION, EMULSION INTRAVENOUS CONTINUOUS PRN
Status: DISCONTINUED | OUTPATIENT
Start: 2021-03-25 | End: 2021-03-25

## 2021-03-25 RX ORDER — NALOXONE HYDROCHLORIDE 0.4 MG/ML
0.4 INJECTION, SOLUTION INTRAMUSCULAR; INTRAVENOUS; SUBCUTANEOUS
Status: CANCELLED | OUTPATIENT
Start: 2021-03-25

## 2021-03-25 RX ORDER — FLUMAZENIL 0.1 MG/ML
0.2 INJECTION, SOLUTION INTRAVENOUS
Status: CANCELLED | OUTPATIENT
Start: 2021-03-25 | End: 2021-03-25

## 2021-03-25 RX ADMIN — PROPOFOL 50 MG: 10 INJECTION, EMULSION INTRAVENOUS at 09:04

## 2021-03-25 RX ADMIN — PROPOFOL 50 MG: 10 INJECTION, EMULSION INTRAVENOUS at 09:06

## 2021-03-25 RX ADMIN — PROPOFOL 30 MG: 10 INJECTION, EMULSION INTRAVENOUS at 09:07

## 2021-03-25 RX ADMIN — PROPOFOL 40 MG: 10 INJECTION, EMULSION INTRAVENOUS at 09:05

## 2021-03-25 RX ADMIN — SODIUM CHLORIDE, POTASSIUM CHLORIDE, SODIUM LACTATE AND CALCIUM CHLORIDE: 600; 310; 30; 20 INJECTION, SOLUTION INTRAVENOUS at 08:48

## 2021-03-25 RX ADMIN — PROPOFOL 200 MCG/KG/MIN: 10 INJECTION, EMULSION INTRAVENOUS at 09:05

## 2021-03-25 RX ADMIN — LIDOCAINE HYDROCHLORIDE 80 MG: 20 INJECTION, SOLUTION INFILTRATION; PERINEURAL at 09:04

## 2021-03-25 NOTE — LETTER
Humberto Roberts  [address] [place] [id number]    March 31, 2021      Dear Humberto,  This letter is to inform you of the results of your pathology report from your endoscopy.  Your pathology report was:  FINAL DIAGNOSIS:   Stomach, mucosal biopsy:   - Benign antral gastric mucosa with moderate reactive change in glandular   epithelium and mild chronic   inflammation, suggestive of reactive gastropathy.   - Immunohistochemical antibody assay for Helicobacter negative  The above findings are negative for any ulceration, significant inflammation or infection.  If you have further questions please don t hesitate to call our clinic at [phone].   Sincerely,     Humberto Antoine, DO

## 2021-03-25 NOTE — ANESTHESIA CARE TRANSFER NOTE
Patient: Humberto Roberts    Procedure(s):  Colonoscopy Incomplete  Esophagogastroduodenoscopy with biopsy    Diagnosis: Gastroesophageal reflux disease, unspecified whether esophagitis present [K21.9]  Diagnosis Additional Information: No value filed.    Anesthesia Type:   No value filed.     Note:    Oropharynx: oropharynx clear of all foreign objects and spontaneously breathing  Level of Consciousness: drowsy  Oxygen Supplementation: face mask    Independent Airway: airway patency satisfactory and stable  Dentition: dentition unchanged  Vital Signs Stable: post-procedure vital signs reviewed and stable  Report to RN Given: handoff report given  Patient transferred to: Phase II    Handoff Report: Identifed the Patient, Identified the Reponsible Provider, Reviewed the pertinent medical history, Discussed the surgical course, Reviewed Intra-OP anesthesia mangement and issues during anesthesia, Set expectations for post-procedure period and Allowed opportunity for questions and acknowledgement of understanding      Vitals: (Last set prior to Anesthesia Care Transfer)  CRNA VITALS  3/25/2021 0859 - 3/25/2021 0938      3/25/2021             Resp Rate (observed):  17        Electronically Signed By: RADHA Vilchis CRNA  March 25, 2021  9:38 AM

## 2021-03-25 NOTE — ANESTHESIA POSTPROCEDURE EVALUATION
Patient: Humberto Roberts    Procedure(s):  Colonoscopy Incomplete  Esophagogastroduodenoscopy with biopsy    Diagnosis:Gastroesophageal reflux disease, unspecified whether esophagitis present [K21.9]  Diagnosis Additional Information: No value filed.    Anesthesia Type:  No value filed.    Note:  Disposition: Outpatient   Postop Pain Control: Uneventful            Sign Out: Well controlled pain   PONV: No   Neuro/Psych: Uneventful            Sign Out: Acceptable/Baseline neuro status   Airway/Respiratory: Uneventful            Sign Out: Acceptable/Baseline resp. status   CV/Hemodynamics: Uneventful            Sign Out: Acceptable CV status   Other NRE: NONE   DID A NON-ROUTINE EVENT OCCUR? No    Event details/Postop Comments:  Pt was happy with anesthesia care.  No complications.  I will follow up with the pt if needed.         Last vitals:  Vitals:    03/25/21 0932 03/25/21 0945 03/25/21 1000   BP: 110/74 132/77 (!) 139/93   Pulse: 68 88 77   Resp: 20 16 16   Temp:      SpO2: 92% 100% 98%       Last vitals prior to Anesthesia Care Transfer:  CRNA VITALS  3/25/2021 0859 - 3/25/2021 0959      3/25/2021             Resp Rate (observed):  17          Electronically Signed By: RADHA Vilchis CRNA  March 25, 2021  12:55 PM

## 2021-03-25 NOTE — ANESTHESIA PREPROCEDURE EVALUATION
Anesthesia Pre-Procedure Evaluation    Patient: Humberto Roberts   MRN: [id number] : 1970        Preoperative Diagnosis: Gastroesophageal reflux disease, unspecified whether esophagitis present [K21.9]   Procedure : Procedure(s):  Colonoscopy Incomplete  Esophagogastroduodenoscopy with biopsy     Past Medical History:   Diagnosis Date     Acid reflux disease since      Back pain chronic     Cellulitis of left upper arm and forearm 2013     Hypertension       Past Surgical History:   Procedure Laterality Date     ENT SURGERY        No Known Allergies   Social History     Tobacco Use     Smoking status: Never Smoker     Smokeless tobacco: Never Used   Substance Use Topics     Alcohol use: No     Alcohol/week: 0.0 standard drinks      Wt Readings from Last 1 Encounters:   21 148.8 kg (328 lb)        Anesthesia Evaluation   Pt has had prior anesthetic.     No history of anesthetic complications       ROS/MED HX  ENT/Pulmonary:     (+) sleep apnea, moderate, uses CPAP,     Neurologic:  - neg neurologic ROS     Cardiovascular:     (+) hypertension-----    METS/Exercise Tolerance:     Hematologic:  - neg hematologic  ROS     Musculoskeletal: Comment: Mid back pain      GI/Hepatic:     (+) GERD, Asymptomatic on medication,     Renal/Genitourinary:  - neg Renal ROS     Endo:     (+) Obesity,     Psychiatric/Substance Use:  - neg psychiatric ROS     Infectious Disease:  - neg infectious disease ROS     Malignancy:  - neg malignancy ROS     Other:            Physical Exam    Airway        Mallampati: II   TM distance: > 3 FB   Neck ROM: full   Mouth opening: > 3 cm    Respiratory Devices and Support         Dental  no notable dental history         Cardiovascular   cardiovascular exam normal       Rhythm and rate: regular and normal     Pulmonary   pulmonary exam normal        breath sounds clear to auscultation           OUTSIDE LABS:  CBC:   Lab Results   Component Value Date    WBC 6.2 2018     WBC 7.0 03/12/2018    HGB 15.7 12/20/2018    HGB 14.6 03/12/2018    HCT 47.0 12/20/2018    HCT 44.8 03/12/2018     12/20/2018     03/12/2018     BMP:   Lab Results   Component Value Date     02/24/2021     12/04/2019    POTASSIUM 3.7 02/24/2021    POTASSIUM 3.9 12/04/2019    CHLORIDE 108 02/24/2021    CHLORIDE 105 12/04/2019    CO2 29 02/24/2021    CO2 30 12/04/2019    BUN 10 02/24/2021    BUN 12 12/04/2019    CR 1.01 02/24/2021    CR 1.07 12/04/2019     (H) 02/24/2021    GLC 80 12/04/2019     COAGS: No results found for: PTT, INR, FIBR  POC: No results found for: BGM, HCG, HCGS  HEPATIC:   Lab Results   Component Value Date    ALBUMIN 4.0 02/24/2021    PROTTOTAL 7.2 02/24/2021    ALT 46 02/24/2021    AST 24 02/24/2021    ALKPHOS 86 02/24/2021    BILITOTAL 0.3 02/24/2021     OTHER:   Lab Results   Component Value Date    A1C 6.1 (H) 02/24/2021    CHERIE 9.1 02/24/2021    MAG 2.0 02/24/2021    TSH 1.26 02/24/2021    CRP 7.4 03/12/2018    SED 5 12/20/2018       Anesthesia Plan    ASA Status:  2   NPO Status:  NPO Appropriate    Anesthesia Type: MAC.     - Reason for MAC: straight local not clinically adequate   Induction: Intravenous, Propofol.   Maintenance: TIVA.        Consents    Anesthesia Plan(s) and associated risks, benefits, and realistic alternatives discussed. Questions answered and patient/representative(s) expressed understanding.     - Discussed with:  Patient      - Extended Intubation/Ventilatory Support Discussed: No.      - Patient is DNR/DNI Status: No    Use of blood products discussed: No .     Postoperative Care            Comments:    Dr Antoine performed the H&P pre-op.  No change from my exam            RADHA Vilchis CRNA

## 2021-03-25 NOTE — TELEPHONE ENCOUNTER
Patient called back to schedule. He doesn't want to do another covid test so he will call back after he thinks about it for a while.

## 2021-03-25 NOTE — DISCHARGE INSTRUCTIONS
[facility]    Home Care Following Endoscopy          Activity:    You have just undergone an endoscopic procedure usually performed with conscious sedation.  Do not work or operate machinery (including a car) for at least 12 hours.      I encourage you to walk and attempt to pass this air as soon as possible.    Diet:    Return to the diet you were on before your procedure but eat lightly for the first 12-24 hours.    Drink plenty of water.    Resume any regular medications unless otherwise advised by your physician.  Please begin any new medication prescribed as a result of your procedure as directed by your physician.     If you had any biopsy or polyp removed please refrain from aspirin or aspirin products for 2 days.  If on Coumadin please restart as instructed by your physician.   Pain:    You may take Tylenol as needed for pain.  Expected Recovery:    You can expect some mild abdominal fullness and/or discomfort due to the air used to inflate your intestinal tract. It is also normal to have a mild sore throat after upper endoscopy.    Call Your Physician if You Have:    After Upper Endoscopy:  o Shoulder, back or chest pain.  o Difficulty breathing or swallowing.  o Vomiting blood.    After Colonoscopy:  o Worsening persisting abdominal pain which is worse with activity.  o Fevers (>101 degrees F), chills or shakes.  o Passage of continued blood with bowel movements.   Any questions or concerns about your recovery, please call [phone] or after hours [phone] Nurse Advice Line.    Follow-up Care:    You should receive a call or letter with your results within 1 week. Please call if you have not received a notification of your results.  If asked to return to clinic please make an appointment 1 week after your procedure.  Call [phone].

## 2021-03-29 LAB — COPATH REPORT: NORMAL

## 2021-03-30 ENCOUNTER — VIRTUAL VISIT (OUTPATIENT)
Dept: SURGERY | Facility: CLINIC | Age: 51
End: 2021-03-30
Payer: COMMERCIAL

## 2021-03-30 VITALS — BODY MASS INDEX: 40.43 KG/M2 | HEIGHT: 74 IN | WEIGHT: 315 LBS

## 2021-03-30 DIAGNOSIS — Z13.0 SCREENING FOR IRON DEFICIENCY ANEMIA: ICD-10-CM

## 2021-03-30 DIAGNOSIS — I10 HYPERTENSION GOAL BP (BLOOD PRESSURE) < 140/90: ICD-10-CM

## 2021-03-30 DIAGNOSIS — K21.9 GASTROESOPHAGEAL REFLUX DISEASE, UNSPECIFIED WHETHER ESOPHAGITIS PRESENT: ICD-10-CM

## 2021-03-30 DIAGNOSIS — R73.03 PREDIABETES: ICD-10-CM

## 2021-03-30 DIAGNOSIS — E66.01 MORBID OBESITY (H): Primary | ICD-10-CM

## 2021-03-30 DIAGNOSIS — G47.33 OSA (OBSTRUCTIVE SLEEP APNEA): ICD-10-CM

## 2021-03-30 PROCEDURE — 99204 OFFICE O/P NEW MOD 45 MIN: CPT | Mod: 95 | Performed by: FAMILY MEDICINE

## 2021-03-30 ASSESSMENT — MIFFLIN-ST. JEOR: SCORE: 2376.26

## 2021-03-30 NOTE — PROGRESS NOTES
"Humberto is a 51 year old who is being evaluated via a billable video visit.      If the video visit is dropped, the invitation should be resent by: Text to cell phone: [phone]  Will anyone else be joining your video visit? No      Video-Visit Details    Type of service:  Video Visit- converted to telephone visit due to technical issues    Originating Location (pt. Location): Home    Distant Location (provider location):  [facility] [place]     Platform used for Video Visit: [facility]     [facility] Bariatric Surgery Consultation Note    2021    RE: Humberto Roberts  MR#: [id number]  : 1970      Referring provider:       3/30/2021   Who referred you? Dr. Hester       Chief Complaint/Reason for visit: evaluation for possible weight loss surgery    Dear Calos Hester MD (General),    I had the pleasure of seeing your patient, Humberto Roberts, to evaluate his obesity and consider him for possible weight loss surgery. As you know, Humberto Roberts is 51 year old.  He has a height of 6' 2\", a weight of 320 lbs 0 oz, and calculated Body mass index is 41.09 kg/m .        HISTORY OF PRESENT ILLNESS:  Weight Loss History Reviewed with Patient 3/30/2021   How long have you been overweight? Since late 20's to early 40's   What is the most that you have ever weighed? 350   What is the most weight you have lost? 25- struggled with weight regain   I have tried the following methods to lose weight Watching portions or calories, Exercise, Prescription Medications   I have tried the following weight loss medications? (Check all that apply) Topamax/Topiramate- not helpful   Have you ever had weight loss surgery? No       CO-MORBIDITIES OF OBESITY INCLUDE:     3/30/2021   I have the following health issues associated with obesity: Pre-Diabetes, High Blood Pressure, High Cholesterol, Sleep Apnea, GERD (Reflux), Asthma, Stress Incontinence       PAST MEDICAL HISTORY:  Past Medical History: "   Diagnosis Date     Acid reflux disease since 2006     Back pain chronic     Cellulitis of left upper arm and forearm 11/22/2013     Hypertension        PAST SURGICAL HISTORY:  Past Surgical History:   Procedure Laterality Date     COLONOSCOPY N/A 3/25/2021    Procedure: Colonoscopy Incomplete;  Surgeon: Humberto Antoine DO;  Location:  GI     ENT SURGERY       ESOPHAGOSCOPY, GASTROSCOPY, DUODENOSCOPY (EGD), COMBINED N/A 3/25/2021    Procedure: Esophagogastroduodenoscopy with biopsy;  Surgeon: Humberto Antoine DO;  Location:  GI       FAMILY HISTORY:   Family History   Problem Relation Age of Onset     Diabetes Mother      Hypertension Mother      Heart Disease Mother      Heart Disease Father      Diabetes Father      Unknown/Adopted Maternal Grandmother      Unknown/Adopted Maternal Grandfather      Unknown/Adopted Paternal Grandmother      Unknown/Adopted Paternal Grandfather      No Known Problems Sister      No Known Problems Son      No Known Problems Daughter      No Known Problems Sister      No Known Problems Son      No Known Problems Daughter        SOCIAL HISTORY:   Social History Questions Reviewed With Patient 3/30/2021   Which best describes your employment status (select all that apply) I am unemployed   Which best describes your marital status:    Do you have children? Yes   Who do you have in your support network that can be available to help you for the first 2 weeks after surgery? wife   Who can you count on for support throughout your weight loss surgery journey? wife   Can you afford 3 meals a day?  Yes   Can you afford 50-60 dollars a month for vitamins? Yes       HABITS:     3/30/2021   How often do you drink alcohol? Never   Have you ever used any of the following nicotine products? No   Have you or are you currently using street drugs or prescription strength medication for which you do not have a prescription for? No   Do you have a history of chemical  dependency (alcohol or drug abuse)? No       PSYCHOLOGICAL HISTORY:   Psychological History Reviewed With Patient 3/30/2021   Have you ever attempted suicide? Never.   Have you had thoughts of suicide in the past year? No   Have you ever been hospitalized for mental illness or a suicide attempt? Never.   Do you have a history of chronic pain? Yes- arthritis in his back   Have you ever been diagnosed with fibromyalgia? No   Are you currently seeing a therapist or counselor?  No   Are you currently seeing a psychiatrist? No       ROS:     3/30/2021   Skin:  Leg swelling   HEENT: Dizziness/lightheadedness, Missing teeth   If you answered yes to missing teeth, please indicate how many: 1   Musculoskeletal: Joint Pain, Back pain, Limited mobility, Swelling of legs, Arthritis   Cardiovascular: Shortness of breath with activity   Pulmonary: Shortness of breath at rest, Shortness of breath with activity, Snoring, Awaken from sleep to catch your breath, People have told me I stop breathing while asleep. Known sleep apnea- uses CPAP intermitttently   Gastrointestinal: Heartburn, Reflux, Difficulty swallowing (food gets stuck)   Genitourinary: Stress incontinence (losing urine when coughing, sneezing, etc.)   Hematological: None of the above- no history of blood clot   Neurological: None of the above       EATING BEHAVIORS:     3/30/2021   Have you or anyone else thought that you had an eating disorder? No   Do you currently binge eat (eat a large amount of food in a short time)? Yes- everyday- 2 burgers and fries and large soda   Are you an emotional eater? Yes   Do you get up to eat after falling asleep? Yes       EXERCISE:     3/30/2021   How often do you exercise? 2 x per week   What is the duration of your exercise (in minutes)? 30 Minutes   What types of exercise do you do? running, weightlifting- at home   What keeps you from being more active?  Pain- back       MEDICATIONS:  Current Outpatient Medications   Medication  "Sig Dispense Refill     allopurinol (ZYLOPRIM) 100 MG tablet TAKE ONE TABLET BY MOUTH TWICE A DAY 60 tablet 0     ASPIRIN LOW DOSE 81 MG EC tablet TAKE ONE TABLET BY MOUTH ONCE DAILY 90 tablet 0     atenolol (TENORMIN) 50 MG tablet TAKE ONE TABLET BY MOUTH ONCE DAILY 90 tablet 1     cetirizine (ZYRTEC) 10 MG tablet TAKE ONE TABLET BY MOUTH ONCE DAILY AS NEEDED FOR ALLERGIES 30 tablet 5     colchicine (COLCYRS) 0.6 MG tablet TAKE 2 TABLETS BY MOUTH AT THE FIRST SIGN OF FLARE, TAKE 1-2 ADDITIONAL TABLETS PER DAY UNTIL SYMPTOMS IMPROVE THEN STOP THIS MEDICATION. DO NOT USE MORE THAN A FIVE DAYS 20 tablet 0     diclofenac (VOLTAREN) 75 MG EC tablet TAKE ONE TABLET BY MOUTH TWICE A DAY AS NEEDED FOR MODERATE PAIN 60 tablet 0     fluticasone (FLONASE) 50 MCG/ACT nasal spray SPRAY 1-2 SPRAYS IN EACH NOSTRIL EVERY DAY 16 g 5     hydrochlorothiazide (HYDRODIURIL) 25 MG tablet TAKE ONE TABLET BY MOUTH ONCE DAILY 30 tablet 5     order for DME Equipment being ordered: shower chair 1 Device 0     order for DME 1 Device Auto CPAP @@ 7-15 cm with heated humidity via mask of choice       pantoprazole (PROTONIX) 40 MG EC tablet Take 1 tablet (40 mg) by mouth daily Stop omeprazole 90 tablet 1     QUEtiapine (SEROQUEL) 25 MG tablet Take 1-2 tablets (25-50 mg) by mouth At Bedtime 60 tablet 0     tiZANidine (ZANAFLEX) 4 MG tablet TAKE ONE TABLET BY MOUTH EVERY NIGHT AT BEDTIME AND TAKE ONE TABLET BY MOUTH EVERY MORNING AS NEEDEd 180 tablet 0       ALLERGIES:  No Known Allergies    LABS/IMAGING/MEDICAL RECORDS REVIEW: normal recent tsh, cmp, slightly increased A1C, vitamin D    PHYSICAL EXAM:  Ht 1.88 m (6' 2\")   Wt 145.2 kg (320 lb)   BMI 41.09 kg/m    GENERAL: Healthy, alert and no distress    In summary, Humberto Roberts has Class III obesity with a body mass index of Body mass index is 41.09 kg/m . kg/m2 and the comorbidities stated above. He completed an informational seminar and is a candidate for the laparoscopic gastric bypass.  " He will have to complete the following pre-requisites:    Received weight loss goal of 5 lb prior to surgery. Goal weight to be determined at in person visit.  Achieve clearance from dietitian to see surgeon.  Have preoperative laboratory tests drawn.  Psychological Evaluation with MMPI and clearance for weight loss surgery.    Once the patient has completed the requirements in their task list and there are no further recommendations, the pt will be allowed to see the surgeon of her choice for consultation on the laparoscopic gastric bypass surgery. Patient verbalizes understanding of the process to surgery and expectations for the postoperative period including the need for lifelong lifestyle changes, vitamin supplementation, and laboratory monitoring.    Patient will return in 1-2 months for a face to face visit with me. At that time we will get an official weight, determine weight loss goals prior to surgery and do a physical exam. We will discuss the available weight loss surgeries including risks and benefits in more detail. He initially thought he wanted the sleeve gastrectomy but he has significant reflux so may not be a candidate. We will discuss this in more detail at his follow up visit.    Sincerely,     ADRI Linda MD    Total time spent on the date of this encounter doing: chart review, review of test results, patient visit, physical exam, education, counseling, developing plan of care and documenting = 46 minutes.

## 2021-03-30 NOTE — Clinical Note
Dear Calos Hester,    Thank you for referring Humberto for medical bariatric consultation. I have enclosed my office note for your review. Please contact me if you have any questions or concerns.     Thank you,  ADRI Linda MD

## 2021-03-30 NOTE — LETTER
Humberto Roberts  March 30, 2021        Bariatric Task List      Required Weight loss:    Lose 5 lbs prior to surgery.  Goal Weight: to be determined at in person visit   Tasks:  Have preoperative laboratory tests drawn.     Psychological Evaluation with MMPI and clearance for weight loss surgery.    Achieve clearance from dietitian to see surgeon.    Start using CPAP    Discuss alternatives to NSAIDS for pain control after surgery.        
No

## 2021-03-30 NOTE — PATIENT INSTRUCTIONS
Peng Paul,  It was nice talking with you today. Please call [phone] to set up a 30 minute in person visit with me in 1 month. You can have your labs drawn at any [facility] facility before then.   Alvina Roberts  March 30, 2021        Bariatric Task List      Required Weight loss:    Lose 5 lbs prior to surgery.  Goal Weight: to be determined at in person visit   Tasks:  Have preoperative laboratory tests drawn.     Psychological Evaluation with MMPI and clearance for weight loss surgery.    Achieve clearance from dietitian to see surgeon.    Start using CPAP    Discuss alternatives to NSAIDS for pain control after surgery.

## 2021-04-17 ENCOUNTER — HEALTH MAINTENANCE LETTER (OUTPATIENT)
Age: 51
End: 2021-04-17

## 2021-04-26 ENCOUNTER — HOSPITAL ENCOUNTER (EMERGENCY)
Facility: CLINIC | Age: 51
Discharge: HOME | End: 2021-04-26
Attending: FAMILY MEDICINE | Admitting: FAMILY MEDICINE
Payer: COMMERCIAL

## 2021-04-26 ENCOUNTER — APPOINTMENT (OUTPATIENT)
Dept: GENERAL RADIOLOGY | Facility: CLINIC | Age: 51
End: 2021-04-26
Attending: FAMILY MEDICINE
Payer: COMMERCIAL

## 2021-04-26 VITALS
HEART RATE: 104 BPM | RESPIRATION RATE: 20 BRPM | DIASTOLIC BLOOD PRESSURE: 81 MMHG | BODY MASS INDEX: 41.09 KG/M2 | OXYGEN SATURATION: 97 % | WEIGHT: 315 LBS | TEMPERATURE: 98.7 F | SYSTOLIC BLOOD PRESSURE: 151 MMHG

## 2021-04-26 DIAGNOSIS — J02.9 ACUTE PHARYNGITIS, UNSPECIFIED ETIOLOGY: ICD-10-CM

## 2021-04-26 DIAGNOSIS — Z11.59 ENCOUNTER FOR SCREENING FOR OTHER VIRAL DISEASES: ICD-10-CM

## 2021-04-26 DIAGNOSIS — R50.9 ACUTE FEBRILE ILLNESS: ICD-10-CM

## 2021-04-26 DIAGNOSIS — Z20.822 SUSPECTED COVID-19 VIRUS INFECTION: ICD-10-CM

## 2021-04-26 LAB
ALBUMIN SERPL-MCNC: 3.9 G/DL (ref 3.4–5)
ALBUMIN UR-MCNC: 30 MG/DL
ALP SERPL-CCNC: 81 U/L (ref 40–150)
ALT SERPL W P-5'-P-CCNC: 47 U/L (ref 0–70)
ANION GAP SERPL CALCULATED.3IONS-SCNC: 7 MMOL/L (ref 3–14)
APPEARANCE UR: CLEAR
AST SERPL W P-5'-P-CCNC: 33 U/L (ref 0–45)
BASOPHILS # BLD AUTO: 0.1 10E9/L (ref 0–0.2)
BASOPHILS NFR BLD AUTO: 0.3 %
BILIRUB SERPL-MCNC: 0.6 MG/DL (ref 0.2–1.3)
BILIRUB UR QL STRIP: NEGATIVE
BUN SERPL-MCNC: 14 MG/DL (ref 7–30)
CALCIUM SERPL-MCNC: 9.2 MG/DL (ref 8.5–10.1)
CHLORIDE SERPL-SCNC: 100 MMOL/L (ref 94–109)
CO2 SERPL-SCNC: 29 MMOL/L (ref 20–32)
COLOR UR AUTO: YELLOW
CREAT SERPL-MCNC: 1.27 MG/DL (ref 0.66–1.25)
CRP SERPL-MCNC: 215 MG/L (ref 0–8)
DEPRECATED S PYO AG THROAT QL EIA: NEGATIVE
DIFFERENTIAL METHOD BLD: ABNORMAL
EOSINOPHIL # BLD AUTO: 0 10E9/L (ref 0–0.7)
EOSINOPHIL NFR BLD AUTO: 0 %
ERYTHROCYTE [DISTWIDTH] IN BLOOD BY AUTOMATED COUNT: 14 % (ref 10–15)
FLUAV RNA RESP QL NAA+PROBE: NEGATIVE
FLUBV RNA RESP QL NAA+PROBE: NEGATIVE
GFR SERPL CREATININE-BSD FRML MDRD: 65 ML/MIN/{1.73_M2}
GLUCOSE SERPL-MCNC: 126 MG/DL (ref 70–99)
GLUCOSE UR STRIP-MCNC: NEGATIVE MG/DL
HCT VFR BLD AUTO: 46.9 % (ref 40–53)
HGB BLD-MCNC: 15.7 G/DL (ref 13.3–17.7)
HGB UR QL STRIP: ABNORMAL
IMM GRANULOCYTES # BLD: 0.2 10E9/L (ref 0–0.4)
IMM GRANULOCYTES NFR BLD: 1.2 %
KETONES UR STRIP-MCNC: NEGATIVE MG/DL
LABORATORY COMMENT REPORT: NORMAL
LACTATE BLD-SCNC: 1.7 MMOL/L (ref 0.7–2)
LEUKOCYTE ESTERASE UR QL STRIP: NEGATIVE
LYMPHOCYTES # BLD AUTO: 2.2 10E9/L (ref 0.8–5.3)
LYMPHOCYTES NFR BLD AUTO: 12.3 %
MCH RBC QN AUTO: 27.8 PG (ref 26.5–33)
MCHC RBC AUTO-ENTMCNC: 33.5 G/DL (ref 31.5–36.5)
MCV RBC AUTO: 83 FL (ref 78–100)
MONOCYTES # BLD AUTO: 1.3 10E9/L (ref 0–1.3)
MONOCYTES NFR BLD AUTO: 7.2 %
MUCOUS THREADS #/AREA URNS LPF: PRESENT /LPF
NEUTROPHILS # BLD AUTO: 14.1 10E9/L (ref 1.6–8.3)
NEUTROPHILS NFR BLD AUTO: 79 %
NITRATE UR QL: NEGATIVE
NRBC # BLD AUTO: 0 10*3/UL
NRBC BLD AUTO-RTO: 0 /100
PH UR STRIP: 6 PH (ref 5–7)
PLATELET # BLD AUTO: 245 10E9/L (ref 150–450)
POTASSIUM SERPL-SCNC: 3.7 MMOL/L (ref 3.4–5.3)
PROT SERPL-MCNC: 8.2 G/DL (ref 6.8–8.8)
RBC # BLD AUTO: 5.65 10E12/L (ref 4.4–5.9)
RBC #/AREA URNS AUTO: 3 /HPF (ref 0–2)
RSV RNA SPEC QL NAA+PROBE: NORMAL
SARS-COV-2 RNA RESP QL NAA+PROBE: NEGATIVE
SODIUM SERPL-SCNC: 136 MMOL/L (ref 133–144)
SOURCE: ABNORMAL
SP GR UR STRIP: 1.02 (ref 1–1.03)
SPECIMEN SOURCE: NORMAL
SPECIMEN SOURCE: NORMAL
UROBILINOGEN UR STRIP-MCNC: 4 MG/DL (ref 0–2)
WBC # BLD AUTO: 17.9 10E9/L (ref 4–11)
WBC #/AREA URNS AUTO: <1 /HPF (ref 0–5)

## 2021-04-26 PROCEDURE — 99284 EMERGENCY DEPT VISIT MOD MDM: CPT | Mod: 25 | Performed by: FAMILY MEDICINE

## 2021-04-26 PROCEDURE — 258N000003 HC RX IP 258 OP 636: Performed by: EMERGENCY MEDICINE

## 2021-04-26 PROCEDURE — 81001 URINALYSIS AUTO W/SCOPE: CPT | Performed by: FAMILY MEDICINE

## 2021-04-26 PROCEDURE — 99283 EMERGENCY DEPT VISIT LOW MDM: CPT | Performed by: FAMILY MEDICINE

## 2021-04-26 PROCEDURE — 71045 X-RAY EXAM CHEST 1 VIEW: CPT

## 2021-04-26 PROCEDURE — C9803 HOPD COVID-19 SPEC COLLECT: HCPCS | Performed by: FAMILY MEDICINE

## 2021-04-26 PROCEDURE — 86140 C-REACTIVE PROTEIN: CPT | Performed by: EMERGENCY MEDICINE

## 2021-04-26 PROCEDURE — 80053 COMPREHEN METABOLIC PANEL: CPT | Performed by: EMERGENCY MEDICINE

## 2021-04-26 PROCEDURE — 83605 ASSAY OF LACTIC ACID: CPT | Performed by: EMERGENCY MEDICINE

## 2021-04-26 PROCEDURE — 999N001174 HC STATISTIC STREP A RAPID: Performed by: FAMILY MEDICINE

## 2021-04-26 PROCEDURE — 250N000013 HC RX MED GY IP 250 OP 250 PS 637: Performed by: FAMILY MEDICINE

## 2021-04-26 PROCEDURE — 87636 SARSCOV2 & INF A&B AMP PRB: CPT | Performed by: FAMILY MEDICINE

## 2021-04-26 PROCEDURE — 85025 COMPLETE CBC W/AUTO DIFF WBC: CPT | Performed by: EMERGENCY MEDICINE

## 2021-04-26 PROCEDURE — 87040 BLOOD CULTURE FOR BACTERIA: CPT | Performed by: FAMILY MEDICINE

## 2021-04-26 PROCEDURE — 87651 STREP A DNA AMP PROBE: CPT | Performed by: FAMILY MEDICINE

## 2021-04-26 PROCEDURE — 84145 PROCALCITONIN (PCT): CPT | Performed by: FAMILY MEDICINE

## 2021-04-26 PROCEDURE — 999N000105 HC STATISTIC NO DOCUMENTATION TO SUPPORT CHARGE: Performed by: FAMILY MEDICINE

## 2021-04-26 RX ORDER — HYDROCODONE BITARTRATE AND ACETAMINOPHEN 5; 325 MG/1; MG/1
1-2 TABLET ORAL EVERY 6 HOURS PRN
Status: DISCONTINUED | OUTPATIENT
Start: 2021-04-26 | End: 2021-04-27 | Stop reason: HOSPADM

## 2021-04-26 RX ORDER — HYDROCODONE BITARTRATE AND ACETAMINOPHEN 5; 325 MG/1; MG/1
2 TABLET ORAL ONCE
Status: COMPLETED | OUTPATIENT
Start: 2021-04-26 | End: 2021-04-26

## 2021-04-26 RX ORDER — HYDROCODONE BITARTRATE AND ACETAMINOPHEN 5; 325 MG/1; MG/1
1-2 TABLET ORAL EVERY 6 HOURS PRN
Qty: 4 TABLET | Refills: 0 | Status: SHIPPED | OUTPATIENT
Start: 2021-04-26 | End: 2021-04-26

## 2021-04-26 RX ORDER — HYDROCODONE BITARTRATE AND ACETAMINOPHEN 5; 325 MG/1; MG/1
1-2 TABLET ORAL EVERY 6 HOURS PRN
Qty: 10 TABLET | Refills: 0 | Status: SHIPPED | OUTPATIENT
Start: 2021-04-26 | End: 2021-04-28

## 2021-04-26 RX ADMIN — HYDROCODONE BITARTRATE AND ACETAMINOPHEN 2 TABLET: 5; 325 TABLET ORAL at 21:25

## 2021-04-26 RX ADMIN — HYDROCODONE BITARTRATE AND ACETAMINOPHEN 2 TABLET: 5; 325 TABLET ORAL at 21:47

## 2021-04-26 RX ADMIN — SODIUM CHLORIDE 1000 ML: 9 INJECTION, SOLUTION INTRAVENOUS at 20:11

## 2021-04-27 ENCOUNTER — TELEPHONE (OUTPATIENT)
Dept: EMERGENCY MEDICINE | Facility: CLINIC | Age: 51
End: 2021-04-27

## 2021-04-27 DIAGNOSIS — J02.0 STREPTOCOCCAL PHARYNGITIS: ICD-10-CM

## 2021-04-27 LAB
PROCALCITONIN SERPL-MCNC: 0.31 NG/ML
SPECIMEN SOURCE: ABNORMAL
STREP GROUP A PCR: ABNORMAL

## 2021-04-27 RX ORDER — PENICILLIN V POTASSIUM 500 MG/1
500 TABLET, FILM COATED ORAL 2 TIMES DAILY
Qty: 20 TABLET | Refills: 0 | Status: SHIPPED | OUTPATIENT
Start: 2021-04-27 | End: 2021-05-07

## 2021-04-27 NOTE — ED NOTES
Pt appears short of breath with activity. Pt 's with activity. MD notified. Pt states he doesn't feel short of breath however.

## 2021-04-27 NOTE — TELEPHONE ENCOUNTER
[facility] Emergency Department Lab result notification [Adult-Male]    Likely ED lab result protocol used  Group A strep    Reason for call  Notify of lab results, assess symptoms,  review ED providers recommendations/discharge instructions (if necessary) and advise per ED lab result f/u protocol    Lab Result (including Rx patient on, if applicable)  Group A Streptococcus PCR is POSITIVE.  [facility] Emergency Dept discharge antibiotic: None  Recommendations in Treatment per [facility] ED Lab Result Strep group A protocol.     Information table from Emergency Dept Provider visit on 4/26/21  Symptoms reported at ED visit (Chief complaint, HPI)  Pharyngitis      HPI:  Humberto Roberts is a 51 year old male who presented to the emergency department with 2-day history of acute febrile illness initially starting with headache and scratchy sore throat.  Symptoms progressed to fever, body aches, and continued sore throat.  He denies any significant cough or shortness of breath.  There has been no nausea, vomiting or diarrhea, chest pain, joint pain or rash.  Patient states that he is disabled and has not been in contact with any body with coronavirus or any other illness.  He was at the [facility] and was exposed to some cold temperatures.  He has a history of prediabetes, obstructive sleep apnea, hypertension and GERD.  Patient denies any significant dizziness, fatigue, loss of taste or smell.  He was able to eat soup this evening that went down smoothly.  Additional past history include chronic back pain treated with anti-inflammatory medications, and gout.     Significant Medical hx, if applicable (i.e. CKD, diabetes) Reviewed   Allergies No Known Allergies   Weight, if applicable Wt Readings from Last 2 Encounters:   04/26/21 145.2 kg (320 lb)   03/30/21 145.2 kg (320 lb)      Coumadin/Warfarin [Yes /No] No   Creatinine Level (mg/dl) Creatinine   Date Value Ref Range Status   04/26/2021  1.27 (H) 0.66 - 1.25 mg/dL Final      Creatinine clearance (ml/min), if applicable Serum creatinine: 1.27 mg/dL (H) 04/26/21 1938  Estimated creatinine clearance: 104.5 mL/min (A)   ED providers Impression and Plan (applicable information) Humberto Roberts is a 51 year old male who presented to the emergency department with 2-day history of acute febrile illness with pounding headache and sore throat.  Pain is rated 7/10 and to the point where he could not sleep the last 2 nights.  His symptoms progressed to fever, body aches and continued sore throat.  He was not able to eat bread as it was too painful but was able to have some soup tonight.  He does not have any trismus, cough, shortness of breath, chest pain, etc.  There has been no known sick exposure although he later added that he had an upper endoscopy/colonoscopy for 4 weeks ago.  Patient denies loss of smell or taste.  Vital signs reveal a temp of 103.1, blood pressure 181/102, heart rate of 128, respiratory rate of 18 with 96% oxygen saturation.  Exam reveals mild oropharyngeal injection, no trismus, no uvular deviation to suggest peritonsillar abscess.  No nuchal rigidity, lungs are clear.  Cardiovascular exam rapid heart rate but regular rhythm, abdomen is soft and nontender.  Laboratory work-up reveals an elevated white blood count of 17.9 with 79% neutrophils.  Hemoglobin is 15.7.  Comprehensive metabolic panel was normal except for nonfasting glucose of 126.  Creatinine is elevated at 1.27 and BUN is 14.  Normal liver enzymes.  Lactic acid levels 1.7.  Rapid strep is negative.  COVID-19 PCR is negative for influenza and SARS-CoV-2 PCR it CRP is markedly elevated at 215.  Portable chest x-ray reveals no acute cardiopulmonary disease.     Patient received a bolus of normal saline, headache and throat pain improved to a 7/10.  We discussed the possibility that he may still have COVID-19 infection given the recent surge in cases.  Patient's urinalysis  reveals 3 red blood cells and less than 1 white blood cell.  It does appear to be fairly concentrated with specific gravity of 1.019.  Patient was encouraged to drink more fluids.  Add Norco at nighttime to help with headache and throat pain.  Take ibuprofen for fever during the day and add Tylenol for breakthrough symptoms.  Patient expressed understanding and agreement with this plan.  Please see discharge instructions below.   ED diagnosis Acute febrile illness   Acute pharyngitis, unspecified etiology   Suspected COVID-19 virus infection      ED provider Aguilar Naidu MD      RN Assessment (Patient s current Symptoms), include time called.  [Insert Left message here if message left]  12:34PM: Patient returned call. States that he is doing better. Is swallowing without difficulty. States that his fever has gone down.     RN Recommendations/Instructions per [facility] ED lab result protocol  Patient notified of lab result and treatment recommendations.  Rx for Penicillin V Potassium (VEETID) 500 MG tablet,  1 tablet (500 mg) by mouth 2 times daily for 10 days sent to [[facility]arm[facility] - [email or web address]  RN reviewed information about strep throat from protocol RN reference guide.  Advised to complete the full 10 days of the antibiotic.  Advised to follow up with the PCP as directed by the ED provider.  The patient is comfortable with the information given and has no further questions.      Please Contact your PCP clinic or return to the Emergency department if your:    Symptoms worsen or other concerning symptom's.    PCP follow-up Questions asked: YES       Ginny Goodman RN  [facility]  Emergency Dept Lab Result RN  Ph# [phone]     Copy of Lab result  Group A Streptococcus PCR Throat Swab  Order: [id number]  Status:  Final result   Visible to patient:  Yes (MyChart) Dx:  Acute febrile illness  Specimen Information: Throat        (important suggestion)  Newer results  are available. Click to view them now.     Ref Range & Units 1d ago Resulting Agency    Specimen Description  Throat  [facility]    Strep Group A PCR NDET^Not Detected Detected, Abnormal ResultAbnormal   [facility]   Comment: Group A Streptococcus DNA detected.   FDA approved assay performed using [employer] GeneXpert real-time PCR.          Specimen Collected: 04/26/21  7:38 PM Last Resulted: 04/27/21  4:24 AM

## 2021-04-27 NOTE — DISCHARGE INSTRUCTIONS
You have an acute illness with high fever.  I suspect that you may still have COVID-19 despite your negative test tonight.  You have blood cultures pending to rule out bacterial infection.  Closely monitor for further symptoms.  Return to the emergency department if you develop new or worsening symptoms.  Continue to take Motrin up to 600 mg 4 times a day for fever and body aches.  Add Tylenol for breakthrough pain during the day and reserve Vicodin for nighttime use.

## 2021-04-27 NOTE — ED PROVIDER NOTES
[facility] ED Provider Note   Patient: Humberto Roberts  MRN #:  [id number]  Date of Visit: April 26, 2021    CC:     Chief Complaint   Patient presents with     Pharyngitis     HPI:  Humberto Roberts is a 51 year old male who presented to the emergency department with 2-day history of acute febrile illness initially starting with headache and scratchy sore throat.  Symptoms progressed to fever, body aches, and continued sore throat.  He denies any significant cough or shortness of breath.  There has been no nausea, vomiting or diarrhea, chest pain, joint pain or rash.  Patient states that he is disabled and has not been in contact with any body with coronavirus or any other illness.  He was at the [facility] and was exposed to some cold temperatures.  He has a history of prediabetes, obstructive sleep apnea, hypertension and GERD.  Patient denies any significant dizziness, fatigue, loss of taste or smell.  He was able to eat soup this evening that went down smoothly.  Additional past history include chronic back pain treated with anti-inflammatory medications, and gout.    Problem List:  Patient Active Problem List    Diagnosis Date Noted     Acanthosis nigricans 02/25/2021     Priority: Medium     Skin tag 02/25/2021     Priority: Medium     Pre-diabetes 02/25/2021     Priority: Medium     Vitamin D deficiency 12/08/2019     Priority: Medium     Hyperuricemia 12/08/2019     Priority: Medium     RICARDO (obstructive sleep apnea) - on cpap 09/22/2019     Priority: Medium     Chronic bilateral low back pain without sciatica 09/22/2019     Priority: Medium     Seasonal allergic rhinitis due to pollen, unspecified chronicity 01/23/2018     Priority: Medium     Morbid obesity (H) 12/06/2017     Priority: Medium     Elevated serum creatinine 10/15/2015     Priority: Medium     Insomnia 10/15/2015     Priority: Medium     Hypertension goal BP (blood pressure) <  140/90 05/06/2013     Priority: Medium     GERD (gastroesophageal reflux disease) 04/24/2013     Priority: Medium       Past Medical History:   Diagnosis Date     Acid reflux disease since 2006     Back pain chronic     Cellulitis of left upper arm and forearm 11/22/2013     Hypertension        MEDS: HYDROcodone-acetaminophen (NORCO) 5-325 MG tablet  allopurinol (ZYLOPRIM) 100 MG tablet  ASPIRIN LOW DOSE 81 MG EC tablet  atenolol (TENORMIN) 50 MG tablet  cetirizine (ZYRTEC) 10 MG tablet  colchicine (COLCYRS) 0.6 MG tablet  diclofenac (VOLTAREN) 75 MG EC tablet  fluticasone (FLONASE) 50 MCG/ACT nasal spray  hydrochlorothiazide (HYDRODIURIL) 25 MG tablet  order for DME  order for DME  pantoprazole (PROTONIX) 40 MG EC tablet  QUEtiapine (SEROQUEL) 25 MG tablet  tiZANidine (ZANAFLEX) 4 MG tablet        ALLERGIES:  No Known Allergies    Past Surgical History:   Procedure Laterality Date     COLONOSCOPY N/A 3/25/2021    Procedure: Colonoscopy Incomplete;  Surgeon: Humberto Antoine DO;  Location:  GI     ENT SURGERY       ESOPHAGOSCOPY, GASTROSCOPY, DUODENOSCOPY (EGD), COMBINED N/A 3/25/2021    Procedure: Esophagogastroduodenoscopy with biopsy;  Surgeon: Humberto Antoine DO;  Location:  GI       Social History     Tobacco Use     Smoking status: Never Smoker     Smokeless tobacco: Never Used   Substance Use Topics     Alcohol use: No     Alcohol/week: 0.0 standard drinks     Drug use: No         Review of Systems   Except as noted in HPI, all other systems were reviewed and are negative    Physical Exam     Vitals were reviewed  Patient Vitals for the past 12 hrs:   BP Temp Temp src Pulse Resp SpO2 Weight   04/26/21 2200 (!) 151/81 101.4  F (38.6  C) Temporal 104 16 97 % --   04/26/21 2150 -- -- -- 110 20 97 % --   04/26/21 2130 (!) 148/112 -- -- 120 -- -- --   04/26/21 2125 (!) 157/97 -- -- 110 24 -- --   04/26/21 1910 (!) 181/102 103.1  F (39.5  C) Oral 128 18 96 % 145.2 kg (320 lb)     GENERAL  APPEARANCE: Alert and oriented x3, mild to moderate distress due to febrile illness, no acute respiratory distress  FACE: normal facies  EYES: Pupils are equal; sclerae nonicteric  HENT: normal external exam  NECK: no adenopathy or asymmetry  RESP: normal respiratory effort; clear breath sounds bilaterally  CV: Rapid rate, regular rhythm; no significant murmurs, gallops or rubs  ABD: soft, obese, no tenderness; no rebound or guarding; bowel sounds are normal  MS: no gross deformities noted; normal muscle tone.  EXT: No calf tenderness or pitting edema; good capillary refill,  SKIN: no worrisome rash  NEURO: no facial droop; no focal deficits, speech is normal  PSYCH: normal mood and affect      Available Lab/Imaging Results     Results for orders placed or performed during the hospital encounter of 04/26/21 (from the past 24 hour(s))   Comprehensive metabolic panel   Result Value Ref Range    Sodium 136 133 - 144 mmol/L    Potassium 3.7 3.4 - 5.3 mmol/L    Chloride 100 94 - 109 mmol/L    Carbon Dioxide 29 20 - 32 mmol/L    Anion Gap 7 3 - 14 mmol/L    Glucose 126 (H) 70 - 99 mg/dL    Urea Nitrogen 14 7 - 30 mg/dL    Creatinine 1.27 (H) 0.66 - 1.25 mg/dL    GFR Estimate 65 >60 mL/min/[1.73_m2]    GFR Estimate If Black 75 >60 mL/min/[1.73_m2]    Calcium 9.2 8.5 - 10.1 mg/dL    Bilirubin Total 0.6 0.2 - 1.3 mg/dL    Albumin 3.9 3.4 - 5.0 g/dL    Protein Total 8.2 6.8 - 8.8 g/dL    Alkaline Phosphatase 81 40 - 150 U/L    ALT 47 0 - 70 U/L    AST 33 0 - 45 U/L   CBC with platelets differential   Result Value Ref Range    WBC 17.9 (H) 4.0 - 11.0 10e9/L    RBC Count 5.65 4.4 - 5.9 10e12/L    Hemoglobin 15.7 13.3 - 17.7 g/dL    Hematocrit 46.9 40.0 - 53.0 %    MCV 83 78 - 100 fl    MCH 27.8 26.5 - 33.0 pg    MCHC 33.5 31.5 - 36.5 g/dL    RDW 14.0 10.0 - 15.0 %    Platelet Count 245 150 - 450 10e9/L    Diff Method Automated Method     % Neutrophils 79.0 %    % Lymphocytes 12.3 %    % Monocytes 7.2 %    % Eosinophils 0.0 %     % Basophils 0.3 %    % Immature Granulocytes 1.2 %    Nucleated RBCs 0 0 /100    Absolute Neutrophil 14.1 (H) 1.6 - 8.3 10e9/L    Absolute Lymphocytes 2.2 0.8 - 5.3 10e9/L    Absolute Monocytes 1.3 0.0 - 1.3 10e9/L    Absolute Eosinophils 0.0 0.0 - 0.7 10e9/L    Absolute Basophils 0.1 0.0 - 0.2 10e9/L    Abs Immature Granulocytes 0.2 0 - 0.4 10e9/L    Absolute Nucleated RBC 0.0    Lactate for Sepsis Protocol   Result Value Ref Range    Lactate for Sepsis Protocol 1.7 0.7 - 2.0 mmol/L   Symptomatic Influenza A/B & SARS-CoV2 (COVID-19) Virus PCR Multiplex    Specimen: Nasopharyngeal   Result Value Ref Range    Flu A/B & SARS-COV-2 PCR Source Nasopharyngeal     SARS-CoV-2 PCR Result NEGATIVE     Influenza A PCR Negative NEG^Negative    Influenza B PCR Negative NEG^Negative    Respiratory Syncytial Virus PCR (Note)     Flu A/B & SARS-CoV-2 PCR Comment (Note)    Streptococcus A Rapid Scr w Reflx to PCR    Specimen: Throat   Result Value Ref Range    Strep Specimen Description Throat     Streptococcus Group A Rapid Screen Negative NEG^Negative   CRP inflammation   Result Value Ref Range    CRP Inflammation 215.0 (H) 0.0 - 8.0 mg/L   XR Chest Port 1 View    Narrative    XR PORTABLE CHEST ONE VIEW   4/26/2021 9:01 PM     HISTORY: Fever, sore throat, body aches.    COMPARISON: None.      Impression    IMPRESSION: No acute cardiopulmonary disease.    IVANIA ROSARIO MD   UA reflex to Microscopic   Result Value Ref Range    Color Urine Yellow     Appearance Urine Clear     Glucose Urine Negative NEG^Negative mg/dL    Bilirubin Urine Negative NEG^Negative    Ketones Urine Negative NEG^Negative mg/dL    Specific Gravity Urine 1.019 1.003 - 1.035    Blood Urine Small (A) NEG^Negative    pH Urine 6.0 5.0 - 7.0 pH    Protein Albumin Urine 30 (A) NEG^Negative mg/dL    Urobilinogen mg/dL 4.0 (H) 0.0 - 2.0 mg/dL    Nitrite Urine Negative NEG^Negative    Leukocyte Esterase Urine Negative NEG^Negative    Source Unspecified Urine     RBC  Urine 3 (H) 0 - 2 /HPF    WBC Urine <1 0 - 5 /HPF    Mucous Urine Present (A) NEG^Negative /LPF                Impression     Final diagnoses:   Acute febrile illness   Acute pharyngitis, unspecified etiology   Suspected COVID-19 virus infection         ED Course & Medical Decision Making   Humberto Roberts is a 51 year old male who presented to the emergency department with 2-day history of acute febrile illness with pounding headache and sore throat.  Pain is rated 7/10 and to the point where he could not sleep the last 2 nights.  His symptoms progressed to fever, body aches and continued sore throat.  He was not able to eat bread as it was too painful but was able to have some soup tonight.  He does not have any trismus, cough, shortness of breath, chest pain, etc.  There has been no known sick exposure although he later added that he had an upper endoscopy/colonoscopy for 4 weeks ago.  Patient denies loss of smell or taste.  Vital signs reveal a temp of 103.1, blood pressure 181/102, heart rate of 128, respiratory rate of 18 with 96% oxygen saturation.  Exam reveals mild oropharyngeal injection, no trismus, no uvular deviation to suggest peritonsillar abscess.  No nuchal rigidity, lungs are clear.  Cardiovascular exam rapid heart rate but regular rhythm, abdomen is soft and nontender.  Laboratory work-up reveals an elevated white blood count of 17.9 with 79% neutrophils.  Hemoglobin is 15.7.  Comprehensive metabolic panel was normal except for nonfasting glucose of 126.  Creatinine is elevated at 1.27 and BUN is 14.  Normal liver enzymes.  Lactic acid levels 1.7.  Rapid strep is negative.  COVID-19 PCR is negative for influenza and SARS-CoV-2 PCR it CRP is markedly elevated at 215.  Portable chest x-ray reveals no acute cardiopulmonary disease.    Patient received a bolus of normal saline, headache and throat pain improved to a 7/10.  We discussed the possibility that he may still have COVID-19 infection given  the recent surge in cases.  Patient's urinalysis reveals 3 red blood cells and less than 1 white blood cell.  It does appear to be fairly concentrated with specific gravity of 1.019.  Patient was encouraged to drink more fluids.  Add Norco at nighttime to help with headache and throat pain.  Take ibuprofen for fever during the day and add Tylenol for breakthrough symptoms.  Patient expressed understanding and agreement with this plan.  Please see discharge instructions below.    Medications   sodium chloride (PF) 0.9% PF flush 3 mL (has no administration in time range)   sodium chloride (PF) 0.9% PF flush 3 mL (has no administration in time range)   HYDROcodone-acetaminophen (NORCO) 5-325 MG per tablet 1-2 tablet (2 tablets Oral Given 4/26/21 2147)   0.9% sodium chloride BOLUS (0 mLs Intravenous Stopped 4/26/21 2125)   HYDROcodone-acetaminophen (NORCO) 5-325 MG per tablet 2 tablet (2 tablets Oral Given 4/26/21 2125)          Written after-visit summary and instructions were given at the time of discharge.    Follow up Plan:   [facility] Emergency Dept  [address] [place] [address] [phone]    If symptoms worsen      Discharge Instructions:   You have an acute illness with high fever.  I suspect that you may still have COVID-19 despite your negative test tonight.  You have blood cultures pending to rule out bacterial infection.  Closely monitor for further symptoms.  Return to the emergency department if you develop new or worsening symptoms.  Continue to take Motrin up to 600 mg 4 times a day for fever and body aches.  Add Tylenol for breakthrough pain during the day and reserve Vicodin for nighttime use.       Disclaimer: This note consists of words and symbols derived from keyboarding and dictation using voice recognition software.  As a result, there may be errors that have gone undetected.  Please consider this when interpreting information found in this note.       Evangelista  Aguilar Romeo MD  04/26/21 [id number]

## 2021-05-03 LAB
BACTERIA SPEC CULT: NO GROWTH
BACTERIA SPEC CULT: NO GROWTH
Lab: NORMAL
Lab: NORMAL
SPECIMEN SOURCE: NORMAL
SPECIMEN SOURCE: NORMAL

## 2021-05-11 ENCOUNTER — TELEPHONE (OUTPATIENT)
Dept: FAMILY MEDICINE | Facility: CLINIC | Age: 51
End: 2021-05-11

## 2021-05-11 NOTE — TELEPHONE ENCOUNTER
Reason for Call:  Request for results:    Name of test or procedure: White blood cell count    Date of test of procedure: 3/22    Location of the test or procedure: [facility]    OK to leave the result message on voice mail or with a family member? YES    Phone number Patient can be reached at:  Cell number on file:    Telephone Information:   Mobile [phone]       Additional comments: If leaving voice mail please explain if it is to high or low, and what to do to fix it. Said he keeps missing the calls.    Call taken on 5/11/2021 at 3:15 PM by Shanon Haley

## 2021-05-12 NOTE — TELEPHONE ENCOUNTER
Patient informed this test was not completed on this date. Last CBC completed when patient was in the ER and he had strep throat.   Patient asked about vitamin D level and patient was given last message from Dr. Hester.   No further questions at this time.   Margarita Tidwell CMA ([place])

## 2021-05-13 DIAGNOSIS — Z11.59 ENCOUNTER FOR SCREENING FOR OTHER VIRAL DISEASES: ICD-10-CM

## 2021-05-26 NOTE — TELEPHONE ENCOUNTER
Called patient about missing the COVID test, patient states that he forgot and was out mowing, when he realized it, it was too late. Canceled procedure for 5/27/21 and rescheduled for 6/24/21. Patient states still has prep instructions and dosen't need new ones.

## 2021-05-30 DIAGNOSIS — Z11.59 ENCOUNTER FOR SCREENING FOR OTHER VIRAL DISEASES: ICD-10-CM

## 2021-06-16 ENCOUNTER — OFFICE VISIT (OUTPATIENT)
Dept: FAMILY MEDICINE | Facility: CLINIC | Age: 51
End: 2021-06-16
Payer: COMMERCIAL

## 2021-06-16 VITALS
TEMPERATURE: 97.6 F | BODY MASS INDEX: 41.57 KG/M2 | WEIGHT: 315 LBS | SYSTOLIC BLOOD PRESSURE: 108 MMHG | DIASTOLIC BLOOD PRESSURE: 84 MMHG | RESPIRATION RATE: 18 BRPM | OXYGEN SATURATION: 98 % | HEART RATE: 68 BPM

## 2021-06-16 DIAGNOSIS — B35.1 DERMATOPHYTOSIS OF NAIL: ICD-10-CM

## 2021-06-16 DIAGNOSIS — L91.8 SKIN TAG: Primary | ICD-10-CM

## 2021-06-16 DIAGNOSIS — G47.33 OSA (OBSTRUCTIVE SLEEP APNEA): ICD-10-CM

## 2021-06-16 DIAGNOSIS — E66.01 MORBID OBESITY (H): ICD-10-CM

## 2021-06-16 DIAGNOSIS — R21 RASH AND NONSPECIFIC SKIN ERUPTION: ICD-10-CM

## 2021-06-16 DIAGNOSIS — I10 HYPERTENSION GOAL BP (BLOOD PRESSURE) < 140/90: ICD-10-CM

## 2021-06-16 DIAGNOSIS — L83 ACANTHOSIS NIGRICANS: ICD-10-CM

## 2021-06-16 DIAGNOSIS — M54.50 CHRONIC BILATERAL LOW BACK PAIN WITHOUT SCIATICA: ICD-10-CM

## 2021-06-16 DIAGNOSIS — R73.03 PRE-DIABETES: ICD-10-CM

## 2021-06-16 DIAGNOSIS — G89.29 CHRONIC BILATERAL LOW BACK PAIN WITHOUT SCIATICA: ICD-10-CM

## 2021-06-16 LAB
KOH PREP SPEC: NORMAL
SPECIMEN SOURCE: NORMAL

## 2021-06-16 PROCEDURE — 11200 RMVL SKIN TAGS UP TO&INC 15: CPT | Performed by: FAMILY MEDICINE

## 2021-06-16 PROCEDURE — 87220 TISSUE EXAM FOR FUNGI: CPT | Performed by: FAMILY MEDICINE

## 2021-06-16 PROCEDURE — 99214 OFFICE O/P EST MOD 30 MIN: CPT | Mod: 25 | Performed by: FAMILY MEDICINE

## 2021-06-16 ASSESSMENT — PAIN SCALES - GENERAL: PAINLEVEL: NO PAIN (0)

## 2021-06-16 NOTE — PROGRESS NOTES
Assessment & Plan       ICD-10-CM    1. Skin tag  L91.8 KOH prep (skin, hair or nails only)     Removal of up to 15 skin tags   2. Rash and nonspecific skin eruption  R21 ketoconazole (NIZORAL) 2 % external cream   3. Dermatophytosis of nail  B35.1 Orthopedic  Referral   4. Morbid obesity (H)  E66.01 COMPREHENSIVE WEIGHT MANAGEMENT   5. RICARDO (obstructive sleep apnea) - on cpap  G47.33 COMPREHENSIVE WEIGHT MANAGEMENT   6. Hypertension goal BP (blood pressure) < 140/90  I10 COMPREHENSIVE WEIGHT MANAGEMENT   7. Pre-diabetes  R73.03 COMPREHENSIVE WEIGHT MANAGEMENT   8. Acanthosis nigricans  L83 COMPREHENSIVE WEIGHT MANAGEMENT   9. Chronic bilateral low back pain without sciatica  M54.5 COMPREHENSIVE WEIGHT MANAGEMENT    G89.29         1.  Skin tag discussed with him about the nature of the condition and reassured him that it is benign in nature.  They are bothersome to him and therefore it was removed today.  Total of 12 skin tags were removed    The whole procedure discussed with Humberto in details.  Risks associated with the procedure discussed.  Alternatives was also discussed. He requested to have the lesion excised today.    The whole procedure was done in a usual sterile manner.  The area was cleaned with alcohol swab and the  was used to grab the skin tag and the scissors were used to trim the skin tags off at their bases.  Silver nitrate applied.  Minimal bleeding.  He tolerated the procedure well.  EBL was less than 1 ml.    Post procedural care discussed. Encourage to keep the wound clean. Educate symptoms to call in or be seen.  Did not send out for pathology due to its benign nature.    2.  Skin rash on the calves: Hard to tell, more likely fungal infection - less likely eczema or contact dermatitis.  Could be normal skin pigmentation.  PAT was negative.  Will have him try the ketoconazole cream.  Follow-up as needed    3.  Dermatophytosis of the nail: Most likely has onychomycosis.   Discussed with him about the nature condition.  Treatment options discussed.  He preferred to be referred to podiatry and therefore he was referred.    4.  Morbid obesity:  Today's BMI was 42.  He has multiple comorbidities which include high blood pressure, chronic low back pain, sleep apnea, and prediabetic.  Discussed with him about the nature of the condition and its potential long and short term effects.  Not exercising; he was encouraged daily aerobic exercise and discussed about healthy/portioned diet.  I believe that he will be benefit from weight loss and in this case, most likely will need surgery.  Discussed about the goal for his weight and his BMI.  He is interested look into weight loss options again and therefore he was referred to weight management team.      No follow-ups on file.    Calos Ty Mai, MD  [facility]    Kamran Paul is a 51 year old who presents for the following health issues     HPI     Concern - Moles/Skin tags  Onset: Ongoing  Description: Patient reports having moles that provider was going to remove.       Humberto is here today for several concerns.  First is the skin tags that been having for years.  They are multiple of them at multiple sites but mainly on the neck and chest - no change in size or color.  They are irritated and painful when they got picked on by his grandchildren or get caught on his clothes.  Would like them to be removed today.    Also has the rash on his calves.  Been there for several months.  Skin is scaly and turn darker.  Spreading slowly to the knees.  Not itching or irritating.  No change in detergent, shampoo or lotion.  Not improving with lotion.      He request to be referred to podiatry for his toenails problem.  They are thick and irritating.  Been there for a long time and they are getting worse - really bothersome to him.  He is prediabetic with a hemoglobin A1c of 6.1 was 6 months ago.    He is morbidly obese with  multiple comorbidities which include high blood pressure, sleep apnea, chronic low back pain, prediabetic and acanthosis nigricans.  He was referred to weight management once but decided not to pursue it because it required too many out of town visits.  Has not been exercising.  He is interested to look into weight loss again.    Review of Systems   Constitutional, HEENT, cardiovascular, pulmonary, gi and gu systems are negative, except as otherwise noted.      Objective    /84   Pulse 68   Temp 97.6  F (36.4  C) (Temporal)   Resp 18   Wt 146.9 kg (323 lb 12.8 oz)   SpO2 98%   BMI 41.57 kg/m    Body mass index is 41.57 kg/m .  Physical Exam   GENERAL: healthy, alert and no distress  RESP: lungs clear to auscultation - no rales, rhonchi or wheezes  CV: regular rate and rhythm, normal S1 S2, no S3 or S4, no murmur, click or rub, no peripheral edema  SKIN: Multiple nonirritated/noninfected skin tags noted on the neck and on the chest.  The skin on the calf darker was slightly scaly.  The toes are thick and dark in color.  No ingrown toenail.    Results for orders placed or performed in visit on 06/16/21   KOH prep (skin, hair or nails only)     Status: None    Specimen: Right Leg   Result Value Ref Range    Specimen Description Right Leg     KOH Skin Hair Nails Test No yeast seen

## 2021-06-16 NOTE — H&P (VIEW-ONLY)
Assessment & Plan       ICD-10-CM    1. Skin tag  L91.8 KOH prep (skin, hair or nails only)     Removal of up to 15 skin tags   2. Rash and nonspecific skin eruption  R21 ketoconazole (NIZORAL) 2 % external cream   3. Dermatophytosis of nail  B35.1 Orthopedic  Referral   4. Morbid obesity (H)  E66.01 COMPREHENSIVE WEIGHT MANAGEMENT   5. RICARDO (obstructive sleep apnea) - on cpap  G47.33 COMPREHENSIVE WEIGHT MANAGEMENT   6. Hypertension goal BP (blood pressure) < 140/90  I10 COMPREHENSIVE WEIGHT MANAGEMENT   7. Pre-diabetes  R73.03 COMPREHENSIVE WEIGHT MANAGEMENT   8. Acanthosis nigricans  L83 COMPREHENSIVE WEIGHT MANAGEMENT   9. Chronic bilateral low back pain without sciatica  M54.5 COMPREHENSIVE WEIGHT MANAGEMENT    G89.29         1.  Skin tag discussed with him about the nature of the condition and reassured him that it is benign in nature.  They are bothersome to him and therefore it was removed today.  Total of 12 skin tags were removed    The whole procedure discussed with Humberto in details.  Risks associated with the procedure discussed.  Alternatives was also discussed. He requested to have the lesion excised today.    The whole procedure was done in a usual sterile manner.  The area was cleaned with alcohol swab and the  was used to grab the skin tag and the scissors were used to trim the skin tags off at their bases.  Silver nitrate applied.  Minimal bleeding.  He tolerated the procedure well.  EBL was less than 1 ml.    Post procedural care discussed. Encourage to keep the wound clean. Educate symptoms to call in or be seen.  Did not send out for pathology due to its benign nature.    2.  Skin rash on the calves: Hard to tell, more likely fungal infection - less likely eczema or contact dermatitis.  Could be normal skin pigmentation.  PAT was negative.  Will have him try the ketoconazole cream.  Follow-up as needed    3.  Dermatophytosis of the nail: Most likely has onychomycosis.   Discussed with him about the nature condition.  Treatment options discussed.  He preferred to be referred to podiatry and therefore he was referred.    4.  Morbid obesity:  Today's BMI was 42.  He has multiple comorbidities which include high blood pressure, chronic low back pain, sleep apnea, and prediabetic.  Discussed with him about the nature of the condition and its potential long and short term effects.  Not exercising; he was encouraged daily aerobic exercise and discussed about healthy/portioned diet.  I believe that he will be benefit from weight loss and in this case, most likely will need surgery.  Discussed about the goal for his weight and his BMI.  He is interested look into weight loss options again and therefore he was referred to weight management team.      No follow-ups on file.    Calos Ty Mai, MD  [facility]    Kamran Paul is a 51 year old who presents for the following health issues     HPI     Concern - Moles/Skin tags  Onset: Ongoing  Description: Patient reports having moles that provider was going to remove.       Humberto is here today for several concerns.  First is the skin tags that been having for years.  They are multiple of them at multiple sites but mainly on the neck and chest - no change in size or color.  They are irritated and painful when they got picked on by his grandchildren or get caught on his clothes.  Would like them to be removed today.    Also has the rash on his calves.  Been there for several months.  Skin is scaly and turn darker.  Spreading slowly to the knees.  Not itching or irritating.  No change in detergent, shampoo or lotion.  Not improving with lotion.      He request to be referred to podiatry for his toenails problem.  They are thick and irritating.  Been there for a long time and they are getting worse - really bothersome to him.  He is prediabetic with a hemoglobin A1c of 6.1 was 6 months ago.    He is morbidly obese with  multiple comorbidities which include high blood pressure, sleep apnea, chronic low back pain, prediabetic and acanthosis nigricans.  He was referred to weight management once but decided not to pursue it because it required too many out of town visits.  Has not been exercising.  He is interested to look into weight loss again.    Review of Systems   Constitutional, HEENT, cardiovascular, pulmonary, gi and gu systems are negative, except as otherwise noted.      Objective    /84   Pulse 68   Temp 97.6  F (36.4  C) (Temporal)   Resp 18   Wt 146.9 kg (323 lb 12.8 oz)   SpO2 98%   BMI 41.57 kg/m    Body mass index is 41.57 kg/m .  Physical Exam   GENERAL: healthy, alert and no distress  RESP: lungs clear to auscultation - no rales, rhonchi or wheezes  CV: regular rate and rhythm, normal S1 S2, no S3 or S4, no murmur, click or rub, no peripheral edema  SKIN: Multiple nonirritated/noninfected skin tags noted on the neck and on the chest.  The skin on the calf darker was slightly scaly.  The toes are thick and dark in color.  No ingrown toenail.    Results for orders placed or performed in visit on 06/16/21   KOH prep (skin, hair or nails only)     Status: None    Specimen: Right Leg   Result Value Ref Range    Specimen Description Right Leg     KOH Skin Hair Nails Test No yeast seen

## 2021-06-17 ENCOUNTER — OFFICE VISIT (OUTPATIENT)
Dept: PODIATRY | Facility: CLINIC | Age: 51
End: 2021-06-17
Payer: COMMERCIAL

## 2021-06-17 VITALS
HEIGHT: 74 IN | SYSTOLIC BLOOD PRESSURE: 122 MMHG | WEIGHT: 315 LBS | DIASTOLIC BLOOD PRESSURE: 78 MMHG | BODY MASS INDEX: 40.43 KG/M2

## 2021-06-17 DIAGNOSIS — B35.1 DERMATOPHYTOSIS OF NAIL: ICD-10-CM

## 2021-06-17 DIAGNOSIS — L20.82 FLEXURAL ECZEMA: Primary | ICD-10-CM

## 2021-06-17 PROCEDURE — 99203 OFFICE O/P NEW LOW 30 MIN: CPT | Performed by: PODIATRIST

## 2021-06-17 RX ORDER — KETOCONAZOLE 20 MG/G
CREAM TOPICAL
Qty: 60 G | Refills: 3 | Status: SHIPPED | OUTPATIENT
Start: 2021-06-17 | End: 2022-11-16

## 2021-06-17 ASSESSMENT — PAIN SCALES - GENERAL: PAINLEVEL: NO PAIN (0)

## 2021-06-17 ASSESSMENT — MIFFLIN-ST. JEOR: SCORE: 2389.87

## 2021-06-17 NOTE — PROGRESS NOTES
Chief Complaint   Patient presents with     Consult     LOV 4/25/2018 - NEW ISSUE       Weight management plan: Patient was referred to their PCP to discuss a diet and exercise plan.    Patient has multiple questions and concerns today.  He was referred by his primary care with large changes in his skin about the posterior medial aspect flexural surface of both calves.  He states this used to be a couple of centimeters but over the last few years it is now 90% of the length of his calf.  He also notes moccasin type fashion of dry scaly skin that has not responded to antifungal medication or steroids.  He notes that his toenails have changed to become more dystrophic and this has not responded to any topical antifungal either.  Wonders what his treatment options are.    ROS:  10 point ROS neg other than the symptoms noted above in the HPI.    Patient Active Problem List   Diagnosis     GERD (gastroesophageal reflux disease)     Hypertension goal BP (blood pressure) < 140/90     Elevated serum creatinine     Insomnia     Morbid obesity (H)     Seasonal allergic rhinitis due to pollen, unspecified chronicity     RICARDO (obstructive sleep apnea) - on cpap     Chronic bilateral low back pain without sciatica     Vitamin D deficiency     Hyperuricemia     Acanthosis nigricans     Skin tag     Pre-diabetes       PAST MEDICAL HISTORY:   Past Medical History:   Diagnosis Date     Acid reflux disease since 2006     Back pain chronic     Cellulitis of left upper arm and forearm 11/22/2013     Hypertension         PAST SURGICAL HISTORY:   Past Surgical History:   Procedure Laterality Date     COLONOSCOPY N/A 3/25/2021    Procedure: Colonoscopy Incomplete;  Surgeon: Humberto Antoine DO;  Location:  GI     ENT SURGERY       ESOPHAGOSCOPY, GASTROSCOPY, DUODENOSCOPY (EGD), COMBINED N/A 3/25/2021    Procedure: Esophagogastroduodenoscopy with biopsy;  Surgeon: Humberto Antoine DO;  Location:  GI        MEDICATIONS:    Current Outpatient Medications:      allopurinol (ZYLOPRIM) 100 MG tablet, TAKE ONE TABLET BY MOUTH TWICE A DAY, Disp: 60 tablet, Rfl: 0     ASPIRIN LOW DOSE 81 MG EC tablet, TAKE ONE TABLET BY MOUTH ONCE DAILY, Disp: 90 tablet, Rfl: 0     atenolol (TENORMIN) 50 MG tablet, TAKE ONE TABLET BY MOUTH ONCE DAILY, Disp: 90 tablet, Rfl: 1     cetirizine (ZYRTEC) 10 MG tablet, TAKE ONE TABLET BY MOUTH ONCE DAILY AS NEEDED FOR ALLERGIES, Disp: 30 tablet, Rfl: 5     colchicine (COLCYRS) 0.6 MG tablet, TAKE 2 TABLETS BY MOUTH AT THE FIRST SIGN OF FLARE, TAKE 1-2 ADDITIONAL TABLETS PER DAY UNTIL SYMPTOMS IMPROVE THEN STOP THIS MEDICATION. DO NOT USE MORE THAN A FIVE DAYS, Disp: 20 tablet, Rfl: 0     diclofenac (VOLTAREN) 75 MG EC tablet, TAKE ONE TABLET BY MOUTH TWICE A DAY AS NEEDED FOR MODERATE PAIN, Disp: 60 tablet, Rfl: 0     fluticasone (FLONASE) 50 MCG/ACT nasal spray, SPRAY 1-2 SPRAYS IN EACH NOSTRIL EVERY DAY, Disp: 16 g, Rfl: 5     hydrochlorothiazide (HYDRODIURIL) 25 MG tablet, TAKE ONE TABLET BY MOUTH ONCE DAILY, Disp: 30 tablet, Rfl: 5     ketoconazole (NIZORAL) 2 % external cream, Apply to the rash on the calves twice a day as needed, Disp: 60 g, Rfl: 3     order for DME, Equipment being ordered: shower chair, Disp: 1 Device, Rfl: 0     order for DME, 1 Device Auto CPAP @@ 7-15 cm with heated humidity via mask of choice, Disp: , Rfl:      pantoprazole (PROTONIX) 40 MG EC tablet, Take 1 tablet (40 mg) by mouth daily Stop omeprazole, Disp: 90 tablet, Rfl: 1     QUEtiapine (SEROQUEL) 25 MG tablet, Take 1-2 tablets (25-50 mg) by mouth At Bedtime, Disp: 60 tablet, Rfl: 0     tiZANidine (ZANAFLEX) 4 MG tablet, TAKE ONE TABLET BY MOUTH EVERY NIGHT AT BEDTIME AND TAKE ONE TABLET BY MOUTH EVERY MORNING AS NEEDEd, Disp: 180 tablet, Rfl: 0     ALLERGIES:  No Known Allergies     SOCIAL HISTORY:   Social History     Socioeconomic History     Marital status:      Spouse name: Not on file     Number of children: Not on  "file     Years of education: Not on file     Highest education level: Not on file   Occupational History     Not on file   Social Needs     Financial resource strain: Not on file     Food insecurity     Worry: Not on file     Inability: Not on file     Transportation needs     Medical: Not on file     Non-medical: Not on file   Tobacco Use     Smoking status: Never Smoker     Smokeless tobacco: Never Used   Substance and Sexual Activity     Alcohol use: No     Alcohol/week: 0.0 standard drinks     Drug use: No     Sexual activity: Yes     Partners: Female   Lifestyle     Physical activity     Days per week: Not on file     Minutes per session: Not on file     Stress: Not on file   Relationships     Social connections     Talks on phone: Not on file     Gets together: Not on file     Attends Zoroastrian service: Not on file     Active member of club or organization: Not on file     Attends meetings of clubs or organizations: Not on file     Relationship status: Not on file     Intimate partner violence     Fear of current or ex partner: Not on file     Emotionally abused: Not on file     Physically abused: Not on file     Forced sexual activity: Not on file   Other Topics Concern     Not on file   Social History Narrative     Not on file        FAMILY HISTORY:   Family History   Problem Relation Age of Onset     Diabetes Mother      Hypertension Mother      Heart Disease Mother      Heart Disease Father      Diabetes Father      Unknown/Adopted Maternal Grandmother      Unknown/Adopted Maternal Grandfather      Unknown/Adopted Paternal Grandmother      Unknown/Adopted Paternal Grandfather      No Known Problems Sister      No Known Problems Son      No Known Problems Daughter      No Known Problems Sister      No Known Problems Son      No Known Problems Daughter         EXAM:Vitals: /78 (BP Location: Left arm, Patient Position: Sitting, Cuff Size: Adult Large)   Ht 1.88 m (6' 2\")   Wt 146.5 kg (323 lb)   BMI " 41.47 kg/m    BMI= Body mass index is 41.47 kg/m .    General appearance: Patient is alert and fully cooperative with history & exam.  No sign of distress is noted during the visit.     Psychiatric: Affect is pleasant & appropriate.  Patient appears motivated to improve health.     Respiratory: Breathing is regular & unlabored while sitting.     HEENT: Hearing is intact to spoken word.  Speech is clear.  No gross evidence of visual impairment that would impact ambulation.     Vascular: DP & PT pulses are intact & regular bilaterally.  No significant edema or varicosities noted.  CFT and skin temperature is normal to both lower extremities.     Neurologic: Lower extremity sensation is intact to light touch.  No evidence of weakness or contracture in the lower extremities.  No evidence of neuropathy.    Dermatologic: Skin is intact to both lower extremities with adequate texture, turgor and tone about the integument.  No paronychia or evidence of soft tissue infection is noted.  Dry excoriated epidermis noted plantarly without interdigital maceration.  This is in a moccasin fashion however no petechia noted.  Also a dark discoloration about 2-5 cm wide by 90% of the length of his calf is noted on the anterior aspect of his calf medially bilateral.  Dry scaly skin bilateral.    Musculoskeletal: Patient is ambulatory without assistive device or brace.  Patient is generally obese with BMI of 41.  Generally flatfoot type noted.  No painful or limited range of motion throughout the ankle subtalar midtarsal metatarsal phalangeal joints.  Manual muscle strength +5/5 to all 4 quadrants.     ASSESSMENT:       ICD-10-CM    1. Flexural eczema  L20.82    2. Dermatophytosis of nail  B35.1 Orthopedic [facility] Referral     ADULT DERMATOLOGY REFERRAL        PLAN:  Reviewed patient's chart in [facility].      6/17/2021   I am not sure of the etiology of his skin changes over the last couple of years along his calf.  We discussed attempting  steroids but he feels he is already done that.  Therefore I recommended he follow-up with dermatology where they might work through these treatments again versus biopsy this more thoroughly.  I suspect he does have some tinea pedis about his feet and recommended that he begin utilizing topical Lamisil in a moccasin fashion about his feet interdigital space and toenails.  This is not going to change the skin about his calves but rather about his feet.  Recommended Cetaphil cream to be it applied topically over the top of the antifungal medication however apply the Cetaphil cream once daily and very sparingly to his entire integument.  Referral was placed for dermatology for further investigation and education of this.    Perry Guy DPM

## 2021-06-17 NOTE — LETTER
6/17/2021         RE: Humberto Roberts  [address] [address] [place] [id number]        Dear Colleague,    Thank you for referring your patient, Humberto Roberts, to the [facility]. Please see a copy of my visit note below.    Chief Complaint   Patient presents with     Consult     LOV 4/25/2018 - NEW ISSUE       Weight management plan: Patient was referred to their PCP to discuss a diet and exercise plan.    Patient has multiple questions and concerns today.  He was referred by his primary care with large changes in his skin about the posterior medial aspect flexural surface of both calves.  He states this used to be a couple of centimeters but over the last few years it is now 90% of the length of his calf.  He also notes moccasin type fashion of dry scaly skin that has not responded to antifungal medication or steroids.  He notes that his toenails have changed to become more dystrophic and this has not responded to any topical antifungal either.  Wonders what his treatment options are.    ROS:  10 point ROS neg other than the symptoms noted above in the HPI.    Patient Active Problem List   Diagnosis     GERD (gastroesophageal reflux disease)     Hypertension goal BP (blood pressure) < 140/90     Elevated serum creatinine     Insomnia     Morbid obesity (H)     Seasonal allergic rhinitis due to pollen, unspecified chronicity     RICARDO (obstructive sleep apnea) - on cpap     Chronic bilateral low back pain without sciatica     Vitamin D deficiency     Hyperuricemia     Acanthosis nigricans     Skin tag     Pre-diabetes       PAST MEDICAL HISTORY:   Past Medical History:   Diagnosis Date     Acid reflux disease since 2006     Back pain chronic     Cellulitis of left upper arm and forearm 11/22/2013     Hypertension         PAST SURGICAL HISTORY:   Past Surgical History:   Procedure Laterality Date     COLONOSCOPY N/A 3/25/2021    Procedure: Colonoscopy Incomplete;  Surgeon: Humberto Antoine  DO Norris;  Location:  GI     ENT SURGERY       ESOPHAGOSCOPY, GASTROSCOPY, DUODENOSCOPY (EGD), COMBINED N/A 3/25/2021    Procedure: Esophagogastroduodenoscopy with biopsy;  Surgeon: Humberto Antoine DO;  Location:  GI        MEDICATIONS:   Current Outpatient Medications:      allopurinol (ZYLOPRIM) 100 MG tablet, TAKE ONE TABLET BY MOUTH TWICE A DAY, Disp: 60 tablet, Rfl: 0     ASPIRIN LOW DOSE 81 MG EC tablet, TAKE ONE TABLET BY MOUTH ONCE DAILY, Disp: 90 tablet, Rfl: 0     atenolol (TENORMIN) 50 MG tablet, TAKE ONE TABLET BY MOUTH ONCE DAILY, Disp: 90 tablet, Rfl: 1     cetirizine (ZYRTEC) 10 MG tablet, TAKE ONE TABLET BY MOUTH ONCE DAILY AS NEEDED FOR ALLERGIES, Disp: 30 tablet, Rfl: 5     colchicine (COLCYRS) 0.6 MG tablet, TAKE 2 TABLETS BY MOUTH AT THE FIRST SIGN OF FLARE, TAKE 1-2 ADDITIONAL TABLETS PER DAY UNTIL SYMPTOMS IMPROVE THEN STOP THIS MEDICATION. DO NOT USE MORE THAN A FIVE DAYS, Disp: 20 tablet, Rfl: 0     diclofenac (VOLTAREN) 75 MG EC tablet, TAKE ONE TABLET BY MOUTH TWICE A DAY AS NEEDED FOR MODERATE PAIN, Disp: 60 tablet, Rfl: 0     fluticasone (FLONASE) 50 MCG/ACT nasal spray, SPRAY 1-2 SPRAYS IN EACH NOSTRIL EVERY DAY, Disp: 16 g, Rfl: 5     hydrochlorothiazide (HYDRODIURIL) 25 MG tablet, TAKE ONE TABLET BY MOUTH ONCE DAILY, Disp: 30 tablet, Rfl: 5     ketoconazole (NIZORAL) 2 % external cream, Apply to the rash on the calves twice a day as needed, Disp: 60 g, Rfl: 3     order for DME, Equipment being ordered: shower chair, Disp: 1 Device, Rfl: 0     order for DME, 1 Device Auto CPAP @@ 7-15 cm with heated humidity via mask of choice, Disp: , Rfl:      pantoprazole (PROTONIX) 40 MG EC tablet, Take 1 tablet (40 mg) by mouth daily Stop omeprazole, Disp: 90 tablet, Rfl: 1     QUEtiapine (SEROQUEL) 25 MG tablet, Take 1-2 tablets (25-50 mg) by mouth At Bedtime, Disp: 60 tablet, Rfl: 0     tiZANidine (ZANAFLEX) 4 MG tablet, TAKE ONE TABLET BY MOUTH EVERY NIGHT AT BEDTIME AND TAKE ONE  TABLET BY MOUTH EVERY MORNING AS NEEDEd, Disp: 180 tablet, Rfl: 0     ALLERGIES:  No Known Allergies     SOCIAL HISTORY:   Social History     Socioeconomic History     Marital status:      Spouse name: Not on file     Number of children: Not on file     Years of education: Not on file     Highest education level: Not on file   Occupational History     Not on file   Social Needs     Financial resource strain: Not on file     Food insecurity     Worry: Not on file     Inability: Not on file     Transportation needs     Medical: Not on file     Non-medical: Not on file   Tobacco Use     Smoking status: Never Smoker     Smokeless tobacco: Never Used   Substance and Sexual Activity     Alcohol use: No     Alcohol/week: 0.0 standard drinks     Drug use: No     Sexual activity: Yes     Partners: Female   Lifestyle     Physical activity     Days per week: Not on file     Minutes per session: Not on file     Stress: Not on file   Relationships     Social connections     Talks on phone: Not on file     Gets together: Not on file     Attends Denominational service: Not on file     Active member of club or organization: Not on file     Attends meetings of clubs or organizations: Not on file     Relationship status: Not on file     Intimate partner violence     Fear of current or ex partner: Not on file     Emotionally abused: Not on file     Physically abused: Not on file     Forced sexual activity: Not on file   Other Topics Concern     Not on file   Social History Narrative     Not on file        FAMILY HISTORY:   Family History   Problem Relation Age of Onset     Diabetes Mother      Hypertension Mother      Heart Disease Mother      Heart Disease Father      Diabetes Father      Unknown/Adopted Maternal Grandmother      Unknown/Adopted Maternal Grandfather      Unknown/Adopted Paternal Grandmother      Unknown/Adopted Paternal Grandfather      No Known Problems Sister      No Known Problems Son      No Known Problems  "Daughter      No Known Problems Sister      No Known Problems Son      No Known Problems Daughter         EXAM:Vitals: /78 (BP Location: Left arm, Patient Position: Sitting, Cuff Size: Adult Large)   Ht 1.88 m (6' 2\")   Wt 146.5 kg (323 lb)   BMI 41.47 kg/m    BMI= Body mass index is 41.47 kg/m .    General appearance: Patient is alert and fully cooperative with history & exam.  No sign of distress is noted during the visit.     Psychiatric: Affect is pleasant & appropriate.  Patient appears motivated to improve health.     Respiratory: Breathing is regular & unlabored while sitting.     HEENT: Hearing is intact to spoken word.  Speech is clear.  No gross evidence of visual impairment that would impact ambulation.     Vascular: DP & PT pulses are intact & regular bilaterally.  No significant edema or varicosities noted.  CFT and skin temperature is normal to both lower extremities.     Neurologic: Lower extremity sensation is intact to light touch.  No evidence of weakness or contracture in the lower extremities.  No evidence of neuropathy.    Dermatologic: Skin is intact to both lower extremities with adequate texture, turgor and tone about the integument.  No paronychia or evidence of soft tissue infection is noted.  Dry excoriated epidermis noted plantarly without interdigital maceration.  This is in a moccasin fashion however no petechia noted.  Also a dark discoloration about 2-5 cm wide by 90% of the length of his calf is noted on the anterior aspect of his calf medially bilateral.  Dry scaly skin bilateral.    Musculoskeletal: Patient is ambulatory without assistive device or brace.  Patient is generally obese with BMI of 41.  Generally flatfoot type noted.  No painful or limited range of motion throughout the ankle subtalar midtarsal metatarsal phalangeal joints.  Manual muscle strength +5/5 to all 4 quadrants.     ASSESSMENT:       ICD-10-CM    1. Flexural eczema  L20.82    2. Dermatophytosis of " nail  B35.1 Orthopedic  Referral     ADULT DERMATOLOGY REFERRAL        PLAN:  Reviewed patient's chart in [facility].      6/17/2021   I am not sure of the etiology of his skin changes over the last couple of years along his calf.  We discussed attempting steroids but he feels he is already done that.  Therefore I recommended he follow-up with dermatology where they might work through these treatments again versus biopsy this more thoroughly.  I suspect he does have some tinea pedis about his feet and recommended that he begin utilizing topical Lamisil in a moccasin fashion about his feet interdigital space and toenails.  This is not going to change the skin about his calves but rather about his feet.  Recommended Cetaphil cream to be it applied topically over the top of the antifungal medication however apply the Cetaphil cream once daily and very sparingly to his entire integument.  Referral was placed for dermatology for further investigation and education of this.    Perry Guy DPM        Again, thank you for allowing me to participate in the care of your patient.        Sincerely,        Perry Guy DPM

## 2021-06-22 NOTE — TELEPHONE ENCOUNTER
Patient no showed today's covid test.  Colonoscopy rescheduled to 7/15 and covid test also rescheduled.

## 2021-06-23 DIAGNOSIS — I10 HYPERTENSION GOAL BP (BLOOD PRESSURE) < 140/90: ICD-10-CM

## 2021-06-23 DIAGNOSIS — F51.04 PSYCHOPHYSIOLOGICAL INSOMNIA: ICD-10-CM

## 2021-06-24 RX ORDER — ASPIRIN 81 MG/1
TABLET, COATED ORAL
Qty: 90 TABLET | Refills: 2 | Status: SHIPPED | OUTPATIENT
Start: 2021-06-24 | End: 2022-02-01

## 2021-06-24 RX ORDER — TRAZODONE HYDROCHLORIDE 100 MG/1
TABLET ORAL
Qty: 30 TABLET | Refills: 6 | OUTPATIENT
Start: 2021-06-24

## 2021-06-24 NOTE — TELEPHONE ENCOUNTER
ASA  Prescription approved per [facility] Refill Protocol.    Joseline Mims Rn

## 2021-06-24 NOTE — TELEPHONE ENCOUNTER
Trazodone  Routing refill request to provider for review/approval because:  Drug not active on patient's medication list    The original prescription was discontinued on 2/24/2021 by Calos Hester MD. Renewing this prescription may not be appropriate    Joseline Mims Rn

## 2021-06-25 NOTE — TELEPHONE ENCOUNTER
Pharmacy was informed that the trazodone was discontinued. He is now taking Seroquel in place of it.    Jose Otto, CMA

## 2021-07-11 DIAGNOSIS — Z11.59 ENCOUNTER FOR SCREENING FOR OTHER VIRAL DISEASES: ICD-10-CM

## 2021-07-11 LAB — SARS-COV-2 RNA RESP QL NAA+PROBE: NEGATIVE

## 2021-07-11 PROCEDURE — U0003 INFECTIOUS AGENT DETECTION BY NUCLEIC ACID (DNA OR RNA); SEVERE ACUTE RESPIRATORY SYNDROME CORONAVIRUS 2 (SARS-COV-2) (CORONAVIRUS DISEASE [COVID-19]), AMPLIFIED PROBE TECHNIQUE, MAKING USE OF HIGH THROUGHPUT TECHNOLOGIES AS DESCRIBED BY CMS-2020-01-R: HCPCS

## 2021-07-11 PROCEDURE — U0005 INFEC AGEN DETEC AMPLI PROBE: HCPCS

## 2021-07-14 ENCOUNTER — TELEPHONE (OUTPATIENT)
Dept: FAMILY MEDICINE | Facility: CLINIC | Age: 51
End: 2021-07-14

## 2021-07-14 NOTE — TELEPHONE ENCOUNTER
Patient is calling for the colon prep for his colonoscopy tomorrow, 7/15/21.    This is reviewed over the phone and he is instructed it will be sent via MyChart in letters and through a MyChart if he would like to review it.  Closing this encounter.  Berenice Marx, WONN, RN

## 2021-07-14 NOTE — LETTER

## 2021-07-15 ENCOUNTER — HOSPITAL ENCOUNTER (OUTPATIENT)
Facility: CLINIC | Age: 51
Discharge: HOME | End: 2021-07-15
Attending: SURGERY | Admitting: SURGERY
Payer: COMMERCIAL

## 2021-07-15 ENCOUNTER — ANESTHESIA (OUTPATIENT)
Dept: GASTROENTEROLOGY | Facility: CLINIC | Age: 51
End: 2021-07-15
Payer: COMMERCIAL

## 2021-07-15 ENCOUNTER — ANESTHESIA EVENT (OUTPATIENT)
Dept: GASTROENTEROLOGY | Facility: CLINIC | Age: 51
End: 2021-07-15
Payer: COMMERCIAL

## 2021-07-15 VITALS
OXYGEN SATURATION: 100 % | RESPIRATION RATE: 16 BRPM | TEMPERATURE: 98.7 F | HEART RATE: 61 BPM | DIASTOLIC BLOOD PRESSURE: 93 MMHG | SYSTOLIC BLOOD PRESSURE: 147 MMHG

## 2021-07-15 LAB — COLONOSCOPY: NORMAL

## 2021-07-15 PROCEDURE — G0121 COLON CA SCRN NOT HI RSK IND: HCPCS | Performed by: SURGERY

## 2021-07-15 PROCEDURE — 250N000009 HC RX 250: Performed by: NURSE ANESTHETIST, CERTIFIED REGISTERED

## 2021-07-15 PROCEDURE — 250N000011 HC RX IP 250 OP 636: Performed by: NURSE ANESTHETIST, CERTIFIED REGISTERED

## 2021-07-15 PROCEDURE — 258N000003 HC RX IP 258 OP 636: Performed by: NURSE ANESTHETIST, CERTIFIED REGISTERED

## 2021-07-15 PROCEDURE — 370N000017 HC ANESTHESIA TECHNICAL FEE, PER MIN: Performed by: SURGERY

## 2021-07-15 PROCEDURE — 45378 DIAGNOSTIC COLONOSCOPY: CPT | Performed by: SURGERY

## 2021-07-15 RX ORDER — PROPOFOL 10 MG/ML
INJECTION, EMULSION INTRAVENOUS PRN
Status: DISCONTINUED | OUTPATIENT
Start: 2021-07-15 | End: 2021-07-15

## 2021-07-15 RX ORDER — SODIUM CHLORIDE, SODIUM LACTATE, POTASSIUM CHLORIDE, CALCIUM CHLORIDE 600; 310; 30; 20 MG/100ML; MG/100ML; MG/100ML; MG/100ML
INJECTION, SOLUTION INTRAVENOUS CONTINUOUS
Status: DISCONTINUED | OUTPATIENT
Start: 2021-07-15 | End: 2021-07-15 | Stop reason: HOSPADM

## 2021-07-15 RX ORDER — NALOXONE HYDROCHLORIDE 0.4 MG/ML
0.2 INJECTION, SOLUTION INTRAMUSCULAR; INTRAVENOUS; SUBCUTANEOUS
Status: DISCONTINUED | OUTPATIENT
Start: 2021-07-15 | End: 2021-07-15 | Stop reason: HOSPADM

## 2021-07-15 RX ORDER — LIDOCAINE 40 MG/G
CREAM TOPICAL
Status: DISCONTINUED | OUTPATIENT
Start: 2021-07-15 | End: 2021-07-15 | Stop reason: HOSPADM

## 2021-07-15 RX ORDER — ONDANSETRON 4 MG/1
4 TABLET, ORALLY DISINTEGRATING ORAL EVERY 6 HOURS PRN
Status: DISCONTINUED | OUTPATIENT
Start: 2021-07-15 | End: 2021-07-15 | Stop reason: HOSPADM

## 2021-07-15 RX ORDER — PROPOFOL 10 MG/ML
INJECTION, EMULSION INTRAVENOUS CONTINUOUS PRN
Status: DISCONTINUED | OUTPATIENT
Start: 2021-07-15 | End: 2021-07-15

## 2021-07-15 RX ORDER — PROCHLORPERAZINE MALEATE 5 MG
10 TABLET ORAL EVERY 6 HOURS PRN
Status: DISCONTINUED | OUTPATIENT
Start: 2021-07-15 | End: 2021-07-15 | Stop reason: HOSPADM

## 2021-07-15 RX ORDER — NALOXONE HYDROCHLORIDE 0.4 MG/ML
0.4 INJECTION, SOLUTION INTRAMUSCULAR; INTRAVENOUS; SUBCUTANEOUS
Status: DISCONTINUED | OUTPATIENT
Start: 2021-07-15 | End: 2021-07-15 | Stop reason: HOSPADM

## 2021-07-15 RX ORDER — ONDANSETRON 2 MG/ML
4 INJECTION INTRAMUSCULAR; INTRAVENOUS EVERY 6 HOURS PRN
Status: DISCONTINUED | OUTPATIENT
Start: 2021-07-15 | End: 2021-07-15 | Stop reason: HOSPADM

## 2021-07-15 RX ORDER — LIDOCAINE HYDROCHLORIDE 20 MG/ML
INJECTION, SOLUTION INFILTRATION; PERINEURAL PRN
Status: DISCONTINUED | OUTPATIENT
Start: 2021-07-15 | End: 2021-07-15

## 2021-07-15 RX ORDER — FLUMAZENIL 0.1 MG/ML
0.2 INJECTION, SOLUTION INTRAVENOUS
Status: DISCONTINUED | OUTPATIENT
Start: 2021-07-15 | End: 2021-07-15 | Stop reason: HOSPADM

## 2021-07-15 RX ORDER — ONDANSETRON 2 MG/ML
4 INJECTION INTRAMUSCULAR; INTRAVENOUS
Status: DISCONTINUED | OUTPATIENT
Start: 2021-07-15 | End: 2021-07-15 | Stop reason: HOSPADM

## 2021-07-15 RX ADMIN — PROPOFOL 150 MCG/KG/MIN: 10 INJECTION, EMULSION INTRAVENOUS at 08:03

## 2021-07-15 RX ADMIN — LIDOCAINE HYDROCHLORIDE 60 MG: 20 INJECTION, SOLUTION INFILTRATION; PERINEURAL at 08:03

## 2021-07-15 RX ADMIN — PROPOFOL 50 MG: 10 INJECTION, EMULSION INTRAVENOUS at 08:03

## 2021-07-15 RX ADMIN — SODIUM CHLORIDE, POTASSIUM CHLORIDE, SODIUM LACTATE AND CALCIUM CHLORIDE: 600; 310; 30; 20 INJECTION, SOLUTION INTRAVENOUS at 07:59

## 2021-07-15 ASSESSMENT — LIFESTYLE VARIABLES: TOBACCO_USE: 0

## 2021-07-15 NOTE — DISCHARGE INSTRUCTIONS
[facility]    Home Care Following Endoscopy          Activity:    You have just undergone an endoscopic procedure usually performed with conscious sedation.  Do not work or operate machinery (including a car) for at least 12 hours.      I encourage you to walk and attempt to pass this air as soon as possible.    Diet:    Return to the diet you were on before your procedure but eat lightly for the first 12-24 hours.    Drink plenty of water.    Resume any regular medications unless otherwise advised by your physician.  Please begin any new medication prescribed as a result of your procedure as directed by your physician.     If you had any biopsy or polyp removed please refrain from aspirin or aspirin products for 2 days.  If on Coumadin please restart as instructed by your physician.   Pain:    You may take Tylenol as needed for pain.  Expected Recovery:    You can expect some mild abdominal fullness and/or discomfort due to the air used to inflate your intestinal tract. It is also normal to have a mild sore throat after upper endoscopy.    Call Your Physician if You Have:    After Colonoscopy:  o Worsening persisting abdominal pain which is worse with activity.  o Fevers (>101 degrees F), chills or shakes.  o Passage of continued blood with bowel movements.   Any questions or concerns about your recovery, please call [phone] or after hours [phone] Nurse Advice Line.    Follow-up Care:    You should receive a call or letter with your results within 1 week. Please call if you have not received a notification of your results.

## 2021-07-15 NOTE — ANESTHESIA POSTPROCEDURE EVALUATION
Patient: Humberto Roberts    Procedure(s):  COLONOSCOPY    Diagnosis:Routine general medical examination at a health care facility [Z00.00]  Gastroesophageal reflux disease, unspecified whether esophagitis present [K21.9]  Diagnosis Additional Information: No value filed.    Anesthesia Type:  MAC    Note:  Disposition: Outpatient   Postop Pain Control: Uneventful            Sign Out: Well controlled pain   PONV: No   Neuro/Psych: Uneventful            Sign Out: Acceptable/Baseline neuro status   Airway/Respiratory: Uneventful            Sign Out: Acceptable/Baseline resp. status   CV/Hemodynamics: Uneventful            Sign Out: Acceptable CV status   Other NRE: NONE   DID A NON-ROUTINE EVENT OCCUR? No    Event details/Postop Comments:  Pt was happy with anesthesia care.  No complications.  I will follow up with the pt if needed.           Last vitals:  Vitals:    07/15/21 0737 07/15/21 0825 07/15/21 0830   BP: (!) 161/93 106/66 112/75   Pulse: 71 69 61   Resp: 16 16    Temp: 98.7  F (37.1  C)     SpO2:  96% 97%       Last vitals prior to Anesthesia Care Transfer:  CRNA VITALS  7/15/2021 0750 - 7/15/2021 0845      7/15/2021             Pulse:  77    SpO2:  99 %    Resp Rate (observed):  20          Electronically Signed By: RADHA Herron CRNA  July 15, 2021  8:45 AM

## 2021-07-15 NOTE — ANESTHESIA CARE TRANSFER NOTE
Patient: Humberto Roberts    Procedure(s):  COLONOSCOPY    Diagnosis: Routine general medical examination at a health care facility [Z00.00]  Gastroesophageal reflux disease, unspecified whether esophagitis present [K21.9]  Diagnosis Additional Information: No value filed.    Anesthesia Type:   MAC     Note:    Oropharynx: oropharynx clear of all foreign objects and spontaneously breathing  Level of Consciousness: awake      Independent Airway: airway patency satisfactory and stable  Dentition: dentition unchanged  Vital Signs Stable: post-procedure vital signs reviewed and stable  Report to RN Given: handoff report given  Patient transferred to: Phase II    Handoff Report: Identifed the Patient, Identified the Reponsible Provider, Reviewed the pertinent medical history, Discussed the surgical course, Reviewed Intra-OP anesthesia mangement and issues during anesthesia, Set expectations for post-procedure period and Allowed opportunity for questions and acknowledgement of understanding      Vitals: (Last set prior to Anesthesia Care Transfer)  CRNA VITALS  7/15/2021 0750 - 7/15/2021 0828      7/15/2021             Pulse:  77    SpO2:  99 %    Resp Rate (observed):  20        Electronically Signed By: RADHA Herron CRNA  July 15, 2021  8:28 AM

## 2021-07-15 NOTE — ANESTHESIA PREPROCEDURE EVALUATION
Anesthesia Pre-Procedure Evaluation    Patient: Humberto Roberts   MRN: [id number] : 1970        Preoperative Diagnosis: Gastroesophageal reflux disease, unspecified whether esophagitis present [K21.9]   Procedure : Procedure(s):  Colonoscopy Incomplete  Esophagogastroduodenoscopy with biopsy     Past Medical History:   Diagnosis Date     Acid reflux disease since      Back pain chronic     Cellulitis of left upper arm and forearm 2013     Hypertension       Past Surgical History:   Procedure Laterality Date     COLONOSCOPY N/A 3/25/2021    Procedure: Colonoscopy Incomplete;  Surgeon: Humberto Antoine DO;  Location:  GI     ENT SURGERY       ESOPHAGOSCOPY, GASTROSCOPY, DUODENOSCOPY (EGD), COMBINED N/A 3/25/2021    Procedure: Esophagogastroduodenoscopy with biopsy;  Surgeon: Humberto Antoine DO;  Location: [facility]      No Known Allergies   Social History     Tobacco Use     Smoking status: Never Smoker     Smokeless tobacco: Never Used   Substance Use Topics     Alcohol use: No     Alcohol/week: 0.0 standard drinks      Wt Readings from Last 1 Encounters:   21 146.5 kg (323 lb)        Anesthesia Evaluation   Pt has had prior anesthetic.     No history of anesthetic complications       ROS/MED HX  ENT/Pulmonary:     (+) sleep apnea, moderate, uses CPAP,     Neurologic:  - neg neurologic ROS     Cardiovascular:     (+) hypertension-----    METS/Exercise Tolerance:     Hematologic:  - neg hematologic  ROS     Musculoskeletal: Comment: Mid back pain      GI/Hepatic:     (+) GERD, Asymptomatic on medication,     Renal/Genitourinary:  - neg Renal ROS     Endo:     (+) Obesity,     Psychiatric/Substance Use:  - neg psychiatric ROS     Infectious Disease:  - neg infectious disease ROS     Malignancy:  - neg malignancy ROS     Other:            Physical Exam    Airway        Mallampati: II   TM distance: > 3 FB   Neck ROM: full   Mouth opening: > 3 cm    Respiratory Devices and Support          Dental  no notable dental history         Cardiovascular   cardiovascular exam normal       Rhythm and rate: regular and normal     Pulmonary   pulmonary exam normal        breath sounds clear to auscultation           OUTSIDE LABS:  CBC:   Lab Results   Component Value Date    WBC 17.9 (H) 04/26/2021    WBC 6.2 12/20/2018    HGB 15.7 04/26/2021    HGB 15.7 12/20/2018    HCT 46.9 04/26/2021    HCT 47.0 12/20/2018     04/26/2021     12/20/2018     BMP:   Lab Results   Component Value Date     04/26/2021     02/24/2021    POTASSIUM 3.7 04/26/2021    POTASSIUM 3.7 02/24/2021    CHLORIDE 100 04/26/2021    CHLORIDE 108 02/24/2021    CO2 29 04/26/2021    CO2 29 02/24/2021    BUN 14 04/26/2021    BUN 10 02/24/2021    CR 1.27 (H) 04/26/2021    CR 1.01 02/24/2021     (H) 04/26/2021     (H) 02/24/2021     COAGS: No results found for: PTT, INR, FIBR  POC: No results found for: BGM, HCG, HCGS  HEPATIC:   Lab Results   Component Value Date    ALBUMIN 3.9 04/26/2021    PROTTOTAL 8.2 04/26/2021    ALT 47 04/26/2021    AST 33 04/26/2021    ALKPHOS 81 04/26/2021    BILITOTAL 0.6 04/26/2021     OTHER:   Lab Results   Component Value Date    A1C 6.1 (H) 02/24/2021    CHERIE 9.2 04/26/2021    MAG 2.0 02/24/2021    TSH 1.26 02/24/2021    .0 (H) 04/26/2021    SED 5 12/20/2018       Anesthesia Plan    ASA Status:  2   NPO Status:  NPO Appropriate    Anesthesia Type: MAC.     - Reason for MAC: straight local not clinically adequate   Induction: Intravenous, Propofol.   Maintenance: TIVA.        Consents    Anesthesia Plan(s) and associated risks, benefits, and realistic alternatives discussed. Questions answered and patient/representative(s) expressed understanding.     - Discussed with:  Patient      - Extended Intubation/Ventilatory Support Discussed: No.      - Patient is DNR/DNI Status: No    Use of blood products discussed: No .     Postoperative Care            Comments:      Elina performed the H&P pre-op.  No change from my exam            RADHA Vilchis CRNAAnesthesia Pre-Procedure Evaluation    Patient: Humberto Roberts   MRN: [id number] : 1970        Preoperative Diagnosis: Routine general medical examination at a health care facility [Z00.00]  Gastroesophageal reflux disease, unspecified whether esophagitis present [K21.9]   Procedure : Procedure(s):  COLONOSCOPY     Past Medical History:   Diagnosis Date     Acid reflux disease since      Back pain chronic     Cellulitis of left upper arm and forearm 2013     Hypertension       Past Surgical History:   Procedure Laterality Date     COLONOSCOPY N/A 3/25/2021    Procedure: Colonoscopy Incomplete;  Surgeon: Humberto Antoine DO;  Location:  GI     ENT SURGERY       ESOPHAGOSCOPY, GASTROSCOPY, DUODENOSCOPY (EGD), COMBINED N/A 3/25/2021    Procedure: Esophagogastroduodenoscopy with biopsy;  Surgeon: Humberto Antoine DO;  Location:  GI      No Known Allergies   Social History     Tobacco Use     Smoking status: Never Smoker     Smokeless tobacco: Never Used   Substance Use Topics     Alcohol use: No     Alcohol/week: 0.0 standard drinks      Wt Readings from Last 1 Encounters:   21 146.5 kg (323 lb)        Anesthesia Evaluation   Pt has had prior anesthetic. Type: MAC.    No history of anesthetic complications       ROS/MED HX  ENT/Pulmonary:     (+) sleep apnea, moderate, uses CPAP,  (-) tobacco use   Neurologic:  - neg neurologic ROS   (+) delerium,     Cardiovascular:     (+) hypertension-----    METS/Exercise Tolerance:     Hematologic:  - neg hematologic  ROS     Musculoskeletal: Comment: Mid back pain      GI/Hepatic:     (+) GERD, bowel prep,     Renal/Genitourinary:  - neg Renal ROS     Endo:     (+) Obesity,     Psychiatric/Substance Use:  - neg psychiatric ROS     Infectious Disease:       Malignancy:  - neg malignancy ROS     Other:  - neg other ROS          Physical  Exam    Airway        Mallampati: II   TM distance: > 3 FB   Neck ROM: full   Mouth opening: > 3 cm    Respiratory Devices and Support         Dental  no notable dental history         Cardiovascular   cardiovascular exam normal       Rhythm and rate: regular and normal     Pulmonary   pulmonary exam normal        breath sounds clear to auscultation           OUTSIDE LABS:  CBC:   Lab Results   Component Value Date    WBC 17.9 (H) 04/26/2021    WBC 6.2 12/20/2018    HGB 15.7 04/26/2021    HGB 15.7 12/20/2018    HCT 46.9 04/26/2021    HCT 47.0 12/20/2018     04/26/2021     12/20/2018     BMP:   Lab Results   Component Value Date     04/26/2021     02/24/2021    POTASSIUM 3.7 04/26/2021    POTASSIUM 3.7 02/24/2021    CHLORIDE 100 04/26/2021    CHLORIDE 108 02/24/2021    CO2 29 04/26/2021    CO2 29 02/24/2021    BUN 14 04/26/2021    BUN 10 02/24/2021    CR 1.27 (H) 04/26/2021    CR 1.01 02/24/2021     (H) 04/26/2021     (H) 02/24/2021     COAGS: No results found for: PTT, INR, FIBR  POC: No results found for: BGM, HCG, HCGS  HEPATIC:   Lab Results   Component Value Date    ALBUMIN 3.9 04/26/2021    PROTTOTAL 8.2 04/26/2021    ALT 47 04/26/2021    AST 33 04/26/2021    ALKPHOS 81 04/26/2021    BILITOTAL 0.6 04/26/2021     OTHER:   Lab Results   Component Value Date    A1C 6.1 (H) 02/24/2021    CHERIE 9.2 04/26/2021    MAG 2.0 02/24/2021    TSH 1.26 02/24/2021    .0 (H) 04/26/2021    SED 5 12/20/2018       Anesthesia Plan    ASA Status:  2   NPO Status:  NPO Appropriate    Anesthesia Type: MAC.     - Reason for MAC: immobility needed, straight local not clinically adequate   Induction: Intravenous, Propofol.   Maintenance: TIVA.        Consents    Anesthesia Plan(s) and associated risks, benefits, and realistic alternatives discussed. Questions answered and patient/representative(s) expressed understanding.     - Discussed with:  Patient      - Extended Intubation/Ventilatory  Support Discussed: No.      - Patient is DNR/DNI Status: No    Use of blood products discussed: No .     Postoperative Care            Comments:    The risks and benefits of anesthesia, and the alternatives where applicable, have been discussed with the patient, and they wish to proceed.            RADHA Herron CRNA

## 2021-10-02 ENCOUNTER — HEALTH MAINTENANCE LETTER (OUTPATIENT)
Age: 51
End: 2021-10-02

## 2021-10-19 ENCOUNTER — TELEPHONE (OUTPATIENT)
Dept: FAMILY MEDICINE | Facility: CLINIC | Age: 51
End: 2021-10-19

## 2021-10-19 DIAGNOSIS — M54.50 CHRONIC BILATERAL LOW BACK PAIN WITHOUT SCIATICA: Primary | ICD-10-CM

## 2021-10-19 DIAGNOSIS — G89.29 CHRONIC BILATERAL LOW BACK PAIN WITHOUT SCIATICA: Primary | ICD-10-CM

## 2021-10-19 NOTE — TELEPHONE ENCOUNTER
Patient is requesting medrol dose pack. I don't see it on their med list      Thank You,  Aman Mccarthy, Pharmacy [facility] [place]

## 2021-10-20 RX ORDER — METHYLPREDNISOLONE 4 MG
TABLET, DOSE PACK ORAL
Qty: 21 TABLET | Refills: 0 | Status: SHIPPED | OUTPATIENT
Start: 2021-10-20 | End: 2022-04-06

## 2021-10-20 NOTE — TELEPHONE ENCOUNTER
Please find out more why he wanted a refill for the medication.   Perhaps initiating an e-visit or virtual visit.  Thank you

## 2021-10-20 NOTE — TELEPHONE ENCOUNTER
Spoke to patient, He stated he has a cough that has resulted in muscle aches. This is something he gets every year.   Patient was told he should start an E visit to discuss further.   Margarita Tidewll CMA on 10/20/2021 at 12:06 PM

## 2021-10-20 NOTE — TELEPHONE ENCOUNTER
Routing refill request to provider for review/approval because:  Drug not on the [facility] refill protocol     Joseline Mims Rn

## 2021-10-22 ENCOUNTER — TELEPHONE (OUTPATIENT)
Dept: EMERGENCY MEDICINE | Facility: CLINIC | Age: 51
End: 2021-10-22

## 2021-10-22 ENCOUNTER — HOSPITAL ENCOUNTER (EMERGENCY)
Facility: CLINIC | Age: 51
Discharge: HOME | End: 2021-10-22
Attending: EMERGENCY MEDICINE | Admitting: EMERGENCY MEDICINE
Payer: COMMERCIAL

## 2021-10-22 VITALS
WEIGHT: 315 LBS | DIASTOLIC BLOOD PRESSURE: 116 MMHG | SYSTOLIC BLOOD PRESSURE: 187 MMHG | TEMPERATURE: 98.4 F | BODY MASS INDEX: 40.44 KG/M2 | HEART RATE: 74 BPM | RESPIRATION RATE: 20 BRPM | OXYGEN SATURATION: 98 %

## 2021-10-22 DIAGNOSIS — Z20.822 SUSPECTED 2019 NOVEL CORONAVIRUS INFECTION: ICD-10-CM

## 2021-10-22 LAB
FLUAV RNA SPEC QL NAA+PROBE: NEGATIVE
FLUBV RNA RESP QL NAA+PROBE: NEGATIVE
RSV RNA SPEC NAA+PROBE: NEGATIVE
SARS-COV-2 RNA RESP QL NAA+PROBE: POSITIVE

## 2021-10-22 PROCEDURE — 87637 SARSCOV2&INF A&B&RSV AMP PRB: CPT | Performed by: EMERGENCY MEDICINE

## 2021-10-22 PROCEDURE — C9803 HOPD COVID-19 SPEC COLLECT: HCPCS | Performed by: EMERGENCY MEDICINE

## 2021-10-22 PROCEDURE — 99283 EMERGENCY DEPT VISIT LOW MDM: CPT | Performed by: EMERGENCY MEDICINE

## 2021-10-22 NOTE — ED TRIAGE NOTES
Pt concerned about his cough that started last week, started on a z-pack yesterday.  Pt states that he feels better.  Pt states that his wife tested (+) for covid yesterday.  Pt wanting to be tested.

## 2021-10-22 NOTE — ED PROVIDER NOTES
History     Chief Complaint   Patient presents with     Covid 19 Testing     HPI  Humberto Roberts is a 51 year old male who presents with complaints of 1 week of congestion and cough.  No fever or chills.  No sore throat.  No change in taste or smell.  No chest pain.  No abdominal pain, nausea, vomiting or diarrhea.  No skin rashes.  Denies tobacco use.  Both his wife and grandson have similar symptoms.  Wife tested positive for Covid yesterday.  Patient received his first Covid shot and a 4 to 5 days ago.  Does have sleep apnea and is prediabetic.  Was started on Zithromax yesterday.    Allergies:  No Known Allergies    Problem List:    Patient Active Problem List    Diagnosis Date Noted     Acanthosis nigricans 02/25/2021     Priority: Medium     Skin tag 02/25/2021     Priority: Medium     Pre-diabetes 02/25/2021     Priority: Medium     Vitamin D deficiency 12/08/2019     Priority: Medium     Hyperuricemia 12/08/2019     Priority: Medium     RICARDO (obstructive sleep apnea) - on cpap 09/22/2019     Priority: Medium     Chronic bilateral low back pain without sciatica 09/22/2019     Priority: Medium     Seasonal allergic rhinitis due to pollen, unspecified chronicity 01/23/2018     Priority: Medium     Morbid obesity (H) 12/06/2017     Priority: Medium     Elevated serum creatinine 10/15/2015     Priority: Medium     Insomnia 10/15/2015     Priority: Medium     Hypertension goal BP (blood pressure) < 140/90 05/06/2013     Priority: Medium     GERD (gastroesophageal reflux disease) 04/24/2013     Priority: Medium        Past Medical History:    Past Medical History:   Diagnosis Date     Acid reflux disease since 2006     Back pain chronic     Cellulitis of left upper arm and forearm 11/22/2013     Hypertension        Past Surgical History:    Past Surgical History:   Procedure Laterality Date     COLONOSCOPY N/A 3/25/2021    Procedure: Colonoscopy Incomplete;  Surgeon: Humberto Antoine DO;  Location:  GI      COLONOSCOPY N/A 7/15/2021    Procedure: COLONOSCOPY;  Surgeon: Humberto Antoine DO;  Location:  GI     ENT SURGERY       ESOPHAGOSCOPY, GASTROSCOPY, DUODENOSCOPY (EGD), COMBINED N/A 3/25/2021    Procedure: Esophagogastroduodenoscopy with biopsy;  Surgeon: Humberto Antoine DO;  Location:  GI       Family History:    Family History   Problem Relation Age of Onset     Diabetes Mother      Hypertension Mother      Heart Disease Mother      Heart Disease Father      Diabetes Father      Unknown/Adopted Maternal Grandmother      Unknown/Adopted Maternal Grandfather      Unknown/Adopted Paternal Grandmother      Unknown/Adopted Paternal Grandfather      No Known Problems Sister      No Known Problems Son      No Known Problems Daughter      No Known Problems Sister      No Known Problems Son      No Known Problems Daughter        Social History:  Marital Status:   [2]  Social History     Tobacco Use     Smoking status: Never Smoker     Smokeless tobacco: Never Used   Substance Use Topics     Alcohol use: No     Alcohol/week: 0.0 standard drinks     Drug use: No        Medications:    allopurinol (ZYLOPRIM) 100 MG tablet  ASPIRIN LOW DOSE 81 MG EC tablet  atenolol (TENORMIN) 50 MG tablet  cetirizine (ZYRTEC) 10 MG tablet  colchicine (COLCYRS) 0.6 MG tablet  diclofenac (VOLTAREN) 75 MG EC tablet  fluticasone (FLONASE) 50 MCG/ACT nasal spray  hydrochlorothiazide (HYDRODIURIL) 25 MG tablet  ketoconazole (NIZORAL) 2 % external cream  methylPREDNISolone (MEDROL DOSEPAK) 4 MG tablet therapy pack  order for DME  order for DME  pantoprazole (PROTONIX) 40 MG EC tablet  QUEtiapine (SEROQUEL) 25 MG tablet  tiZANidine (ZANAFLEX) 4 MG tablet          Review of Systems all other systems are reviewed and are negative.    Physical Exam   BP: (!) 178/107  Pulse: 84  Temp: 98.4  F (36.9  C)  Resp: 18  Weight: 142.9 kg (315 lb)  SpO2: 97 %      Physical Exam alert cooperative male in mild distress.  HEENT  reveals right ear effusion.  No eye injection.  Nasal mucosal swelling with clear discharge.  No oral lesions.  Speech is clear.  Neck was supple.  There is no stridor or adenopathy.  Lungs had no adventitious sounds except a rare crackle at the right base.  Cardiac auscultation was normal.  Abdomen is obese but benign to palpation.    ED Course        Procedures              Critical Care time:  none               No results found for this or any previous visit (from the past 24 hour(s)).    Medications - No data to display    Assessments & Plan (with Medical Decision Making)   Humberto Roberts is a 51 year old male who presents with complaints of 1 week of congestion and cough.  No fever or chills.  No sore throat.  No change in taste or smell.  No chest pain.  No abdominal pain, nausea, vomiting or diarrhea.  No skin rashes.  Denies tobacco use.  Both his wife and grandson have similar symptoms.  Wife tested positive for Covid yesterday.  Patient received his first Covid shot and a 4 to 5 days ago.  Does have sleep apnea and is prediabetic.  Was started on Zithromax yesterday.  On presentation patient was afebrile and not hypoxic.  He had some nasal mucosal swelling and clear discharge.  No adventitious lung sounds.  Covid test is obtained and pending.  Information on Covid treatment and reasons to return to the emergency room were discussed.  I have reviewed the nursing notes.    I have reviewed the findings, diagnosis, plan and need for follow up with the patient.       New Prescriptions    No medications on file       Final diagnoses:   Suspected 2019 novel coronavirus infection       10/22/2021   [facility] EMERGENCY DEPT     Crispin Scott MD  10/22/21 0906

## 2021-10-22 NOTE — TELEPHONE ENCOUNTER
"-Coronavirus (COVID-19) Notification    Caller Name (Patient, parent, daughter/son, grandparent, etc)  Patient    Reason for call  Notify of Positive Coronavirus (COVID-19) lab results, assess symptoms,  review [facility] recommendations    Lab Result    Lab test:  2019-nCoV rRt-PCR or SARS-CoV-2 PCR    Oropharyngeal AND/OR nasopharyngeal swabs is POSITIVE for 2019-nCoV RNA/SARS-COV-2 PCR (COVID-19 virus)    RN Recommendations/Instructions per [facility] Coronavirus COVID-19 recommendations    Brief introduction script  Introduce self then review script:  \"I am calling on behalf of [employer].  We were notified that your Coronavirus test (COVID-19) for was POSITIVE for the virus.  I have some information to relay to you but first I wanted to mention that the [place] Dept of Health will be contacting you shortly [it's possible MD already called Patient] to talk to you more about how you are feeling and other people you have had contact with who might now also have the virus.  Also, [facility] is Partnering with the [facility] for Covid-19 research, you may be contacted directly by research staff.\"    Assessment (Inquire about Patient's current symptoms)   Assessment   Current Symptoms at time of phone call: (if no symptoms, document No symptoms] Fever, chills, cough  Patient feeling fine now.   Symptoms onset (if applicable) 10/10/2021     If at time of call, Patients symptoms hare worsened, the Patient should contact 911 or have someone drive them to Emergency Dept promptly:      If Patient calling 911, inform 911 personal that you have tested positive for the Coronavirus (COVID-19).  Place mask on and await 911 to arrive.    If Emergency Dept, If possible, please have another adult drive you to the Emergency Dept but you need to wear mask when in contact with other people.      Monoclonal Antibody Administration    You may be eligible to receive a new treatment with a monoclonal " "antibody for preventing hospitalization in patients at high risk for complications from COVID-19.   This medication is still experimental and available on a limited basis; it is given through an IV and must be given at an infusion center. Please note that not all people who are eligible will receive the medication since it is in limited supply.     Are you interested in being considered for this medication?  No.   Does the patient fit the criteria: No    If patient qualifies based on above criteria:  \"You will be contacted if you are selected to receive this treatment in the next 1-2 business days.   This is time sensitive and if you are not selected in the next 1-2 business days, you will not receive the medication.  If you do not receive a call to schedule, you have not been selected.\"      Review information with Patient    Your result was positive. This means you have COVID-19 (coronavirus).  We have sent you a letter that reviews the information that I'll be reviewing with you now.    How can I protect others?    If you have symptoms: stay home and away from others (self-isolate) until:    You've had no fever--and no medicine that reduces fever--for 1 full day (24 hours). And       Your other symptoms have gotten better. For example, your cough or breathing has improved. And     At least 10 days have passed since your symptoms started. (If you've been told by a doctor that you have a weak immune system, wait 20 days.)     If you don't have symptoms: Stay home and away from others (self-isolate) until at least 10 days have passed since your first positive COVID-19 test. (Date test collected)    During this time:    Stay in your own room, including for meals. Use your own bathroom if you can.    Stay away from others in your home. No hugging, kissing or shaking hands. No visitors.     Don't go to work, school or anywhere else.     Clean  high touch  surfaces often (doorknobs, counters, handles, etc.). Use a " household cleaning spray or wipes. You'll find a full list on the EPA website at www.epa.gov/pesticide-registration/list-n-disinfectants-use-against-sars-cov-2.     Cover your mouth and nose with a mask, tissue or other face covering to avoid spreading germs.    Wash your hands and face often with soap and water.    Make a list of people you have been in close contact with recently, even if either of you wore a face covering.   ; Start your list from 2 days before you became ill or had a positive test.  ; Include anyone that was within 6 feet of you for a cumulative total of 15 minutes or more in 24 hours. (Example: if you sat next to Pete for 5 minutes in the morning and 10 minutes in the afternoon, then you were in close contact for 15 minutes total that day. Pete would be added to your list.)    A public health worker will call or text you. It is important that you answer. They will ask you questions about possible exposures to COVID-19, such as people you have been in direct contact with and places you have visited.    Tell the people on your list that you have COVID-19; they should stay away from others for 14 days starting from the last time they were in contact with you (unless you are told something different from a public health worker).     Caregivers in these groups are at risk for severe illness due to COVID-19:  o People 65 years and older  o People who live in a nursing home or long-term care facility  o People with chronic disease (lung, heart, cancer, diabetes, kidney, liver, immunologic)  o People who have a weakened immune system, including those who:  - Are in cancer treatment  - Take medicine that weakens the immune system, such as corticosteroids  - Had a bone marrow or organ transplant  - Have an immune deficiency  - Have poorly controlled HIV or AIDS  - Are obese (body mass index of 40 or higher)  - Smoke regularly    Caregivers should wear gloves while washing dishes, handling laundry and  cleaning bedrooms and bathrooms.    Wash and dry laundry with special caution. Don't shake dirty laundry, and use the warmest water setting you can.    If you have a weakened immune system, ask your doctor about other actions you should take.    For more tips, go to www.cdc.gov/coronavirus/2019-ncov/downloads/10Things.pdf.    You should not go back to work until you meet the guidelines above for ending your home isolation. You don't need to be retested for COVID-19 before going back to work--studies show that you won't spread the virus if it's been at least 10 days since your symptoms started (or 20 days, if you have a weak immune system).    Employers: This document serves as formal notice of your employee's medical guidelines for going back to work. They must meet the above guidelines before going back to work in person.    How can I take care of myself?    1. Get lots of rest. Drink extra fluids (unless a doctor has told you not to).    2. Take Tylenol (acetaminophen) for fever or pain. If you have liver or kidney problems, ask your family doctor if it's okay to take Tylenol.     Take either:     650 mg (two 325 mg pills) every 4 to 6 hours, or     1,000 mg (two 500 mg pills) every 8 hours as needed.     Note: Don't take more than 3,000 mg in one day. Acetaminophen is found in many medicines (both prescribed and over-the-counter medicines). Read all labels to be sure you don't take too much.    For children, check the Tylenol bottle for the right dose (based on their age or weight).    3. If you have other health problems (like cancer, heart failure, an organ transplant or severe kidney disease): Call your specialty clinic if you don't feel better in the next 2 days.    4. Know when to call 911: Emergency warning signs include:    Trouble breathing or shortness of breath    Pain or pressure in the chest that doesn't go away    Feeling confused like you haven't felt before, or not being able to wake  up    Bluish-colored lips or face    5. Sign up for [employer]. We know it's scary to hear that you have COVID-19. We want to track your symptoms to make sure you're okay over the next 2 weeks. Please look for an email from [employer]--this is a free, online program that we'll use to keep in touch. To sign up, follow the link in the email. Learn more at [email or web address].    Where can I get more information?    [facility]: [email or web address]    Coronavirus Basics: www.health.[facility].[place]./diseases/coronavirus/basics.html    What to Do If You're Sick: www.cdc.gov/coronavirus/2019-ncov/about/steps-when-sick.html    Ending Home Isolation: www.cdc.gov/coronavirus/2019-ncov/hcp/disposition-in-home-patients.html     Caring for Someone with COVID-19: www.cdc.gov/coronavirus/2019-ncov/if-you-are-sick/care-for-someone.html     [facility] clinical trials (COVID-19 research studies): clinicalaffairs.[facility]-clinical-trials     A Positive COVID-19 letter will be sent via [employer] or the mail. (Exception, no letters sent to Presurgerical/Preprocedure Patients)    Patient is past isolation guideline.  Patient is feeling fine with no fever for 24 hours.  Saima Lezama LPN

## 2021-11-03 ENCOUNTER — HOSPITAL ENCOUNTER (EMERGENCY)
Facility: CLINIC | Age: 51
Discharge: HOME | End: 2021-11-03
Attending: PHYSICIAN ASSISTANT | Admitting: PHYSICIAN ASSISTANT
Payer: COMMERCIAL

## 2021-11-03 VITALS
RESPIRATION RATE: 18 BRPM | TEMPERATURE: 98.4 F | SYSTOLIC BLOOD PRESSURE: 144 MMHG | OXYGEN SATURATION: 100 % | DIASTOLIC BLOOD PRESSURE: 80 MMHG | HEART RATE: 61 BPM | BODY MASS INDEX: 41.29 KG/M2 | WEIGHT: 315 LBS

## 2021-11-03 DIAGNOSIS — H65.90 SEROUS OTITIS MEDIA: ICD-10-CM

## 2021-11-03 DIAGNOSIS — H69.90 DYSFUNCTION OF EUSTACHIAN TUBE: ICD-10-CM

## 2021-11-03 PROCEDURE — 99284 EMERGENCY DEPT VISIT MOD MDM: CPT | Performed by: PHYSICIAN ASSISTANT

## 2021-11-03 PROCEDURE — 250N000012 HC RX MED GY IP 250 OP 636 PS 637: Performed by: PHYSICIAN ASSISTANT

## 2021-11-03 PROCEDURE — 99283 EMERGENCY DEPT VISIT LOW MDM: CPT

## 2021-11-03 RX ORDER — PREDNISONE 20 MG/1
50 TABLET ORAL DAILY
Qty: 10 TABLET | Refills: 0 | Status: SHIPPED | OUTPATIENT
Start: 2021-11-04 | End: 2021-11-08

## 2021-11-03 RX ADMIN — PREDNISONE 50 MG: 20 TABLET ORAL at 21:31

## 2021-11-04 NOTE — ED PROVIDER NOTES
History     Chief Complaint   Patient presents with     Otalgia     HPI  Humberto Roberts is a 51 year old male who presents for evaluation of bilateral plugged ear sensation with aching discomfort for the last 1 week.  He did have URI symptoms 2 weeks ago.  Was diagnosed with COVID-19.  States all of those symptoms have resolved.  Denies any otorrhea, blood from the ear canal, fever, chills, sinus pain/pressure, sore throat, odynophagia, dyspnea, cough.  Denies any injury to the ears.  Has never had ear problems in the past.  He states that he did feel much better after being on methylprednisolone for his COVID-19.  Has not had ear problems in the past.  Denies any ear surgeries.  Nothing seems to make his symptoms better or worse.        Allergies:  No Known Allergies    Problem List:    Patient Active Problem List    Diagnosis Date Noted     Acanthosis nigricans 02/25/2021     Priority: Medium     Skin tag 02/25/2021     Priority: Medium     Pre-diabetes 02/25/2021     Priority: Medium     Vitamin D deficiency 12/08/2019     Priority: Medium     Hyperuricemia 12/08/2019     Priority: Medium     RICARDO (obstructive sleep apnea) - on cpap 09/22/2019     Priority: Medium     Chronic bilateral low back pain without sciatica 09/22/2019     Priority: Medium     Seasonal allergic rhinitis due to pollen, unspecified chronicity 01/23/2018     Priority: Medium     Morbid obesity (H) 12/06/2017     Priority: Medium     Elevated serum creatinine 10/15/2015     Priority: Medium     Insomnia 10/15/2015     Priority: Medium     Hypertension goal BP (blood pressure) < 140/90 05/06/2013     Priority: Medium     GERD (gastroesophageal reflux disease) 04/24/2013     Priority: Medium        Past Medical History:    Past Medical History:   Diagnosis Date     Acid reflux disease since 2006     Back pain chronic     Cellulitis of left upper arm and forearm 11/22/2013     Hypertension        Past Surgical History:    Past Surgical  History:   Procedure Laterality Date     COLONOSCOPY N/A 3/25/2021    Procedure: Colonoscopy Incomplete;  Surgeon: Humberto Antoine DO;  Location:  GI     COLONOSCOPY N/A 7/15/2021    Procedure: COLONOSCOPY;  Surgeon: Humberto Antoine DO;  Location:  GI     ENT SURGERY       ESOPHAGOSCOPY, GASTROSCOPY, DUODENOSCOPY (EGD), COMBINED N/A 3/25/2021    Procedure: Esophagogastroduodenoscopy with biopsy;  Surgeon: Humberto Antoine DO;  Location:  GI       Family History:    Family History   Problem Relation Age of Onset     Diabetes Mother      Hypertension Mother      Heart Disease Mother      Heart Disease Father      Diabetes Father      Unknown/Adopted Maternal Grandmother      Unknown/Adopted Maternal Grandfather      Unknown/Adopted Paternal Grandmother      Unknown/Adopted Paternal Grandfather      No Known Problems Sister      No Known Problems Son      No Known Problems Daughter      No Known Problems Sister      No Known Problems Son      No Known Problems Daughter        Social History:  Marital Status:   [2]  Social History     Tobacco Use     Smoking status: Never Smoker     Smokeless tobacco: Never Used   Substance Use Topics     Alcohol use: No     Alcohol/week: 0.0 standard drinks     Drug use: No        Medications:    [START ON 11/4/2021] predniSONE (DELTASONE) 20 MG tablet  allopurinol (ZYLOPRIM) 100 MG tablet  ASPIRIN LOW DOSE 81 MG EC tablet  atenolol (TENORMIN) 50 MG tablet  cetirizine (ZYRTEC) 10 MG tablet  colchicine (COLCYRS) 0.6 MG tablet  diclofenac (VOLTAREN) 75 MG EC tablet  fluticasone (FLONASE) 50 MCG/ACT nasal spray  hydrochlorothiazide (HYDRODIURIL) 25 MG tablet  ketoconazole (NIZORAL) 2 % external cream  methylPREDNISolone (MEDROL DOSEPAK) 4 MG tablet therapy pack  order for DME  order for DME  pantoprazole (PROTONIX) 40 MG EC tablet  QUEtiapine (SEROQUEL) 25 MG tablet  tiZANidine (ZANAFLEX) 4 MG tablet          Review of Systems   All other systems  reviewed and are negative.      Physical Exam   BP: (!) 144/80  Pulse: 61  Temp: 98.4  F (36.9  C)  Resp: 18  Weight: 145.9 kg (321 lb 9.6 oz)  SpO2: 100 %      Physical Exam  Vitals and nursing note reviewed.   Constitutional:       General: He is not in acute distress.     Appearance: He is not diaphoretic.   HENT:      Head: Normocephalic and atraumatic.      Right Ear: Ear canal and external ear normal. There is no impacted cerumen.      Left Ear: Ear canal and external ear normal. There is no impacted cerumen.      Ears:      Comments: Bilateral TMs with clear fluid serous otitis without TM erythema or bulging.     Nose: Nose normal.      Mouth/Throat:      Mouth: Mucous membranes are moist.      Pharynx: No oropharyngeal exudate.   Eyes:      General: No scleral icterus.        Right eye: No discharge.         Left eye: No discharge.      Conjunctiva/sclera: Conjunctivae normal.      Pupils: Pupils are equal, round, and reactive to light.   Neck:      Thyroid: No thyromegaly.   Cardiovascular:      Rate and Rhythm: Normal rate and regular rhythm.      Heart sounds: Normal heart sounds. No murmur heard.     Pulmonary:      Effort: Pulmonary effort is normal. No respiratory distress.      Breath sounds: Normal breath sounds. No wheezing or rales.   Chest:      Chest wall: No tenderness.   Abdominal:      General: Bowel sounds are normal. There is no distension.      Palpations: Abdomen is soft. There is no mass.      Tenderness: There is no abdominal tenderness. There is no guarding or rebound.   Musculoskeletal:         General: No tenderness or deformity. Normal range of motion.      Cervical back: Normal range of motion and neck supple.   Lymphadenopathy:      Cervical: No cervical adenopathy.   Skin:     General: Skin is warm and dry.      Capillary Refill: Capillary refill takes less than 2 seconds.      Findings: No erythema or rash.   Neurological:      Mental Status: He is alert and oriented to person,  place, and time.      Cranial Nerves: No cranial nerve deficit.   Psychiatric:         Behavior: Behavior normal.         Thought Content: Thought content normal.         ED Course        Procedures              Critical Care time:  none               No results found for this or any previous visit (from the past 24 hour(s)).    Medications   predniSONE (DELTASONE) tablet 50 mg (has no administration in time range)       Assessments & Plan (with Medical Decision Making)     Serous otitis media  Dysfunction of eustachian tube     51 year old male presents for evaluation of bilateral ear plugging sensation and discomfort for the past 1 week.  Symptoms started after URI symptoms 2-3 weeks ago.  No otorrhea, fever, or chills.  See HPI for further details.  On exam blood pressure 144/80, temperature 98.4, pulse 61, respiration 18, oxygen saturation 100% on room air.  Patient in no acute distress.  Bilateral serous clear fluid effusion without TM erythema or further abnormality.  Canals are clear.  No TMJ tenderness.  No clicking over the TMJ joint with chart range of motion.  Remainder the exam is otherwise reassuring.  Patient will restart his fluticasone nasal spray on a regular basis.  He has already trialed OTC medications, but we will switch this to regular Mucinex.  We cannot use a decongestant given his hypertension.  Try a prednisone burst per orders.  First dose given here in the ED, and he will  the remainder of his prescription tomorrow.  Try chewing gum or yawning.  Push clear fluids.  Indications for return reviewed.  Patient was in agreement with this plan was suitable for discharge.     I have reviewed the nursing notes.    I have reviewed the findings, diagnosis, plan and need for follow up with the patient.       New Prescriptions    PREDNISONE (DELTASONE) 20 MG TABLET    Take 2.5 tablets (50 mg) by mouth daily for 4 days       Final diagnoses:   Serous otitis media   Dysfunction of eustachian  tube     Disclaimer: This note consists of symbols derived from keyboarding, dictation and/or voice recognition software. As a result, there may be errors in the script that have gone undetected. Please consider this when interpreting information found in this chart.      11/3/2021   [facility] EMERGENCY DEPT     Alcon Kaufman PA-C  11/03/21 [id number]

## 2021-11-04 NOTE — DISCHARGE INSTRUCTIONS
It was a pleasure working with you today!  I hope your condition improves rapidly!     Keep using the Fluticasone nasal spray 2 sprays each nostril daily.  Try adding the prednisone.  Take regular Mucinex twice daily.  This is over the counter.  Try yawning and chewing gum.  This combination should help drain the fluid from your ear internally through your eustachian tube that goes into your throat area.

## 2021-11-11 ENCOUNTER — TRANSFERRED RECORDS (OUTPATIENT)
Dept: HEALTH INFORMATION MANAGEMENT | Facility: CLINIC | Age: 51
End: 2021-11-11
Payer: COMMERCIAL

## 2021-11-23 ENCOUNTER — HOSPITAL ENCOUNTER (EMERGENCY)
Facility: CLINIC | Age: 51
Discharge: HOME | End: 2021-11-23
Attending: FAMILY MEDICINE | Admitting: FAMILY MEDICINE
Payer: COMMERCIAL

## 2021-11-23 VITALS
SYSTOLIC BLOOD PRESSURE: 137 MMHG | DIASTOLIC BLOOD PRESSURE: 73 MMHG | WEIGHT: 315 LBS | TEMPERATURE: 98.4 F | OXYGEN SATURATION: 97 % | HEART RATE: 62 BPM | RESPIRATION RATE: 18 BRPM | BODY MASS INDEX: 40.8 KG/M2

## 2021-11-23 DIAGNOSIS — M10.061 ACUTE IDIOPATHIC GOUT OF RIGHT KNEE: ICD-10-CM

## 2021-11-23 PROCEDURE — 99283 EMERGENCY DEPT VISIT LOW MDM: CPT | Performed by: FAMILY MEDICINE

## 2021-11-23 PROCEDURE — 99284 EMERGENCY DEPT VISIT MOD MDM: CPT | Performed by: FAMILY MEDICINE

## 2021-11-23 RX ORDER — OXYCODONE AND ACETAMINOPHEN 5; 325 MG/1; MG/1
1 TABLET ORAL EVERY 6 HOURS PRN
Qty: 12 TABLET | Refills: 0 | Status: SHIPPED | OUTPATIENT
Start: 2021-11-23 | End: 2022-04-06

## 2021-11-23 RX ORDER — PREDNISONE 10 MG/1
TABLET ORAL
Qty: 32 TABLET | Refills: 0 | Status: SHIPPED | OUTPATIENT
Start: 2021-11-23 | End: 2021-12-03

## 2021-11-23 NOTE — ED TRIAGE NOTES
R knee pain for the past 3-4 days.  It is swollen, painful to walk on and keeping him awake at night.

## 2021-11-23 NOTE — DISCHARGE INSTRUCTIONS
I suspect the pain and swelling in your right knee is gout related.  Take prednisone 40 mg a day and then decrease by 1 pill a day.  Reduce the intake of high uric acid foods.  Follow-up with your primary care provider.

## 2021-11-23 NOTE — ED PROVIDER NOTES
History     Chief Complaint   Patient presents with     Knee Pain     HPI  Humberto Roberts is a 51 year old male with history of gout and hyperuricemia who presents the emergency room with right knee pain swelling redness warmth of acute onset.  He usually gets gout in his right ankle and right foot but feels that he has in his right knee this time.  He has had it in the knee occasionally but it is usually in the foot or ankle.  No injury.  He responds well to steroids.    Allergies:  No Known Allergies    Problem List:    Patient Active Problem List    Diagnosis Date Noted     Acanthosis nigricans 02/25/2021     Priority: Medium     Skin tag 02/25/2021     Priority: Medium     Pre-diabetes 02/25/2021     Priority: Medium     Vitamin D deficiency 12/08/2019     Priority: Medium     Hyperuricemia 12/08/2019     Priority: Medium     RICARDO (obstructive sleep apnea) - on cpap 09/22/2019     Priority: Medium     Chronic bilateral low back pain without sciatica 09/22/2019     Priority: Medium     Seasonal allergic rhinitis due to pollen, unspecified chronicity 01/23/2018     Priority: Medium     Morbid obesity (H) 12/06/2017     Priority: Medium     Elevated serum creatinine 10/15/2015     Priority: Medium     Insomnia 10/15/2015     Priority: Medium     Hypertension goal BP (blood pressure) < 140/90 05/06/2013     Priority: Medium     GERD (gastroesophageal reflux disease) 04/24/2013     Priority: Medium        Past Medical History:    Past Medical History:   Diagnosis Date     Acid reflux disease since 2006     Back pain chronic     Cellulitis of left upper arm and forearm 11/22/2013     Hypertension        Past Surgical History:    Past Surgical History:   Procedure Laterality Date     COLONOSCOPY N/A 3/25/2021    Procedure: Colonoscopy Incomplete;  Surgeon: Humberto Antoine DO;  Location:  GI     COLONOSCOPY N/A 7/15/2021    Procedure: COLONOSCOPY;  Surgeon: Humberto Antoine DO;  Location:  GI      ENT SURGERY       ESOPHAGOSCOPY, GASTROSCOPY, DUODENOSCOPY (EGD), COMBINED N/A 3/25/2021    Procedure: Esophagogastroduodenoscopy with biopsy;  Surgeon: Humberto Antoine DO;  Location:  GI       Family History:    Family History   Problem Relation Age of Onset     Diabetes Mother      Hypertension Mother      Heart Disease Mother      Heart Disease Father      Diabetes Father      Unknown/Adopted Maternal Grandmother      Unknown/Adopted Maternal Grandfather      Unknown/Adopted Paternal Grandmother      Unknown/Adopted Paternal Grandfather      No Known Problems Sister      No Known Problems Son      No Known Problems Daughter      No Known Problems Sister      No Known Problems Son      No Known Problems Daughter        Social History:  Marital Status:   [2]  Social History     Tobacco Use     Smoking status: Never Smoker     Smokeless tobacco: Never Used   Substance Use Topics     Alcohol use: No     Alcohol/week: 0.0 standard drinks     Drug use: No        Medications:    oxyCODONE-acetaminophen (PERCOCET) 5-325 MG tablet  predniSONE (DELTASONE) 10 MG tablet  allopurinol (ZYLOPRIM) 100 MG tablet  ASPIRIN LOW DOSE 81 MG EC tablet  atenolol (TENORMIN) 50 MG tablet  cetirizine (ZYRTEC) 10 MG tablet  colchicine (COLCYRS) 0.6 MG tablet  diclofenac (VOLTAREN) 75 MG EC tablet  fluticasone (FLONASE) 50 MCG/ACT nasal spray  hydrochlorothiazide (HYDRODIURIL) 25 MG tablet  ketoconazole (NIZORAL) 2 % external cream  methylPREDNISolone (MEDROL DOSEPAK) 4 MG tablet therapy pack  order for DME  order for DME  pantoprazole (PROTONIX) 40 MG EC tablet  QUEtiapine (SEROQUEL) 25 MG tablet  tiZANidine (ZANAFLEX) 4 MG tablet          Review of Systems   All other systems reviewed and are negative.      Physical Exam   BP: 137/73  Pulse: 62  Temp: 98.4  F (36.9  C)  Resp: 18  Weight: 144.2 kg (317 lb 12.8 oz)  SpO2: 97 %      Physical Exam  Vitals and nursing note reviewed.   Constitutional:       Appearance: Normal  appearance. He is obese. He is not ill-appearing.   HENT:      Head: Normocephalic and atraumatic.      Nose: Nose normal.      Mouth/Throat:      Mouth: Mucous membranes are moist.   Cardiovascular:      Rate and Rhythm: Normal rate.      Pulses: Normal pulses.   Pulmonary:      Effort: Pulmonary effort is normal.   Musculoskeletal:      Cervical back: Normal range of motion.      Comments: Right knee is just slightly larger than the left.  No obvious effusion.  It is slightly warm to the touch but no obvious redness.   Skin:     General: Skin is warm.      Capillary Refill: Capillary refill takes less than 2 seconds.   Neurological:      General: No focal deficit present.      Mental Status: He is alert and oriented to person, place, and time.   Psychiatric:         Mood and Affect: Mood normal.         Behavior: Behavior normal.         ED Course        Procedures              Critical Care time:  none               No results found for this or any previous visit (from the past 24 hour(s)).    Medications - No data to display    Assessments & Plan (with Medical Decision Making)   MDM--51-year-old with hyperuricemia and history of gout with a painful warm swollen right knee.  I suspect this may be gout related or other acute arthritic condition.  He has no fever and there is no evidence of a septic joint.  There is no injury or trauma.  Recommended a course of steroids and he was given 12 tablets of Percocet for pain control.  I have reviewed the nursing notes.    I have reviewed the findings, diagnosis, plan and need for follow up with the patient.       Discharge Medication List as of 11/23/2021  8:22 AM      START taking these medications    Details   oxyCODONE-acetaminophen (PERCOCET) 5-325 MG tablet Take 1 tablet by mouth every 6 hours as needed for severe pain, Disp-12 tablet, R-0, Local Print      predniSONE (DELTASONE) 10 MG tablet Take 4 tablets daily for 5 days,  take 2 tablets daily for 3 days, take 1  tablet daily for 3 days, take half a tablet for 3 days., Disp-32 tablet, R-0, Local Print             Final diagnoses:   Acute idiopathic gout of right knee       11/23/2021   [facility] EMERGENCY DEPT     Ivanna, Joe DANIELLE MD  11/23/21 1038

## 2021-12-20 ENCOUNTER — VIRTUAL VISIT (OUTPATIENT)
Dept: PSYCHOLOGY | Facility: CLINIC | Age: 51
End: 2021-12-20
Payer: COMMERCIAL

## 2021-12-20 DIAGNOSIS — Z02.9 ENCOUNTERS FOR ADMINISTRATIVE PURPOSES: Primary | ICD-10-CM

## 2021-12-20 NOTE — PROGRESS NOTES
Sent text and email link to join session at 9:00am. Patient did not join, so called at 9:10am. Patient reported he did not receive the email or text and that he was driving. Stated to patient that we cannot have the session while he is driving so we can use our next scheduled appointment on 1/24/2022 as the initial session.     Diane Wagoner PsyD,   Clinical Psychologist

## 2021-12-21 DIAGNOSIS — F51.04 PSYCHOPHYSIOLOGICAL INSOMNIA: Primary | ICD-10-CM

## 2021-12-22 RX ORDER — TRAZODONE HYDROCHLORIDE 100 MG/1
TABLET ORAL
Qty: 30 TABLET | Refills: 6 | Status: SHIPPED | OUTPATIENT
Start: 2021-12-22 | End: 2023-05-11

## 2021-12-22 NOTE — TELEPHONE ENCOUNTER
Routing to covering provider as PCP is currently out of office.     Routing refill request to provider for review/approval because:  Drug not active on patient's medication list    Joseline Mims RN

## 2021-12-23 ENCOUNTER — TELEPHONE (OUTPATIENT)
Dept: FAMILY MEDICINE | Facility: CLINIC | Age: 51
End: 2021-12-23
Payer: COMMERCIAL

## 2021-12-24 NOTE — TELEPHONE ENCOUNTER
Patient is requesting a refill on prednisone 10mg. Medication is not on med list.     Thank you,  Kia Pineda, [facility] [place]

## 2021-12-27 ENCOUNTER — TELEPHONE (OUTPATIENT)
Dept: FAMILY MEDICINE | Facility: CLINIC | Age: 51
End: 2021-12-27
Payer: COMMERCIAL

## 2021-12-28 NOTE — TELEPHONE ENCOUNTER
Left voicemail in regard to recommendations about appointment needed. Call back number provided to schedule an appointment.

## 2021-12-28 NOTE — TELEPHONE ENCOUNTER
Patient is requesting refill of prednisone 10mg tablets. Medication is not on med list.    Thank you,  Kia Pineda, [facility] [place]

## 2022-01-20 ENCOUNTER — TELEPHONE (OUTPATIENT)
Dept: BEHAVIORAL HEALTH | Facility: CLINIC | Age: 52
End: 2022-01-20
Payer: COMMERCIAL

## 2022-01-24 ENCOUNTER — VIRTUAL VISIT (OUTPATIENT)
Dept: PSYCHOLOGY | Facility: CLINIC | Age: 52
End: 2022-01-24
Payer: COMMERCIAL

## 2022-01-24 DIAGNOSIS — K21.9 GASTROESOPHAGEAL REFLUX DISEASE, UNSPECIFIED WHETHER ESOPHAGITIS PRESENT: ICD-10-CM

## 2022-01-24 DIAGNOSIS — M54.50 CHRONIC BILATERAL LOW BACK PAIN WITHOUT SCIATICA: ICD-10-CM

## 2022-01-24 DIAGNOSIS — G47.33 OSA (OBSTRUCTIVE SLEEP APNEA): ICD-10-CM

## 2022-01-24 DIAGNOSIS — G89.29 CHRONIC BILATERAL LOW BACK PAIN WITHOUT SCIATICA: ICD-10-CM

## 2022-01-24 DIAGNOSIS — R73.03 PRE-DIABETES: ICD-10-CM

## 2022-01-24 DIAGNOSIS — I10 HYPERTENSION GOAL BP (BLOOD PRESSURE) < 140/90: ICD-10-CM

## 2022-01-24 DIAGNOSIS — E66.01 MORBID OBESITY (H): Primary | ICD-10-CM

## 2022-01-24 PROCEDURE — 96156 HLTH BHV ASSMT/REASSESSMENT: CPT | Mod: GT | Performed by: PSYCHOLOGIST

## 2022-01-24 ASSESSMENT — COLUMBIA-SUICIDE SEVERITY RATING SCALE - C-SSRS
1. IN THE PAST MONTH, HAVE YOU WISHED YOU WERE DEAD OR WISHED YOU COULD GO TO SLEEP AND NOT WAKE UP?: NO
2. HAVE YOU ACTUALLY HAD ANY THOUGHTS OF KILLING YOURSELF LIFETIME?: NO
2. HAVE YOU ACTUALLY HAD ANY THOUGHTS OF KILLING YOURSELF?: NO
1. IN THE PAST MONTH, HAVE YOU WISHED YOU WERE DEAD OR WISHED YOU COULD GO TO SLEEP AND NOT WAKE UP?: NO

## 2022-01-24 ASSESSMENT — ANXIETY QUESTIONNAIRES
5. BEING SO RESTLESS THAT IT IS HARD TO SIT STILL: SEVERAL DAYS
6. BECOMING EASILY ANNOYED OR IRRITABLE: MORE THAN HALF THE DAYS
GAD7 TOTAL SCORE: 13
2. NOT BEING ABLE TO STOP OR CONTROL WORRYING: MORE THAN HALF THE DAYS
3. WORRYING TOO MUCH ABOUT DIFFERENT THINGS: MORE THAN HALF THE DAYS
1. FEELING NERVOUS, ANXIOUS, OR ON EDGE: NEARLY EVERY DAY
7. FEELING AFRAID AS IF SOMETHING AWFUL MIGHT HAPPEN: NOT AT ALL

## 2022-01-24 ASSESSMENT — PATIENT HEALTH QUESTIONNAIRE - PHQ9
SUM OF ALL RESPONSES TO PHQ QUESTIONS 1-9: 21
5. POOR APPETITE OR OVEREATING: NEARLY EVERY DAY

## 2022-01-24 NOTE — PATIENT INSTRUCTIONS
Peng Paul,    Here is your bariatric team information:    [facility] in [place]:  [phone]    Alvina Linda MD (medical provider evaluating your health and readiness to complete surgery)  No dietician visits yet (you will need to meet with a dietician multiple times to determine readiness to complete surgery)  Diane Wagoner PsyD, LP (psychologist completing the psychological evaluation to determine readiness to complete surgery)

## 2022-01-24 NOTE — PROGRESS NOTES
HEALTH AND BEHAVIOR ASSESSMENT     Patient Name: Humberto Roberts  Date: 22      Service Type: Health and Behavior Assessment    Session Start Time: 8:05am  Session End Time: 8:45am     Session Length: 40 minutes    Session #: 1    Attendees: Client attended alone    Service Modality: Video Visit:      Provider verified identity through the following two step process.  Patient provided:  Patient photo and Patient     Telemedicine Visit: The patient's condition can be safely assessed and treated via synchronous audio and visual telemedicine encounter.      Reason for Telemedicine Visit: Services only offered telehealth    Originating Site (Patient Location): Patient's home    Distant Site (Provider Location): Provider Remote Setting- Home Office    Consent:  The patient/guardian has verbally consented to: the potential risks and benefits of telemedicine (video visit) versus in person care; bill my insurance or make self-payment for services provided; and responsibility for payment of non-covered services.     Patient would like the video invitation sent by:  Send to e-mail at: [email or web address]    Mode of Communication:  Video Conference via well    As the provider I attest to compliance with applicable laws and regulations related to telemedicine.      DATA:  A Health and Behavior Assessment was completed to identify the psychological, behavioral, emotional, cognitive, and social factors that are impacting the patient's physical health and management of chronic illness. Current eating and lifestyle habits are impacting patient's mobility and ability to participate in social activities. Medical records indicate diagnoses of: obesity, hypertension, obstructive sleep apnea, GERD and prediabetes. Initial diagnosis of current illness occurred: obesity documented in 2017 but patient indicated arnel he has been overweight since high school.  Patient is being considered  as a candidate for bariatric surgery as a treatment for obesity and comorbidities. A history of patient's weight, eating habits, and dieting was gathered today. A review of current stressors was completed. Patient identified the following lifestyle factors that have impacted her/his weight and medical conditions: emotional eating, low physical activity, frequent restaurant meals/take-out/convenience foods and consumption of high-calorie beverages. Patient's understanding of 1) surgical risks and 2) guidelines for post-surgical follow-up was also reviewed in this session.       Medical Diagnosis as assigned by the referring Medical Provider:  Obesity, Hypertension, GERD and Obstructive Sleep Apnea; prediabetes       Mental Status Assessment::   Appearance:   Appropriate    Eye Contact:   Good    Psychomotor Behavior: Normal    Attitude:   Cooperative    Orientation:   All   Speech    Rate / Production: Normal     Volume:  Normal    Mood:    Normal   Affect:    Appropriate    Thought Content:  Clear    Thought Form:  Coherent  Logical    Insight:    Good       Safety Assessment:   Risk status (Self / Other harm or suicidal ideation)   Patient denies current fears or concerns for personal safety.   Patient denies current or recent suicidal ideation or behaviors.   Patientdenies current or recent homicidal ideation or behaviors.   Patient denies current or recent self injurious behavior or ideation.   Patient denies other safety concerns.   Patient reports there has been no change in risk factors since their last session.     Patientreports there has been no change in protective factors since their last session.     Recommended that patient call 911 or go to the local ED should there be a change in any of these risk factors.    Rating Scales:  PHQ 1/24/2022   PHQ-9 Total Score 21   Q9: Thoughts of better off dead/self-harm past 2 weeks Not at all       CHALINO-7 SCORE 1/24/2022   Total Score 13       [place] Suicide  Severity Rating Scale (Lifetime/Recent)  [place] Suicide Severity Rating (Lifetime/Recent) 1/24/2022   1. Wish to be Dead (Lifetime) No   1. Wish to be Dead (Recent) No   2. Non-Specific Active Suicidal Thoughts (Lifetime) No   2. Non-Specific Active Suicidal Thoughts (Recent) No       WHODAS 2.0 (12 item)  This questionnaire asks about difficulties due to health conditions. Health conditions include disease or illnesses, other health problems that may be short or long lasting, injuries, mental health or emotional problems, and problems with alcohol or drugs.            Think back over the past 30 days and answer these questions, thinking about how much difficulty you had doing the following activities. For each question, please [place] only one response.     S1 Standing for long periods such as 30 minutes? Extreme / or cannot do = 5         S2 Taking care of household responsibilities? Moderate =   3          S3 Learning a new task, for example, learning how to get to a new place? Extreme / or cannot do = 5       S4 How much of a problem do you have joining community activities (for example, festivals, Muslim or other activities) in the same way as anyone else can? Mild =           2   S5 How much have you been emotionally affected by your health problems? Severe =       4                In the past 30 days, how much difficulty did you have in:   S6 Concentrating on doing something for ten minutes? Moderate =   3          S7 Walking a long distance such as a kilometer (or equivalent)? Severe =       4        S8 Washing your whole body? Mild =           2          S9 Getting dressed?   Severe =       4          S10 Dealing with people you do not know? None =         1         S11 Maintaining a friendship? Moderate =   3        S12 Your day to day work? Severe =       4              H1 Overall, in the past 30 days, how many days were these difficulties present? Record number of days: 30   H2 In the past 30 days,  for how many days were you totally unable to carry out your usual activities or work because of any health condition? Record number of days: 30    H3 In the past 30 days, not counting the days that you were totally unable, for how many days did you cut back or reduce your usual activities or work because of any health condition? Record number of days: 30       PLAN:   Medical necessity criteria is warranted in order to: Measure behavioral factors that impact disease management in scenarios that include but are not limited to: (a) pre-surgical evaluation to identify psychological factors that may potentially affect or complicate the outcome of surgical procedures and/or aftercare (e.g., spinal surgery, bariatric surgery); (b) assessment of emotional/personality factors impacting physical disease management and ability to comply with and benefit from medical interventions; (c) assessment of psychological factors in chronic pain patients; and (d) compliance to treatment regimens    Medical necessity for psychological assessment is warranted as a result of the following: (1) A specific clinical question is posed that relates to the condition/symptoms being addressed (2) The question cannot be adequately addressed by clinical interview and/or behavioral observation (3) Results of psychological testing are reasonably expected to provide an answer to the query (4) It is reasonably expected that the testing will provide information leading to a clearer diagnosis and/or guide treatment planning with an expectation of improved clinical outcome.    Patient will complete MMPI-2 prior to next session.     Patient will return for scheduled mental health diagnostic assessment. Will continue with scheduled weight loss surgery clinic appointments. Patient has contact information and may contact provider in the meantime as needed.    The client reports no currently identified Church, ethnic or cultural issues relevant to  therapy.    A Release of Information is not needed at this time.    Report to child/adult protection services was NA.    Patient will have open access to their mental health medical record.    I acknowledge that, based uponcurrent clinical information, the patient and I have reviewed and discussed issues pertaining to the purpose of therapy/testing, potential therapeutic goals, procedures, risks and benefits and estimated duration of therapy/testing. Issues pertaining to fees and confidentiality were also addressed with the patient indicated understanding. The patient was informed that their symptoms may change during the course of assessment, for better or worse, and this may impact the clinical approach and/or the duration of treatment; the patient understands that it is imperative that I am kept informed of the changes when they occur. I will not be providing any experimental procedures and, if we agree that a change in clinical procedure would be more beneficial, I will obtain specific consent for that procedure or refer you to another provider who has expertise in that area.       Parts of this documentation may have been completed using dictation software. Potential errors may result and are unintentional.     Diane Wagoner PsyD, LP  Clinical Psychologist

## 2022-01-25 ASSESSMENT — ANXIETY QUESTIONNAIRES: GAD7 TOTAL SCORE: 13

## 2022-01-30 DIAGNOSIS — I10 HYPERTENSION GOAL BP (BLOOD PRESSURE) < 140/90: ICD-10-CM

## 2022-02-01 RX ORDER — ASPIRIN 81 MG/1
TABLET, COATED ORAL
Qty: 90 TABLET | Refills: 2 | Status: SHIPPED | OUTPATIENT
Start: 2022-02-01 | End: 2023-03-07

## 2022-02-14 ENCOUNTER — VIRTUAL VISIT (OUTPATIENT)
Dept: PSYCHOLOGY | Facility: CLINIC | Age: 52
End: 2022-02-14
Payer: COMMERCIAL

## 2022-02-14 DIAGNOSIS — Z02.9 ENCOUNTERS FOR ADMINISTRATIVE PURPOSES: Primary | ICD-10-CM

## 2022-02-14 NOTE — PROGRESS NOTES
Patient forgot appointment. Rescheduled for 3/1.    Diane Wagoner PsyD, LP  Clinical Psychologist

## 2022-03-14 NOTE — TELEPHONE ENCOUNTER
Reason for Call: Request for an order or referral: Shower chair    Order or referral being requested: Shower Chair     Date needed: as soon as possible    Has the patient been seen by the PCP for this problem? NO    Additional comments: Samara had a discussion with patient about him needing an order for a shower chair. Samara states she doesn't know if patient has discussed this with Mariana Ortega before or not.     Phone number Patient can be reached at:  Please call Samara (Care Coordinator) at [facility]    Best Time: Any [phone]    Can we leave a detailed message on this number?  YES    Call taken on 12/15/2017 at 8:25 AM by Valencia Silva     [de-identified] : This is annual Preventative examination for this 52 year year old  female.  The patient has been generally feeling well with no new complaints. A complete evaluation of their current medication was reviewed with them including OTC medication .\par \par Chronic medical problems for which they are being followed by a physician are:\par Chronic hepatitis B\par    \par \par Exercises regularly:\par \par .\par \par \par

## 2022-04-06 ENCOUNTER — VIRTUAL VISIT (OUTPATIENT)
Dept: PHARMACY | Facility: CLINIC | Age: 52
End: 2022-04-06
Payer: COMMERCIAL

## 2022-04-06 DIAGNOSIS — K21.9 GASTROESOPHAGEAL REFLUX DISEASE, UNSPECIFIED WHETHER ESOPHAGITIS PRESENT: ICD-10-CM

## 2022-04-06 DIAGNOSIS — J30.1 SEASONAL ALLERGIC RHINITIS DUE TO POLLEN: ICD-10-CM

## 2022-04-06 DIAGNOSIS — E66.01 MORBID OBESITY (H): Primary | ICD-10-CM

## 2022-04-06 DIAGNOSIS — F51.04 PSYCHOPHYSIOLOGICAL INSOMNIA: ICD-10-CM

## 2022-04-06 DIAGNOSIS — E79.0 HYPERURICEMIA: ICD-10-CM

## 2022-04-06 DIAGNOSIS — I10 HYPERTENSION GOAL BP (BLOOD PRESSURE) < 140/90: ICD-10-CM

## 2022-04-06 DIAGNOSIS — R73.03 PRE-DIABETES: ICD-10-CM

## 2022-04-06 PROCEDURE — 99607 MTMS BY PHARM ADDL 15 MIN: CPT | Performed by: PHARMACIST

## 2022-04-06 PROCEDURE — 99605 MTMS BY PHARM NP 15 MIN: CPT | Performed by: PHARMACIST

## 2022-04-06 NOTE — PROGRESS NOTES
Medication Therapy Management (MTM) Encounter    ASSESSMENT:                            Medication Adherence/Access: No issues identified    Hypertension: Stable.     Gout: Stable per patient.     GERD: Opportunities to address diet/triggers, but given issues with reflux/keeping down medication at night may benefit from addition of histamine-2 receptor antagonist vs going up on PPI dose.     Insomnia: Somewhat stable - discussed using flavored, carb free liquid to help with taste of pill.     Allergic Rhinitis: Stable per patient.    Pre-diabetes/Obesity: May benefit from starting metformin therapy to help counteract metabolic affects of quetiapine (though use is low) and slow progression towards diabetes.     PLAN:                            Will send plan to Dr. Hester for consideration of the followin. Consider starting famotidine 20 mg by mouth at bedtime.    2. Consider starting metformin  mg daily for pre-diabetes.    3. Patient to find flavored water/sugar free drink to take trazodone with at night.     Follow-up: 6 months or sooner if needed    SUBJECTIVE/OBJECTIVE:                          Humberto Roberts is a 52 year old male called for an initial visit. He was referred to me from his [employer] insurance plan.      Reason for visit: Comprehensive medication review.    Allergies/ADRs: Reviewed in chart  Past Medical History: Reviewed in chart  Tobacco: He reports that he has never smoked. He has never used smokeless tobacco.  Alcohol: none      Medication Adherence/Access: Patient takes medications directly from bottles - stores them in safe.   Patient takes medications 2 time(s) per day.   Per patient, misses medication 0 times per week.   Medication barriers: taste of trazodone makes it difficult to keep down.   The patient fills medications at [place]: YES.    Hypertension: Current medications include aspirin 81 mg daily, hydrochlorothiazide 25 mg daily and atenolol 50 mg daily.  Patient does  not self-monitor blood pressure.  Patient reports no current medication side effects.  BP Readings from Last 3 Encounters:   11/23/21 137/73   11/03/21 (!) 144/80   10/22/21 (!) 187/116     Gout: Currently taking allopurinol 100 mg once daily. Patient reports no flares for about 6 months. Does have colchicine to use for flares. Patient is experiencing the following medication side effects: none.   Uric Acid   Date Value Ref Range Status   02/24/2021 5.8 3.5 - 7.2 mg/dL Final     GERD: Current medications include: Protonix (pantoprazole) 40 mg once daily. Pt reports that if eats too late or red sauce/spicy foods it can happen, but has issues on days even when he watches this - sometimes nighttime medications come up as a result.  Patient feels that current regimen is not effective.  He wonders about going higher on his current dose.     Insomnia: Current medications include: trazodone 100 mg at bedtime and quetiapine 25-50 mg at bedtime as needed (tends to avoid - gets drowsy next day). Patient reports he can fall asleep, but sometimes a hard time going back to sleep at night.     Allergic Rhinitis: Current medications include cetirizine 10 mg daily as needed - gets very drowsy so limits use and fluticasone nasal spray daily. Patient feels that current therapy is effective.     Pre-diabetes/Obesity: Patient is not taking any current medications. He is hopeful to lose weight and he states he does have family history of diabetes so wants to prevent progression if possible. Not interested in injectables.   Lab Results   Component Value Date    A1C 6.1 02/24/2021    A1C 5.5 06/29/2011     Today's Vitals: There were no vitals taken for this visit.     Wt Readings from Last 5 Encounters:   11/23/21 317 lb 12.8 oz (144.2 kg)   11/03/21 321 lb 9.6 oz (145.9 kg)   10/22/21 315 lb (142.9 kg)   06/17/21 323 lb (146.5 kg)   06/16/21 323 lb 12.8 oz (146.9 kg)     ----------------      I spent 30 minutes with this patient today. I  offer these suggestions for consideration by Dr. Hester. A copy of the visit note was provided to the patient's provider(s).    The patient was sent via [employer] a summary of these recommendations.     Gatito Peterson, PharmD, [facility]  Medication Therapy Management Pharmacist  Pager: [phone]    Telemedicine Visit Details  Type of service:  Telephone visit  Start Time: 8:00 AM  End Time: 8:30 AM  Originating Location (patient location): [place]  Distant Location (provider location):  [facility]     Medication Therapy Recommendations  GERD (gastroesophageal reflux disease)    Current Medication: pantoprazole (PROTONIX) 40 MG EC tablet   Rationale: Synergistic therapy - Needs additional medication therapy - Indication   Recommendation: Start Medication - famotidine 20 MG tablet - Start famotidine 20 mg by mouth every evening   Status: Contact Provider - Awaiting Response         Pre-diabetes    Rationale: Untreated condition - Needs additional medication therapy - Indication   Recommendation: Start Medication - metFORMIN 750 MG 24 hr tablet - Start metformin  mg by mouth daily   Status: Contact Provider - Awaiting Response

## 2022-04-07 ENCOUNTER — MYC MEDICAL ADVICE (OUTPATIENT)
Dept: PHARMACY | Facility: CLINIC | Age: 52
End: 2022-04-07
Payer: COMMERCIAL

## 2022-04-07 NOTE — PATIENT INSTRUCTIONS
Recommendations from today's MTM visit:                                                    MTM (medication therapy management) is a service provided by a clinical pharmacist designed to help you get the most of out of your medicines.   Today we reviewed what your medicines are for, how to know if they are working, that your medicines are safe and how to make your medicine regimen as easy as possible.      1. I will send a message to Dr. Hester to consider starting famotidine 20 mg by mouth at bedtime.  This is a different type of acid pill that could work with your pantoprazole to help prevent your acid reflux issues.     2. Consider starting metformin  mg daily for pre-diabetes.  This medication is weight neutral and can sometimes help with weight as well.     3. Patient to find flavored water/sugar free drink to take trazodone with at night to help mask the taste.    Indications for your medications:     Gout: Allopurinol (preventative - keeps uric acid levels low), Colchicine (helps when you have a flare)  High Blood Pressure: hydrochlorothiazide (diuretic water pill), Atenolol (slows down heart rate and dilates blood vessels)  Stroke/Heart Attack Prevention: Aspirin (helps to prevent platelets to form clots)  Heartburn: Pantoprazole (helps to block acid pumps in your stomach)  Insomnia: Trazodone (helps to shut down racing thoughts/mind before bedtime, sedating); Quetiapine (similar)  Allergies: Cetirizine (antihistamine, drowsiest of the non-drowsy), Fluticasone nasal spray (reduces inflammation)    Follow-up: 3 months or sooner if needed    It was great to speak with you today.  I value your experience and would be very thankful for your time with providing feedback on our clinic survey. You may receive a survey via email or text message in the next few days.     To schedule another MTM appointment, please call the clinic directly or you may call the MTM scheduling line at [phone] or toll-free at  [phone].     My Clinical Pharmacist's contact information:                                                      Please feel free to contact me with any questions or concerns you have.      Gatito Peterson, PharmD, [facility]  Medication Therapy Management Pharmacist  Pager: [phone]

## 2022-04-07 NOTE — TELEPHONE ENCOUNTER
----- Message from Benito Peterson [facility] sent at 4/7/2022  3:39 PM CDT -----  Regarding: MTM Considerations - famotidine, metformin  Humberto Hernadez Mai, JR was seen for a Medication Therapy Management visit after recent referral from his [employer] insurance plan. He has not seen you since Fall 2021 - he was also open to a follow-up if necessary. I offer the following suggestions for your consideration.    1. Consider starting famotidine 20 mg by mouth at bedtime.  He asked a lot about increasing pantoprazole, but long term this may be safer given the risks of PPI.  He does have some opportunities with diet, but reflux is still an issue even when he avoid triggers.     2. Consider starting metformin  mg daily for pre-diabetes. He mentioned a family history of diabetes and desire to lose weight. He is not using quetiapine regularly, but this would also help to counteract some metabolic side effects from that treatment as well.       I have CC'd my note as well. Please respond if you agree with the plan or have an alternative plan, and I can make the changes and follow-up with the patient if appropriate.    Thank you,    Gatito Peterson, PharmD, BCACP  Medication Therapy Management Pharmacist  Pager: [phone]

## 2022-04-15 DIAGNOSIS — K21.9 GASTROESOPHAGEAL REFLUX DISEASE, UNSPECIFIED WHETHER ESOPHAGITIS PRESENT: ICD-10-CM

## 2022-04-15 NOTE — TELEPHONE ENCOUNTER
Routing refill request to provider for review/approval because:  Drug interaction warning    Joseline Mims Rn

## 2022-04-18 RX ORDER — PANTOPRAZOLE SODIUM 40 MG/1
40 TABLET, DELAYED RELEASE ORAL DAILY
Qty: 90 TABLET | Refills: 0 | Status: SHIPPED | OUTPATIENT
Start: 2022-04-18 | End: 2022-05-20

## 2022-05-08 ENCOUNTER — HEALTH MAINTENANCE LETTER (OUTPATIENT)
Age: 52
End: 2022-05-08

## 2022-05-17 DIAGNOSIS — K21.9 GASTROESOPHAGEAL REFLUX DISEASE, UNSPECIFIED WHETHER ESOPHAGITIS PRESENT: ICD-10-CM

## 2022-05-17 DIAGNOSIS — I10 ESSENTIAL HYPERTENSION: ICD-10-CM

## 2022-05-17 DIAGNOSIS — E79.0 HYPERURICEMIA: ICD-10-CM

## 2022-05-20 RX ORDER — ATENOLOL 50 MG/1
TABLET ORAL
Qty: 90 TABLET | Refills: 0 | Status: SHIPPED | OUTPATIENT
Start: 2022-05-20 | End: 2022-07-29

## 2022-05-20 RX ORDER — HYDROCHLOROTHIAZIDE 25 MG/1
TABLET ORAL
Qty: 30 TABLET | Refills: 0 | Status: SHIPPED | OUTPATIENT
Start: 2022-05-20 | End: 2022-07-05

## 2022-05-20 RX ORDER — PANTOPRAZOLE SODIUM 40 MG/1
40 TABLET, DELAYED RELEASE ORAL DAILY
Qty: 90 TABLET | Refills: 0 | Status: SHIPPED | OUTPATIENT
Start: 2022-05-20 | End: 2022-11-11

## 2022-05-20 RX ORDER — ALLOPURINOL 100 MG/1
100 TABLET ORAL DAILY
Qty: 30 TABLET | Refills: 0 | Status: SHIPPED | OUTPATIENT
Start: 2022-05-20 | End: 2022-07-29

## 2022-05-20 NOTE — TELEPHONE ENCOUNTER
Allopurinol  Routing refill request to provider for review/approval because:  Labs not current:  Creatinine, CBC, ALT, Uric Acid    Hydrochlorothiazide  Routing refill request to provider for review/approval because:  Labs not current:  Creatinine, potassium, sodium    Joseline Mims RN

## 2022-05-20 NOTE — TELEPHONE ENCOUNTER
Protonix Atenolol  Prescription approved per [facility] Refill Protocol.    Joseline Mims RN

## 2022-05-20 NOTE — TELEPHONE ENCOUNTER
1 month supply refilled.  Please follow-up before med run out.  Follow-up as a physical and general follow-up 40 minutes.

## 2022-05-23 NOTE — TELEPHONE ENCOUNTER
I called patient and scheduled a medication check office visit 40 min appointment, per Kacie Swann for Tuesday 06/21 @ 7:40 am    Noris Jaimes     [facility]

## 2022-06-17 DIAGNOSIS — M10.9 ACUTE GOUT OF LEFT ANKLE, UNSPECIFIED CAUSE: ICD-10-CM

## 2022-06-20 RX ORDER — COLCHICINE 0.6 MG/1
TABLET ORAL
Qty: 20 TABLET | Refills: 0 | Status: SHIPPED | OUTPATIENT
Start: 2022-06-20 | End: 2022-11-14

## 2022-06-20 NOTE — TELEPHONE ENCOUNTER
Pending Prescriptions:                       Disp   Refills    colchicine (COLCYRS) 0.6 MG tablet [Pharma*20 tab*0        Sig: TAKE 2 TABLETS BY MOUTH AT THE FIRST SIGN OF FLARE,           TAKE 1-2 ADDITIONAL TABLETS PER DAY UNTIL           SYMPTOMS IMPROVE THEN STOP THIS MEDICATION. DO           NOT USE MORE THAN A FIVE DAYS    Routing refill request to provider for review/approval because:  Labs not current:  CBC, ALT, Uric Acid, CR  Patient needs to be seen because it has been more than 1 year since last office visit.    Hollie Sosa RN

## 2022-07-02 DIAGNOSIS — I10 ESSENTIAL HYPERTENSION: ICD-10-CM

## 2022-07-05 RX ORDER — HYDROCHLOROTHIAZIDE 25 MG/1
TABLET ORAL
Qty: 30 TABLET | Refills: 0 | Status: SHIPPED | OUTPATIENT
Start: 2022-07-05 | End: 2022-07-29

## 2022-07-05 NOTE — TELEPHONE ENCOUNTER
Routing refill request to provider for review/approval because:  Labs not current:  CREA, K+, NA+  Patient needs to be seen because it has been more than 1 year since last office visit.    Jonny Hernandez RN

## 2022-07-29 DIAGNOSIS — E79.0 HYPERURICEMIA: ICD-10-CM

## 2022-07-29 DIAGNOSIS — I10 ESSENTIAL HYPERTENSION: ICD-10-CM

## 2022-07-29 RX ORDER — ALLOPURINOL 100 MG/1
100 TABLET ORAL DAILY
Qty: 30 TABLET | Refills: 1 | Status: SHIPPED | OUTPATIENT
Start: 2022-07-29 | End: 2022-12-08

## 2022-07-29 RX ORDER — HYDROCHLOROTHIAZIDE 25 MG/1
TABLET ORAL
Qty: 30 TABLET | Refills: 1 | Status: SHIPPED | OUTPATIENT
Start: 2022-07-29 | End: 2022-10-27

## 2022-07-29 RX ORDER — ATENOLOL 50 MG/1
TABLET ORAL
Qty: 90 TABLET | Refills: 0 | Status: SHIPPED | OUTPATIENT
Start: 2022-07-29 | End: 2022-12-05

## 2022-07-29 NOTE — TELEPHONE ENCOUNTER
Hydrodiuril  Routing refill request to provider for review/approval because:  Labs not current:  CR, Potassium, NA  Patient needs to be seen because it has been more than 1 year since last office visit.  Patient has upcoming office visit in September 2022    Hollie Sosa RN

## 2022-07-29 NOTE — TELEPHONE ENCOUNTER
Atenolol  Allopurinol  Routing refill request to provider for review/approval because:  Labs not current:  CBC, ALT, Uric acid, CR  Patient needs to be seen because it has been more than 1 year since last office visit.  Office visit scheduled 9/13/2022    Hollie Sosa RN

## 2022-09-19 ENCOUNTER — HOSPITAL ENCOUNTER (EMERGENCY)
Facility: CLINIC | Age: 52
Discharge: HOME | End: 2022-09-19
Attending: EMERGENCY MEDICINE | Admitting: EMERGENCY MEDICINE
Payer: COMMERCIAL

## 2022-09-19 VITALS
RESPIRATION RATE: 16 BRPM | TEMPERATURE: 98.4 F | BODY MASS INDEX: 40.83 KG/M2 | DIASTOLIC BLOOD PRESSURE: 99 MMHG | WEIGHT: 315 LBS | OXYGEN SATURATION: 94 % | HEART RATE: 72 BPM | SYSTOLIC BLOOD PRESSURE: 182 MMHG

## 2022-09-19 DIAGNOSIS — I10 HYPERTENSION, UNSPECIFIED TYPE: ICD-10-CM

## 2022-09-19 DIAGNOSIS — H69.92 DYSFUNCTION OF LEFT EUSTACHIAN TUBE: ICD-10-CM

## 2022-09-19 PROCEDURE — 99282 EMERGENCY DEPT VISIT SF MDM: CPT | Performed by: EMERGENCY MEDICINE

## 2022-09-20 NOTE — ED TRIAGE NOTES
"  Pain and \"plugged\" left ear     Triage Assessment     Row Name 09/19/22 3820       Triage Assessment (Adult)    Airway WDL WDL       Respiratory WDL    Respiratory WDL WDL              "

## 2022-09-20 NOTE — ED PROVIDER NOTES
History     Chief Complaint   Patient presents with     Ear Fullness     HPI  Humberto Roberts is a 52 year old male who presents with left ear fullness.  This began 4 days ago.  No pain.  He did have a cold about 2 weeks ago.  No fever.  No drainage from the ear.  No trauma to the ear.    Allergies:  No Known Allergies    Problem List:    Patient Active Problem List    Diagnosis Date Noted     Acanthosis nigricans 02/25/2021     Priority: Medium     Skin tag 02/25/2021     Priority: Medium     Pre-diabetes 02/25/2021     Priority: Medium     Vitamin D deficiency 12/08/2019     Priority: Medium     Hyperuricemia 12/08/2019     Priority: Medium     RICARDO (obstructive sleep apnea) - on cpap 09/22/2019     Priority: Medium     Chronic bilateral low back pain without sciatica 09/22/2019     Priority: Medium     Seasonal allergic rhinitis due to pollen, unspecified chronicity 01/23/2018     Priority: Medium     Morbid obesity (H) 12/06/2017     Priority: Medium     Elevated serum creatinine 10/15/2015     Priority: Medium     Insomnia 10/15/2015     Priority: Medium     Hypertension goal BP (blood pressure) < 140/90 05/06/2013     Priority: Medium     GERD (gastroesophageal reflux disease) 04/24/2013     Priority: Medium        Past Medical History:    Past Medical History:   Diagnosis Date     Acid reflux disease since 2006     Back pain chronic     Cellulitis of left upper arm and forearm 11/22/2013     Hypertension        Past Surgical History:    Past Surgical History:   Procedure Laterality Date     COLONOSCOPY N/A 3/25/2021    Procedure: Colonoscopy Incomplete;  Surgeon: Humberto Antoine DO;  Location:  GI     COLONOSCOPY N/A 7/15/2021    Procedure: COLONOSCOPY;  Surgeon: Humberto Antoine DO;  Location:  GI     ENT SURGERY       ESOPHAGOSCOPY, GASTROSCOPY, DUODENOSCOPY (EGD), COMBINED N/A 3/25/2021    Procedure: Esophagogastroduodenoscopy with biopsy;  Surgeon: Humberto Antoine DO;   Location:  GI       Family History:    Family History   Problem Relation Age of Onset     Diabetes Mother      Hypertension Mother      Heart Disease Mother      Heart Disease Father      Diabetes Father      Unknown/Adopted Maternal Grandmother      Unknown/Adopted Maternal Grandfather      Unknown/Adopted Paternal Grandmother      Unknown/Adopted Paternal Grandfather      No Known Problems Sister      No Known Problems Son      No Known Problems Daughter      No Known Problems Sister      No Known Problems Son      No Known Problems Daughter        Social History:  Marital Status:   [2]  Social History     Tobacco Use     Smoking status: Never Smoker     Smokeless tobacco: Never Used   Substance Use Topics     Alcohol use: No     Alcohol/week: 0.0 standard drinks     Drug use: No        Medications:    colchicine (COLCYRS) 0.6 MG tablet  allopurinol (ZYLOPRIM) 100 MG tablet  ASPIRIN LOW DOSE 81 MG EC tablet  atenolol (TENORMIN) 50 MG tablet  cetirizine (ZYRTEC) 10 MG tablet  fluticasone (FLONASE) 50 MCG/ACT nasal spray  hydrochlorothiazide (HYDRODIURIL) 25 MG tablet  ketoconazole (NIZORAL) 2 % external cream  order for DME  order for DME  pantoprazole (PROTONIX) 40 MG EC tablet  QUEtiapine (SEROQUEL) 25 MG tablet  tiZANidine (ZANAFLEX) 4 MG tablet  traZODone (DESYREL) 100 MG tablet          Review of Systems  All other systems are reviewed and are negative    Physical Exam   BP: (!) 182/99  Pulse: 72  Temp: 98.4  F (36.9  C)  Resp: 16  Weight: 144.2 kg (318 lb)  SpO2: 94 %      Physical Exam  Vitals and nursing note reviewed.   Constitutional:       General: He is not in acute distress.     Appearance: He is well-developed. He is not diaphoretic.   HENT:      Head: Normocephalic and atraumatic.      Right Ear: Tympanic membrane, ear canal and external ear normal. There is no impacted cerumen.      Left Ear: Tympanic membrane, ear canal and external ear normal. There is no impacted cerumen.   Eyes:       General: No scleral icterus.        Right eye: No discharge.         Left eye: No discharge.      Conjunctiva/sclera: Conjunctivae normal.   Cardiovascular:      Rate and Rhythm: Normal rate and regular rhythm.      Heart sounds: Normal heart sounds. No murmur heard.  Pulmonary:      Effort: Pulmonary effort is normal. No respiratory distress.      Breath sounds: Normal breath sounds. No stridor.   Abdominal:      Palpations: Abdomen is soft.      Tenderness: There is no abdominal tenderness.   Musculoskeletal:         General: Normal range of motion.      Cervical back: Normal range of motion and neck supple.   Skin:     General: Skin is warm and dry.      Coloration: Skin is not pale.      Findings: No erythema or rash.   Neurological:      Mental Status: He is alert.      Cranial Nerves: No cranial nerve deficit.      Motor: No abnormal muscle tone.         ED Course                 Procedures              Critical Care time:  none               No results found for this or any previous visit (from the past 24 hour(s)).    Medications - No data to display    Assessments & Plan (with Medical Decision Making)  52-year-old male with some left ear fullness.  Exam normal.  Recent cold.  This may represent some station tube dysfunction.  I recommended Afrin nasal spray as needed for 3 days.     I have reviewed the nursing notes.    I have reviewed the findings, diagnosis, plan and need for follow up with the patient.       New Prescriptions    No medications on file       Final diagnoses:   Dysfunction of left eustachian tube       9/19/2022   [facility] EMERGENCY DEPT     Ben Hutchinson MD  09/19/22 1973

## 2022-09-20 NOTE — DISCHARGE INSTRUCTIONS
May use Afrin nasal spray, 2 squirts per nostril up to twice a day for up to 3 days as needed for fullness.

## 2022-10-25 DIAGNOSIS — I10 ESSENTIAL HYPERTENSION: ICD-10-CM

## 2022-10-26 NOTE — TELEPHONE ENCOUNTER
Pending Prescriptions:                       Disp   Refills    hydrochlorothiazide (HYDRODIURIL) 25 MG ta*30 tab*1        Sig: TAKE ONE TABLET BY MOUTH ONCE DAILY      Routing refill request to provider for review/approval because:  Blood pressure under 140/90 in past 12 months    Normal serum creatinine on file in past 12 months    Normal serum potassium on file in past 12 months    Normal serum sodium on file in past 12 months       Minoo Joshi RN

## 2022-10-27 RX ORDER — HYDROCHLOROTHIAZIDE 25 MG/1
TABLET ORAL
Qty: 30 TABLET | Refills: 1 | Status: SHIPPED | OUTPATIENT
Start: 2022-10-27 | End: 2023-01-26

## 2022-11-09 DIAGNOSIS — M10.9 ACUTE GOUT OF LEFT ANKLE, UNSPECIFIED CAUSE: ICD-10-CM

## 2022-11-09 DIAGNOSIS — K21.9 GASTROESOPHAGEAL REFLUX DISEASE, UNSPECIFIED WHETHER ESOPHAGITIS PRESENT: ICD-10-CM

## 2022-11-11 RX ORDER — PANTOPRAZOLE SODIUM 40 MG/1
40 TABLET, DELAYED RELEASE ORAL DAILY
Qty: 90 TABLET | Refills: 0 | Status: SHIPPED | OUTPATIENT
Start: 2022-11-11 | End: 2023-02-14

## 2022-11-11 NOTE — TELEPHONE ENCOUNTER
"Requested Prescriptions   Pending Prescriptions Disp Refills    colchicine (COLCYRS) 0.6 MG tablet [Pharmacy Med Name: COLCHICINE 0.6MG TABS] 20 tablet 0     Sig: TAKE 2 TABLETS BY MOUTH AT THE FIRST SIGN OF FLARE, TAKE 1-2 ADDITIONAL TABLETS PER DAY UNTIL SYMPTOMS IMPROVE THEN STOP THIS MEDICATION. DO NOT USE MORE THAN A FIVE DAYS       Gout Agents Protocol Failed - 11/9/2022  8:15 AM        Failed - CBC on file in past 12 months     Recent Labs   Lab Test 04/26/21 1938   WBC 17.9*   RBC 5.65   HGB 15.7   HCT 46.9                    Failed - ALT on file in past 12 months     Recent Labs   Lab Test 04/26/21 1938   ALT 47             Failed - Has Uric Acid on file in past 12 months and value is less than 6     Recent Labs   Lab Test 02/24/21  1452   URIC 5.8     If level is 6mg/dL or greater, ok to refill one time and refer to provider.           Failed - Normal serum creatinine on file in the past 12 months     Recent Labs   Lab Test 04/26/21 1938   CR 1.27*       Ok to refill medication if creatinine is low          Passed - Recent (12 mo) or future (30 days) visit within the authorizing provider's specialty     Patient has had an office visit with the authorizing provider or a provider within the authorizing providers department within the previous 12 mos or has a future within next 30 days. See \"Patient Info\" tab in inbasket, or \"Choose Columns\" in Meds & Orders section of the refill encounter.              Passed - Medication is active on med list        Passed - Patient is age 18 or older              Minoo Soto RN  "

## 2022-11-14 RX ORDER — COLCHICINE 0.6 MG/1
TABLET ORAL
Qty: 20 TABLET | Refills: 0 | Status: SHIPPED | OUTPATIENT
Start: 2022-11-14 | End: 2022-11-16

## 2022-11-16 ENCOUNTER — OFFICE VISIT (OUTPATIENT)
Dept: FAMILY MEDICINE | Facility: CLINIC | Age: 52
End: 2022-11-16
Payer: COMMERCIAL

## 2022-11-16 VITALS
SYSTOLIC BLOOD PRESSURE: 138 MMHG | HEART RATE: 70 BPM | OXYGEN SATURATION: 99 % | BODY MASS INDEX: 41.75 KG/M2 | WEIGHT: 315 LBS | TEMPERATURE: 97.9 F | HEIGHT: 73 IN | RESPIRATION RATE: 20 BRPM | DIASTOLIC BLOOD PRESSURE: 86 MMHG

## 2022-11-16 DIAGNOSIS — N52.9 ERECTILE DYSFUNCTION, UNSPECIFIED ERECTILE DYSFUNCTION TYPE: ICD-10-CM

## 2022-11-16 DIAGNOSIS — R73.03 PRE-DIABETES: ICD-10-CM

## 2022-11-16 DIAGNOSIS — Z79.4 TYPE 2 DIABETES MELLITUS WITHOUT COMPLICATION, WITH LONG-TERM CURRENT USE OF INSULIN (H): ICD-10-CM

## 2022-11-16 DIAGNOSIS — L83 ACANTHOSIS NIGRICANS: ICD-10-CM

## 2022-11-16 DIAGNOSIS — E55.9 VITAMIN D DEFICIENCY: ICD-10-CM

## 2022-11-16 DIAGNOSIS — M54.50 CHRONIC BILATERAL LOW BACK PAIN WITHOUT SCIATICA: ICD-10-CM

## 2022-11-16 DIAGNOSIS — E66.01 MORBID OBESITY (H): ICD-10-CM

## 2022-11-16 DIAGNOSIS — G89.29 CHRONIC BILATERAL LOW BACK PAIN WITHOUT SCIATICA: ICD-10-CM

## 2022-11-16 DIAGNOSIS — Z00.00 ROUTINE GENERAL MEDICAL EXAMINATION AT A HEALTH CARE FACILITY: Primary | ICD-10-CM

## 2022-11-16 DIAGNOSIS — F51.04 PSYCHOPHYSIOLOGICAL INSOMNIA: ICD-10-CM

## 2022-11-16 DIAGNOSIS — R21 RASH AND NONSPECIFIC SKIN ERUPTION: ICD-10-CM

## 2022-11-16 DIAGNOSIS — E79.0 HYPERURICEMIA: ICD-10-CM

## 2022-11-16 DIAGNOSIS — E11.9 TYPE 2 DIABETES MELLITUS WITHOUT COMPLICATION, WITH LONG-TERM CURRENT USE OF INSULIN (H): ICD-10-CM

## 2022-11-16 DIAGNOSIS — G47.33 OSA (OBSTRUCTIVE SLEEP APNEA): ICD-10-CM

## 2022-11-16 DIAGNOSIS — I10 HYPERTENSION GOAL BP (BLOOD PRESSURE) < 140/90: ICD-10-CM

## 2022-11-16 DIAGNOSIS — R79.89 ELEVATED SERUM CREATININE: ICD-10-CM

## 2022-11-16 DIAGNOSIS — K21.9 GASTROESOPHAGEAL REFLUX DISEASE WITHOUT ESOPHAGITIS: ICD-10-CM

## 2022-11-16 LAB
ALBUMIN SERPL-MCNC: 3.8 G/DL (ref 3.4–5)
ALP SERPL-CCNC: 94 U/L (ref 40–150)
ALT SERPL W P-5'-P-CCNC: 37 U/L (ref 0–70)
ANION GAP SERPL CALCULATED.3IONS-SCNC: 6 MMOL/L (ref 3–14)
AST SERPL W P-5'-P-CCNC: 21 U/L (ref 0–45)
BILIRUB SERPL-MCNC: 0.2 MG/DL (ref 0.2–1.3)
BUN SERPL-MCNC: 11 MG/DL (ref 7–30)
CALCIUM SERPL-MCNC: 8.4 MG/DL (ref 8.5–10.1)
CHLORIDE BLD-SCNC: 106 MMOL/L (ref 94–109)
CHOLEST SERPL-MCNC: 145 MG/DL
CO2 SERPL-SCNC: 27 MMOL/L (ref 20–32)
CREAT SERPL-MCNC: 1.03 MG/DL (ref 0.66–1.25)
FASTING STATUS PATIENT QL REPORTED: NO
GFR SERPL CREATININE-BSD FRML MDRD: 87 ML/MIN/1.73M2
GLUCOSE BLD-MCNC: 132 MG/DL (ref 70–99)
HBA1C MFR BLD: 6.6 % (ref 0–5.6)
HDLC SERPL-MCNC: 26 MG/DL
LDLC SERPL CALC-MCNC: 76 MG/DL
NONHDLC SERPL-MCNC: 119 MG/DL
POTASSIUM BLD-SCNC: 3.6 MMOL/L (ref 3.4–5.3)
PROT SERPL-MCNC: 6.8 G/DL (ref 6.8–8.8)
PSA SERPL-MCNC: 0.67 UG/L (ref 0–4)
SODIUM SERPL-SCNC: 139 MMOL/L (ref 133–144)
TRIGL SERPL-MCNC: 216 MG/DL
URATE SERPL-MCNC: 6.9 MG/DL (ref 3.5–7.2)

## 2022-11-16 PROCEDURE — 83036 HEMOGLOBIN GLYCOSYLATED A1C: CPT | Performed by: FAMILY MEDICINE

## 2022-11-16 PROCEDURE — 80053 COMPREHEN METABOLIC PANEL: CPT | Performed by: FAMILY MEDICINE

## 2022-11-16 PROCEDURE — 80061 LIPID PANEL: CPT | Performed by: FAMILY MEDICINE

## 2022-11-16 PROCEDURE — 99214 OFFICE O/P EST MOD 30 MIN: CPT | Mod: 25 | Performed by: FAMILY MEDICINE

## 2022-11-16 PROCEDURE — 99396 PREV VISIT EST AGE 40-64: CPT | Performed by: FAMILY MEDICINE

## 2022-11-16 PROCEDURE — 84550 ASSAY OF BLOOD/URIC ACID: CPT | Performed by: FAMILY MEDICINE

## 2022-11-16 PROCEDURE — 36415 COLL VENOUS BLD VENIPUNCTURE: CPT | Performed by: FAMILY MEDICINE

## 2022-11-16 PROCEDURE — 83970 ASSAY OF PARATHORMONE: CPT | Performed by: FAMILY MEDICINE

## 2022-11-16 PROCEDURE — 82306 VITAMIN D 25 HYDROXY: CPT | Performed by: FAMILY MEDICINE

## 2022-11-16 PROCEDURE — G0103 PSA SCREENING: HCPCS | Performed by: FAMILY MEDICINE

## 2022-11-16 RX ORDER — METHYLPREDNISOLONE 4 MG
TABLET, DOSE PACK ORAL
Qty: 21 TABLET | Refills: 0 | Status: SHIPPED | OUTPATIENT
Start: 2022-11-16 | End: 2022-12-16

## 2022-11-16 RX ORDER — SILDENAFIL CITRATE 20 MG/1
20-40 TABLET ORAL DAILY PRN
Qty: 30 TABLET | Refills: 4 | Status: SHIPPED | OUTPATIENT
Start: 2022-11-16 | End: 2023-06-09

## 2022-11-16 RX ORDER — FAMOTIDINE 40 MG/1
40 TABLET, FILM COATED ORAL
Qty: 90 TABLET | Refills: 1 | Status: SHIPPED | OUTPATIENT
Start: 2022-11-16 | End: 2024-03-19

## 2022-11-16 RX ORDER — CLOTRIMAZOLE AND BETAMETHASONE DIPROPIONATE 10; .64 MG/G; MG/G
CREAM TOPICAL 2 TIMES DAILY
Qty: 45 G | Refills: 1 | Status: SHIPPED | OUTPATIENT
Start: 2022-11-16 | End: 2024-01-25

## 2022-11-16 RX ORDER — HYDROCODONE BITARTRATE AND ACETAMINOPHEN 5; 325 MG/1; MG/1
1 TABLET ORAL 2 TIMES DAILY PRN
Qty: 30 TABLET | Refills: 0 | Status: SHIPPED | OUTPATIENT
Start: 2022-11-16 | End: 2023-06-08

## 2022-11-16 RX ORDER — COLCHICINE 0.6 MG/1
TABLET ORAL
Qty: 20 TABLET | Refills: 4 | Status: SHIPPED | OUTPATIENT
Start: 2022-11-16 | End: 2023-06-09

## 2022-11-16 ASSESSMENT — PAIN SCALES - GENERAL: PAINLEVEL: NO PAIN (0)

## 2022-11-16 NOTE — PROGRESS NOTES
"  {PROVIDER CHARTING PREFERENCE:490552}    Subjective   Humberto is a 52 year old{ACCOMPANIED BY STATEMENT (Optional):591107}, presenting for the following health issues:  Ear Problem      HPI     On going Left Ear issues that has been over 2 months. Was last seen in on 9/19 for this and would like ears looked at again since symptoms are persisting   {additonal problems for provider to add (Optional):324023}    Review of Systems   {ROS COMP (Optional):525617}      Objective    /86   Pulse 70   Temp 97.9  F (36.6  C) (Temporal)   Resp 20   Ht 1.849 m (6' 0.8\")   Wt 148.1 kg (326 lb 8 oz)   SpO2 99%   BMI 43.31 kg/m    Body mass index is 43.31 kg/m .  Physical Exam   {Exam List (Optional):141366}    {Diagnostic Test Results (Optional):152395}    {AMBULATORY ATTESTATION (Optional):269961}            "

## 2022-11-16 NOTE — Clinical Note
Please let patient know that I referred him back to sleep medicine for sleep apnea reevaluation.   Also I started him on a low-dose metformin as he is now diabetes.  Metformin also will help him to lose weight.  I also added the Topamax for appetite suppressant and eventually weight loss.  I would like him to follow-up in 1-2 and will discuss further about weight management.  Please help him to set up the appointment.  I also recommend him to see an eye doctor for diabetic exam.

## 2022-11-16 NOTE — PROGRESS NOTES
SUBJECTIVE:   CC: Humberto is an 52 year old who presents for preventative health visit.   Patient has been advised of split billing requirements and indicates understanding: Yes  HPI      Humberto is a 50-year-old -American male who is here today for physical and general follow-up.     1.  His blood pressure has been controlled with atenolol and hydrochlorothiazide with no side effect.  Not checking his blood pressure at home.  No headache or dizziness.  No chest pain, shortness of breath, dyspnea, leg swelling or orthopnea.  Not exercising due to chronic back pain.  Denied of excessive salt/caffeine intake.  He is prediabetic but no history of CVA, CAD or high cholesterol.    2.  He has chronic low back pain for years secondary to degenerative joint disease of the spine.  He in fact has been on disability for.  Been taking the diclofenac and Zanaflex as needed which have not been effective.  The same kind of pain that he has had - constant dull achy pain in his lowerr back that becomes sharp with tingling sensation that radiates down to his legs bilaterally with certain movement or activities.  It keeps him up at night and prevents him from being active -not able to exercise.  No problem with control his bowel movement or urination.  No weakness on the legs.  No fever or chills.  No unusual activities.  Epidural injections helped only temporarily and physical therapy typically made it worse. Medrol Dosepak has been working well for acute flareup..  Not taking the pain meds chronically.      3.    His acid reflux symptom has been controlled with Protonix and Pepcid.  Feels a difference if skipped the doses.  No longer having the tasting the acid in his throat. No choking or globus sensation. No excessive bloatedness.  No melena or coffee-ground emesis.  No abdominal pain.    Avoid greasy or spicy food completely.     4.  Also has high uric acid with history of gout.  Allopurinol has been working very well.  He  has colchicine to take as needed for flare up and has been working well as well.  Last flareup was couple months ago     5.  He takes trazodone for sleeping which has been working very well.  He has trouble with falling and maintaining sleep when not taking the trazodone.   No side effect    6.  Tolerating the CPAP better and been using the CPAP as prescribed.  Last the sleep study was in 2015.     7.  He also needs refill for the triamcinolone cream for the rash on his ankle.  Its been working well with no side effect.  He takes Viagra for ED and has been working well.  No side effect.  Also needs a refill for it.     8.  Otherwise, he is doing well.  No major medical care or procedure done since the last physical over a year ago. No headache, dizziness or acute visual change.  No runny nose, nasal congestion, coughing, fever or chill. No CP/SOB. No N/V/D/C or problem with urination.  No abdominal pain. Denied of STD risks.  No leg swelling.  Not exercising.  No alcohol, drugs or tobacco use. Feels safe, not working - lives with his wife.  Denied of being depressed. No other concern today      Today's PHQ-2 Score:   PHQ-2 ( 1999 Pf[employer]) 11/16/2022   Q1: Little interest or pleasure in doing things 0   Q2: Feeling down, depressed or hopeless 0   PHQ-2 Score 0   PHQ-2 Total Score (12-17 Years)- Positive if 3 or more points; Administer PHQ-A if positive -   Q1: Little interest or pleasure in doing things -   Q2: Feeling down, depressed or hopeless -   PHQ-2 Score -           Social History     Tobacco Use     Smoking status: Never     Smokeless tobacco: Never   Substance Use Topics     Alcohol use: No     Alcohol/week: 0.0 standard drinks     If you drink alcohol do you typically have >3 drinks per day or >7 drinks per week? Yes      Alcohol Use 2/24/2021   Prescreen: >3 drinks/day or >7 drinks/week? No   Prescreen: >3 drinks/day or >7 drinks/week? -   No flowsheet data found.    Last PSA:   PSA   Date Value Ref Range  Status   02/24/2021 0.76 0 - 4 ug/L Final     Comment:     Assay Method:  Chemiluminescence using Siemens Vista analyzer       Reviewed orders with patient. Reviewed health maintenance and updated orders accordingly - Yes  BP Readings from Last 3 Encounters:   11/16/22 138/86   09/19/22 (!) 182/99   11/23/21 137/73    Wt Readings from Last 3 Encounters:   11/16/22 148.1 kg (326 lb 8 oz)   09/19/22 144.2 kg (318 lb)   11/23/21 144.2 kg (317 lb 12.8 oz)                  Patient Active Problem List   Diagnosis     GERD (gastroesophageal reflux disease)     Hypertension goal BP (blood pressure) < 140/90     Elevated serum creatinine     Insomnia     Morbid obesity (H)     Seasonal allergic rhinitis due to pollen, unspecified chronicity     RICARDO (obstructive sleep apnea) - on cpap     Chronic bilateral low back pain without sciatica     Vitamin D deficiency     Hyperuricemia     Acanthosis nigricans     Skin tag     Pre-diabetes     Past Surgical History:   Procedure Laterality Date     COLONOSCOPY N/A 3/25/2021    Procedure: Colonoscopy Incomplete;  Surgeon: Humberto Antoine DO;  Location:  GI     COLONOSCOPY N/A 7/15/2021    Procedure: COLONOSCOPY;  Surgeon: Humberto Antoine DO;  Location:  GI     ENT SURGERY       ESOPHAGOSCOPY, GASTROSCOPY, DUODENOSCOPY (EGD), COMBINED N/A 3/25/2021    Procedure: Esophagogastroduodenoscopy with biopsy;  Surgeon: Humberto Antoine DO;  Location:  GI       Social History     Tobacco Use     Smoking status: Never     Smokeless tobacco: Never   Substance Use Topics     Alcohol use: No     Alcohol/week: 0.0 standard drinks     Family History   Problem Relation Age of Onset     Diabetes Mother      Hypertension Mother      Heart Disease Mother      Heart Disease Father      Diabetes Father      Unknown/Adopted Maternal Grandmother      Unknown/Adopted Maternal Grandfather      Unknown/Adopted Paternal Grandmother      Unknown/Adopted Paternal Grandfather      No  Known Problems Sister      No Known Problems Son      No Known Problems Daughter      No Known Problems Sister      No Known Problems Son      No Known Problems Daughter          Current Outpatient Medications   Medication Sig Dispense Refill     allopurinol (ZYLOPRIM) 100 MG tablet TAKE 1 TABLET (100 MG) BY MOUTH DAILY 30 tablet 1     ASPIRIN LOW DOSE 81 MG EC tablet TAKE ONE TABLET BY MOUTH ONCE DAILY 90 tablet 2     atenolol (TENORMIN) 50 MG tablet TAKE ONE TABLET BY MOUTH ONCE DAILY 90 tablet 0     cetirizine (ZYRTEC) 10 MG tablet TAKE ONE TABLET BY MOUTH ONCE DAILY AS NEEDED FOR ALLERGIES 30 tablet 5     clotrimazole-betamethasone (LOTRISONE) 1-0.05 % external cream Apply topically 2 times daily 45 g 1     colchicine (COLCYRS) 0.6 MG tablet TAKE 2 TABLETS BY MOUTH AT THE FIRST SIGN OF FLARE, TAKE 1-2 ADDITIONAL TABLETS PER DAY UNTIL SYMPTOMS IMPROVE THEN STOP THIS MEDICATION. 20 tablet 4     famotidine (PEPCID) 40 MG tablet Take 1 tablet (40 mg) by mouth nightly as needed for heartburn (acid reflux) 90 tablet 1     fluticasone (FLONASE) 50 MCG/ACT nasal spray SPRAY 1-2 SPRAYS IN EACH NOSTRIL EVERY DAY 16 g 5     hydrochlorothiazide (HYDRODIURIL) 25 MG tablet TAKE ONE TABLET BY MOUTH ONCE DAILY 30 tablet 1     HYDROcodone-acetaminophen (NORCO) 5-325 MG tablet Take 1 tablet by mouth 2 times daily as needed for severe pain (7-10) 30 tablet 0     methylPREDNISolone (MEDROL DOSEPAK) 4 MG tablet therapy pack Follow Package Directions 21 tablet 0     order for DME Equipment being ordered: shower chair 1 Device 0     order for DME 1 Device Auto CPAP @@ 7-15 cm with heated humidity via mask of choice       pantoprazole (PROTONIX) 40 MG EC tablet TAKE 1 TABLET (40 MG) BY MOUTH DAILY STOP OMEPRAZOLE 90 tablet 0     sildenafil (REVATIO) 20 MG tablet Take 1-2 tablets (20-40 mg) by mouth daily as needed (sexual activity) 30 tablet 4     tiZANidine (ZANAFLEX) 4 MG tablet TAKE ONE TABLET BY MOUTH EVERY NIGHT AT BEDTIME AND  "TAKE ONE TABLET BY MOUTH EVERY MORNING AS NEEDEd 180 tablet 0     traZODone (DESYREL) 100 MG tablet TAKE ONE TABLET BY MOUTH EVERY NIGHT AT BEDTIME 30 tablet 6     No Known Allergies  Recent Labs   Lab Test 11/16/22  1603 04/26/21  1938 02/24/21  1452 12/04/19  1550   A1C 6.6*  --  6.1*  --    LDL 76  --  82 90   HDL 26*  --  30* 34*   TRIG 216*  --  190* 151*   ALT 37 47 46 40   CR 1.03 1.27* 1.01 1.07   GFRESTIMATED 87 65 86 81   GFRESTBLACK  --  75 >90 >90   POTASSIUM 3.6 3.7 3.7 3.9   TSH  --   --  1.26 1.57        Reviewed and updated as needed this visit by clinical staff   Tobacco  Allergies  Meds   Med Hx  Surg Hx  Fam Hx  Soc Hx        Reviewed and updated as needed this visit by Provider   Tobacco  Allergies  Meds   Med Hx  Surg Hx  Fam Hx  Soc Hx           Review of Systems    Please see subjective otherwise the complete review of system was negative     OBJECTIVE:   /86   Pulse 70   Temp 97.9  F (36.6  C) (Temporal)   Resp 20   Ht 1.849 m (6' 0.8\")   Wt 148.1 kg (326 lb 8 oz)   SpO2 99%   BMI 43.31 kg/m      Physical Exam   GENERAL: alert and no distress, speaking in full sentences.  EYES: Eyes grossly normal to inspection, PERRL and conjunctivae and sclerae normal.  No nystagmus.  All 4 visual fields are intact  HENT: Ear canals and TM's normal.  Nares are non-congested. Oropharynx is pink and moist. No tender with palpation to the sinuses.  NECK: no adenopathy, supple, no lymphadenopathy or thyromegaly.  No tender with palpation to the cervical spine or its paraspinous muscle.  RESP: lungs clear to auscultation - no rales, rhonchi or wheezes  CV: regular rate and rhythm, no murmur.  ABDOMEN: soft, nondistended, nontender, no palpable masses or organomegaly with normal bowel sounds.  MS: no gross musculoskeletal defects noted, no edema.  Walk with no limping, normal gait.  All 4 extremities are equally in strength. Ankle, knees, hips, shoulders, elbows and wrists exams normal.  " Normal fine motor skills on fingers.  Back is straight, no lordosis or scoliosis.    Tender will present to the lumbar spine.  Negative straight leg maneuver.  SKIN: no suspicious lesions or rashes.  Nigricans acanthosis noted.  Multiple large skin tags noted on the trunk and neck.  Thickened, scaly eczematous rash noted on the back of his ankles bilaterally.  NEURO: Normal strength and tone, mentation intact and speech normal.  Cranial nerves II through XII intact, DTR +2 throughout, no focal neurological deficit.  PSYCH: mentation appears normal, affect normal/bright.  Thoughts intact, no hallucination.  No suicidal or homicidal ideation.  LYMPH: no cervical, supraclavicular or axillary adenopathy.   (male): Offered and recommended but he declined.  He has no concern about it.      Diagnostic Test Results:  Labs reviewed in Epic  Results for orders placed or performed in visit on 11/16/22   Hemoglobin A1c     Status: Abnormal   Result Value Ref Range    Hemoglobin A1C 6.6 (H) 0.0 - 5.6 %   Vitamin D Deficiency     Status: Normal   Result Value Ref Range    Vitamin D, Total (25-Hydroxy) 25 20 - 75 ug/L    Narrative    Season, race, dietary intake, and treatment affect the concentration of 25-hydroxy-Vitamin D. Values may decrease during winter months and increase during summer months. Values 20-29 ug/L may indicate Vitamin D insufficiency and values <20 ug/L may indicate Vitamin D deficiency.    Vitamin D determination is routinely performed by an immunoassay specific for 25 hydroxyvitamin D3.  If an individual is on vitamin D2(ergocalciferol) supplementation, please specify 25 OH vitamin D2 and D3 level determination by LCMSMS test [id number].     Uric acid     Status: Normal   Result Value Ref Range    Uric Acid 6.9 3.5 - 7.2 mg/dL   PSA, screen     Status: Normal   Result Value Ref Range    Prostate Specific Antigen Screen 0.67 0.00 - 4.00 ug/L    Narrative    Assay Method:  Chemiluminescence using Siemens   Vista  analyzer.   Comprehensive metabolic panel (BMP + Alb, Alk Phos, ALT, AST, Total. Bili, TP)     Status: Abnormal   Result Value Ref Range    Sodium 139 133 - 144 mmol/L    Potassium 3.6 3.4 - 5.3 mmol/L    Chloride 106 94 - 109 mmol/L    Carbon Dioxide (CO2) 27 20 - 32 mmol/L    Anion Gap 6 3 - 14 mmol/L    Urea Nitrogen 11 7 - 30 mg/dL    Creatinine 1.03 0.66 - 1.25 mg/dL    Calcium 8.4 (L) 8.5 - 10.1 mg/dL    Glucose 132 (H) 70 - 99 mg/dL    Alkaline Phosphatase 94 40 - 150 U/L    AST 21 0 - 45 U/L    ALT 37 0 - 70 U/L    Protein Total 6.8 6.8 - 8.8 g/dL    Albumin 3.8 3.4 - 5.0 g/dL    Bilirubin Total 0.2 0.2 - 1.3 mg/dL    GFR Estimate 87 >60 mL/min/1.73m2   Lipid panel reflex to direct LDL Fasting     Status: Abnormal   Result Value Ref Range    Cholesterol 145 <200 mg/dL    Triglycerides 216 (H) <150 mg/dL    Direct Measure HDL 26 (L) >=40 mg/dL    LDL Cholesterol Calculated 76 <=100 mg/dL    Non HDL Cholesterol 119 <130 mg/dL    Patient Fasting > 8hrs? No     Narrative    Cholesterol  Desirable:  <200 mg/dL    Triglycerides  Normal:  Less than 150 mg/dL  Borderline High:  150-199 mg/dL  High:  200-499 mg/dL  Very High:  Greater than or equal to 500 mg/dL    Direct Measure HDL  Female:  Greater than or equal to 50 mg/dL   Male:  Greater than or equal to 40 mg/dL    LDL Cholesterol  Desirable:  <100mg/dL  Above Desirable:  100-129 mg/dL   Borderline High:  130-159 mg/dL   High:  160-189 mg/dL   Very High:  >= 190 mg/dL    Non HDL Cholesterol  Desirable:  130 mg/dL  Above Desirable:  130-159 mg/dL  Borderline High:  160-189 mg/dL  High:  190-219 mg/dL  Very High:  Greater than or equal to 220 mg/dL       ASSESSMENT/PLAN:   (Z00.00) Routine general medical examination at a health care facility  (primary encounter diagnosis)  Comment: Overall Humberto overall is doing well with multiple chronic medical conditions that are overall stable.  Recommended but declined the shingles, COVID booster and flu vaccination.   Discussed about safety issue, healthy diet, exercising, weight management, good sleeping hygiene, substance/alcohol history prevention, and depression prevention. Recommended daily exercise for at least 30 minutes or stay as active as tolerated.  Emphasized on healthy diet with adequate fluid intake and resting. Feels safe at home. Follow in 1 year, earlier as needed.  Labs as ordered below.  UTD for colon cancer screening.   Had a colonoscopy done in July 2021, was normal and cleared for 10 years.  Discussed with him about the pros and cons of prostate screening cancer with PSA.  Also educated about the potential false positive which may require unnecessary work-up.  He was informed of negative/normal PSA leve does not rule out prostate cancer completely although it is very unlikely.  He understands and is willing to take the risk..  All of his questions were answered.    Plan: Lipid panel reflex to direct LDL Fasting,         Comprehensive metabolic panel (BMP + Alb, Alk         Phos, ALT, AST, Total. Bili, TP), PSA, screen            (I10) Hypertension goal BP (blood pressure) < 140/90  Comment: BP is normal and stable with atenolol and hydrochlorothiazide, been tolerating them well.    Comorbidities include morbid obesity, sleep apnea, chronic low back pain and prediabetic.  No histories of CVA, CAD or high cholesterol.  His cholesterol level today was normal.  His kidney function and electrolytes were also normal today.  Will continue with the atenolol and hydrochlorothiazide at the current doses.  Emphasized on healthy/low salt diet, staying active and weight loss.  Avoid high sugar/caffeine intake. Lab as ordered.  Follow up in 6 month, earlier as needed    Plan: Lipid panel reflex to direct LDL Fasting            (R73.03) Pre-diabetes - now DM  Comment: His hemoglobin A1c is 6.6 went up from 6.1 a year ago.    He is morbidly obese with acanthosis nigricans.  He is not able to exercise due to chronic low back  pain.  He is -American heritage which also with high risk for diabetes.  His blood pressure is controlled.  Emphasized on exercising, healthy diet and weight loss.  Will start him low-dose of metformin which may also help to lose weight.  Will continue to monitor closely.    Plan: Hemoglobin A1c            (M54.50,  G89.29) Chronic bilateral low back pain without sciatica  Comment: Known to have arthritis of the spine with chronic lower back pain and radiculopathy; has been on disability for this for years.  It prevents him from exercising or just being active.  It keeps him up at night.  Epidural injections helped minimally and temporarily.  Physical therapy worsening symptoms.  Diclofenac and Zanaflex are not really helping much.  Physical exam today with no focal neurological deficit and clinical presentation did not suggest equina syndrome or infection.  He is morbidly obese.      I discussed with him about treatment options.  Emphasized on the importance of weight loss.  Continue with the diclofenac and Zanaflex as needed.    Will start him on Norco to twice a day as needed for severe pain.  Encouraged him to take it only as needed for severe pain.  Discussed about seeing pain specialist but he declined.  Medrol Dosepak has been working well for acute flareup; been having the flareup for the last couple weeks and therefore  Medrol Dosepak prescribed today.  Continue with his normal activities as tolerated.  Symptoms the need to be seen Sequent discussed.      Plan: methylPREDNISolone (MEDROL DOSEPAK) 4 MG tablet        therapy pack, HYDROcodone-acetaminophen (NORCO)        5-325 MG tablet           (E79.0) Hyperuricemia  Comment: With history of gouty arthritis.  Tolerated allopurinol well.  Kidney function and electrolytes were normal today.  Uric acid level was normal today as well.  Will continue with allopurinol.  Foods to avoid discussed and encouraged.    Plan: Uric acid            (G47.33) RICARDO  (obstructive sleep apnea) - on cpap  Comment: Encouraged weight loss.  Tolerating the CPAP a little better. Last the sleep study was in 2015, significant weight gain since then.  Still feeling tired and in excessive sleepiness during the day.  He was referred to sleep medicine last year but no-show.  Will refer him to sleep specialist again for reevaluation.    Emphasized importance of controlling his sleep apnea as well as discussed the potential complication associated with uncontrolled sleep apnea.    (E66.01) Morbid obesity (H)  Comment: Today's BMI was 43, went up from 42 last year.   Also has multiple high blood pressure, sleep apnea, chronic low back pain, and DM.  Discussed with him about the nature of the condition and its long term and short term effects.    Not able to exercise due to chronic low back pain.  Encouraged to stay active as tolerated.   Healthy and portion diet discussed and encouraged.    He was referred to weight management last year but this did not cover for it.  He is not really sure if he is ready for surgery at this time.  Discussed about the goal for his weight and his BMI.  No history of thyroid problem.  He is prediabetic with a hemoglobin A1c of 6.6.  Start him on low-dose metformin which may also help lose weight.  Will start him on the Topamax as well.  Follow-up in 1- 2 for reevaluation and will consider adding other weight loss medication.      (K21.9) Gastroesophageal reflux disease without esophagitis  Comment: Controlled with the Protonix and Pepcid.  Upper endoscopy last year showed gastritis without esophagitis.  Emphasized importance of avoiding any known triggers include greasy/spicy food or late meals.  He does not drink alcohol nor taking NSAIDs excessively.  Will continue with Protonix and Pepcid at this time.  Follow-up as needed.    Plan: famotidine (PEPCID) 40 MG tablet            (E55.9) Vitamin D deficiency  Comment: Vitamin D level was normal today.  Continue  "with the vitamin D supplement.  Food with high vitamin D source discussed and recommended    Plan: Vitamin D Deficiency            (R79.89) Elevated serum creatinine  Comment: Resolved      (F51.04) Psychophysiological insomnia  Comment: Doing well with the trazodone.  Did not tolerate the Seroquel.  Good sleeping hygiene discussed.  Continue with trazodone as needed.      (R21) Rash and nonspecific skin eruption  Comment: Lotrisone refilled and encouraged to use only as needed.    Plan: clotrimazole-betamethasone (LOTRISONE) 1-0.05 %        external cream            (N52.9) Erectile dysfunction, unspecified erectile dysfunction type  Comment: Doing well, continue with theRevatio as needed -tolerating it well.  No contraindication identified.  He was instructed to go to the ER if develop painful erections or erection the last more than 3 hours.    Plan: sildenafil (REVATIO) 20 MG tablet              Patient has been advised of split billing requirements and indicates understanding: Yes      COUNSELING:   Reviewed preventive health counseling, as reflected in patient instructions       Regular exercise       Healthy diet/nutrition       Vision screening       Hearing screening       Immunizations    Declined: Covid-19, Influenza and Zoster due to Conscientious objector               Aspirin prophylaxis        Alcohol Use        Colorectal cancer screening       Prostate cancer screening       Advance Care Planning      BMI:   Estimated body mass index is 43.31 kg/m  as calculated from the following:    Height as of this encounter: 1.849 m (6' 0.8\").    Weight as of this encounter: 148.1 kg (326 lb 8 oz).   Weight management plan: Discussed healthy diet and exercise guidelines Metformin and Topamax      He reports that he has never smoked. He has never used smokeless tobacco.        Calos Ty Mai, MD  [facility]  "

## 2022-11-17 LAB — DEPRECATED CALCIDIOL+CALCIFEROL SERPL-MC: 25 UG/L (ref 20–75)

## 2022-11-24 PROBLEM — E11.9 DIABETES MELLITUS, TYPE 2 (H): Status: ACTIVE | Noted: 2022-11-24

## 2022-11-24 PROBLEM — R73.03 PRE-DIABETES: Status: RESOLVED | Noted: 2021-02-25 | Resolved: 2022-11-24

## 2022-11-24 RX ORDER — METFORMIN HCL 500 MG
500 TABLET, EXTENDED RELEASE 24 HR ORAL
Qty: 30 TABLET | Refills: 5 | Status: SHIPPED | OUTPATIENT
Start: 2022-11-24 | End: 2023-06-11

## 2022-11-24 RX ORDER — TOPIRAMATE 25 MG/1
25 TABLET, FILM COATED ORAL 2 TIMES DAILY
Qty: 60 TABLET | Refills: 1 | Status: SHIPPED | OUTPATIENT
Start: 2022-11-24 | End: 2023-06-09

## 2022-11-25 ENCOUNTER — TELEPHONE (OUTPATIENT)
Dept: FAMILY MEDICINE | Facility: CLINIC | Age: 52
End: 2022-11-25

## 2022-11-25 NOTE — TELEPHONE ENCOUNTER
Spoke with patient and informed of note below. Patient understood and will follow up.   Jessica Ortiz MA     Closing encounter.     Calos Hester MD [facility]  Please let patient know that I referred him back to sleep medicine for sleep apnea reevaluation.   Also I started him on a low-dose metformin as he is now diabetes.  Metformin also will help him to lose weight.  I also added the Topamax for appetite suppressant and eventually weight loss.  I would like him to follow-up in 1-2 and will discuss further about weight management.  Please help him to set up the appointment.  I also recommend him to see an eye doctor for diabetic exam.

## 2022-12-13 ENCOUNTER — HOSPITAL ENCOUNTER (EMERGENCY)
Facility: CLINIC | Age: 52
Discharge: HOME | End: 2022-12-13
Attending: FAMILY MEDICINE | Admitting: FAMILY MEDICINE
Payer: COMMERCIAL

## 2022-12-13 VITALS
SYSTOLIC BLOOD PRESSURE: 160 MMHG | DIASTOLIC BLOOD PRESSURE: 92 MMHG | RESPIRATION RATE: 20 BRPM | HEART RATE: 83 BPM | TEMPERATURE: 99.5 F | OXYGEN SATURATION: 98 %

## 2022-12-13 DIAGNOSIS — J01.01 ACUTE RECURRENT MAXILLARY SINUSITIS: ICD-10-CM

## 2022-12-13 DIAGNOSIS — H65.92 OME (OTITIS MEDIA WITH EFFUSION), LEFT: ICD-10-CM

## 2022-12-13 PROCEDURE — 99284 EMERGENCY DEPT VISIT MOD MDM: CPT | Performed by: FAMILY MEDICINE

## 2022-12-13 RX ORDER — CODEINE PHOSPHATE AND GUAIFENESIN 10; 100 MG/5ML; MG/5ML
1-2 SOLUTION ORAL EVERY 4 HOURS PRN
Qty: 120 ML | Refills: 0 | Status: SHIPPED | OUTPATIENT
Start: 2022-12-13 | End: 2023-06-09

## 2022-12-13 RX ORDER — AMOXICILLIN 875 MG
875 TABLET ORAL 2 TIMES DAILY
Qty: 20 TABLET | Refills: 0 | Status: SHIPPED | OUTPATIENT
Start: 2022-12-13 | End: 2023-06-08

## 2022-12-13 NOTE — ED PROVIDER NOTES
History     Chief Complaint   Patient presents with     Cough     Ear Fullness     HPI  Humberto Roberts is a 52 year old male who presents with left ear pain, sinus congestion and cough has been going on the last couple days.  Patient's had some subjective fevers.  Denies any nausea any vomiting.  Denies any shortness of breath.  Patient states his cough is productive.  Nothing makes his symptoms better or worse.  His ear is hurting him a lot and he is having trouble hearing out of his ear.    Allergies:  No Known Allergies    Problem List:    Patient Active Problem List    Diagnosis Date Noted     Diabetes mellitus, type 2 (H) 11/24/2022     Priority: Medium     Acanthosis nigricans 02/25/2021     Priority: Medium     Skin tag 02/25/2021     Priority: Medium     Vitamin D deficiency 12/08/2019     Priority: Medium     Hyperuricemia 12/08/2019     Priority: Medium     RICARDO (obstructive sleep apnea) - on cpap 09/22/2019     Priority: Medium     Chronic bilateral low back pain without sciatica 09/22/2019     Priority: Medium     Seasonal allergic rhinitis due to pollen, unspecified chronicity 01/23/2018     Priority: Medium     Morbid obesity (H) 12/06/2017     Priority: Medium     Insomnia 10/15/2015     Priority: Medium     Hypertension goal BP (blood pressure) < 140/90 05/06/2013     Priority: Medium     GERD (gastroesophageal reflux disease) 04/24/2013     Priority: Medium        Past Medical History:    Past Medical History:   Diagnosis Date     Acid reflux disease since 2006     Back pain chronic     Cellulitis of left upper arm and forearm 11/22/2013     Elevated serum creatinine 10/15/2015     Hypertension      Pre-diabetes 2/25/2021       Past Surgical History:    Past Surgical History:   Procedure Laterality Date     COLONOSCOPY N/A 3/25/2021    Procedure: Colonoscopy Incomplete;  Surgeon: Humberto Antoine DO;  Location:  GI     COLONOSCOPY N/A 7/15/2021    Procedure: COLONOSCOPY;  Surgeon:  Humberto Antoine DO;  Location:  GI     ENT SURGERY       ESOPHAGOSCOPY, GASTROSCOPY, DUODENOSCOPY (EGD), COMBINED N/A 3/25/2021    Procedure: Esophagogastroduodenoscopy with biopsy;  Surgeon: Humberto Antoine DO;  Location:  GI       Family History:    Family History   Problem Relation Age of Onset     Diabetes Mother      Hypertension Mother      Heart Disease Mother      Heart Disease Father      Diabetes Father      Unknown/Adopted Maternal Grandmother      Unknown/Adopted Maternal Grandfather      Unknown/Adopted Paternal Grandmother      Unknown/Adopted Paternal Grandfather      No Known Problems Sister      No Known Problems Son      No Known Problems Daughter      No Known Problems Sister      No Known Problems Son      No Known Problems Daughter        Social History:  Marital Status:   [2]  Social History     Tobacco Use     Smoking status: Never     Smokeless tobacco: Never   Substance Use Topics     Alcohol use: No     Alcohol/week: 0.0 standard drinks     Drug use: No        Medications:    amoxicillin (AMOXIL) 875 MG tablet  guaiFENesin-codeine (ROBITUSSIN AC) 100-10 MG/5ML solution  allopurinol (ZYLOPRIM) 100 MG tablet  ASPIRIN LOW DOSE 81 MG EC tablet  atenolol (TENORMIN) 50 MG tablet  cetirizine (ZYRTEC) 10 MG tablet  clotrimazole-betamethasone (LOTRISONE) 1-0.05 % external cream  colchicine (COLCYRS) 0.6 MG tablet  famotidine (PEPCID) 40 MG tablet  fluticasone (FLONASE) 50 MCG/ACT nasal spray  hydrochlorothiazide (HYDRODIURIL) 25 MG tablet  HYDROcodone-acetaminophen (NORCO) 5-325 MG tablet  metFORMIN (GLUCOPHAGE XR) 500 MG 24 hr tablet  methylPREDNISolone (MEDROL DOSEPAK) 4 MG tablet therapy pack  order for DME  order for DME  pantoprazole (PROTONIX) 40 MG EC tablet  sildenafil (REVATIO) 20 MG tablet  tiZANidine (ZANAFLEX) 4 MG tablet  topiramate (TOPAMAX) 25 MG tablet  traZODone (DESYREL) 100 MG tablet          Review of Systems   All other systems reviewed and are  negative.      Physical Exam   BP: (!) 160/92  Pulse: 83  Temp: 99.5  F (37.5  C)  Resp: 20  SpO2: 98 %      Physical Exam  Vitals and nursing note reviewed.   Constitutional:       General: He is not in acute distress.     Appearance: He is well-developed and well-nourished. He is not diaphoretic.   HENT:      Head: Normocephalic and atraumatic.      Right Ear: Hearing, tympanic membrane, ear canal and external ear normal.      Left Ear: Tympanic membrane is injected, erythematous and bulging.      Nose: Nose normal.      Mouth/Throat:      Mouth: Oropharynx is clear and moist.      Pharynx: No oropharyngeal exudate.   Eyes:      General: No scleral icterus.     Conjunctiva/sclera: Conjunctivae normal.      Pupils: Pupils are equal, round, and reactive to light.   Cardiovascular:      Rate and Rhythm: Normal rate and regular rhythm.      Pulses: Intact distal pulses.      Heart sounds: Normal heart sounds. No murmur heard.    No friction rub.   Pulmonary:      Effort: Pulmonary effort is normal. No respiratory distress.      Breath sounds: Normal breath sounds. No wheezing or rales.   Abdominal:      General: Bowel sounds are normal. There is no distension.      Palpations: Abdomen is soft. There is no mass.      Tenderness: There is no abdominal tenderness. There is no guarding or rebound.   Musculoskeletal:         General: No tenderness or edema. Normal range of motion.      Cervical back: Normal range of motion.   Skin:     General: Skin is warm.      Findings: No rash.   Neurological:      Mental Status: He is alert and oriented to person, place, and time.   Psychiatric:         Judgment: Judgment normal.         ED Course                 Procedures      No results found for this or any previous visit (from the past 24 hour(s)).    Medications - No data to display     Exam is consistent with a left otitis media.  We will start the patient on a course of amoxicillin.  I think his cough is likely drainage from  his sinuses.  Certainly he could have influenza also but he has an obvious infection so we need to treat this.  We will also give a course of Robitussin with codeine to help with the cough.  Patient will be discharged at this time.    Assessments & Plan (with Medical Decision Making)  Left otitis media, acute sinusitis     I have reviewed the nursing notes.    I have reviewed the findings, diagnosis, plan and need for follow up with the patient.      New Prescriptions    AMOXICILLIN (AMOXIL) 875 MG TABLET    Take 1 tablet (875 mg) by mouth 2 times daily    GUAIFENESIN-CODEINE (ROBITUSSIN AC) 100-10 MG/5ML SOLUTION    Take 5-10 mLs by mouth every 4 hours as needed for cough       Final diagnoses:   OME (otitis media with effusion), left   Acute recurrent maxillary sinusitis       12/13/2022   [facility] EMERGENCY DEPT     Rubin, Amor Birmingham MD  12/13/22 2483

## 2022-12-13 NOTE — ED TRIAGE NOTES
Triage Assessment     Row Name 12/13/22 0319       Triage Assessment (Adult)    Airway WDL WDL       Respiratory WDL    Respiratory WDL WDL            Cough, Congestion, Unable to hear from left ear.  Wife has strep throat

## 2022-12-14 DIAGNOSIS — M54.50 CHRONIC BILATERAL LOW BACK PAIN WITHOUT SCIATICA: ICD-10-CM

## 2022-12-14 DIAGNOSIS — G89.29 CHRONIC BILATERAL LOW BACK PAIN WITHOUT SCIATICA: ICD-10-CM

## 2022-12-16 RX ORDER — METHYLPREDNISOLONE 4 MG
TABLET, DOSE PACK ORAL
Qty: 21 TABLET | Refills: 0 | Status: SHIPPED | OUTPATIENT
Start: 2022-12-16 | End: 2023-06-09

## 2022-12-16 NOTE — TELEPHONE ENCOUNTER
Requested Prescriptions   Pending Prescriptions Disp Refills    methylPREDNISolone (MEDROL DOSEPAK) 4 MG tablet therapy pack [Pharmacy Med Name: METHYLPREDNISOLONE 4MG TBPK] 21 tablet 0     Sig: Follow Package Directions       There is no refill protocol information for this order

## 2023-01-03 ENCOUNTER — TELEPHONE (OUTPATIENT)
Dept: OTOLARYNGOLOGY | Facility: CLINIC | Age: 53
End: 2023-01-03

## 2023-01-03 NOTE — TELEPHONE ENCOUNTER
Patient was rescheduled for 2/1 for audio and 2/6 with Dr. Sykes. Patient confirmed appointment changes.    Niru Urena RN on 1/3/2023 at 10:02 AM

## 2023-01-03 NOTE — TELEPHONE ENCOUNTER
[facility] Call Center    Phone Message    May a detailed message be left on voicemail: no     Reason for Call: Other: pts wife Bandar calling pt has lost hearing in left ear and is starting to loss hearing in right pt scheduled for a March appt but cannot wait that long. pt has been to ER and has taken med but nothing is helping. Please call to discuss and schedule sooner appt if possible     Action Taken: Other: routing to [facility] patient care specialty pool     Travel Screening: Not Applicable

## 2023-01-22 NOTE — TELEPHONE ENCOUNTER
"Zanaflex  Last Written Prescription Date:  12/04/2019  Last Fill Quantity: 60,  # refills: 1   Last office visit: 12/4/2019 with prescribing provider:  Kacie   Routing refill request to provider for review/approval because:  Drug not on the [facility] refill protocol     Allopurinol  Last Written Prescription Date:  12/06/2019  Last Fill Quantity: 60,  # refills: 1   Last office visit: 12/4/2019 with prescribing provider:  Kacie  Routing refill request to provider for review/approval because:  Labs out of range:  Uric Acid  Labs not current:  CBC    Future Office Visit:   Next 5 appointments (look out 90 days)    Jun 05, 2020  8:00 AM CDT  SHORT [facility]os Ty Mai, [facility] [address] [place] [id number] [phone]         Requested Prescriptions   Pending Prescriptions Disp Refills     allopurinol (ZYLOPRIM) 100 MG tablet [Pharmacy Med Name: ALLOPURINOL 100MG TABS] 60 tablet 1     Sig: TAKE ONE TABLET BY MOUTH TWICE A DAY       Gout Agents Protocol Failed - 3/13/2020 11:42 AM        Failed - CBC on file in past 12 months     Recent Labs   Lab Test 12/20/18  0830   WBC 6.2   RBC 5.57   HGB 15.7   HCT 47.0              Failed - Has Uric Acid on file in past 12 months and value is less than 6     Recent Labs   Lab Test 12/04/19  1550   URIC 8.2*     If level is 6mg/dL or greater, ok to refill one time and refer to provider.         Passed - ALT on file in past 12 months     Recent Labs   Lab Test 12/04/19  1550   ALT 40           Passed - Recent (12 mo) or future (30 days) visit within the authorizing provider's specialty     Patient has had an office visit with the authorizing provider or a provider within the authorizing providers department within the previous 12 mos or has a future within next 30 days. See \"Patient Info\" tab in inbasket, or \"Choose Columns\" in Meds & Orders section of the refill encounter.          Passed - Medication is active on med " list        Passed - Patient is age 18 or older        Passed - Normal serum creatinine on file in the past 12 months     Recent Labs   Lab Test 12/04/19  1550   CR 1.07     Ok to refill medication if creatinine is low           tiZANidine (ZANAFLEX) 4 MG tablet [Pharmacy Med Name: TIZANIDINE HCL 4MG TABS] 60 tablet 1     Sig: TAKE ONE TABLET BY MOUTH EVERY NIGHT AT BEDTIME AND TAKE ONE TABLET BY MOUTH EVERY MORNING AS NEEDED (STOP METHOCARBAMOL)       There is no refill protocol information for this order      Joseline Mims RN    Yes...

## 2023-01-24 DIAGNOSIS — I10 ESSENTIAL HYPERTENSION: ICD-10-CM

## 2023-01-24 NOTE — LETTER
"February 1, 2023      Humberto Roberts  [address] [place] [id number]        Dear ,    We are writing to inform you of your test results.      \"your labs today showed your vitamin D level was normal.  Your prostate enzyme was also normal.  Your your kidney function, liver enzymes and electrolytes were normal.  Your cholesterol looks pretty good.  HDL or good cholesterol level is quite low.  Your triglyceride level was also slightly high.  Your bad cholesterol or LDL was good.  I recommend you to start supplementing omega-3 to (fish oil) increase your HDL and lower your triglyceride level.  It can be bought over-the-counter, perhaps 2 Capsules a day.  The uric acid level is normal, no change in your allopurinol dose.  You are now considered diabetes with a hemoglobin A1c of 6.6.  The goal for your hemoglobin A1c is around 7.0 or less.  No medication is needed at this time however if you are interested, I recommend to be on low-dose metformin.  Metformin may also help you to lose weight.  Please let me know if you are interested.  Please work on exercising, healthy diet and weight loss as discussed.  I would like to recheck your hemoglobin A1c in 6 months.\"      If you have any questions or concerns, please call the clinic at the number listed above.       Sincerely,    Calos Hester MD  Care Team                 "

## 2023-01-25 NOTE — TELEPHONE ENCOUNTER
Routing refill request to provider for review/approval because:    Requested Prescriptions   Pending Prescriptions Disp Refills    hydrochlorothiazide (HYDRODIURIL) 25 MG tablet [Pharmacy Med Name: HYDROCHLOROTHIAZIDE 25MG TABS] 30 tablet 1     Sig: TAKE ONE TABLET BY MOUTH ONCE DAILY       Diuretics (Including Combos) Protocol Failed - 1/24/2023  8:29 PM        Failed - Blood pressure under 140/90 in past 12 months     BP Readings from Last 3 Encounters:   12/13/22 (!) 160/92   11/16/22 138/86   09/19/22 (!) 182/99

## 2023-01-26 RX ORDER — HYDROCHLOROTHIAZIDE 25 MG/1
TABLET ORAL
Qty: 90 TABLET | Refills: 1 | Status: SHIPPED | OUTPATIENT
Start: 2023-01-26 | End: 2023-09-12

## 2023-01-26 NOTE — TELEPHONE ENCOUNTER
"I wrote a lab result note on 11/2022 but he never review it.  Please let him know:  \"your labs today showed your vitamin D level was normal.  Your prostate enzyme was also normal.  Your your kidney function, liver enzymes and electrolytes were normal.  Your cholesterol looks pretty good.  HDL or good cholesterol level is quite low.  Your triglyceride level was also slightly high.  Your bad cholesterol or LDL was good.  I recommend you to start supplementing omega-3 to (fish oil) increase your HDL and lower your triglyceride level.  It can be bought over-the-counter, perhaps 2 Capsules a day.  The uric acid level is normal, no change in your allopurinol dose.  You are now considered diabetes with a hemoglobin A1c of 6.6.  The goal for your hemoglobin A1c is around 7.0 or less.  No medication is needed at this time however if you are interested, I recommend to be on low-dose metformin.  Metformin may also help you to lose weight.  Please let me know if you are interested.  Please work on exercising, healthy diet and weight loss as discussed.  I would like to recheck your hemoglobin A1c in 6 months.\"  "

## 2023-01-30 NOTE — PROGRESS NOTES
"ENT Consultation    Humberto Roberts who is a 52 year old male seen in consultation at the request of self.      History of Present Illness - Humberto Roberts is a 52 year old male presents with long history of hearing loss in both ears also history of ear infections as a child.  Apparently had tubes.  As a child he has had tubes multiple ear infections.  Never had otologic surgery otherwise.  However for the last 3 to 6 months has had pressure in the left ear feeling of \"sloshing\" fluid when he leans over.  Occasionally ear pops and he hears little bit better then closes up again.  He was treated for ear infection twice with antibiotics over the last 6 months.  He even has been trying to use Afrin nasal spray to decongest and pop his ears without much relief.  Denies any vertigo or tinnitus.  He is hearing loss also can be attributed to shooting guns without protection and listening to loud music without protection.  He denies any seasonal or perennial allergies.          BP Readings from Last 1 Encounters:   02/01/23 138/70       BP noted to be well controlled today in office.     Humberto IS NOT a smoker/uses chewing tobacco.      Past Medical History -   Past Medical History:   Diagnosis Date     Acid reflux disease since 2006     Back pain chronic     Cellulitis of left upper arm and forearm 11/22/2013     Elevated serum creatinine 10/15/2015     Hypertension      Pre-diabetes 2/25/2021       Current Medications -   Current Outpatient Medications:      allopurinol (ZYLOPRIM) 100 MG tablet, TAKE 1 TABLET (100 MG) BY MOUTH DAILY, Disp: 90 tablet, Rfl: 1     amoxicillin (AMOXIL) 875 MG tablet, Take 1 tablet (875 mg) by mouth 2 times daily, Disp: 20 tablet, Rfl: 0     ASPIRIN LOW DOSE 81 MG EC tablet, TAKE ONE TABLET BY MOUTH ONCE DAILY, Disp: 90 tablet, Rfl: 2     atenolol (TENORMIN) 50 MG tablet, TAKE ONE TABLET BY MOUTH ONCE DAILY, Disp: 90 tablet, Rfl: 1     cetirizine (ZYRTEC) 10 MG tablet, TAKE ONE TABLET BY " MOUTH ONCE DAILY AS NEEDED FOR ALLERGIES, Disp: 30 tablet, Rfl: 5     clotrimazole-betamethasone (LOTRISONE) 1-0.05 % external cream, Apply topically 2 times daily, Disp: 45 g, Rfl: 1     colchicine (COLCYRS) 0.6 MG tablet, TAKE 2 TABLETS BY MOUTH AT THE FIRST SIGN OF FLARE, TAKE 1-2 ADDITIONAL TABLETS PER DAY UNTIL SYMPTOMS IMPROVE THEN STOP THIS MEDICATION., Disp: 20 tablet, Rfl: 4     famotidine (PEPCID) 40 MG tablet, Take 1 tablet (40 mg) by mouth nightly as needed for heartburn (acid reflux), Disp: 90 tablet, Rfl: 1     fluticasone (FLONASE) 50 MCG/ACT nasal spray, SPRAY 1-2 SPRAYS IN EACH NOSTRIL EVERY DAY, Disp: 16 g, Rfl: 5     guaiFENesin-codeine (ROBITUSSIN AC) 100-10 MG/5ML solution, Take 5-10 mLs by mouth every 4 hours as needed for cough, Disp: 120 mL, Rfl: 0     hydrochlorothiazide (HYDRODIURIL) 25 MG tablet, TAKE ONE TABLET BY MOUTH ONCE DAILY, Disp: 90 tablet, Rfl: 1     HYDROcodone-acetaminophen (NORCO) 5-325 MG tablet, Take 1 tablet by mouth 2 times daily as needed for severe pain (7-10), Disp: 30 tablet, Rfl: 0     metFORMIN (GLUCOPHAGE XR) 500 MG 24 hr tablet, Take 1 tablet (500 mg) by mouth daily (with dinner), Disp: 30 tablet, Rfl: 5     methylPREDNISolone (MEDROL DOSEPAK) 4 MG tablet therapy pack, FOLLOW PACKAGE DIRECTIONS, Disp: 21 tablet, Rfl: 0     order for DME, Equipment being ordered: shower chair, Disp: 1 Device, Rfl: 0     order for DME, 1 Device Auto CPAP @@ 7-15 cm with heated humidity via mask of choice, Disp: , Rfl:      pantoprazole (PROTONIX) 40 MG EC tablet, TAKE 1 TABLET (40 MG) BY MOUTH DAILY STOP OMEPRAZOLE, Disp: 90 tablet, Rfl: 0     sildenafil (REVATIO) 20 MG tablet, Take 1-2 tablets (20-40 mg) by mouth daily as needed (sexual activity), Disp: 30 tablet, Rfl: 4     tiZANidine (ZANAFLEX) 4 MG tablet, TAKE ONE TABLET BY MOUTH EVERY NIGHT AT BEDTIME AND TAKE ONE TABLET BY MOUTH EVERY MORNING AS NEEDEd, Disp: 180 tablet, Rfl: 0     topiramate (TOPAMAX) 25 MG tablet, Take 1  tablet (25 mg) by mouth 2 times daily, Disp: 60 tablet, Rfl: 1     traZODone (DESYREL) 100 MG tablet, TAKE ONE TABLET BY MOUTH EVERY NIGHT AT BEDTIME, Disp: 30 tablet, Rfl: 6    Allergies - No Known Allergies    Social History -   Social History     Socioeconomic History     Marital status:    Tobacco Use     Smoking status: Never     Smokeless tobacco: Never   Substance and Sexual Activity     Alcohol use: No     Alcohol/week: 0.0 standard drinks     Drug use: No     Sexual activity: Yes     Partners: Female       Family History -   Family History   Problem Relation Age of Onset     Diabetes Mother      Hypertension Mother      Heart Disease Mother      Heart Disease Father      Diabetes Father      Unknown/Adopted Maternal Grandmother      Unknown/Adopted Maternal Grandfather      Unknown/Adopted Paternal Grandmother      Unknown/Adopted Paternal Grandfather      No Known Problems Sister      No Known Problems Son      No Known Problems Daughter      No Known Problems Sister      No Known Problems Son      No Known Problems Daughter        Review of Systems - As per HPI and PMHx, otherwise review of system review of the head and neck negative. Otherwise 10+ review of system is negative    Physical Exam  /70   Temp (!) 96.5  F (35.8  C) (Temporal)   Wt 142.9 kg (315 lb)   BMI 41.79 kg/m    BMI: Body mass index is 41.79 kg/m .    General - The patient is well nourished and well developed, and appears to have good nutritional status.  Alert and oriented to person and place, answers questions and cooperates with examination appropriately.    SKIN - No suspicious lesions or rashes.  Respiration - No respiratory distress.  Head and Face - Normocephalic and atraumatic, with no gross asymmetry noted of the contour of the facial features.  The facial nerve is intact, with strong symmetric movements.    Voice and Breathing - The patient was breathing comfortably without the use of accessory muscles. The  patients voice was clear and strong, and had appropriate pitch and quality.    Ears - Bilateral pinna and EACs with normal appearing overlying skin.  Right tympanic membrane appears to be clear slightly thickened slightly retracted with ear canal clear and dry.  On the left however we see fluid behind the drum which is retracted.  Ear canal appears to be clear and dry.    Eyes - Extraocular movements intact.  Sclera were not icteric or injected, conjunctiva were pink and moist.    Mouth - Examination of the oral cavity showed pink, healthy oral mucosa. No lesions or ulcerations noted.  The tongue was mobile and midline, and the dentition were in good condition.      Throat - The walls of the oropharynx were smooth, pink, moist, symmetric, and had no lesions or ulcerations.  The tonsillar pillars and soft palate were symmetric.  The uvula was midline on elevation.    Neck - Normal midline excursion of the laryngotracheal complex during swallowing.  Full range of motion on passive movement.  Palpation of the occipital, submental, submandibular, internal jugular chain, and supraclavicular nodes did not demonstrate any abnormal lymph nodes or masses.  The carotid pulse was palpable bilaterally.  Palpation of the thyroid was soft and smooth, with no nodules or goiter appreciated.  The trachea was mobile and midline.    Nose - External contour is symmetric, no gross deflection or scars.  Nasal mucosa is pink and moist with no abnormal mucus.  The septum was midline and non-obstructive, turbinates of normal size and position.  No polyps, masses, or purulence noted on examination.    Neuro - Nonfocal neuro exam is normal, CN 2 through 12 intact, normal gait and muscle tone.      Performed in clinic today:  Audiologic Studies - An audiogram and tympanogram were performed today as part of the evaluation and personally reviewed. The tympanogram shows Type C with robust peak curves on the right and Type C almost B with  flattened peak curves on the left, with Normal canal volumes and middle ear pressures.  The audiogram showed Moderate SNHL on the right and Mild SNHLon the left.      Word recognition score 100 percent on the right and 100% on the left.    Procedure nasopharyngoscopy:  Patient is status the need to be able to examine the nasopharynx in the presence of chronic serous otitis media that is unilateral to  exclude any masses or lesions obstructing the eustachian tube.  She wished to go ahead with it.  After topical static with Xylocaine decongestion Carrillo-Synephrine I passed the 30 degree rigid sinus scope.  Nasal vestibule appears to be clear without polyps lesions.  Appearance of the nasopharynx shows normal anterior and posterior lip of the eustachian tube no masses or lesions noted with the torus tubarius and fossa of Rosenmuller clear.  Patient tolerated procedure well.  Scope used Mytamed Purple.    At this point we discussed for the treatment of this problem.  Considering his symptomatic it has been over several months he wished to go ahead with a myringotomy and tube placement.  He understands the risks of perforation post tube otorrhea and wishes to go ahead with it.  He is appropriately positioned under the microscope.  We also topical phenol to anesthetize inferior quadrant.  Radial myringotomy incision was made.  Serous fluid was suctioned followed by placement of type I Paparella tube.  Patient tolerated procedure well felt immediately better.  A/P - Humberto JR Roberts is a 52 year old male with bilateral sensorineural hearing loss as well as left chronic serous otitis media dressed with tube placement.  He will follow-up in 6 to 8 weeks to examine his ear as well as meet with audiology regarding consultation on hearing aids.      Miguel Sykes MD

## 2023-02-01 ENCOUNTER — OFFICE VISIT (OUTPATIENT)
Dept: OTOLARYNGOLOGY | Facility: OTHER | Age: 53
End: 2023-02-01
Payer: COMMERCIAL

## 2023-02-01 ENCOUNTER — OFFICE VISIT (OUTPATIENT)
Dept: AUDIOLOGY | Facility: OTHER | Age: 53
End: 2023-02-01
Payer: COMMERCIAL

## 2023-02-01 VITALS
TEMPERATURE: 96.5 F | SYSTOLIC BLOOD PRESSURE: 138 MMHG | DIASTOLIC BLOOD PRESSURE: 70 MMHG | BODY MASS INDEX: 41.79 KG/M2 | WEIGHT: 315 LBS

## 2023-02-01 DIAGNOSIS — H65.22 CHRONIC SEROUS OTITIS MEDIA, LEFT EAR: Primary | ICD-10-CM

## 2023-02-01 DIAGNOSIS — H90.3 SENSORINEURAL HEARING LOSS, ASYMMETRICAL: ICD-10-CM

## 2023-02-01 DIAGNOSIS — H90.3 ASYMMETRICAL SENSORINEURAL HEARING LOSS: Primary | ICD-10-CM

## 2023-02-01 DIAGNOSIS — H93.8X2 PRESSURE SENSATION IN EAR, LEFT: ICD-10-CM

## 2023-02-01 PROCEDURE — 99207 PR NO CHARGE LOS: CPT | Performed by: AUDIOLOGIST

## 2023-02-01 PROCEDURE — 92511 NASOPHARYNGOSCOPY: CPT | Performed by: OTOLARYNGOLOGY

## 2023-02-01 PROCEDURE — 99203 OFFICE O/P NEW LOW 30 MIN: CPT | Mod: 25 | Performed by: OTOLARYNGOLOGY

## 2023-02-01 PROCEDURE — 92550 TYMPANOMETRY & REFLEX THRESH: CPT | Performed by: AUDIOLOGIST

## 2023-02-01 PROCEDURE — 92557 COMPREHENSIVE HEARING TEST: CPT | Performed by: AUDIOLOGIST

## 2023-02-01 PROCEDURE — 69433 CREATE EARDRUM OPENING: CPT | Mod: LT | Performed by: OTOLARYNGOLOGY

## 2023-02-01 NOTE — TELEPHONE ENCOUNTER
Attempted to reach patient by phone only number listed is not working. Letter sent with note below.     Closing encounter.   Jessica Ortiz MA

## 2023-02-01 NOTE — LETTER
"    2/1/2023         RE: Humberto Roberts  [address] [place] [id number]        Dear Colleague,    Thank you for referring your patient, Humberto Roberts, to the [facility]. Please see a copy of my visit note below.    ENT Consultation    Humberto Roberts who is a 52 year old male seen in consultation at the request of self.      History of Present Illness - Humebrto Roberts is a 52 year old male presents with long history of hearing loss in both ears also history of ear infections as a child.  Apparently had tubes.  As a child he has had tubes multiple ear infections.  Never had otologic surgery otherwise.  However for the last 3 to 6 months has had pressure in the left ear feeling of \"sloshing\" fluid when he leans over.  Occasionally ear pops and he hears little bit better then closes up again.  He was treated for ear infection twice with antibiotics over the last 6 months.  He even has been trying to use Afrin nasal spray to decongest and pop his ears without much relief.  Denies any vertigo or tinnitus.  He is hearing loss also can be attributed to shooting guns without protection and listening to loud music without protection.  He denies any seasonal or perennial allergies.          BP Readings from Last 1 Encounters:   02/01/23 138/70       BP noted to be well controlled today in office.     Humberto IS NOT a smoker/uses chewing tobacco.      Past Medical History -   Past Medical History:   Diagnosis Date     Acid reflux disease since 2006     Back pain chronic     Cellulitis of left upper arm and forearm 11/22/2013     Elevated serum creatinine 10/15/2015     Hypertension      Pre-diabetes 2/25/2021       Current Medications -   Current Outpatient Medications:      allopurinol (ZYLOPRIM) 100 MG tablet, TAKE 1 TABLET (100 MG) BY MOUTH DAILY, Disp: 90 tablet, Rfl: 1     amoxicillin (AMOXIL) 875 MG tablet, Take 1 tablet (875 mg) by mouth 2 times daily, Disp: 20 tablet, Rfl: 0     ASPIRIN LOW " DOSE 81 MG EC tablet, TAKE ONE TABLET BY MOUTH ONCE DAILY, Disp: 90 tablet, Rfl: 2     atenolol (TENORMIN) 50 MG tablet, TAKE ONE TABLET BY MOUTH ONCE DAILY, Disp: 90 tablet, Rfl: 1     cetirizine (ZYRTEC) 10 MG tablet, TAKE ONE TABLET BY MOUTH ONCE DAILY AS NEEDED FOR ALLERGIES, Disp: 30 tablet, Rfl: 5     clotrimazole-betamethasone (LOTRISONE) 1-0.05 % external cream, Apply topically 2 times daily, Disp: 45 g, Rfl: 1     colchicine (COLCYRS) 0.6 MG tablet, TAKE 2 TABLETS BY MOUTH AT THE FIRST SIGN OF FLARE, TAKE 1-2 ADDITIONAL TABLETS PER DAY UNTIL SYMPTOMS IMPROVE THEN STOP THIS MEDICATION., Disp: 20 tablet, Rfl: 4     famotidine (PEPCID) 40 MG tablet, Take 1 tablet (40 mg) by mouth nightly as needed for heartburn (acid reflux), Disp: 90 tablet, Rfl: 1     fluticasone (FLONASE) 50 MCG/ACT nasal spray, SPRAY 1-2 SPRAYS IN EACH NOSTRIL EVERY DAY, Disp: 16 g, Rfl: 5     guaiFENesin-codeine (ROBITUSSIN AC) 100-10 MG/5ML solution, Take 5-10 mLs by mouth every 4 hours as needed for cough, Disp: 120 mL, Rfl: 0     hydrochlorothiazide (HYDRODIURIL) 25 MG tablet, TAKE ONE TABLET BY MOUTH ONCE DAILY, Disp: 90 tablet, Rfl: 1     HYDROcodone-acetaminophen (NORCO) 5-325 MG tablet, Take 1 tablet by mouth 2 times daily as needed for severe pain (7-10), Disp: 30 tablet, Rfl: 0     metFORMIN (GLUCOPHAGE XR) 500 MG 24 hr tablet, Take 1 tablet (500 mg) by mouth daily (with dinner), Disp: 30 tablet, Rfl: 5     methylPREDNISolone (MEDROL DOSEPAK) 4 MG tablet therapy pack, FOLLOW PACKAGE DIRECTIONS, Disp: 21 tablet, Rfl: 0     order for DME, Equipment being ordered: shower chair, Disp: 1 Device, Rfl: 0     order for DME, 1 Device Auto CPAP @@ 7-15 cm with heated humidity via mask of choice, Disp: , Rfl:      pantoprazole (PROTONIX) 40 MG EC tablet, TAKE 1 TABLET (40 MG) BY MOUTH DAILY STOP OMEPRAZOLE, Disp: 90 tablet, Rfl: 0     sildenafil (REVATIO) 20 MG tablet, Take 1-2 tablets (20-40 mg) by mouth daily as needed (sexual activity),  Disp: 30 tablet, Rfl: 4     tiZANidine (ZANAFLEX) 4 MG tablet, TAKE ONE TABLET BY MOUTH EVERY NIGHT AT BEDTIME AND TAKE ONE TABLET BY MOUTH EVERY MORNING AS NEEDEd, Disp: 180 tablet, Rfl: 0     topiramate (TOPAMAX) 25 MG tablet, Take 1 tablet (25 mg) by mouth 2 times daily, Disp: 60 tablet, Rfl: 1     traZODone (DESYREL) 100 MG tablet, TAKE ONE TABLET BY MOUTH EVERY NIGHT AT BEDTIME, Disp: 30 tablet, Rfl: 6    Allergies - No Known Allergies    Social History -   Social History     Socioeconomic History     Marital status:    Tobacco Use     Smoking status: Never     Smokeless tobacco: Never   Substance and Sexual Activity     Alcohol use: No     Alcohol/week: 0.0 standard drinks     Drug use: No     Sexual activity: Yes     Partners: Female       Family History -   Family History   Problem Relation Age of Onset     Diabetes Mother      Hypertension Mother      Heart Disease Mother      Heart Disease Father      Diabetes Father      Unknown/Adopted Maternal Grandmother      Unknown/Adopted Maternal Grandfather      Unknown/Adopted Paternal Grandmother      Unknown/Adopted Paternal Grandfather      No Known Problems Sister      No Known Problems Son      No Known Problems Daughter      No Known Problems Sister      No Known Problems Son      No Known Problems Daughter        Review of Systems - As per HPI and PMHx, otherwise review of system review of the head and neck negative. Otherwise 10+ review of system is negative    Physical Exam  /70   Temp (!) 96.5  F (35.8  C) (Temporal)   Wt 142.9 kg (315 lb)   BMI 41.79 kg/m    BMI: Body mass index is 41.79 kg/m .    General - The patient is well nourished and well developed, and appears to have good nutritional status.  Alert and oriented to person and place, answers questions and cooperates with examination appropriately.    SKIN - No suspicious lesions or rashes.  Respiration - No respiratory distress.  Head and Face - Normocephalic and atraumatic, with  no gross asymmetry noted of the contour of the facial features.  The facial nerve is intact, with strong symmetric movements.    Voice and Breathing - The patient was breathing comfortably without the use of accessory muscles. The patients voice was clear and strong, and had appropriate pitch and quality.    Ears - Bilateral pinna and EACs with normal appearing overlying skin.  Right tympanic membrane appears to be clear slightly thickened slightly retracted with ear canal clear and dry.  On the left however we see fluid behind the drum which is retracted.  Ear canal appears to be clear and dry.    Eyes - Extraocular movements intact.  Sclera were not icteric or injected, conjunctiva were pink and moist.    Mouth - Examination of the oral cavity showed pink, healthy oral mucosa. No lesions or ulcerations noted.  The tongue was mobile and midline, and the dentition were in good condition.      Throat - The walls of the oropharynx were smooth, pink, moist, symmetric, and had no lesions or ulcerations.  The tonsillar pillars and soft palate were symmetric.  The uvula was midline on elevation.    Neck - Normal midline excursion of the laryngotracheal complex during swallowing.  Full range of motion on passive movement.  Palpation of the occipital, submental, submandibular, internal jugular chain, and supraclavicular nodes did not demonstrate any abnormal lymph nodes or masses.  The carotid pulse was palpable bilaterally.  Palpation of the thyroid was soft and smooth, with no nodules or goiter appreciated.  The trachea was mobile and midline.    Nose - External contour is symmetric, no gross deflection or scars.  Nasal mucosa is pink and moist with no abnormal mucus.  The septum was midline and non-obstructive, turbinates of normal size and position.  No polyps, masses, or purulence noted on examination.    Neuro - Nonfocal neuro exam is normal, CN 2 through 12 intact, normal gait and muscle tone.      Performed in  clinic today:  Audiologic Studies - An audiogram and tympanogram were performed today as part of the evaluation and personally reviewed. The tympanogram shows Type C with robust peak curves on the right and Type C almost B with flattened peak curves on the left, with Normal canal volumes and middle ear pressures.  The audiogram showed Moderate SNHL on the right and Mild SNHLon the left.      Word recognition score 100 percent on the right and 100% on the left.    Procedure nasopharyngoscopy:  Patient is status the need to be able to examine the nasopharynx in the presence of chronic serous otitis media that is unilateral to  exclude any masses or lesions obstructing the eustachian tube.  She wished to go ahead with it.  After topical static with Xylocaine decongestion Carrillo-Synephrine I passed the 30 degree rigid sinus scope.  Nasal vestibule appears to be clear without polyps lesions.  Appearance of the nasopharynx shows normal anterior and posterior lip of the eustachian tube no masses or lesions noted with the torus tubarius and fossa of Rosenmuller clear.  Patient tolerated procedure well.  Scope used Mytamed Purple.    At this point we discussed for the treatment of this problem.  Considering his symptomatic it has been over several months he wished to go ahead with a myringotomy and tube placement.  He understands the risks of perforation post tube otorrhea and wishes to go ahead with it.  He is appropriately positioned under the microscope.  We also topical phenol to anesthetize inferior quadrant.  Radial myringotomy incision was made.  Serous fluid was suctioned followed by placement of type I Paparella tube.  Patient tolerated procedure well felt immediately better.  A/P - Humberto Roberts is a 52 year old male with bilateral sensorineural hearing loss as well as left chronic serous otitis media dressed with tube placement.  He will follow-up in 6 to 8 weeks to examine his ear as well as meet with audiology  regarding consultation on hearing aids.      Miguel Sykes MD        Again, thank you for allowing me to participate in the care of your patient.        Sincerely,        Miguel Sykes MD, MD

## 2023-02-01 NOTE — PROGRESS NOTES
AUDIOLOGY REPORT:    Patient was referred from ENT by Dr. Sykes for audiology evaluation. The patient reports that he had a decrease in hearing in his left ear a couple of months ago. He reports that he used to have pain in the left ear and still has a feeling of pressure at times. The patient reports a history of PE tubes as a child but has not had any recent ear infections. He reports intermittent tinnitus in the left ear. The patient reports a history of loud noise exposure. He reports that he does not have a family history of hearing loss.    Testing:    Otoscopy:   Otoscopic exam indicates ears are clear of cerumen bilaterally     Tympanograms:    RIGHT: negative pressure      LEFT:   negative pressure     Reflexes (reported by stimulus ear):  RIGHT: Ipsilateral is present at normal levels  RIGHT: Contralateral is absent at frequencies tested  LEFT:   Ipsilateral is present at elevated levels   LEFT:   Contralateral is absent at frequencies tested    Thresholds:   Pure Tone Thresholds assessed using conventional audiometry with good reliability from 250-8000 Hz bilaterally using insert earphones and circumaural headphones     RIGHT:  mild to moderately severe sensorineural hearing loss    LEFT:    mild essentially sensorineural hearing loss with normal hearing sensitivity at 250 and 2853-9059 Hz    Speech Reception Threshold:    RIGHT: 40 dB HL    LEFT:   20 dB HL  Results are in agreement with pure tone average.     Word Recognition Score:     RIGHT: 100% at 80 dB HL using NU-6 recorded word list.    LEFT:   100% at 70 dB HL using NU-6 recorded word list.    Discussed results with the patient.     Patient was returned to ENT for follow up.     Francois Artis, CCC-A  Licensed Audiologist #[id number]  2/1/2023

## 2023-02-14 DIAGNOSIS — K21.9 GASTROESOPHAGEAL REFLUX DISEASE, UNSPECIFIED WHETHER ESOPHAGITIS PRESENT: ICD-10-CM

## 2023-02-14 RX ORDER — PANTOPRAZOLE SODIUM 40 MG/1
40 TABLET, DELAYED RELEASE ORAL DAILY
Qty: 90 TABLET | Refills: 2 | Status: SHIPPED | OUTPATIENT
Start: 2023-02-14 | End: 2023-11-29

## 2023-02-14 NOTE — TELEPHONE ENCOUNTER
Prescription approved per [facility] Refill Protocol.  Katiana Geller RN on 2/14/2023 at 3:19 PM

## 2023-02-23 ENCOUNTER — TELEPHONE (OUTPATIENT)
Dept: PHARMACY | Facility: CLINIC | Age: 53
End: 2023-02-23
Payer: COMMERCIAL

## 2023-02-23 NOTE — TELEPHONE ENCOUNTER
This patient is due for MTM follow-up. I called the patient to schedule an appointment and was unable to leave a voicemail.    Gatito Peterson, TaniaD, [facility]  Medication Therapy Management Pharmacist  Pager: [phone]

## 2023-03-04 DIAGNOSIS — I10 HYPERTENSION GOAL BP (BLOOD PRESSURE) < 140/90: ICD-10-CM

## 2023-03-07 RX ORDER — ASPIRIN 81 MG/1
TABLET, COATED ORAL
Qty: 90 TABLET | Refills: 2 | Status: SHIPPED | OUTPATIENT
Start: 2023-03-07 | End: 2024-02-05

## 2023-04-07 ENCOUNTER — NURSE TRIAGE (OUTPATIENT)
Dept: FAMILY MEDICINE | Facility: CLINIC | Age: 53
End: 2023-04-07

## 2023-04-07 ENCOUNTER — VIRTUAL VISIT (OUTPATIENT)
Dept: URGENT CARE | Facility: CLINIC | Age: 53
End: 2023-04-07
Payer: COMMERCIAL

## 2023-04-07 DIAGNOSIS — J06.9 VIRAL URI WITH COUGH: Primary | ICD-10-CM

## 2023-04-07 PROCEDURE — 99441 PR PHYSICIAN TELEPHONE EVALUATION 5-10 MIN: CPT | Mod: 93

## 2023-04-07 RX ORDER — ALBUTEROL SULFATE 90 UG/1
2 AEROSOL, METERED RESPIRATORY (INHALATION) EVERY 4 HOURS PRN
Qty: 18 G | Refills: 0 | Status: SHIPPED | OUTPATIENT
Start: 2023-04-07

## 2023-04-07 RX ORDER — PREDNISONE 20 MG/1
20 TABLET ORAL DAILY
Qty: 5 TABLET | Refills: 0 | Status: SHIPPED | OUTPATIENT
Start: 2023-04-07 | End: 2023-04-12

## 2023-04-07 NOTE — TELEPHONE ENCOUNTER
S: Cough/Cold symptom's.     B: Patient is calling in wanting prescription for prednisone taper pack. Patient requested it and it was denied. Patient states he normally can just request it when he is sick and it will help him feel better right away. Patient states he started having cold symptom's on Monday. Productive Cough, sinus congestion. Denies fevers. Does report he had some wheezing, however he still had 3 tabs of his last prednisone pack left from December. He took 1 tab on Wednesday, 1 tab Thursday and last tab this morning. He is starting to feel a little better but needs more. He is no longer wheezing due to the medications patient reports. Denies shortness of breath. Denies blood in sputum, it is yellowish with green at times.     A: Advised patient to be evaluated by provider today per protocol. RN reviewed red flag symptoms with patient and when to seek emergency care.     R: patient verbalized understanding, however declines coming in for a visit. Just wants his medication. RN did advise that prescription was already declined by provider today due to needing to be evaluated.  Patient requesting a virtual appointment, unable to submit an e-Visit. Patient scheduled for virtual visit per request. Would like prescription sent to [facility] in [place].  Denied any other questions or concerns at this time.       Reason for Disposition    Wheezing is present    Additional Information    Negative: MILD difficulty breathing (e.g., minimal/no SOB at rest, SOB with walking, pulse <100) and still present when not coughing    Negative: Coughed up > 1 tablespoon (15 ml) blood (Exception: Blood-tinged sputum.)    Negative: Fever > 103 F (39.4 C)    Negative: Fever > 101 F (38.3 C) and over 60 years of age    Negative: Fever > 100.0 F (37.8 C) and has diabetes mellitus or a weak immune system (e.g., HIV positive, cancer chemotherapy, organ transplant, splenectomy, chronic steroids)    Negative: Fever > 100.0  F (37.8 C) and bedridden (e.g., nursing home patient, stroke, chronic illness, recovering from surgery)    Negative: Increasing ankle swelling    Negative: Patient sounds very sick or weak to the triager    Negative: MODERATE difficulty breathing (e.g., speaks in phrases, SOB even at rest, pulse 100-120) and still present when not coughing    Negative: Chest pain present when not coughing    Negative: Passed out (i.e., fainted, collapsed and was not responding)    Negative: Previous asthma attacks and this feels like asthma attack    Negative: Dry cough (non-productive; no sputum or minimal clear sputum) and within 14 days of COVID-19 Exposure    Negative: Bluish (or gray) lips or face    Negative: SEVERE difficulty breathing (e.g., struggling for each breath, speaks in single words)    Negative: Rapid onset of cough and has hives    Negative: Coughing started suddenly after medicine, an allergic food or bee sting    Negative: Difficulty breathing after exposure to flames, smoke, or fumes    Negative: Sounds like a life-threatening emergency to the triager    Protocols used: COUGH-A-OH

## 2023-04-07 NOTE — PROGRESS NOTES
Assessment & Plan     Viral URI with cough  - predniSONE (DELTASONE) 20 MG tablet; Take 1 tablet (20 mg) by mouth daily for 5 days  - albuterol (PROAIR HFA/PROVENTIL HFA/VENTOLIN HFA) 108 (90 Base) MCG/ACT inhaler; Inhale 2 puffs into the lungs every 4 hours as needed for shortness of breath or wheezing    Steroid has helped open up his chest so far, will finish course with prednisone.  I recommend that he take 1 tablet daily until all pills are gone.  Albuterol as needed to help open up chest.  Follow-up to see PCP if symptoms worsen or fail to improve as expected.      Return in about 1 week (around 4/14/2023) for visit with primary care provider if not improving.     Valencia Baugh PA-C  [facility]    Subjective   Humberto Roberts is a 53 year old who presents for the following health issues    HPI    Humberto presents via telephone for evaluation of cough.  Symptoms first began 5 days ago.  He had a productive cough with sinus congestion, no fever.  He has some wheezing and tightness in his chest but did have some methylprednisolone left over from a previous prescription.  He took 1 tablet Wednesday, 1 tablet Thursday and this morning.  He notes that the wheezing has significantly improved but has not resolved.      Review of Systems   ROS negative except as stated above.      Objective    Physical Exam   healthy, alert and no distress  PSYCH: Alert and oriented times 3; coherent speech, normal   rate and volume, able to articulate logical thoughts, able   to abstract reason, no tangential thoughts, no hallucinations   or delusions. Affect is normal and pleasant  RESP: No cough, no audible wheezing, able to talk in full sentences  Remainder of exam unable to be completed due to telephone visits    Call duration: 7 min  Provider location: offsite at home, [facility]

## 2023-04-24 ENCOUNTER — TRANSFERRED RECORDS (OUTPATIENT)
Dept: HEALTH INFORMATION MANAGEMENT | Facility: CLINIC | Age: 53
End: 2023-04-24

## 2023-04-24 LAB — RETINOPATHY: NEGATIVE

## 2023-05-11 DIAGNOSIS — I10 ESSENTIAL HYPERTENSION: ICD-10-CM

## 2023-05-11 DIAGNOSIS — F51.04 PSYCHOPHYSIOLOGICAL INSOMNIA: ICD-10-CM

## 2023-05-11 DIAGNOSIS — E79.0 HYPERURICEMIA: ICD-10-CM

## 2023-05-11 RX ORDER — TRAZODONE HYDROCHLORIDE 100 MG/1
TABLET ORAL
Qty: 30 TABLET | Refills: 6 | Status: SHIPPED | OUTPATIENT
Start: 2023-05-11 | End: 2023-10-11

## 2023-05-11 RX ORDER — ATENOLOL 50 MG/1
TABLET ORAL
Qty: 90 TABLET | Refills: 1 | Status: SHIPPED | OUTPATIENT
Start: 2023-05-11 | End: 2024-01-26

## 2023-05-11 RX ORDER — ALLOPURINOL 100 MG/1
100 TABLET ORAL DAILY
Qty: 30 TABLET | Refills: 0 | Status: SHIPPED | OUTPATIENT
Start: 2023-05-11 | End: 2023-08-03

## 2023-05-11 NOTE — TELEPHONE ENCOUNTER
Pending Prescriptions:                       Disp   Refills    traZODone (DESYREL) 100 MG tablet [Pharmac*30 tab*6        Sig: TAKE ONE TABLET BY MOUTH EVERY NIGHT AT BEDTIME      Routing refill request to provider for review/approval because:  A break in medication    Katiana Geller RN on 5/11/2023 at 3:46 PM

## 2023-05-11 NOTE — TELEPHONE ENCOUNTER
Pending Prescriptions:                       Disp   Refills    allopurinol (ZYLOPRIM) 100 MG tablet [Phar*90 tab*1        Sig: TAKE 1 TABLET (100 MG) BY MOUTH DAILY    Signed Prescriptions:                        Disp   Refills    atenolol (TENORMIN) 50 MG tablet           90 tab*1        Sig: TAKE ONE TABLET BY MOUTH ONCE DAILY  Authorizing Provider: SHILOH DEVINE  Ordering User: DAMIAN ROSE      Routing refill request to provider for review/approval because:  Labs not current:  cbc, uric    Damian Rose RN on 5/11/2023 at 3:51 PM

## 2023-05-11 NOTE — TELEPHONE ENCOUNTER
Prescription approved per [facility] Refill Protocol.  Katiana Geller RN on 5/11/2023 at 3:51 PM

## 2023-05-26 ENCOUNTER — HOSPITAL ENCOUNTER (EMERGENCY)
Facility: CLINIC | Age: 53
Discharge: HOME | End: 2023-05-26
Attending: FAMILY MEDICINE | Admitting: FAMILY MEDICINE
Payer: COMMERCIAL

## 2023-05-26 VITALS
OXYGEN SATURATION: 99 % | BODY MASS INDEX: 40.43 KG/M2 | HEIGHT: 74 IN | HEART RATE: 65 BPM | RESPIRATION RATE: 18 BRPM | TEMPERATURE: 98 F | DIASTOLIC BLOOD PRESSURE: 91 MMHG | SYSTOLIC BLOOD PRESSURE: 156 MMHG | WEIGHT: 315 LBS

## 2023-05-26 DIAGNOSIS — M62.830 SPASM OF LUMBAR PARASPINOUS MUSCLE: ICD-10-CM

## 2023-05-26 LAB
ALBUMIN UR-MCNC: NEGATIVE MG/DL
APPEARANCE UR: CLEAR
BILIRUB UR QL STRIP: NEGATIVE
COLOR UR AUTO: YELLOW
GLUCOSE UR STRIP-MCNC: NEGATIVE MG/DL
HGB UR QL STRIP: NEGATIVE
KETONES UR STRIP-MCNC: NEGATIVE MG/DL
LEUKOCYTE ESTERASE UR QL STRIP: NEGATIVE
MUCOUS THREADS #/AREA URNS LPF: PRESENT /LPF
NITRATE UR QL: NEGATIVE
PH UR STRIP: 5 [PH] (ref 5–7)
RBC URINE: <1 /HPF
SP GR UR STRIP: 1.01 (ref 1–1.03)
SQUAMOUS EPITHELIAL: <1 /HPF
UROBILINOGEN UR STRIP-MCNC: NORMAL MG/DL
WBC URINE: 1 /HPF

## 2023-05-26 PROCEDURE — 99284 EMERGENCY DEPT VISIT MOD MDM: CPT | Performed by: FAMILY MEDICINE

## 2023-05-26 PROCEDURE — 81001 URINALYSIS AUTO W/SCOPE: CPT | Performed by: FAMILY MEDICINE

## 2023-05-26 PROCEDURE — 250N000013 HC RX MED GY IP 250 OP 250 PS 637: Performed by: FAMILY MEDICINE

## 2023-05-26 PROCEDURE — 250N000012 HC RX MED GY IP 250 OP 636 PS 637: Performed by: FAMILY MEDICINE

## 2023-05-26 PROCEDURE — 99283 EMERGENCY DEPT VISIT LOW MDM: CPT | Performed by: FAMILY MEDICINE

## 2023-05-26 RX ORDER — PREDNISONE 10 MG/1
TABLET ORAL
Qty: 7 TABLET | Refills: 0 | Status: SHIPPED | OUTPATIENT
Start: 2023-05-27 | End: 2023-06-01

## 2023-05-26 RX ORDER — LIDOCAINE 4 G/G
1 PATCH TOPICAL ONCE
Status: DISCONTINUED | OUTPATIENT
Start: 2023-05-26 | End: 2023-05-26 | Stop reason: HOSPADM

## 2023-05-26 RX ORDER — PREDNISONE 20 MG/1
40 TABLET ORAL ONCE
Status: COMPLETED | OUTPATIENT
Start: 2023-05-26 | End: 2023-05-26

## 2023-05-26 RX ORDER — LIDOCAINE 4 G/G
1 PATCH TOPICAL EVERY 24 HOURS
Qty: 15 PATCH | Refills: 0 | Status: SHIPPED | OUTPATIENT
Start: 2023-05-26 | End: 2023-06-09

## 2023-05-26 RX ADMIN — PREDNISONE 40 MG: 20 TABLET ORAL at 04:16

## 2023-05-26 RX ADMIN — LIDOCAINE 1 PATCH: 560 PATCH PERCUTANEOUS; TOPICAL; TRANSDERMAL at 03:11

## 2023-05-26 ASSESSMENT — ENCOUNTER SYMPTOMS
CHILLS: 0
BACK PAIN: 1
EYES NEGATIVE: 1
WEAKNESS: 0
FEVER: 0
DIFFICULTY URINATING: 0
GASTROINTESTINAL NEGATIVE: 1
FREQUENCY: 1
NUMBNESS: 0
PSYCHIATRIC NEGATIVE: 1
CARDIOVASCULAR NEGATIVE: 1
RESPIRATORY NEGATIVE: 1
HEMATURIA: 0

## 2023-05-26 ASSESSMENT — ACTIVITIES OF DAILY LIVING (ADL): ADLS_ACUITY_SCORE: 35

## 2023-05-26 NOTE — ED TRIAGE NOTES
Pt reports experiencing low back pain for the past week that continues to worsen. He notes that the pain started after lifting something heavy. He states that he has tried his muscle relaxer, ibuprofen, tylenol, and icy hot which has not helped     Triage Assessment     Row Name 05/26/23 0256       Triage Assessment (Adult)    Airway WDL WDL       Respiratory WDL    Respiratory WDL WDL       Skin Circulation/Temperature WDL    Skin Circulation/Temperature WDL WDL       Cardiac WDL    Cardiac WDL WDL       Peripheral/Neurovascular WDL    Peripheral Neurovascular WDL WDL       Cognitive/Neuro/Behavioral WDL    Cognitive/Neuro/Behavioral WDL WDL

## 2023-05-26 NOTE — ED PROVIDER NOTES
History     Chief Complaint   Patient presents with     Back Pain     HPI  Humberto Roberts is a 53 year old male who presented to the emergency room today secondary to concerns of right-sided low back pain this been present for the last week or so.  States that he was lifting up some ramps a week ago and following that he developed pain.  He denies any extension of the pain or radiation of the pain into his legs.  He denies any incontinence of bowel or bladder.  He denies any weakness into the legs and he denies any numbness into the legs.  States the pain makes it difficult for him to sleep.  Pain is worse with change of positions.  He denies any pain with urination or burning with urination or blood in the urine or stools.  States that he has not had relief with the use of muscle relaxers, ibuprofen, Tylenol medications at home.    Allergies:  No Known Allergies    Problem List:    Patient Active Problem List    Diagnosis Date Noted     Diabetes mellitus, type 2 (H) 11/24/2022     Priority: Medium     Acanthosis nigricans 02/25/2021     Priority: Medium     Skin tag 02/25/2021     Priority: Medium     Vitamin D deficiency 12/08/2019     Priority: Medium     Hyperuricemia 12/08/2019     Priority: Medium     RICARDO (obstructive sleep apnea) - on cpap 09/22/2019     Priority: Medium     Chronic bilateral low back pain without sciatica 09/22/2019     Priority: Medium     Seasonal allergic rhinitis due to pollen, unspecified chronicity 01/23/2018     Priority: Medium     Morbid obesity (H) 12/06/2017     Priority: Medium     Insomnia 10/15/2015     Priority: Medium     Hypertension goal BP (blood pressure) < 140/90 05/06/2013     Priority: Medium     GERD (gastroesophageal reflux disease) 04/24/2013     Priority: Medium        Past Medical History:    Past Medical History:   Diagnosis Date     Acid reflux disease since 2006     Back pain chronic     Cellulitis of left upper arm and forearm 11/22/2013     Elevated serum  creatinine 10/15/2015     Hypertension      Pre-diabetes 2/25/2021       Past Surgical History:    Past Surgical History:   Procedure Laterality Date     COLONOSCOPY N/A 3/25/2021    Procedure: Colonoscopy Incomplete;  Surgeon: Humberto Antoine DO;  Location:  GI     COLONOSCOPY N/A 7/15/2021    Procedure: COLONOSCOPY;  Surgeon: Humberto Antoine DO;  Location:  GI     ENT SURGERY       ESOPHAGOSCOPY, GASTROSCOPY, DUODENOSCOPY (EGD), COMBINED N/A 3/25/2021    Procedure: Esophagogastroduodenoscopy with biopsy;  Surgeon: Humberto Antoine DO;  Location:  GI       Family History:    Family History   Problem Relation Age of Onset     Diabetes Mother      Hypertension Mother      Heart Disease Mother      Heart Disease Father      Diabetes Father      Unknown/Adopted Maternal Grandmother      Unknown/Adopted Maternal Grandfather      Unknown/Adopted Paternal Grandmother      Unknown/Adopted Paternal Grandfather      No Known Problems Sister      No Known Problems Son      No Known Problems Daughter      No Known Problems Sister      No Known Problems Son      No Known Problems Daughter        Social History:  Marital Status:   [2]  Social History     Tobacco Use     Smoking status: Never     Smokeless tobacco: Never   Substance Use Topics     Alcohol use: No     Alcohol/week: 0.0 standard drinks of alcohol     Drug use: No        Medications:    Lidocaine (LIDOCARE) 4 % Patch  [START ON 5/27/2023] predniSONE (DELTASONE) 10 MG tablet  tiZANidine (ZANAFLEX) 4 MG tablet  albuterol (PROAIR HFA/PROVENTIL HFA/VENTOLIN HFA) 108 (90 Base) MCG/ACT inhaler  allopurinol (ZYLOPRIM) 100 MG tablet  amoxicillin (AMOXIL) 875 MG tablet  ASPIRIN LOW DOSE 81 MG EC tablet  atenolol (TENORMIN) 50 MG tablet  cetirizine (ZYRTEC) 10 MG tablet  clotrimazole-betamethasone (LOTRISONE) 1-0.05 % external cream  colchicine (COLCYRS) 0.6 MG tablet  famotidine (PEPCID) 40 MG tablet  fluticasone (FLONASE) 50 MCG/ACT  "nasal spray  guaiFENesin-codeine (ROBITUSSIN AC) 100-10 MG/5ML solution  hydrochlorothiazide (HYDRODIURIL) 25 MG tablet  HYDROcodone-acetaminophen (NORCO) 5-325 MG tablet  metFORMIN (GLUCOPHAGE XR) 500 MG 24 hr tablet  methylPREDNISolone (MEDROL DOSEPAK) 4 MG tablet therapy pack  order for DME  order for DME  pantoprazole (PROTONIX) 40 MG EC tablet  sildenafil (REVATIO) 20 MG tablet  topiramate (TOPAMAX) 25 MG tablet  traZODone (DESYREL) 100 MG tablet          Review of Systems   Constitutional: Negative for chills and fever.   HENT: Negative.    Eyes: Negative.    Respiratory: Negative.    Cardiovascular: Negative.    Gastrointestinal: Negative.    Genitourinary: Positive for frequency and urgency. Negative for difficulty urinating, hematuria and penile discharge.   Musculoskeletal: Positive for back pain (Right low back area pain.).   Skin: Negative for rash.   Neurological: Negative for weakness and numbness.   Psychiatric/Behavioral: Negative.    All other systems reviewed and are negative.      Physical Exam   BP: (!) 160/96  Pulse: 70  Temp: 98.4  F (36.9  C)  Resp: 18  Height: 188 cm (6' 2\")  Weight: 145.2 kg (320 lb 3.2 oz)  SpO2: 97 %      Physical Exam  Vitals and nursing note reviewed.   Constitutional:       General: He is in acute distress.      Appearance: He is obese. He is not ill-appearing, toxic-appearing or diaphoretic.   HENT:      Head: Normocephalic and atraumatic.   Eyes:      Conjunctiva/sclera: Conjunctivae normal.      Pupils: Pupils are equal, round, and reactive to light.   Cardiovascular:      Rate and Rhythm: Normal rate.      Pulses: Normal pulses.   Pulmonary:      Effort: Pulmonary effort is normal. No respiratory distress.   Abdominal:      Tenderness: There is no abdominal tenderness.      Comments: Obese abdomen.   Musculoskeletal:      Cervical back: Normal range of motion and neck supple.      Lumbar back: Spasms and tenderness present. No swelling, edema, deformity, signs of " trauma, lacerations or bony tenderness. Decreased range of motion. Negative right straight leg raise test and negative left straight leg raise test. No scoliosis.        Back:       Comments: Patient with tenderness to the area of the right quadratus lumborum musculature.  Palpation of the area reveals spasm present.  No mass noted.  No open skin wound to the area noted.   Neurological:      Mental Status: He is alert.         ED Course                 Procedures              Critical Care time:  none               Results for orders placed or performed during the hospital encounter of 05/26/23 (from the past 24 hour(s))   UA with Microscopic reflex to Culture    Specimen: Urine, Clean Catch   Result Value Ref Range    Color Urine Yellow Colorless, Straw, Light Yellow, Yellow    Appearance Urine Clear Clear    Glucose Urine Negative Negative mg/dL    Bilirubin Urine Negative Negative    Ketones Urine Negative Negative mg/dL    Specific Gravity Urine 1.010 1.003 - 1.035    Blood Urine Negative Negative    pH Urine 5.0 5.0 - 7.0    Protein Albumin Urine Negative Negative mg/dL    Urobilinogen Urine Normal Normal, 2.0 mg/dL    Nitrite Urine Negative Negative    Leukocyte Esterase Urine Negative Negative    Mucus Urine Present (A) None Seen /LPF    RBC Urine <1 <=2 /HPF    WBC Urine 1 <=5 /HPF    Squamous Epithelials Urine <1 <=1 /HPF    Narrative    Urine Culture not indicated       Medications   Lidocaine (LIDOCARE) 4 % Patch 1 patch (1 patch Transdermal $Patch/Med Applied 5/26/23 8803)   predniSONE (DELTASONE) tablet 40 mg (40 mg Oral $Given 5/26/23 8286)       Assessments & Plan (with Medical Decision Making)  53-year-old male to the ER secondary concerns of right-sided low back pain after doing some lifting a week ago.  Patient's exam is reassuring without evidence of bowel or bladder incontinence, muscle weakness to lower extremities, or loss of touch sensation or deep tendon reflexes in the lower extremities.   Patient's Exam consistent with acute para-lumbar and psoas muscle spasm. Humberto was instructed on the use of ice, massage and stretching, gradual increased activity to start with walking or swimming and medications use to improve his symptoms. Humberto will return for signs of fever, uncontrolled pain, weakness or numbness into the lower legs, or incontinence of bowel or bladder. New medications as listed below. Humberto is told to avoid use of the muscle relaxer and narcotic medications if driving or controlling machinery.     I have reviewed the nursing notes.    I have reviewed the findings, diagnosis, plan and need for follow up with the patient.           Medical Decision Making  The patient's presentation was of moderate complexity (an acute complicated injury).    The patient's evaluation involved:  ordering and/or review of 3+ test(s) in this encounter (see separate area of note for details)  review of 3+ test result(s) ordered prior to this encounter (see separate area of note for details)    The patient's management necessitated moderate risk (prescription drug management including medications given in the ED).        Discharge Medication List as of 5/26/2023  4:18 AM      START taking these medications    Details   Lidocaine (LIDOCARE) 4 % Patch Place 1 patch onto the skin every 24 hoursDisp-15 patch, [id number]      predniSONE (DELTASONE) 10 MG tablet Two tablets daily for 2 days, then one tab for two days, the 1/2 tab daily for 2 days, Disp-7 tablet, R-0, E-Prescribe                  I verbally discussed the findings of the evaluation today in the ER. I have verbally discussed with Humberto the suggested treatment(s) as described in the discharge instructions and handouts. I have prescribed the above listed medications and instructed him on appropriate use of these medications.      I have verbally suggested he follow-up in his clinic or return to the ER for increased symptoms. See the follow-up  recommendations documented  in the after visit summary in this visit's EPIC chart.      Disclaimer: This note consists of words and symbols derived from keyboarding and dictation using voice recognition software.  As a result, there may be errors that have gone undetected.  Please consider this when interpreting information found in this note.    Final diagnoses:   Spasm of lumbar paraspinous muscle - right       5/26/2023   [facility] EMERGENCY DEPT     Skinny Herrera,   05/26/23 0597

## 2023-06-01 ENCOUNTER — NURSE TRIAGE (OUTPATIENT)
Dept: FAMILY MEDICINE | Facility: CLINIC | Age: 53
End: 2023-06-01
Payer: COMMERCIAL

## 2023-06-01 DIAGNOSIS — G89.29 CHRONIC BILATERAL LOW BACK PAIN WITH SCIATICA, SCIATICA LATERALITY UNSPECIFIED: Primary | ICD-10-CM

## 2023-06-01 DIAGNOSIS — M54.40 CHRONIC BILATERAL LOW BACK PAIN WITH SCIATICA, SCIATICA LATERALITY UNSPECIFIED: Primary | ICD-10-CM

## 2023-06-01 NOTE — TELEPHONE ENCOUNTER
Patient was seen for back pain in the ER on 5/26/23.    He was given Prednisone and this did not help much.  He was given a muscle relaxer and this makes him drowsy. This also does not help much.    He is also taking tylenol.    methylprednisolone is what he has been given before and this always helps with his back.    Back pain 10/10 right now.    Per protocol patient advised to go to the ER. Patient is wondering if he can get the methylprednisolone?    Katiana Geller RN on 6/1/2023 at 2:54 PM      Reason for Disposition    SEVERE back pain of sudden onset and age > 60 years    Additional Information    Negative: Passed out (i.e., fainted, collapsed and was not responding)    Negative: Shock suspected (e.g., cold/pale/clammy skin, too weak to stand, low BP, rapid pulse)    Negative: Sounds like a life-threatening emergency to the triager    Negative: Major injury to the back (e.g., MVA, fall > 10 feet or 3 meters, penetrating injury, etc.)    Negative: Pain in the upper back over the ribs (rib cage) that radiates (travels) into the chest    Negative: Pain in the upper back over the ribs (rib cage) and worsened by coughing (or clearly increases with breathing)    Negative: Back pain during pregnancy    Protocols used: BACK PAIN-A-[place]

## 2023-06-02 RX ORDER — METHYLPREDNISOLONE 4 MG
TABLET, DOSE PACK ORAL
Qty: 21 TABLET | Refills: 0 | Status: SHIPPED | OUTPATIENT
Start: 2023-06-02 | End: 2023-09-13

## 2023-06-02 NOTE — TELEPHONE ENCOUNTER
Ok.  Medrol Dosepak was sent to the pharmacy, please take it as prescribed.  Follow-up if not improved by early next week.  ER over the weekend if the pain is worsening or develops of any focal neurological symptoms.  Thank you

## 2023-06-08 ENCOUNTER — HOSPITAL ENCOUNTER (EMERGENCY)
Facility: CLINIC | Age: 53
Discharge: HOME | End: 2023-06-08
Attending: EMERGENCY MEDICINE | Admitting: EMERGENCY MEDICINE
Payer: COMMERCIAL

## 2023-06-08 VITALS
DIASTOLIC BLOOD PRESSURE: 87 MMHG | BODY MASS INDEX: 40.43 KG/M2 | SYSTOLIC BLOOD PRESSURE: 155 MMHG | TEMPERATURE: 97.8 F | WEIGHT: 315 LBS | HEART RATE: 62 BPM | RESPIRATION RATE: 20 BRPM | HEIGHT: 74 IN | OXYGEN SATURATION: 99 %

## 2023-06-08 DIAGNOSIS — M54.50 ACUTE BILATERAL LOW BACK PAIN WITHOUT SCIATICA: ICD-10-CM

## 2023-06-08 PROCEDURE — 99283 EMERGENCY DEPT VISIT LOW MDM: CPT | Performed by: EMERGENCY MEDICINE

## 2023-06-08 PROCEDURE — 99283 EMERGENCY DEPT VISIT LOW MDM: CPT

## 2023-06-08 RX ORDER — GABAPENTIN 300 MG/1
300 CAPSULE ORAL 3 TIMES DAILY PRN
Qty: 42 CAPSULE | Refills: 0 | Status: SHIPPED | OUTPATIENT
Start: 2023-06-08 | End: 2023-10-11

## 2023-06-08 NOTE — ED PROVIDER NOTES
History     chief complaint  HPI  Patient is a 53-year-old gentleman with a history of type 2 diabetes, elevated BMI, hypertension who is presenting with continued back pain.  He states that 3 weeks ago he was lifting a heavy car rim when he first noticed the back pain.  It is across his bilateral lower back.  Occasionally it shoots from the left side to the right side or vice versa.  It never goes down his legs.  No bowel or bladder incontinence, no saddle anesthesia, no fevers or chills.  He has been using IcyHot, muscle relaxers, and prednisone.  He was feeling better but felt like he reinjured it several days ago going up stairs.  He does state that when he is walking around it feels better but after prolonged sitting or lying episode it hurts more.    Review of Systems:  All organ systems below were reviewed and are negative unless indicated in the HPI.    Constitutional  Eyes  ENT  Respiratory  Cardiovascular  Gastrointestinal  Genitourinary  Musculoskeletal  Skin  Neuro    Allergies:  No Known Allergies    Problem List:    Patient Active Problem List    Diagnosis Date Noted     Diabetes mellitus, type 2 (H) 11/24/2022     Priority: Medium     Acanthosis nigricans 02/25/2021     Priority: Medium     Skin tag 02/25/2021     Priority: Medium     Vitamin D deficiency 12/08/2019     Priority: Medium     Hyperuricemia 12/08/2019     Priority: Medium     RICARDO (obstructive sleep apnea) - on cpap 09/22/2019     Priority: Medium     Chronic bilateral low back pain without sciatica 09/22/2019     Priority: Medium     Seasonal allergic rhinitis due to pollen, unspecified chronicity 01/23/2018     Priority: Medium     Morbid obesity (H) 12/06/2017     Priority: Medium     Insomnia 10/15/2015     Priority: Medium     Hypertension goal BP (blood pressure) < 140/90 05/06/2013     Priority: Medium     GERD (gastroesophageal reflux disease) 04/24/2013     Priority: Medium        Past Medical History:    Past Medical  History:   Diagnosis Date     Acid reflux disease since 2006     Back pain chronic     Cellulitis of left upper arm and forearm 11/22/2013     Elevated serum creatinine 10/15/2015     Hypertension      Pre-diabetes 2/25/2021       Past Surgical History:    Past Surgical History:   Procedure Laterality Date     COLONOSCOPY N/A 3/25/2021    Procedure: Colonoscopy Incomplete;  Surgeon: Humberto Antoine DO;  Location:  GI     COLONOSCOPY N/A 7/15/2021    Procedure: COLONOSCOPY;  Surgeon: Humberto Antoine DO;  Location:  GI     ENT SURGERY       ESOPHAGOSCOPY, GASTROSCOPY, DUODENOSCOPY (EGD), COMBINED N/A 3/25/2021    Procedure: Esophagogastroduodenoscopy with biopsy;  Surgeon: Humberto Antoine DO;  Location:  GI       Family History:    Family History   Problem Relation Age of Onset     Diabetes Mother      Hypertension Mother      Heart Disease Mother      Heart Disease Father      Diabetes Father      Unknown/Adopted Maternal Grandmother      Unknown/Adopted Maternal Grandfather      Unknown/Adopted Paternal Grandmother      Unknown/Adopted Paternal Grandfather      No Known Problems Sister      No Known Problems Son      No Known Problems Daughter      No Known Problems Sister      No Known Problems Son      No Known Problems Daughter        Medications:    albuterol (PROAIR HFA/PROVENTIL HFA/VENTOLIN HFA) 108 (90 Base) MCG/ACT inhaler  allopurinol (ZYLOPRIM) 100 MG tablet  ASPIRIN LOW DOSE 81 MG EC tablet  atenolol (TENORMIN) 50 MG tablet  cetirizine (ZYRTEC) 10 MG tablet  clotrimazole-betamethasone (LOTRISONE) 1-0.05 % external cream  colchicine (COLCYRS) 0.6 MG tablet  famotidine (PEPCID) 40 MG tablet  fluticasone (FLONASE) 50 MCG/ACT nasal spray  gabapentin (NEURONTIN) 300 MG capsule  guaiFENesin-codeine (ROBITUSSIN AC) 100-10 MG/5ML solution  hydrochlorothiazide (HYDRODIURIL) 25 MG tablet  Lidocaine (LIDOCARE) 4 % Patch  metFORMIN (GLUCOPHAGE XR) 500 MG 24 hr  "tablet  methylPREDNISolone (MEDROL DOSEPAK) 4 MG tablet therapy pack  pantoprazole (PROTONIX) 40 MG EC tablet  traZODone (DESYREL) 100 MG tablet  methylPREDNISolone (MEDROL DOSEPAK) 4 MG tablet therapy pack  order for DME  order for DME  sildenafil (REVATIO) 20 MG tablet  topiramate (TOPAMAX) 25 MG tablet          Physical Exam   BP: (!) 155/87  Pulse: 62  Temp: 97.8  F (36.6  C)  Resp: 20  Height: 188 cm (6' 2\")  Weight: 145.2 kg (320 lb)  SpO2: 99 %    Gen: Vital signs reviewed  Eyes: Sclera white, pupils round  ENT: External ears and nares normal  Card: Regular rate and rhythm  Resp: No respiratory distress. Lungs clear to auscultation bilaterally  Abd: Soft, non-distended, non-tender  Back: Bilateral paraspinal muscular spasm in the lumbar region.  No midline tenderness.  Extremities: Symmetric distal pulses.  Skin: No rashes  Neuro: Alert, speech normal.  5/5 dorsiflexion and plantarflexion and feet.  Good sensation in feet.  Symmetric patellar reflexes.  Straight leg test on left reproduces right-sided back pain.    ED Course        Procedures             No results found for this or any previous visit (from the past 24 hour(s)).    Medications - No data to display      Consultations:  None    Social Determinants of Health:      Assessments & Plan (with Medical Decision Making)       I have reviewed the nursing notes.    I have reviewed the findings, diagnosis, plan and need for follow up with the patient.      Medical Decision Making  On arrival, patient well-appearing.  Good neurologic exam.  No red flag findings concerning for cauda equina syndrome, discitis, spinal epidural abscess, or other sinister pathology.  Presentation most consistent with muscular spasm in the setting of possible mild disc herniation.  Discussed conservative treatment measures of this as well as the importance of physical therapy.  Referred him to physical therapy.  Also tried gabapentin for the pain.  At this point I do not " feel that advanced imaging is indicated.  Discussed appropriate return precautions and patient was discharged home in stable condition.    Final diagnoses:   Acute bilateral low back pain without sciatica         Wilton Green M.D.   [facility] Emergency Department     Wilton Green MD  06/08/23 [id number]

## 2023-06-08 NOTE — DISCHARGE INSTRUCTIONS
I have placed a physical therapy referral.  Remember that motion is lotion and try not to do any heavy lifting.  I have also prescribed you gabapentin to be used as needed for your pain.  Medications are only good for taking the edge off so you can participate in exercising and stretching.  This will go away on its own eventually as long as you do not reinjure it.

## 2023-06-09 ENCOUNTER — TELEPHONE (OUTPATIENT)
Dept: FAMILY MEDICINE | Facility: CLINIC | Age: 53
End: 2023-06-09

## 2023-06-09 ENCOUNTER — OFFICE VISIT (OUTPATIENT)
Dept: FAMILY MEDICINE | Facility: CLINIC | Age: 53
End: 2023-06-09
Payer: COMMERCIAL

## 2023-06-09 VITALS
OXYGEN SATURATION: 98 % | WEIGHT: 312 LBS | BODY MASS INDEX: 40.06 KG/M2 | SYSTOLIC BLOOD PRESSURE: 136 MMHG | DIASTOLIC BLOOD PRESSURE: 84 MMHG | HEART RATE: 66 BPM | TEMPERATURE: 97.9 F

## 2023-06-09 DIAGNOSIS — M54.50 CHRONIC BILATERAL LOW BACK PAIN WITHOUT SCIATICA: Primary | ICD-10-CM

## 2023-06-09 DIAGNOSIS — G47.33 OSA (OBSTRUCTIVE SLEEP APNEA): ICD-10-CM

## 2023-06-09 DIAGNOSIS — G89.29 CHRONIC BILATERAL LOW BACK PAIN WITHOUT SCIATICA: Primary | ICD-10-CM

## 2023-06-09 DIAGNOSIS — I10 HYPERTENSION GOAL BP (BLOOD PRESSURE) < 140/90: ICD-10-CM

## 2023-06-09 DIAGNOSIS — N52.9 ERECTILE DYSFUNCTION, UNSPECIFIED ERECTILE DYSFUNCTION TYPE: ICD-10-CM

## 2023-06-09 DIAGNOSIS — E66.01 MORBID OBESITY (H): ICD-10-CM

## 2023-06-09 DIAGNOSIS — E11.9 TYPE 2 DIABETES MELLITUS WITHOUT COMPLICATION, WITHOUT LONG-TERM CURRENT USE OF INSULIN (H): ICD-10-CM

## 2023-06-09 LAB
ANION GAP SERPL CALCULATED.3IONS-SCNC: 9 MMOL/L (ref 7–15)
BUN SERPL-MCNC: 11.3 MG/DL (ref 6–20)
CALCIUM SERPL-MCNC: 9.6 MG/DL (ref 8.6–10)
CHLORIDE SERPL-SCNC: 100 MMOL/L (ref 98–107)
CREAT SERPL-MCNC: 1.09 MG/DL (ref 0.67–1.17)
CREAT UR-MCNC: 303.2 MG/DL
DEPRECATED HCO3 PLAS-SCNC: 30 MMOL/L (ref 22–29)
GFR SERPL CREATININE-BSD FRML MDRD: 81 ML/MIN/1.73M2
GLUCOSE SERPL-MCNC: 140 MG/DL (ref 70–99)
HBA1C MFR BLD: 7.7 %
MICROALBUMIN UR-MCNC: 13.6 MG/L
MICROALBUMIN/CREAT UR: 4.49 MG/G CR (ref 0–17)
POTASSIUM SERPL-SCNC: 4.3 MMOL/L (ref 3.4–5.3)
SODIUM SERPL-SCNC: 139 MMOL/L (ref 136–145)

## 2023-06-09 PROCEDURE — 99214 OFFICE O/P EST MOD 30 MIN: CPT | Performed by: FAMILY MEDICINE

## 2023-06-09 PROCEDURE — 99207 PR FOOT EXAM NO CHARGE: CPT | Performed by: FAMILY MEDICINE

## 2023-06-09 PROCEDURE — 96127 BRIEF EMOTIONAL/BEHAV ASSMT: CPT | Performed by: FAMILY MEDICINE

## 2023-06-09 PROCEDURE — 36415 COLL VENOUS BLD VENIPUNCTURE: CPT | Performed by: FAMILY MEDICINE

## 2023-06-09 PROCEDURE — 80048 BASIC METABOLIC PNL TOTAL CA: CPT | Performed by: FAMILY MEDICINE

## 2023-06-09 PROCEDURE — 82043 UR ALBUMIN QUANTITATIVE: CPT | Performed by: FAMILY MEDICINE

## 2023-06-09 PROCEDURE — 83036 HEMOGLOBIN GLYCOSYLATED A1C: CPT | Performed by: FAMILY MEDICINE

## 2023-06-09 PROCEDURE — 82570 ASSAY OF URINE CREATININE: CPT | Performed by: FAMILY MEDICINE

## 2023-06-09 RX ORDER — SILDENAFIL CITRATE 20 MG/1
20-40 TABLET ORAL DAILY PRN
Qty: 30 TABLET | Refills: 4 | Status: SHIPPED | OUTPATIENT
Start: 2023-06-09

## 2023-06-09 RX ORDER — TIZANIDINE HYDROCHLORIDE 4 MG/1
CAPSULE, GELATIN COATED ORAL
Qty: 60 CAPSULE | Refills: 0 | Status: SHIPPED | OUTPATIENT
Start: 2023-06-09 | End: 2023-10-11

## 2023-06-09 ASSESSMENT — PATIENT HEALTH QUESTIONNAIRE - PHQ9
10. IF YOU CHECKED OFF ANY PROBLEMS, HOW DIFFICULT HAVE THESE PROBLEMS MADE IT FOR YOU TO DO YOUR WORK, TAKE CARE OF THINGS AT HOME, OR GET ALONG WITH OTHER PEOPLE: NOT DIFFICULT AT ALL
SUM OF ALL RESPONSES TO PHQ QUESTIONS 1-9: 11
SUM OF ALL RESPONSES TO PHQ QUESTIONS 1-9: 11

## 2023-06-09 ASSESSMENT — PAIN SCALES - GENERAL: PAINLEVEL: EXTREME PAIN (8)

## 2023-06-09 NOTE — TELEPHONE ENCOUNTER
Prior Authorization Retail Medication Request    Medication/Dose: Sildenafil 20 mg  ICD code (if different than what is on RX):    Previously Tried and Failed:    Rationale:      Insurance Name:  [facility]  Insurance ID:  [id number]      Pharmacy Information (if different than what is on RX)  Name:  [facility]  Phone:  [phone]    Thank you,  Mallory Love, Pharmacy Technician  [facility]

## 2023-06-09 NOTE — PATIENT INSTRUCTIONS
Take the Tanazidine twice a day (1 in am and 1 at bedtime) for pain, muscle spasm and back stiffness  Take the gabapentin at bedtime and  may take one in the morning as needed  Hold the Trazadone if take the Tanazidine and Gabapentin at bedtime  Start the Ozempic for DM weight loss  Labs today

## 2023-06-09 NOTE — PROGRESS NOTES
Assessment & Plan     (M54.50,  G89.29) Chronic bilateral low back pain without sciatica  (primary encounter diagnosis)  Comment: Known to have arthritis of the spine with chronic lower back pain without radiculopathy; has been on disability for this for years.  It prevents him from exercising or just being active.  It keeps him up at night. Epidural injections helped minimally and temporarily in the past.  Physical therapy worsened the pain but is willing to take it again.  MRI several years ago showed minimal arthritis no spinal or foraminal stenosis or herniated disc.  Diclofenac and Zanaflex are not really helping much.  Physical exam today with no focal neurological deficit and clinical presentation did not suggest equina syndrome or infection.    Clinical presentation is most suggestive of muscle spasm.  He is morbidly obese.  He was seen in the ER couple days ago, started Medrol Dosepak and is feeling better gradually.     I discussed with him about treatment options.  Emphasized on the importance of weight loss.  No longer taking the diclofenac.  Recommend Tylenol or Motrin  as needed and will continue with the Zanaflex at least at bedtime, during the day as needed.  Will try to avoid opioids if possible -he does not feel the need for it at this time. Discussed about seeing pain specialist but he declined.  Medrol Dosepak has been working well for acute flareup.  Continue with his normal activities as tolerated.  Symptoms the need to be seen or call in discussed.      He was given gabapentin in the ER.  He does not have neuropathic pain and therefore unlikely it will help; I recommended to take it only as needed.    Will also try to help him with the weight loss as below.  He was referred to physical therapy by the ER doctor and encouraged him to give it a try.    Plan: tiZANidine (ZANAFLEX) 4 MG capsule            (I10) Primary hypertension  Comment: BP is normal and stable with atenolol and  Airway  Urgency: elective      General Information and Staff    Patient location during procedure: OR  Anesthesiologist: Yevgeniy Clark MD  Resident/CRNA: Yevgeniy Clark MD  Performed: CRNA     Indications and Patient Condition  Indications for airway ma hydrochlorothiazide, been tolerating them well.  Also has morbid obesity, sleep apnea, chronic low back pain and prediabetic.  No histories of CVA, CAD or high cholesterol.  His cholesterol level recently was normal.  His kidney function and electrolytes were also normal today.  Will continue with the atenolol and hydrochlorothiazide at the current doses.  Healthy/low salt diet, staying active and weight loss encouraged and discussed. Lab as ordered.  Follow up in 6 month, earlier as needed     Plan: Basic metabolic panel  (Ca, Cl, CO2, Creat,         Gluc, K, Na, BUN)            (E11.9) Type 2 diabetes mellitus without complication, without long-term current use of insulin (H)  Comment: His hemoglobin A1c is 7.7 went up from 6.6.  He is morbidly obese with acanthosis nigricans.  He is not able to exercise due to chronic low back pain.  He is -American heritage which also with high risk for diabetes.  His blood pressure is controlled.  Emphasized on exercising, healthy diet and weight loss.   Continue with metformin and added the Ozempic.    Potential side effect discussed.  Will continue to monitor closely.  He had an eye exam done last month and it was normal.    Plan: Albumin Random Urine Quantitative with Creat         Ratio, HEMOGLOBIN A1C, FOOT EXAM            (N52.9) Erectile dysfunction, unspecified erectile dysfunction type  Comment: Doing well, continue with the Revatio as needed - tolerating it well.  No contraindication identified.  He was instructed to go to the ER if develop painful erections or erection the last more than 3 hours.    Plan: sildenafil (REVATIO) 20 MG tablet            (E66.01) Morbid obesity (H)  Comment: Today's BMI was 40, dropped from 43 about 6 months ago .   Also has high blood pressure, sleep apnea, chronic low back pain, and DM.  Not able to exercise due to chronic low back pain.  Encouraged to stay active as tolerated.  Healthy and portion diet discussed and  encouraged.  He was referred to weight management last year but this did not cover for it.  He is not really sure if he is ready for surgery at this time.  Discussed about the goal for his weight and his BMI.  No history of thyroid problem.  He is diabetes with a hemoglobin A1c of 7.7 today.  Will continue with the metformin and start him on the Ozempic.    Is no longer taking the Topamax.  Follow-up in 3 months for reevaluation and will consider adding other weight loss medication.  Will also refer him to sleep medicine.  He has sleep apnea, last sleep study was in 2015 and he has gained significant weight since then.    Plan: semaglutide (OZEMPIC) 2 MG/3ML pen          He was also asked not to take the trazodone if he takes the Zanaflex and gabapentin at bedtime.    Depression Screening Follow Up        6/9/2023     8:50 AM   PHQ   PHQ-9 Total Score 11   Q9: Thoughts of better off dead/self-harm past 2 weeks Not at all         6/9/2023     8:50 AM   Last PHQ-9   1.  Little interest or pleasure in doing things 2   2.  Feeling down, depressed, or hopeless 2   3.  Trouble falling or staying asleep, or sleeping too much 0   4.  Feeling tired or having little energy 1   5.  Poor appetite or overeating 2   6.  Feeling bad about yourself 2   7.  Trouble concentrating 2   8.  Moving slowly or restless 0   Q9: Thoughts of better off dead/self-harm past 2 weeks 0   PHQ-9 Total Score 11       Follow Up Actions Taken  Patient declined referral.  Also declined medication     Work on weight loss  Regular exercise    Calos Ty Mai, MD  [facility]    Kamran Paul is a 53 year old, presenting for the following health issues:  Recheck Medication        6/9/2023     8:45 AM   Additional Questions   Roomed by Paty DE LA GARZA     History of Present Illness       Back Pain:  He presents for follow up of back pain. Patient's back pain is a recurring problem.  Location of back pain:  Right lower back and left lower  back  Description of back pain: sharp  Back pain spreads: nowhere    Since patient first noticed back pain, pain is: gradually worsening  Does back pain interfere with his job:  No      Diabetes:   He presents for follow up of diabetes.  He is not checking blood glucose. When his blood glucose is low, the patient is asymptomatic for confusion, blurred vision, lethargy and reports not feeling dizzy, shaky, or weak.  He has no concerns regarding his diabetes at this time.  He is not experiencing numbness or burning in feet, excessive thirst, blurry vision, weight changes or redness, sores or blisters on feet.         Hypertension: He presents for follow up of hypertension.  He does not check blood pressure  regularly outside of the clinic.  He follows a low salt diet.     He eats 0-1 servings of fruits and vegetables daily.He consumes 0 sweetened beverage(s) daily.He exercises with enough effort to increase his heart rate 9 or less minutes per day.  He exercises with enough effort to increase his heart rate 3 or less days per week.   He is taking medications regularly.    Today's PHQ-9         PHQ-9 Total Score: 11    PHQ-9 Q9 Thoughts of better off dead/self-harm past 2 weeks :   Not at all    How difficult have these problems made it for you to do your work, take care of things at home, or get along with other people: Not difficult at all       Humberto was seen today for couple concerns.  First is to follow-up on ER visit.  He was in the ER for lower back pain flareup couple days ago.  He has chronic low back pain for years secondary to degenerative joint disease of the spine.  He in fact has been on disability for it.  has not been taking diclofenac and Zanaflex which have not helped.  The same kind of pain that he has had - constant dull achy pain in his lower back that becomes sharp with movement or certain activities.  No radiation to the leg.  The back feels stiff and tight with a lot of muscle spasm.    Running  out of muscle relaxant.  It keeps him up at night.  No problem with control his bowel movement or urination.  No weakness on the legs.  No fever or chills.  No unusual activities.  Epidural injections helped only temporarily and physical therapy typically made it worse. Medrol Dosepak has been working well for acute flare-up; it is getting better slightly since the ER visit. Not taking the pain meds chronically.    His blood pressure has been controlled with atenolol and hydrochlorothiazide with no side effect.  Not checking his blood pressure at home.  No headache or dizziness.  No chest pain, shortness of breath, dyspnea, leg swelling or orthopnea.  Not exercising due to chronic back pain.  Denied of excessive salt/caffeine intake.  He DM and is on the metformin.  Not checking her blood sugar at home.  Eye exam last month were normal.  No history of CVA, CAD or high cholesterol.      Review of Systems   Constitutional, HEENT, cardiovascular, pulmonary, gi and gu systems are negative, except as otherwise noted.      Objective    /84   Pulse 66   Temp 97.9  F (36.6  C) (Temporal)   Wt 141.5 kg (312 lb)   SpO2 98%   BMI 40.06 kg/m    Body mass index is 40.06 kg/m .  Physical Exam   GENERAL: alert and no distress.  RESP: lungs clear to auscultation - no rales, rhonchi or wheezes  CV: regular rate and rhythm, no murmur, no peripheral edema  ABDOMEN: soft, nontender, no hepatosplenomegaly, no masses and bowel sounds normal  MS: no gross musculoskeletal defects noted, no edema.  Walk without limping.  Both legs are equal in strength.  Hips knee exam was normal.  Tender with patient to lumbar sacral spinous paraspinous muscle which is very tight and spastic.  Negative straight leg maneuver.  NEURO: Normal strength and tone, mentation intact and speech normal.  DTR +2 throughout.  No focal neurological deficit.  Negative clonus.    Results for orders placed or performed in visit on 06/09/23   Albumin Random  Urine Quantitative with Creat Ratio     Status: None   Result Value Ref Range    Creatinine Urine mg/dL 303.2 mg/dL    Albumin Urine mg/L 13.6 mg/L    Albumin Urine mg/g Cr 4.49 0.00 - 17.00 mg/g Cr   HEMOGLOBIN A1C     Status: Abnormal   Result Value Ref Range    Hemoglobin A1C 7.7 (H) <5.7 %   Basic metabolic panel  (Ca, Cl, CO2, Creat, Gluc, K, Na, BUN)     Status: Abnormal   Result Value Ref Range    Sodium 139 136 - 145 mmol/L    Potassium 4.3 3.4 - 5.3 mmol/L    Chloride 100 98 - 107 mmol/L    Carbon Dioxide (CO2) 30 (H) 22 - 29 mmol/L    Anion Gap 9 7 - 15 mmol/L    Urea Nitrogen 11.3 6.0 - 20.0 mg/dL    Creatinine 1.09 0.67 - 1.17 mg/dL    Calcium 9.6 8.6 - 10.0 mg/dL    Glucose 140 (H) 70 - 99 mg/dL    GFR Estimate 81 >60 mL/min/1.73m2

## 2023-06-10 ENCOUNTER — TELEPHONE (OUTPATIENT)
Dept: FAMILY MEDICINE | Facility: CLINIC | Age: 53
End: 2023-06-10
Payer: COMMERCIAL

## 2023-06-10 DIAGNOSIS — E11.65 TYPE 2 DIABETES MELLITUS WITH HYPERGLYCEMIA, WITHOUT LONG-TERM CURRENT USE OF INSULIN (H): ICD-10-CM

## 2023-06-10 NOTE — TELEPHONE ENCOUNTER
Central Prior Authorization Team   Phone: [phone]      PRIOR AUTHORIZATION DENIED    Medication: OZEMPIC (0.25 OR 0.5 MG/DOSE) 2 MG/3ML SC SOPN  Insurance Company: [employer] - Phone [phone] Fax [phone]  Denial Date: 6/9/2023  Denial Rational: TRIAL AND FAILURE OF AT LEAST TWO PREFERRED MEDICATIONS: BYDUREON BCISE, BYETTA, AND VICTOZA. IF PATIENT CANNOT TRY THE PREFERRED OPTIONS, PLEASE STATE REASON FOR WHY THEY ARE CONTRAINDICATED IN THE PATIENT'S CONDITION.        Appeal Information:     Patient Notified: No

## 2023-06-11 RX ORDER — METFORMIN HCL 500 MG
1000 TABLET, EXTENDED RELEASE 24 HR ORAL 2 TIMES DAILY WITH MEALS
Qty: 120 TABLET | Refills: 3 | Status: SHIPPED | OUTPATIENT
Start: 2023-06-11 | End: 2024-07-26

## 2023-06-12 PROBLEM — E11.65 TYPE 2 DIABETES MELLITUS WITH HYPERGLYCEMIA, WITHOUT LONG-TERM CURRENT USE OF INSULIN (H): Status: ACTIVE | Noted: 2023-06-12

## 2023-06-12 RX ORDER — ATORVASTATIN CALCIUM 10 MG/1
10 TABLET, FILM COATED ORAL DAILY
Qty: 90 TABLET | Refills: 1 | Status: SHIPPED | OUTPATIENT
Start: 2023-06-12 | End: 2024-01-26

## 2023-06-12 NOTE — TELEPHONE ENCOUNTER
Please let patient know that his insurance is not paying for the Ozempic.  I will start him on the Jardiance which is also an oral form in addition to the metformin that he has been taking.  Please be sure to increase the dose as instructed.  His diabetes was not controlled.  Also started him on low dose of Lipitor for heart and stroke protection.  The other option is to try Victoza will be a daily injection if he is interested.  Thank you

## 2023-06-13 NOTE — TELEPHONE ENCOUNTER
Patient was notified and is not interested in the daily injection  Angelita Albarran MA 6/13/2023

## 2023-06-14 NOTE — TELEPHONE ENCOUNTER
PA Initiation    Medication: SILDENAFIL CITRATE 20 MG PO TABS  Insurance Company: [employer] - Phone [phone] Fax [phone]  Pharmacy Filling the Rx: [address]   Filling Pharmacy Phone: [phone]  Filling Pharmacy Fax:    Start Date: 6/14/2023    Central Prior Authorization Team   Phone: [phone]

## 2023-06-15 NOTE — TELEPHONE ENCOUNTER
PRIOR AUTHORIZATION DENIED    Medication: SILDENAFIL CITRATE 20 MG PO TABS  Insurance Company: [employer] - Phone [phone] Fax [phone]  Denial Date: 6/15/2023  Denial Rational:       Appeal Information:     Patient Notified: Yes

## 2023-06-15 NOTE — TELEPHONE ENCOUNTER
Please let patient know that his health insurance does not cover for his Viagra.  He may have to pay it on his own.  In that case, please have him get in touch with the pharmacy about paying cash.  Thank you

## 2023-06-16 NOTE — TELEPHONE ENCOUNTER
Patient informed insurance will not pay for prescription.  Patient states he will wait a couple of weeks to  the medication.  Adele Sim MA

## 2023-06-17 ENCOUNTER — TELEPHONE (OUTPATIENT)
Dept: FAMILY MEDICINE | Facility: CLINIC | Age: 53
End: 2023-06-17
Payer: COMMERCIAL

## 2023-06-17 NOTE — TELEPHONE ENCOUNTER
Looks like he did not review the lab result note.  Please inform him about the lab results note through mail/pone.     Dear Humberto,    Your labs showed that your diabetes is not controlled with a hemoglobin A1c of 7.7.  The goal for your hemoglobin A1c is 7.0 or less.  I will increase your metformin to 1000 mg or 2 tablets twice a day with the total of 4 tablets a day.  The prescription was sent to the pharmacy.  Please be sure to use the Ozempic as discussed.  Recheck hemoglobin A1c in 3 months.  Please also work on exercise and healthy diet as discussed.  I will refer you to see diabetic diabetic educator.  Your kidney function and electrolytes were normal.  The protein level in your urine was normal.  Please let me know if you had any further question.     Calos.

## 2023-06-19 NOTE — TELEPHONE ENCOUNTER
Patient calling back and informed of this message.  Patient understands and agrees to this plan. RN provided patient with number to call for diabetic ed referral that was placed.     RN discussed with patient lifestyle choices in meantime to eat healthier and get daily exercise.     Closing this encounter.    WON MedinaN, RN

## 2023-06-28 ENCOUNTER — MYC MEDICAL ADVICE (OUTPATIENT)
Dept: PHYSICAL THERAPY | Facility: CLINIC | Age: 53
End: 2023-06-28

## 2023-06-28 ENCOUNTER — THERAPY VISIT (OUTPATIENT)
Dept: PHYSICAL THERAPY | Facility: CLINIC | Age: 53
End: 2023-06-28
Attending: EMERGENCY MEDICINE
Payer: COMMERCIAL

## 2023-06-28 DIAGNOSIS — M54.50 ACUTE BILATERAL LOW BACK PAIN WITHOUT SCIATICA: ICD-10-CM

## 2023-06-28 PROCEDURE — 97112 NEUROMUSCULAR REEDUCATION: CPT | Mod: GP | Performed by: PHYSICAL THERAPIST

## 2023-06-28 PROCEDURE — 97161 PT EVAL LOW COMPLEX 20 MIN: CPT | Mod: GP | Performed by: PHYSICAL THERAPIST

## 2023-06-28 NOTE — PROGRESS NOTES
PHYSICAL THERAPY EVALUATION  Type of Visit: Evaluation    See electronic medical record for Abuse and Falls Screening details.    Subjective      Presenting condition or subjective complaint: Low back pain. Low back pain since 17 yo. Recent injury: Lifting rims into truck and 2-3 days later increased. Went to ED 6-8-2023 and received medrol dose pack which were helpful. He did return to ED for a second time.   Date of onset: 05/19/23 (Approximate date,  a few weeks before 6-8-2023 ED visit)    Relevant medical history: Arthritis; Diabetes type 2, Acanthosis nigricans, Vitamin D deficiency, Hyperuricemia, RICARDO, chronic low back pain without sciatic, seasonal allergic rhinitis due to pollen, morbid obesity, insomnia, hypertension, GERD  Dates & types of surgery:   No history of low back surgery.   Prior diagnostic imaging/testing results: EMG; MRI   MRI results from 12-6-2019 Epic report:  Mild degenerative disc disease at L2-L3 and minimal degenerative disc disease at L3-L4.  Prior therapy history for the same diagnosis, illness or injury: No none    Prior Level of Function   Independent, limited in some activities due to pain    Living Environment  Social support: With a significant other or spouse (6 grandchildren, youngest 10 yo)   Type of home: House; Other   Stairs to enter the home: Yes 2 Is there a railing: No   Ramp: No   Stairs inside the home: No 7 Is there a railing: Yes   Help at home: None  Equipment owned:       Employment:   retired  Hobbies/Interests: car shows, drive around, play with grandchildren, shoot basketballs (limited to arms only)    Patient goals for therapy: everything - activities     Objective   LUMBAR SPINE EVALUATION  INTEGUMENTARY (edema, incisions): Negative low back  Leisure mechanical stresses: flexion  Functional disability score (DIO/STarT Back):  Will complete for next session  VAS score (0-10): seated, current: 8/10, range: 4-5/10 to 15-20/10    ADDITIONAL HISTORY:  Present  symptoms: Bilateral approx L3 through S1 and worst symptoms R side  Pain quality: tight, stiff, brick with sharp knife through it  Paresthesia (yes/no):  No  Symptoms (improving/unchanging/worsening):  worsening  Symptoms at onset (back/thigh/leg): Back  Constant symptoms (back/thigh/leg): Back    Symptoms are made worse with the followin% of the time  Bending, Always Rising, Always, increasing after 10 minutes  Standing, Always, after 1 hour  Walking, Always Lying, Time of day - Always AM and Always PM, Always When still and Other - Lifting, rolling to get out of bed, cold weather  Symptoms are made better with the following:  feels good, concerned about standing  Sitting, Time of day - Always as the day progresses, Always  With time limits, On the move and Other - warm weather, inserts in shoes (cannot find since move).     Disturbed sleep (yes/no):  Yes Sleeping postures (prone/sup/side R/L): Likes to sleep prone. Increased symptoms supine and R sidelying, best L sidelying. Has adjustable bed that may need new mattress    Previous episodes (0/1-5/6-10/11+): chronic Year of first episode: at age 18  Previous treatments: Recalled during session he had PT and was given exercises, lost papers since move. Would like to exercise again.     Specific Questions:  Cough/Sneeze/Strain (pos/neg): sneezing, coush  Bowel/Bladder (normal/abnormal): abnormal since medication change  Gait (normal/abnormal): normal   Medications (nil/NSAIDS/analg/steroids/anticoag/other):  Medical allergies:  PER Epic: Jardiance, Lipitor, glucophage, Revito, Zanaflex, Tizanidine, GABApentin, Medrol dose pack, trazodone, Zyloprim. Tenormin, albuterol, ASA, Protonix, hyrochlorothiazide. Refer to Epic for details.   General health (excellent/good/fair/poor):  Fair  Night pain (yes/no): With tranisitional movement to get to bathroom.   Accidents (yes/no): No  Unexplained weight loss (yes/no): No  Barriers at home: none  Other red flags:  None    Postural Observation:   Sitting: Lordotic  Change of posture: Better with lumbar roll and extension no change  Standing: Neutral  Lateral Shift: Right. Relevant: No  Other Observations: Elevation of R shoulder, ER of hip and foor eversion R more than L, bilateral genu varum, rigid    Test Movements:   During: produces, abolishes, increases, decreases, no effect, centralizing, peripheralizing   After: better, worse, no better, no worse, no effect, centralized, peripheralized    Symptomatic response Mechanical response    During testing After testing Effect - increased ROM, decreased ROM, or key functional test No Effect   Pretest symptoms standing: Low back     Rep FIS Increases    No Effect        Rep EIS Increases    Better          Pretest symptoms lying: low back   During testing After testing Effect - increased ROM, decreased ROM, or key functional test No Effect   Rep TOPHER       Rep EIL Increases    Centralized          If required, pretest symptoms: low back   During testing After testing Effect - increased ROM, decreased ROM, or key functional test No Effect   Rep SGIS - R Increases    No Worse        Rep SGIS - L No Effect    No Worse          Static Tests:  Sitting slouched: worsens, Sitting erect: better, Lying prone in extension: centralizing,      Provisional Classification: Derangement - Bilateral, symmetrical, symptoms above knee    Potential Drivers of Pain and/or Disability: Comorbidities    Principle of Management:  Education:  Posture, positioning, rising , centralizing principles Equipment provided:  none  Extension principle:  Cut finger analogy for healing, prone on elbows and extension in standing    Assessment & Plan   CLINICAL IMPRESSIONS   Medical Diagnosis: Acute bilateral low back pain without sciatica (M54.50)    Treatment Diagnosis: Mechanical low back pain   Impression/Assessment:  54 yo male with low back pain since 19 yo. recent injury lifting car rim. Responded positively to  extension.     Clinical Decision Making (Complexity):   Clinical Presentation: Stable/Uncomplicated  Clinical Presentation Rationale: based on medical and personal factors listed in PT evaluation  Clinical Decision Making (Complexity): Low complexity    PLAN OF CARE  Treatment Interventions:  Interventions: Manual Therapy, Neuromuscular Re-education, Therapeutic Activity, Therapeutic Exercise    Long Term Goals     PT Goal 1  Goal Identifier: Walk  Goal Description: Walk x 1/2 mile with ability to calm symptoms so he can incrtease time and distance to 1-2 minutes for his health  Target Date: 07/28/23      Frequency of Treatment: 2 times per week  Duration of Treatment: 6 weeks    Recommended Referrals to Other Professionals:  no needs identified today  Education Assessment:   Learner/Method: Patient;Listening;Reading;Demonstration;Pictures/Video;No Barriers to Learning  Education Comments: Sitting with lumbar roll x 10-15 minutes in firm chair and then move (walk/stand x 10-15 seconds). Keep hollow in low back with sit->stand and when getting out of bed move into slight extension and then sit->stand. Extension preference: used finger cut analogy to explain avoidance of flexion until symptoms 0/10. Prone on elbows or adjust bed to support extension 1 x 2->15 minutes x every 2 hours OR extension in standing in sets of 10 reps. Walking: start walking 1-2 minutes and increase as able using extension to calm symptoms as needed. Goals and plan of care    Risks and benefits of evaluation/treatment have been explained.   Patient/Family/caregiver agrees with Plan of Care.     Evaluation Time:     PT Eval, Low Complexity Minutes (56868): 30   Present: Not applicable    Signing Clinician: Naima Corea, PT      [facility]                                                                                   OUTPATIENT PHYSICAL THERAPY      PLAN OF TREATMENT FOR OUTPATIENT REHABILITATION    Patient's Last Name, First Name, TEDBUDFran  Humberto Roberts JR YOB: 1970   Provider's Name   [facility]   Medical Record No.  [id number]     Onset Date: 05/19/23 (Approximate date,  a few weeks before 6-8-2023 ED visit)  Start of Care Date:    6-   Medical Diagnosis:  Acute bilateral low back pain without sciatica (M54.50)      PT Treatment Diagnosis:  Mechanical low back pain Plan of Treatment  Frequency/Duration: 2 times per week/ 6 weeks    Certification date from  6-  to   8-9-2023        See note for plan of treatment details and functional goals     Naima Corea, PT                         I CERTIFY THE NEED FOR THESE SERVICES FURNISHED UNDER        THIS PLAN OF TREATMENT AND WHILE UNDER MY CARE     (Physician attestation of this document indicates review and certification of the therapy plan).              Will follow up care with primary care provider Karson Live Mai, MD    Referring Provider:  Wilton Green MD/Karson Live Mai, MD      Initial Assessment  See Epic Evaluation-

## 2023-07-05 ENCOUNTER — THERAPY VISIT (OUTPATIENT)
Dept: PHYSICAL THERAPY | Facility: CLINIC | Age: 53
End: 2023-07-05
Attending: EMERGENCY MEDICINE
Payer: COMMERCIAL

## 2023-07-05 DIAGNOSIS — M54.50 ACUTE BILATERAL LOW BACK PAIN WITHOUT SCIATICA: Primary | ICD-10-CM

## 2023-07-05 PROCEDURE — 97112 NEUROMUSCULAR REEDUCATION: CPT | Mod: GP | Performed by: PHYSICAL THERAPIST

## 2023-07-05 PROCEDURE — 97110 THERAPEUTIC EXERCISES: CPT | Mod: GP | Performed by: PHYSICAL THERAPIST

## 2023-07-06 ENCOUNTER — VIRTUAL VISIT (OUTPATIENT)
Dept: EDUCATION SERVICES | Facility: CLINIC | Age: 53
End: 2023-07-06
Attending: FAMILY MEDICINE
Payer: COMMERCIAL

## 2023-07-06 DIAGNOSIS — E11.9 TYPE 2 DIABETES MELLITUS WITHOUT COMPLICATION, WITHOUT LONG-TERM CURRENT USE OF INSULIN (H): ICD-10-CM

## 2023-07-06 PROCEDURE — G0108 DIAB MANAGE TRN  PER INDIV: HCPCS | Mod: 95 | Performed by: DIETITIAN, REGISTERED

## 2023-07-06 NOTE — LETTER
7/6/2023         RE: Humberto Roberts  [address] [facility] [address]        Dear Colleague,    Thank you for referring your patient, Humberto Roberts, to the [facility] [place]. Please see a copy of my visit note below.    Diabetes Self-Management Education & Support    Presents for: Individual review  Type of Service: Telephone Visit  Originating Location (Patient Location): Home  Distant Location (Provider Location): Offsite  Mode of Communication:  Telephone  Telephone Visit Start Time: 735  Telephone Visit End Time (telephone visit stop time): 810  How would patient like to obtain AVS? Email     ASSESSMENT:  New diagnosis of Diabetes last fall.  Reviewed medications in detail, blood sugars, pathophysiology and diet/activity/lifestylev interventions. Discussed aggressive glycemic management and early intervention for halting disease process.  Reviewed goals of reducing risks and health complications.   Noted that when A1c andreina from the 6s to 7s he hadn't adjusted diet much and was only very sporadically taking the metformin.  Is now taking 1 tab of metformin/day and the Jardiance consistently and monitoring food closer.  Has prescription for more metformin and will try 2 tabs/day (1000mg).  Reviewed benefits of monitoring blood sugars and could try a continuous glucose monitor; FreeStyle Jeannine.  Jam does not like needles and could get more benefit out of continuous monitoring.  Had talked himself into ozempic (and accepted the injection), however that was denied, but would be willing to try in the future should insurance cover it.         Patient's most recent    A1C 7.7 06/09/2023    A1C 6.1 02/24/2021     is not meeting goal of <7.0    Diabetes knowledge and skills assessment:   Patient is knowledgeable in diabetes management concepts related to: Healthy Eating, Being Active, Monitoring, Taking Medication and Problem Solving  Continue education with the following diabetes  "management concepts: Healthy Eating, Monitoring, Taking Medication, Problem Solving, Reducing Risks and Healthy Coping  Based on learning assessment above, most appropriate setting for further diabetes education would be: Individual setting.      PLAN  Continue with positive diet & lifestyle changes   Watch portions / balance macronutrients, increase fiber   FreeStyle Jeannine to pharmacy   Follow up scheduled in August   Re set up mychart on phone, verbal consent given to email for now     Education Materials Provided:  [employer] Healthy Living with Diabetes Book      SUBJECTIVE/OBJECTIVE:  Presents for: Individual review  Accompanied by: Self  Diabetes education in the past 24mo: No  Focus of Visit: Monitoring, Reducing Risks, Taking Medication  Diabetes type: Type 2  Date of diagnosis: November of 2022  Disease course: Improving  Other concerns:: None  Cultural Influences/Ethnic Background:  Not  or     Diabetes Symptoms & Complications:          Patient Problem List and Family Medical History reviewed for relevant medical history, current medical status, and diabetes risk factors.    Vitals:  There were no vitals taken for this visit.  Estimated body mass index is 40.06 kg/m  as calculated from the following:    Height as of 6/8/23: 1.88 m (6' 2\").    Weight as of 6/9/23: 141.5 kg (312 lb).   Last 3 BP:   BP Readings from Last 3 Encounters:   06/09/23 136/84   06/08/23 (!) 155/87   05/26/23 (!) 156/91       History   Smoking Status     Never   Smokeless Tobacco     Never       Labs:  Lab Results   Component Value Date    A1C 7.7 06/09/2023    A1C 6.1 02/24/2021     Lab Results   Component Value Date     06/09/2023     11/16/2022     04/26/2021     Lab Results   Component Value Date    LDL 76 11/16/2022    LDL 82 02/24/2021     HDL Cholesterol   Date Value Ref Range Status   02/24/2021 30 (L) >39 mg/dL Final     Direct Measure HDL   Date Value Ref Range Status   11/16/2022 " 26 (L) >=40 mg/dL Final   ]  GFR Estimate   Date Value Ref Range Status   06/09/2023 81 >60 mL/min/1.73m2 Final     Comment:     eGFR calculated using 2021 CKD-EPI equation.   04/26/2021 65 >60 mL/min/[1.73_m2] Final     Comment:     Non  GFR Calc  Starting 12/18/2018, serum creatinine based estimated GFR (eGFR) will be   calculated using the Chronic Kidney Disease Epidemiology Collaboration   (CKD-EPI) equation.       GFR Estimate If Black   Date Value Ref Range Status   04/26/2021 75 >60 mL/min/[1.73_m2] Final     Comment:      GFR Calc  Starting 12/18/2018, serum creatinine based estimated GFR (eGFR) will be   calculated using the Chronic Kidney Disease Epidemiology Collaboration   (CKD-EPI) equation.       Lab Results   Component Value Date    CR 1.09 06/09/2023    CR 1.27 04/26/2021     No results found for: MICROALBUMIN    Healthy Eating:  Healthy Eating Assessed Today: Yes  brisket and steak - watch portions   No candy / no desserts - not a sweets person   Talked about adding non starchy veggies   Rice - talked about how concentrated it is.  Reviewed brown rice & fiber.  Has tried brown rice now.   Talked about portion control and small changes     Being Active:  Being Active Assessed Today: Yes  Activity; back issues and wants to work out more once this pain lessens.     Monitoring:  Monitoring Assessed Today: Yes      Taking Medications:  Diabetes Medication(s)     Biguanides       metFORMIN (GLUCOPHAGE XR) 500 MG 24 hr tablet    Take 2 tablets (1,000 mg) by mouth 2 times daily (with meals)    Sodium-Glucose Co-Transporter 2 (SGLT2) Inhibitors       empagliflozin (JARDIANCE) 25 MG TABS tablet    Take 1 tablet (25 mg) by mouth daily          Taking Medication Assessed Today: Yes  Current Treatments: Diet, Oral Medication (taken by mouth)    Problem Solving:  Not assessed at this visit      Reducing Risks:  Reducing Risks Assessed Today: Yes  Diabetes Risks: Family History,  Ethnicity, Age over 45 years  CAD Risks: Diabetes Mellitus    Healthy Coping:  Healthy Coping Assessed Today: Yes  Emotional response to diabetes: Ready to learn  Stage of change: ACTION (Actively working towards change)  Support resources: Websites  Patient Activation Measure Survey Score:      4/4/2014    11:00 AM   VINNIE Score (Last Two)   VINNIE Raw Score 35   Activation Score 45.2   VINNIE Level 1         Care Plan and Education Provided:  Care Plan: Diabetes   Updates made by Poly Kruse RD since 7/6/2023 12:00 AM      Problem: HbA1C Not In Goal       Goal: Establish Regular Follow-Ups with PCP       Task: Discuss with PCP the recommended timing for patient's next follow up visit(s)    Responsible User: Poly Kruse RD      Task: Discuss schedule for PCP visits with patient Completed 7/6/2023   Responsible User: Poly Kruse RD      Goal: Get HbA1C Level in Goal       Task: Educate patient on diabetes education self-management topics Completed 7/6/2023   Responsible User: Poly Kruse RD      Task: Educate patient on benefits of regular glucose monitoring Completed 7/6/2023   Responsible User: Poly Kruse RD      Task: Refer patient to appropriate extended care team member, as needed (Medication Therapy Management, Behavioral Health, Physical Therapy, etc.)    Responsible User: Poly Kruse RD      Task: Discuss diabetes treatment plan with patient Completed 7/6/2023   Responsible User: Poly Kruse RD      Problem: Diabetes Self-Management Education Needed to Optimize Self-Care Behaviors       Goal: Understand diabetes pathophysiology and disease progression       Task: Provide education on diabetes pathophysiology and disease progression specfic to patient's diabetes type Completed 7/6/2023   Responsible User: Poly Kruse RD      Goal: Healthy Eating - follow a healthy eating pattern for diabetes       Task: Provide education on portion control and  consistency in amount, composition and timing of food intake Completed 7/6/2023   Responsible User: Poly Kruse RD      Task: Provide education on managing carbohydrate intake (carbohydrate counting, plate planning method, etc.) Completed 7/6/2023   Responsible User: Poly Kruse RD      Task: Provide education on weight management    Responsible User: Poly Kruse RD      Task: Provide education on heart healthy eating    Responsible User: Poly Kruse RD      Task: Provide education on eating out    Responsible User: Poly Kruse RD      Task: Develop individualized healthy eating plan with patient    Responsible User: Poly Kruse RD      Goal: Being Active - get regular physical activity, working up to at least 150 minutes per week       Task: Provide education on relationship of activity to glucose and precautions to take if at risk for low glucose    Responsible User: Poly Kruse RD      Task: Discuss barriers to physical activity with patient    Responsible User: Poly Kruse RD      Task: Develop physical activity plan with patient    Responsible User: Poly Kruse RD      Task: Explore community resources including walking groups, assistance programs, and home videos    Responsible User: Poly Kruse RD      Goal: Monitoring - monitor glucose and ketones as directed    Note:     and begin freestyle wai this month     Task: Provide education on blood glucose monitoring (purpose, proper technique, frequency, glucose targets, interpreting results, when to use glucose control solution, sharps disposal)    Responsible User: Poly Kruse RD      Task: Provide education on continuous glucose monitoring (sensor placement, use of marva or /reader, understanding glucose trends, alerts and alarms, differences between sensor glucose and blood glucose) Completed 7/6/2023   Responsible User: Poyl Kruse RD       Task: Provide education on ketone monitoring (when to monitor, frequency, etc.)    Responsible User: Poly Kruse RD      Goal: Taking Medication - patient is consistently taking medications as directed       Task: Provide education on action of prescribed medication, including when to take and possible side effects Completed 7/6/2023   Responsible User: Poly Kruse RD      Task: Provide education on insulin and injectable diabetes medications, including administration, storage, site selection and rotation for injection sites    Responsible User: Poly Kruse RD      Task: Discuss barriers to medication adherence with patient and provide management technique ideas as appropriate    Responsible User: Poly Kruse RD      Task: Provide education on frequency and refill details of medications    Responsible User: Poly Kruse RD      Goal: Problem Solving - know how to prevent and manage short-term diabetes complications       Task: Provide education on high blood glucose - causes, signs/symptoms, prevention and treatment    Responsible User: Poly Kruse RD      Task: Provide education on low blood glucose - causes, signs/symptoms, prevention, treatment, carrying a carbohydrate source at all times, and medical identification    Responsible User: Poly Kruse RD      Task: Provide education on safe travel with diabetes    Responsible User: Poly Kruse RD      Task: Provide education on how to care for diabetes on sick days    Responsible User: Poly Kruse RD      Task: Provide education on when to call a health care provider    Responsible User: Poly Kruse RD      Goal: Reducing Risks - know how to prevent and treat long-term diabetes complications       Task: Provide education on major complications of diabetes, prevention, early diagnostic measures and treatment of complications    Responsible User: Poly Kruse RD       Task: Provide education on recommended care for dental, eye and foot health    Responsible User: Poly Kruse RD      Task: Provide education on Hemoglobin A1c - goals and relationship to blood glucose levels Completed 7/6/2023   Responsible User: Poly Kruse RD      Task: Provide education on recommendations for heart health - lipid levels and goals, blood pressure and goals, and aspirin therapy, if indicated    Responsible User: Poly Kruse RD      Task: Provide education on tobacco cessation    Responsible User: Poly Kruse RD      Goal: Healthy Coping - use available resources to cope with the challenges of managing diabetes       Task: Discuss recognizing feelings about having diabetes    Responsible User: Poly Kruse RD      Task: Provide education on the benefits of making appropriate lifestyle changes    Responsible User: Poly Kruse RD      Task: Provide education on benefits of utilizing support systems    Responsible User: Poly Kruse RD      Task: Discuss methods for coping with stress    Responsible User: Poly Kruse RD      Task: Provide education on when to seek professional counseling    Responsible User: Poly Kruse RD Elizabeth Swartout RD, ALICIA, [facility]  Diabetes Education      Time Spent: 35 minutes  Encounter Type: Individual     A copy of this encounter was shared with the provider.

## 2023-07-06 NOTE — PROGRESS NOTES
Diabetes Self-Management Education & Support    Presents for: Individual review  Type of Service: Telephone Visit  Originating Location (Patient Location): Home  Distant Location (Provider Location): Offsite  Mode of Communication:  Telephone  Telephone Visit Start Time: 735  Telephone Visit End Time (telephone visit stop time): 810  How would patient like to obtain AVS? Email     ASSESSMENT:  New diagnosis of Diabetes last fall.  Reviewed medications in detail, blood sugars, pathophysiology and diet/activity/lifestylev interventions. Discussed aggressive glycemic management and early intervention for halting disease process.  Reviewed goals of reducing risks and health complications.   Noted that when A1c andreina from the 6s to 7s he hadn't adjusted diet much and was only very sporadically taking the metformin.  Is now taking 1 tab of metformin/day and the Jardiance consistently and monitoring food closer.  Has prescription for more metformin and will try 2 tabs/day (1000mg).  Reviewed benefits of monitoring blood sugars and could try a continuous glucose monitor; FreeStyle Jeannine.  Jam does not like needles and could get more benefit out of continuous monitoring.  Had talked himself into ozempic (and accepted the injection), however that was denied, but would be willing to try in the future should insurance cover it.         Patient's most recent    A1C 7.7 06/09/2023    A1C 6.1 02/24/2021     is not meeting goal of <7.0    Diabetes knowledge and skills assessment:   Patient is knowledgeable in diabetes management concepts related to: Healthy Eating, Being Active, Monitoring, Taking Medication and Problem Solving  Continue education with the following diabetes management concepts: Healthy Eating, Monitoring, Taking Medication, Problem Solving, Reducing Risks and Healthy Coping  Based on learning assessment above, most appropriate setting for further diabetes education would be: Individual setting.   "    PLAN  Continue with positive diet & lifestyle changes   Watch portions / balance macronutrients, increase fiber   FreeStyle Jeannine to pharmacy   Follow up scheduled in August   Re set up [employer] on phone, verbal consent given to email for now     Education Materials Provided:  [employer] Healthy Living with Diabetes Book      SUBJECTIVE/OBJECTIVE:  Presents for: Individual review  Accompanied by: Self  Diabetes education in the past 24mo: No  Focus of Visit: Monitoring, Reducing Risks, Taking Medication  Diabetes type: Type 2  Date of diagnosis: November of 2022  Disease course: Improving  Other concerns:: None  Cultural Influences/Ethnic Background:  Not  or     Diabetes Symptoms & Complications:          Patient Problem List and Family Medical History reviewed for relevant medical history, current medical status, and diabetes risk factors.    Vitals:  There were no vitals taken for this visit.  Estimated body mass index is 40.06 kg/m  as calculated from the following:    Height as of 6/8/23: 1.88 m (6' 2\").    Weight as of 6/9/23: 141.5 kg (312 lb).   Last 3 BP:   BP Readings from Last 3 Encounters:   06/09/23 136/84   06/08/23 (!) 155/87   05/26/23 (!) 156/91       History   Smoking Status     Never   Smokeless Tobacco     Never       Labs:  Lab Results   Component Value Date    A1C 7.7 06/09/2023    A1C 6.1 02/24/2021     Lab Results   Component Value Date     06/09/2023     11/16/2022     04/26/2021     Lab Results   Component Value Date    LDL 76 11/16/2022    LDL 82 02/24/2021     HDL Cholesterol   Date Value Ref Range Status   02/24/2021 30 (L) >39 mg/dL Final     Direct Measure HDL   Date Value Ref Range Status   11/16/2022 26 (L) >=40 mg/dL Final   ]  GFR Estimate   Date Value Ref Range Status   06/09/2023 81 >60 mL/min/1.73m2 Final     Comment:     eGFR calculated using 2021 CKD-EPI equation.   04/26/2021 65 >60 mL/min/[1.73_m2] Final     Comment:     Non "  GFR Calc  Starting 12/18/2018, serum creatinine based estimated GFR (eGFR) will be   calculated using the Chronic Kidney Disease Epidemiology Collaboration   (CKD-EPI) equation.       GFR Estimate If Black   Date Value Ref Range Status   04/26/2021 75 >60 mL/min/[1.73_m2] Final     Comment:      GFR Calc  Starting 12/18/2018, serum creatinine based estimated GFR (eGFR) will be   calculated using the Chronic Kidney Disease Epidemiology Collaboration   (CKD-EPI) equation.       Lab Results   Component Value Date    CR 1.09 06/09/2023    CR 1.27 04/26/2021     No results found for: MICROALBUMIN    Healthy Eating:  Healthy Eating Assessed Today: Yes  brisket and steak - watch portions   No candy / no desserts - not a sweets person   Talked about adding non starchy veggies   Rice - talked about how concentrated it is.  Reviewed brown rice & fiber.  Has tried brown rice now.   Talked about portion control and small changes     Being Active:  Being Active Assessed Today: Yes  Activity; back issues and wants to work out more once this pain lessens.     Monitoring:  Monitoring Assessed Today: Yes      Taking Medications:  Diabetes Medication(s)     Biguanides       metFORMIN (GLUCOPHAGE XR) 500 MG 24 hr tablet    Take 2 tablets (1,000 mg) by mouth 2 times daily (with meals)    Sodium-Glucose Co-Transporter 2 (SGLT2) Inhibitors       empagliflozin (JARDIANCE) 25 MG TABS tablet    Take 1 tablet (25 mg) by mouth daily          Taking Medication Assessed Today: Yes  Current Treatments: Diet, Oral Medication (taken by mouth)    Problem Solving:  Not assessed at this visit      Reducing Risks:  Reducing Risks Assessed Today: Yes  Diabetes Risks: Family History, Ethnicity, Age over 45 years  CAD Risks: Diabetes Mellitus    Healthy Coping:  Healthy Coping Assessed Today: Yes  Emotional response to diabetes: Ready to learn  Stage of change: ACTION (Actively working towards change)  Support resources:  Websites  Patient Activation Measure Survey Score:      4/4/2014    11:00 AM   VINNIE Score (Last Two)   VINNIE Raw Score 35   Activation Score 45.2   VINNIE Level 1         Care Plan and Education Provided:  Care Plan: Diabetes   Updates made by Poly Kruse RD since 7/6/2023 12:00 AM      Problem: HbA1C Not In Goal       Goal: Establish Regular Follow-Ups with PCP       Task: Discuss with PCP the recommended timing for patient's next follow up visit(s)    Responsible User: Poly Kruse RD      Task: Discuss schedule for PCP visits with patient Completed 7/6/2023   Responsible User: Poly Kruse RD      Goal: Get HbA1C Level in Goal       Task: Educate patient on diabetes education self-management topics Completed 7/6/2023   Responsible User: Poly Kruse RD      Task: Educate patient on benefits of regular glucose monitoring Completed 7/6/2023   Responsible User: Poly Kruse RD      Task: Refer patient to appropriate extended care team member, as needed (Medication Therapy Management, Behavioral Health, Physical Therapy, etc.)    Responsible User: Poly Kruse RD      Task: Discuss diabetes treatment plan with patient Completed 7/6/2023   Responsible User: Poly Kruse RD      Problem: Diabetes Self-Management Education Needed to Optimize Self-Care Behaviors       Goal: Understand diabetes pathophysiology and disease progression       Task: Provide education on diabetes pathophysiology and disease progression specfic to patient's diabetes type Completed 7/6/2023   Responsible User: Poly Kruse RD      Goal: Healthy Eating - follow a healthy eating pattern for diabetes       Task: Provide education on portion control and consistency in amount, composition and timing of food intake Completed 7/6/2023   Responsible User: Poly Kruse RD      Task: Provide education on managing carbohydrate intake (carbohydrate counting, plate planning method, etc.)  Completed 7/6/2023   Responsible User: Poly Kruse RD      Task: Provide education on weight management    Responsible User: Poly Kruse RD      Task: Provide education on heart healthy eating    Responsible User: Poly Kruse RD      Task: Provide education on eating out    Responsible User: Poly Kruse RD      Task: Develop individualized healthy eating plan with patient    Responsible User: Poly Kruse RD      Goal: Being Active - get regular physical activity, working up to at least 150 minutes per week       Task: Provide education on relationship of activity to glucose and precautions to take if at risk for low glucose    Responsible User: Poly Kruse RD      Task: Discuss barriers to physical activity with patient    Responsible User: Poly Kruse RD      Task: Develop physical activity plan with patient    Responsible User: Poly Kruse RD      Task: Explore community resources including walking groups, assistance programs, and home videos    Responsible User: Poly Kruse RD      Goal: Monitoring - monitor glucose and ketones as directed    Note:     and begin freestyle wai this month     Task: Provide education on blood glucose monitoring (purpose, proper technique, frequency, glucose targets, interpreting results, when to use glucose control solution, sharps disposal)    Responsible User: Poly Kruse RD      Task: Provide education on continuous glucose monitoring (sensor placement, use of marva or /reader, understanding glucose trends, alerts and alarms, differences between sensor glucose and blood glucose) Completed 7/6/2023   Responsible User: Poly Kruse RD      Task: Provide education on ketone monitoring (when to monitor, frequency, etc.)    Responsible User: Poly Kruse RD      Goal: Taking Medication - patient is consistently taking medications as directed       Task: Provide  education on action of prescribed medication, including when to take and possible side effects Completed 7/6/2023   Responsible User: Poly Kruse RD      Task: Provide education on insulin and injectable diabetes medications, including administration, storage, site selection and rotation for injection sites    Responsible User: Poly Kruse RD      Task: Discuss barriers to medication adherence with patient and provide management technique ideas as appropriate    Responsible User: Poly Kruse RD      Task: Provide education on frequency and refill details of medications    Responsible User: Poly Kruse RD      Goal: Problem Solving - know how to prevent and manage short-term diabetes complications       Task: Provide education on high blood glucose - causes, signs/symptoms, prevention and treatment    Responsible User: Poly Kruse RD      Task: Provide education on low blood glucose - causes, signs/symptoms, prevention, treatment, carrying a carbohydrate source at all times, and medical identification    Responsible User: Poly Kruse RD      Task: Provide education on safe travel with diabetes    Responsible User: Poly Kruse RD      Task: Provide education on how to care for diabetes on sick days    Responsible User: Poly Kruse RD      Task: Provide education on when to call a health care provider    Responsible User: Poly Kruse RD      Goal: Reducing Risks - know how to prevent and treat long-term diabetes complications       Task: Provide education on major complications of diabetes, prevention, early diagnostic measures and treatment of complications    Responsible User: Poly Kruse RD      Task: Provide education on recommended care for dental, eye and foot health    Responsible User: Poly Kruse RD      Task: Provide education on Hemoglobin A1c - goals and relationship to blood glucose levels Completed 7/6/2023    Responsible User: Poly Kruse RD      Task: Provide education on recommendations for heart health - lipid levels and goals, blood pressure and goals, and aspirin therapy, if indicated    Responsible User: Poly Kruse RD      Task: Provide education on tobacco cessation    Responsible User: Poly Kruse RD      Goal: Healthy Coping - use available resources to cope with the challenges of managing diabetes       Task: Discuss recognizing feelings about having diabetes    Responsible User: Poly Kruse RD      Task: Provide education on the benefits of making appropriate lifestyle changes    Responsible User: Poly Kruse RD      Task: Provide education on benefits of utilizing support systems    Responsible User: Poly Kruse RD      Task: Discuss methods for coping with stress    Responsible User: Poly Kruse RD      Task: Provide education on when to seek professional counseling    Responsible User: Poly Kruse RD Elizabeth Swartout RD, ALICIA, [facility]  Diabetes Education      Time Spent: 35 minutes  Encounter Type: Individual     A copy of this encounter was shared with the provider.

## 2023-07-31 NOTE — PROGRESS NOTES
DISCHARGE  Reason for Discharge: Canceled or did not keep 2/10 PT appts even with My Chart and phone messages for follow up. PT canceled 8-2-2023 appt as he did not keep 7- appt and did not respond to messages regarding progress and plan for PT.     Equipment Issued: none    Discharge Plan: Patient to continue home program.    Referring Provider:  Wilton Green MD     07/05/23 0500   Appointment Info   Signing clinician's name / credentials Naima Corea, PT LAT [facility]   Visits Used 2   Medical Diagnosis Acute bilateral low back pain without sciatica (M54.50)   PT Tx Diagnosis Mechanical low back pain   Precautions/Limitations none known   Other pertinent information Low back pain since 17 yo. Recent injury with lifting tire rim and then increasing symptoms a few days later. Went to ED x 2 for low back pain.   Progress Note/Certification   Onset of illness/injury or Date of Surgery 05/19/23  (Approximate date,  a few weeks before 6-8-2023 ED visit)   Therapy Frequency 1-2 times per week   Predicted Duration 6 weeks   Progress Note Due Date 08/09/23       Present No   GOALS   PT Goals 2   PT Goal 1   Goal Identifier Walk   Goal Description Walk x 1/2 mile with ability to calm symptoms so he can increase time and distance to 1-2 minutes for his health   Goal Progress walking more and plans to use treadmill too   Target Date 07/28/23   PT Goal 2   Goal Identifier Life style   Goal Description Eliminate or at least calm symptoms so Humberto can get back to his normal daily activities - 20% improvement on DIO, and low score for KIRSTIN   Goal Progress Uses pacing and back extension in standing or prone and uses lumbar positioning to calm symptoms   Target Date 07/28/23   Date Met 07/05/23   Subjective Report   Subjective Report Doing better. Trying to keep up with exercises. Fell asleep in chair. Got up and moving better.   Objective Measures   Objective Measures Objective Measure 1;Objective  Measure 2;Objective Measure 3   Objective Measure 1   Objective Measure Brenton   Details forgot at home   Objective Measure 2   Objective Measure DIO   Details forgot at home   Objective Measure 3   Objective Measure Effect of positioning   Details helps calm   Treatment Interventions (PT)   Interventions Therapeutic Procedure/Exercise;Neuromuscular Re-education   Therapeutic Procedure/Exercise   Therapeutic Procedures: strength, endurance, ROM, flexibillity minutes (21636) 15   Ther Proc 1 - Details walking x with education on pacing, increased symptoms with x 10 reps with decreased symptoms.   Skilled Intervention education, instruction, practice   Patient Response/Progress Able to verbally explain pacing.   Neuromuscular Re-education   Neuromuscular re-ed of mvmt, balance, coord, kinesthetic sense, posture, proprioception minutes (41312) 25   Neuro Re-ed 1 positioning   Neuro Re-ed 1 - Details prone with 2 pillows under chest x 3 minutes; pressups: 2 x 10 with PT overpressure, and stroking to calm after grade 1 PAs in the lower mid back area   Skilled Intervention education, instruction, demonstration, activity selection for extension, posture and first step toward meeting goal   Patient Response/Progress 0/10 ater prone and stroking   Education   Learner/Method Patient;Listening;Demonstration;Reading;Pictures/Video;No Barriers to Learning   Education Comments Pacing, pressups if he feels this is helpful   Plan   Home program Sitting with lumbar roll x 10-15 minutes in firm chair and then move (walk/stand x 10-15 seconds). Keep hollow in low back with sit->stand and when getting out of bed move into slight extension and then sit->stand. Extension preference: used finger cut analogy to explain avoidance of flexion until symptoms 0/10. Prone on elbows or adjust bed to support extension 1 x 2->15 minutes x every 2 hours OR extension in standing in sets of 10 reps. Walking: start walking 1-2 minutes and increase as  able using extension to calm symptoms as needed, pacing   Updates to plan of care 1-2 times per week x 6 weeks   Plan for next session Check DIO and Brenton, walking, prone lying->pressups and manual therapy for lower thoracic tightness with home self mobilization   Total Session Time   Timed Code Treatment Minutes 40   Total Treatment Time (sum of timed and untimed services) 40

## 2023-08-02 ENCOUNTER — TELEPHONE (OUTPATIENT)
Dept: FAMILY MEDICINE | Facility: CLINIC | Age: 53
End: 2023-08-02
Payer: COMMERCIAL

## 2023-08-02 NOTE — TELEPHONE ENCOUNTER
Reason for Call:  Appointment Request    Patient requesting this type of appt: Procedure: Cortisone Injection    Requested provider: Calos Hester    Reason patient unable to be scheduled: Not within requested timeframe    When does patient want to be seen/preferred time: 1-2 weeks    Comments: patient with worsening back pain, would like to discuss possible injections with PCP. Does not feel like he can wait until 8/22.     Could we send this information to you in [employer] or would you prefer to receive a phone call?:   Patient would prefer a phone call   Okay to leave a detailed message?: Yes at Home number on file [phone] (home)    Call taken on 8/2/2023 at 12:18 PM by Zyoa Avendano

## 2023-08-03 NOTE — TELEPHONE ENCOUNTER
I tried to call patient but it didn't ring and right away it said he doesn't have a voicemail set up.  [facility]  Please recall

## 2023-08-08 NOTE — TELEPHONE ENCOUNTER
We were unable to get a hold of patient how would you like to proceed. Your appointment option has already passed.     Jessica Ortiz MA

## 2023-08-08 NOTE — TELEPHONE ENCOUNTER
I guess if it still bothering him, he will call back and then deliver this message, I will try to see him next week.

## 2023-08-15 ENCOUNTER — VIRTUAL VISIT (OUTPATIENT)
Dept: EDUCATION SERVICES | Facility: CLINIC | Age: 53
End: 2023-08-15
Payer: COMMERCIAL

## 2023-08-15 DIAGNOSIS — Z53.9 NO SHOW: Primary | ICD-10-CM

## 2023-08-15 NOTE — LETTER
8/15/2023         RE: Humberto Roberts  [address] [address]        Dear Colleague,    Thank you for referring your patient, Humberto Roberts, to the [facility] DIABETES [facility] [place]. Please see a copy of my visit note below.    Patient no-showed today's appointment; appointment was for Diabetes Education. [facility] sent with scheduling information.     Poly Kruse RD, LD, CDE  Diabetes Education

## 2023-08-15 NOTE — PROGRESS NOTES
Patient no-showed today's appointment; [facility] was [facility]     Poly Kruse RD, LD, CDE  Diabetes Education

## 2023-08-29 ENCOUNTER — TELEPHONE (OUTPATIENT)
Dept: FAMILY MEDICINE | Facility: CLINIC | Age: 53
End: 2023-08-29
Payer: COMMERCIAL

## 2023-08-29 DIAGNOSIS — M54.50 CHRONIC BILATERAL LOW BACK PAIN WITHOUT SCIATICA: Primary | ICD-10-CM

## 2023-08-29 DIAGNOSIS — G89.29 CHRONIC BILATERAL LOW BACK PAIN WITHOUT SCIATICA: Primary | ICD-10-CM

## 2023-08-29 NOTE — TELEPHONE ENCOUNTER
Order/Referral Request    Who is requesting: patient    Orders being requested: mri     Reason service is needed/diagnosis: Back pain    When are orders needed by: asap    Has this been discussed with Provider: Yes    Does patient have a preference on a Group/Provider/Facility? Patient wants any [facility] that has the sit down mri    Does patient have an appointment scheduled?: No    Where to send orders: Place orders within Epic    Could we send this information to you in [facility] or would you prefer to receive a phone call?:   Patient would prefer a phone call   Okay to leave a detailed message?: Yes at Cell number on file:    Telephone Information:   Mobile [phone]

## 2023-08-29 NOTE — TELEPHONE ENCOUNTER
Patient is needing the ORDER placed for the Sit Down MRI of his back.    Order pended.     Patient states this was discussed at his last visit.    Patient will check with insurance regarding location if not available within [facility]    Patient will also need a referral for a surgeon:  they have researched this provider     [facility]  Dr Santana Soriano    Referral pended.      Keisha Olivares XRO/

## 2023-08-31 NOTE — TELEPHONE ENCOUNTER
Patient returning call. Informed of order being placed for MRI, with note requesting sit down MRI. Patient did not know of the location, stating Dr. Hester had mentioned the sit down at his appointment. Please advise patient on the location of an available sit down MRI. [facility], that is not the location that Dr. Hester mentioned. Please follow up with provider of location that was mentioned and note referral was placed for Neurosurgeon, Tom. Please call patient back with the location of a sit down MRI. Laxmi Lugo LPN

## 2023-08-31 NOTE — TELEPHONE ENCOUNTER
I tried calling patient but it rings once and goes right to saying he doesn't have voicemail.  Please try calling again.

## 2023-09-07 ENCOUNTER — HOSPITAL ENCOUNTER (OUTPATIENT)
Dept: MRI IMAGING | Facility: CLINIC | Age: 53
Discharge: HOME | End: 2023-09-07
Attending: FAMILY MEDICINE | Admitting: FAMILY MEDICINE
Payer: COMMERCIAL

## 2023-09-07 DIAGNOSIS — G89.29 CHRONIC BILATERAL LOW BACK PAIN WITHOUT SCIATICA: ICD-10-CM

## 2023-09-07 DIAGNOSIS — M54.50 CHRONIC BILATERAL LOW BACK PAIN WITHOUT SCIATICA: ICD-10-CM

## 2023-09-07 PROCEDURE — 72148 MRI LUMBAR SPINE W/O DYE: CPT

## 2023-09-10 DIAGNOSIS — I10 ESSENTIAL HYPERTENSION: ICD-10-CM

## 2023-09-10 DIAGNOSIS — E11.65 TYPE 2 DIABETES MELLITUS WITH HYPERGLYCEMIA, WITHOUT LONG-TERM CURRENT USE OF INSULIN (H): ICD-10-CM

## 2023-09-11 ENCOUNTER — TELEPHONE (OUTPATIENT)
Dept: FAMILY MEDICINE | Facility: CLINIC | Age: 53
End: 2023-09-11
Payer: COMMERCIAL

## 2023-09-11 DIAGNOSIS — M54.50 CHRONIC BILATERAL LOW BACK PAIN WITHOUT SCIATICA: Primary | ICD-10-CM

## 2023-09-11 DIAGNOSIS — G89.29 CHRONIC BILATERAL LOW BACK PAIN WITHOUT SCIATICA: Primary | ICD-10-CM

## 2023-09-11 NOTE — TELEPHONE ENCOUNTER
S: Test Results    B: Patient is calling in regarding MRI done last week.  RN did review below results note from provider.     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  If the pain continues bothering you, I recommend to follow-up to discuss about the treatment options.  Vui.    Patient is asking for appointment to be seen for follow up as he is still having pain.  He is also asking for something different for pain.  Gabapentin only makes his drowsy and doesn't really help the pain.  Prednisone does help.    A: Advised patient that message will be sent to provider with request for pain meds and asking how soon he would like to see patient for follow up.     R: patient verbalized understanding and will await a call back.

## 2023-09-12 DIAGNOSIS — M54.40 CHRONIC BILATERAL LOW BACK PAIN WITH SCIATICA, SCIATICA LATERALITY UNSPECIFIED: ICD-10-CM

## 2023-09-12 DIAGNOSIS — G89.29 CHRONIC BILATERAL LOW BACK PAIN WITH SCIATICA, SCIATICA LATERALITY UNSPECIFIED: ICD-10-CM

## 2023-09-12 RX ORDER — HYDROCHLOROTHIAZIDE 25 MG/1
TABLET ORAL
Qty: 90 TABLET | Refills: 2 | Status: SHIPPED | OUTPATIENT
Start: 2023-09-12 | End: 2024-07-26

## 2023-09-12 RX ORDER — EMPAGLIFLOZIN 25 MG/1
25 TABLET, FILM COATED ORAL DAILY
Qty: 90 TABLET | Refills: 0 | Status: SHIPPED | OUTPATIENT
Start: 2023-09-12 | End: 2024-01-05

## 2023-09-13 RX ORDER — METHYLPREDNISOLONE 4 MG
TABLET, DOSE PACK ORAL
Qty: 21 TABLET | Refills: 0 | Status: SHIPPED | OUTPATIENT
Start: 2023-09-13 | End: 2023-10-07

## 2023-09-15 NOTE — TELEPHONE ENCOUNTER
Sign order and close encounter.  He is aware that surgery will call him to schedule this sometime next week.

## 2023-09-23 NOTE — LETTER
February 17, 2021      Humberto Roberts  [address] [place] [id number]        Dear Humberto,     You are due to be seen to recheck your meds, please call and make an apt.     Sincerely,        Dr. Hester           
Stable

## 2023-09-28 ENCOUNTER — TELEPHONE (OUTPATIENT)
Dept: PALLIATIVE MEDICINE | Facility: CLINIC | Age: 53
End: 2023-09-28
Payer: COMMERCIAL

## 2023-10-05 DIAGNOSIS — G89.29 CHRONIC BILATERAL LOW BACK PAIN WITH SCIATICA, SCIATICA LATERALITY UNSPECIFIED: ICD-10-CM

## 2023-10-05 DIAGNOSIS — M54.40 CHRONIC BILATERAL LOW BACK PAIN WITH SCIATICA, SCIATICA LATERALITY UNSPECIFIED: ICD-10-CM

## 2023-10-07 RX ORDER — METHYLPREDNISOLONE 4 MG
TABLET, DOSE PACK ORAL
Qty: 21 TABLET | Refills: 0 | Status: SHIPPED | OUTPATIENT
Start: 2023-10-07 | End: 2024-03-19

## 2023-10-10 ENCOUNTER — TELEPHONE (OUTPATIENT)
Dept: FAMILY MEDICINE | Facility: CLINIC | Age: 53
End: 2023-10-10
Payer: COMMERCIAL

## 2023-10-10 NOTE — TELEPHONE ENCOUNTER
Reason for Call:  Appointment Request    Patient requesting this type of appt: Pre-op    Requested provider: Calos Hester    Reason patient unable to be scheduled: Not within requested timeframe    When does patient want to be seen/preferred time:  Before surgery on 10/20    Comments: Needs pre op nothing available before surgery date    Could we send this information to you in [employer] or would you prefer to receive a phone call?:   Patient would prefer a phone call   Okay to leave a detailed message?: Yes at Cell number on file:    Telephone Information:   Mobile [phone]       Call taken on 10/10/2023 at 11:35 AM by Humberto Wallace

## 2023-10-11 ENCOUNTER — OFFICE VISIT (OUTPATIENT)
Dept: FAMILY MEDICINE | Facility: CLINIC | Age: 53
End: 2023-10-11
Payer: COMMERCIAL

## 2023-10-11 VITALS
OXYGEN SATURATION: 99 % | RESPIRATION RATE: 15 BRPM | SYSTOLIC BLOOD PRESSURE: 128 MMHG | HEIGHT: 73 IN | HEART RATE: 56 BPM | DIASTOLIC BLOOD PRESSURE: 86 MMHG | TEMPERATURE: 97.5 F | BODY MASS INDEX: 41.75 KG/M2 | WEIGHT: 315 LBS

## 2023-10-11 DIAGNOSIS — I10 HYPERTENSION GOAL BP (BLOOD PRESSURE) < 140/90: ICD-10-CM

## 2023-10-11 DIAGNOSIS — E11.65 TYPE 2 DIABETES MELLITUS WITH HYPERGLYCEMIA, WITHOUT LONG-TERM CURRENT USE OF INSULIN (H): ICD-10-CM

## 2023-10-11 DIAGNOSIS — Z78.9 HEPATITIS B VACCINATION STATUS UNKNOWN: ICD-10-CM

## 2023-10-11 DIAGNOSIS — M54.50 CHRONIC BILATERAL LOW BACK PAIN WITHOUT SCIATICA: ICD-10-CM

## 2023-10-11 DIAGNOSIS — G89.29 CHRONIC BILATERAL LOW BACK PAIN WITHOUT SCIATICA: ICD-10-CM

## 2023-10-11 DIAGNOSIS — G47.33 OSA (OBSTRUCTIVE SLEEP APNEA): ICD-10-CM

## 2023-10-11 DIAGNOSIS — Z01.818 PREOP GENERAL PHYSICAL EXAM: Primary | ICD-10-CM

## 2023-10-11 DIAGNOSIS — E66.01 MORBID OBESITY (H): ICD-10-CM

## 2023-10-11 DIAGNOSIS — F51.04 PSYCHOPHYSIOLOGICAL INSOMNIA: ICD-10-CM

## 2023-10-11 LAB
ANION GAP SERPL CALCULATED.3IONS-SCNC: 9 MMOL/L (ref 7–15)
BUN SERPL-MCNC: 13.5 MG/DL (ref 6–20)
CALCIUM SERPL-MCNC: 9.3 MG/DL (ref 8.6–10)
CHLORIDE SERPL-SCNC: 102 MMOL/L (ref 98–107)
CREAT SERPL-MCNC: 1.13 MG/DL (ref 0.67–1.17)
DEPRECATED HCO3 PLAS-SCNC: 29 MMOL/L (ref 22–29)
EGFRCR SERPLBLD CKD-EPI 2021: 78 ML/MIN/1.73M2
GLUCOSE SERPL-MCNC: 84 MG/DL (ref 70–99)
HBA1C MFR BLD: 6.4 %
HBV SURFACE AB SERPL IA-ACNC: 0 M[IU]/ML
HBV SURFACE AB SERPL IA-ACNC: NONREACTIVE M[IU]/ML
POTASSIUM SERPL-SCNC: 4 MMOL/L (ref 3.4–5.3)
SODIUM SERPL-SCNC: 140 MMOL/L (ref 135–145)

## 2023-10-11 PROCEDURE — 83036 HEMOGLOBIN GLYCOSYLATED A1C: CPT | Performed by: FAMILY MEDICINE

## 2023-10-11 PROCEDURE — 80048 BASIC METABOLIC PNL TOTAL CA: CPT | Performed by: FAMILY MEDICINE

## 2023-10-11 PROCEDURE — 99214 OFFICE O/P EST MOD 30 MIN: CPT | Performed by: FAMILY MEDICINE

## 2023-10-11 PROCEDURE — 86706 HEP B SURFACE ANTIBODY: CPT | Performed by: FAMILY MEDICINE

## 2023-10-11 PROCEDURE — 36415 COLL VENOUS BLD VENIPUNCTURE: CPT | Performed by: FAMILY MEDICINE

## 2023-10-11 RX ORDER — TIZANIDINE HYDROCHLORIDE 4 MG/1
4 CAPSULE, GELATIN COATED ORAL 2 TIMES DAILY PRN
Qty: 60 CAPSULE | Refills: 3 | Status: SHIPPED | OUTPATIENT
Start: 2023-10-11 | End: 2024-03-19

## 2023-10-11 RX ORDER — OXYCODONE AND ACETAMINOPHEN 5; 325 MG/1; MG/1
1 TABLET ORAL 2 TIMES DAILY PRN
Qty: 20 TABLET | Refills: 0 | Status: SHIPPED | OUTPATIENT
Start: 2023-10-11 | End: 2023-10-26

## 2023-10-11 RX ORDER — TRAZODONE HYDROCHLORIDE 150 MG/1
150 TABLET ORAL AT BEDTIME
Qty: 90 TABLET | Refills: 1 | Status: SHIPPED | OUTPATIENT
Start: 2023-10-11 | End: 2024-03-19

## 2023-10-11 NOTE — COMMUNITY RESOURCES LIST (ENGLISH)
10/11/2023   [facility]  N/A  For questions about this resource list or additional care needs, please contact your primary care clinic or care manager.  Phone: [phone]   Email: N/A   Address: [address] [place] [id number]   Hours: N/A        Food and Nutrition       Food pantry  1  [facility] Distance: 1.62 miles      Pickup   [address] [place] [address]  Language: English  Hours: Mon 1:00 PM - 3:00 PM , Wed 1:00 PM - 3:00 PM , Fri 9:00 AM - 11:00 AM  Fees: Free   Phone: (585) 439-6016 Email: info@[employer] Website: http://sites.reQwip.[employer]/REscour/home?authuser=0     2  [place] [facility] Distance: 13.79 miles      Pickup   [address] [place] [address]  Language: English  Hours: Thu 12:00 PM - 4:00 PM  Fees: Free   Phone: (730) 822-9168 Email: [email or web address].[email or web address] Website: [email or web address]://[email or web address]/Cu[employer]oRiskonnect/          Housing       Housing search assistance  3  [facility] Distance: 23.06 miles      In-Person   [address] [place] [address]  Language: English  Hours: Mon - Fri 8:00 AM - 4:00 PM  Fees: Free   Phone: (717) 282-5140 Email: Pilo@[employer].org Website: https://[employer].org/          Important Numbers & Websites       Emergency Services   911  City Services   311  Poison Control   (643) 873-2591  Suicide Prevention Lifeline   (522) 428-3363 (TALK)  Child Abuse Hotline   (746) 328-9526 (4-A-Child)  Sexual Assault Hotline   (850) 282-5408 (HOPE)  National Runaway Safeline   (671) 666-6316 (RUNAWAY)  All-Options Talkline   (222) 760-5713  Substance Abuse Referral   (581) 838-8341 (HELP)

## 2023-10-11 NOTE — PROGRESS NOTES
[address] [address]  Phone: [phone]  Fax: [phone]  Primary Provider: Shiloh Hester  Pre-op Performing Provider: SHILOH HESTER      PREOPERATIVE EVALUATION:  Today's date: 10/11/2023    Humberto is a 53 year old male who presents for a preoperative evaluation.      10/11/2023     9:20 AM   Additional Questions   Roomed by Malia   Accompanied by self       Surgical Information:  Surgery/Procedure: Back   Surgery Location: [facility]  Surgeon: Dr. Aranda  Surgery Date: 10/20/23  Time of Surgery:   Where patient plans to recover: At home with family  Fax number for surgical facility: Note does not need to be faxed, will be available electronically in Epic.    Assessment & Plan     The proposed surgical procedure is considered LOW risk.      ICD-10-CM    1. Preop general physical exam  Z01.818       2. Chronic bilateral low back pain without sciatica  M54.50 tiZANidine (ZANAFLEX) 4 MG capsule    G89.29 oxyCODONE-acetaminophen (PERCOCET) 5-325 MG tablet      3. Hypertension goal BP (blood pressure) < 140/90  I10 Basic metabolic panel  (Ca, Cl, CO2, Creat, Gluc, K, Na, BUN)     Basic metabolic panel  (Ca, Cl, CO2, Creat, Gluc, K, Na, BUN)      4. Type 2 diabetes mellitus with hyperglycemia, without long-term current use of insulin (H)  E11.65 HEMOGLOBIN A1C     HEMOGLOBIN A1C      5. RICARDO (obstructive sleep apnea) - on cpap  G47.33       6. Morbid obesity (H)  E66.01       7. Psychophysiological insomnia  F51.04 traZODone (DESYREL) 150 MG tablet      8. Hepatitis B vaccination status unknown  Z78.9 Hepatitis B Surface Antibody     Hepatitis B Surface Antibody         His back pain has been bad and affecting his daily activity.  Over-the-counter medication has not helped.  He was given a small amount of Percocet to be taken as needed for severe pain until the procedure.  Potential side effect discussed and he was instructed not to operate any type of machine  while taking the Percocet.  He was informed that is not intended for long-term treatment.    -Multiple comorbidities which include diabetes, obesity, hyperlipidemia and sleep apnea.  The goal for his blood pressure to be less than 140/90.  His blood pressure has been stable and controlled with hydrochlorothiazide and atenolol.  Tolerating the medications well.  No change in medication today.  Emphasized on importance of control his modifiable risks and encouraged exercising, healthy diet and weight management.  He does not smoke and denied of excessive alcohol intake or drug use.  Follow-up in 6 months, earlier as needed.    -Last hemoglobin A1c about 4 months ago was 7.7.  Been tolerating the metformin and Jardiance well, no side effect.  He also has morbid obesity, sleep apnea, hyperlipidemia and high blood pressure.  Unfortunately, his insurance did not cover for Ozempic.  His hemoglobin A1c today was 6.4 and the goal for his hemoglobin A1c less than 7.0.  No change in medication today.  Again, emphasized on healthy lifestyle modification as discussed above.  Up-to-date for eye exam.  Encouraged him to check his feet daily/strictly for unintended open wound or infection.  Follow-up in 6 months.    -Sleep apnea with morbid obesity.  BMI today will 43.  He is also has diabetes and high blood pressure as mentioned above.  Not able to exercise due to chronic back pain.  Saw weight management last year but did not feel it was helpful and therefore stopped following up.  His health insurance not pay for Ozempic.  Will continue with the metformin and Jardiance.  Recommend to follow-up to discuss about pharmacological options.  In the meantime, encouraged to increase physical activities and exercise daily as tolerated.  Healthy diet also discussed and encouraged.  No history of thyroid problem.  Continue with the CPAP daily as prescribed.  He was informed that weight loss will have great benefit to his health overall  especially with diabetes, high blood pressure, sleep apnea and back pain.    -Also has trouble with falling and maintaining sleep.  Never been on medication for it before.  We will have him try the trazodone.  Potential side effect discussed.  Also discussed about sleeping hygiene.  Follow-up as needed    Risks and Recommendations:  Cardiovascular:  No history of CAD, CVA or PAD; HTN and DM controlled.  Low risk procedure.  No no further cardiovascular work-up is needed for this procedure.    Diabetes:  Diabetes is controlled with metformin and Jardiance, hemoglobin A1c today was 6.4.  Please see above for further details.  -Hold metformin for 24 hours and the Jardiance for 3 days for procedure; resume them after procedure    Pulmonary:   No pulmonary risk identified.  Never smoked.  No history of asthma or COPD.   - Oxygen as needed to maintain O2 sat above 94% during and after procedure.    Obstructive Sleep Apnea:   Known to have sleep apnea, tolerating the CPAP well.  Encouraged to wear CPAP daily as prescribed.  - Oxygen as needed to maintain O2 sat above 94% during and after procedure.    Anemia/Bleeding/Clotting:   No history of bleeding disorder.  No history of PE/DVT.  PE/DVT prophylaxis per surgeon's recommendation/desire.   No history of anemia or blood transfusion and is okay to be transfused if necessary.     Social and Substance:   No social or substance concerns identified.  No concern of mental health.  Not on chronic pain meds.  No drugs or alcohol.  Never smoking.  Lives with his wife.    Infection:   No history of MRSA  Prophylactic antibiotics per surgeon's recommendation/desire.  Shower with provided antimicrobial shampoo the day before and a.m. the procedure       Antiplatelet or Anticoagulation Medication Instructions:  Additional Medication Instructions:  Please take your prescribed medications as prescribed except.  No Aspirin or NSAID (Ibuprofen, Aleve, Motrin, Naproxen, ect.) for 7 days  before the procedure  Ok to take Tylenol as needed for pain.  Hold the HTCZ on am of the procedure; resume it after the procedure   Hold Metfomin for 24 hr and Jardiance for 3 days before the procedure; resume after the procedure  Take shower with provided shampoo the day before and am of procedure  No alcohol or smoking for 24 before the procedure  Nothing to eat for 8 hrs, but may drink clear fluid up to 2 hrs before the procedure.      RECOMMENDATION:  APPROVAL GIVEN to proceed with proposed procedure, without further diagnostic evaluation.      Subjective       HPI related to upcoming procedure: Epidural injection        10/11/2023     8:57 AM   Preop Questions   1. Have you ever had a heart attack or stroke? No   2. Have you ever had surgery on your heart or blood vessels, such as a stent placement, a coronary artery bypass, or surgery on an artery in your head, neck, heart, or legs? No   3. Do you have chest pain with activity? YES -due to deconditioning, chronic and overall stable   4. Do you have a history of  heart failure? No   5. Do you currently have a cold, bronchitis or symptoms of other infection? No   6. Do you have a cough, shortness of breath, or wheezing? No   7. Do you or anyone in your family have previous history of blood clots? No   8. Do you or does anyone in your family have a serious bleeding problem such as prolonged bleeding following surgeries or cuts? No   9. Have you ever had problems with anemia or been told to take iron pills? No   10. Have you had any abnormal blood loss such as black, tarry or bloody stools? No   11. Have you ever had a blood transfusion? No   12. Are you willing to have a blood transfusion if it is medically needed before, during, or after your surgery? Yes   13. Have you or any of your relatives ever had problems with anesthesia? No   14. Do you have sleep apnea, excessive snoring or daytime drowsiness? YES -has sleep apnea on CPAP   14a. Do you have a CPAP  machine? No   15. Do you have any artifical heart valves or other implanted medical devices like a pacemaker, defibrillator, or continuous glucose monitor? No   16. Do you have artificial joints? No   17. Are you allergic to latex? No       Health Care Directive:  Patient does not have a Health Care Directive or Living Will: Discussed advance care planning with patient; however, patient declined at this time.    Preoperative Review of :   reviewed - controlled substances reflected in medication list.      Humberto is here today for pre-op physical. He is scheduled for epidural injection for chronic low back pain due to DJD of the spine in about a week.  It is expected to be the same day procedure with MAC anesthesia. No personal or family history of anesthesia complication or pre-matured CAD or MI.  Been on and off the Medrol dosepack in the last 6 months for the back pain.  Not taking Aspirin or other form of blood thinner.  Last dose of NSAID was couple days ago.      Humberto has to have high blood pressure, diabetes, obesity with sleep apnea.  Been using the CPAP regularly as prescribed, tolerating well.  Weight overall has been stable.  Not able to exercise due to chronic back pain.  Been trying to eat healthy diet.  Saw weight management last year but did not follow up as he did not feel it was helping.  Insulin did not pay for Ozempic.  He takes metformin and Jardiance for diabetes, atenolol and hydrochlorothiazide for high blood pressure; tolerating the medication well.  Not checking his blood sugar or blood pressure.  No symptoms of hypoglycemia.  Not able to exercise due to back pain.  No acute change in vision.  Up-to-date with eye exam.  Been having trouble preventing and following sleep, over-the-counter medication has not been effective.     He generally is doing well today. No headache or dizziness. No runny nose, nasal congestion, ST, coughing, fever or chill.  No chest pain or SOB.  No N/V/D/C or  problem with urination.  Never smoked and denied of having breathing problem.  No history of asthma or COPD.     Review of Systems  Constitutional, neuro, ENT, endocrine, pulmonary, cardiac, gastrointestinal, genitourinary, musculoskeletal, integument and psychiatric systems are negative, except as otherwise noted.    Patient Active Problem List    Diagnosis Date Noted    Type 2 diabetes mellitus with hyperglycemia, without long-term current use of insulin (H) 06/12/2023     Priority: Medium    Acanthosis nigricans 02/25/2021     Priority: Medium    Skin tag 02/25/2021     Priority: Medium    Vitamin D deficiency 12/08/2019     Priority: Medium    Hyperuricemia 12/08/2019     Priority: Medium    RICARDO (obstructive sleep apnea) - on cpap 09/22/2019     Priority: Medium    Chronic bilateral low back pain without sciatica 09/22/2019     Priority: Medium    Seasonal allergic rhinitis due to pollen, unspecified chronicity 01/23/2018     Priority: Medium    Morbid obesity (H) 12/06/2017     Priority: Medium    Insomnia 10/15/2015     Priority: Medium    Hypertension goal BP (blood pressure) < 140/90 05/06/2013     Priority: Medium    GERD (gastroesophageal reflux disease) 04/24/2013     Priority: Medium      Past Medical History:   Diagnosis Date    Acid reflux disease since 2006    Back pain chronic    Cellulitis of left upper arm and forearm 11/22/2013    Elevated serum creatinine 10/15/2015    Hypertension     Pre-diabetes 2/25/2021     Past Surgical History:   Procedure Laterality Date    COLONOSCOPY N/A 3/25/2021    Procedure: Colonoscopy Incomplete;  Surgeon: Humberto Antoine DO;  Location: [facility]    COLONOSCOPY N/A 7/15/2021    Procedure: COLONOSCOPY;  Surgeon: Humberto Antoine DO;  Location: [facility]    ENT SURGERY      ESOPHAGOSCOPY, GASTROSCOPY, DUODENOSCOPY (EGD), COMBINED N/A 3/25/2021    Procedure: Esophagogastroduodenoscopy with biopsy;  Surgeon: Humberto Antoine DO;  Location: [facility]      Current Outpatient Medications   Medication Sig Dispense Refill    albuterol (PROAIR HFA/PROVENTIL HFA/VENTOLIN HFA) 108 (90 Base) MCG/ACT inhaler Inhale 2 puffs into the lungs every 4 hours as needed for shortness of breath or wheezing 18 g 0    allopurinol (ZYLOPRIM) 100 MG tablet TAKE 1 TABLET (100 MG) BY MOUTH DAILY 90 tablet 0    ASPIRIN LOW DOSE 81 MG EC tablet TAKE ONE TABLET BY MOUTH ONCE DAILY 90 tablet 2    atenolol (TENORMIN) 50 MG tablet TAKE ONE TABLET BY MOUTH ONCE DAILY 90 tablet 1    atorvastatin (LIPITOR) 10 MG tablet Take 1 tablet (10 mg) by mouth daily 90 tablet 1    cetirizine (ZYRTEC) 10 MG tablet TAKE ONE TABLET BY MOUTH ONCE DAILY AS NEEDED FOR ALLERGIES 30 tablet 5    clotrimazole-betamethasone (LOTRISONE) 1-0.05 % external cream Apply topically 2 times daily 45 g 1    Continuous Blood Gluc  (FREESTYLE FANNIE 2 READER) GORDO Use to read blood sugars as per 's instructions. 1 each 0    Continuous Blood Gluc Sensor (FREESTYLE FANNIE 2 SENSOR) MISC Change every 14 days. 2 each 11    empagliflozin (JARDIANCE) 25 MG TABS tablet TAKE 1 TABLET (25 MG) BY MOUTH DAILY 90 tablet 0    famotidine (PEPCID) 40 MG tablet Take 1 tablet (40 mg) by mouth nightly as needed for heartburn (acid reflux) 90 tablet 1    fluticasone (FLONASE) 50 MCG/ACT nasal spray SPRAY 1-2 SPRAYS IN EACH NOSTRIL EVERY DAY 16 g 5    gabapentin (NEURONTIN) 300 MG capsule Take 1 capsule (300 mg) by mouth 3 times daily as needed for neuropathic pain 42 capsule 0    hydrochlorothiazide (HYDRODIURIL) 25 MG tablet TAKE ONE TABLET BY MOUTH ONCE DAILY 90 tablet 2    metFORMIN (GLUCOPHAGE XR) 500 MG 24 hr tablet Take 2 tablets (1,000 mg) by mouth 2 times daily (with meals) 120 tablet 3    methylPREDNISolone (MEDROL DOSEPAK) 4 MG tablet therapy pack FOLLOW PACKAGE DIRECTIONS 21 tablet 0    order for DME Equipment being ordered: shower chair 1 Device 0    order for DME 1 Device Auto CPAP @@ 7-15 cm with heated humidity via  "mask of choice      pantoprazole (PROTONIX) 40 MG EC tablet TAKE 1 TABLET (40 MG) BY MOUTH DAILY STOP OMEPRAZOLE 90 tablet 2    sildenafil (REVATIO) 20 MG tablet Take 1-2 tablets (20-40 mg) by mouth daily as needed (sexual activity) 30 tablet 4    tiZANidine (ZANAFLEX) 4 MG capsule Take 1 tablet in AM and 1 tablet at bedtime 60 capsule 0    traZODone (DESYREL) 100 MG tablet TAKE ONE TABLET BY MOUTH EVERY NIGHT AT BEDTIME 30 tablet 6       No Known Allergies     Social History     Tobacco Use    Smoking status: Never    Smokeless tobacco: Never   Substance Use Topics    Alcohol use: No     Alcohol/week: 0.0 standard drinks of alcohol     Family History   Problem Relation Age of Onset    Diabetes Mother     Hypertension Mother     Heart Disease Mother     Heart Disease Father     Diabetes Father     Unknown/Adopted Maternal Grandmother     Unknown/Adopted Maternal Grandfather     Unknown/Adopted Paternal Grandmother     Unknown/Adopted Paternal Grandfather     No Known Problems Sister     No Known Problems Son     No Known Problems Daughter     No Known Problems Sister     No Known Problems Son     No Known Problems Daughter      History   Drug Use No         Objective     /86   Pulse 56   Temp 97.5  F (36.4  C)   Resp 15   Ht 1.85 m (6' 0.84\")   Wt 145.2 kg (320 lb 3.2 oz)   SpO2 99%   BMI 42.44 kg/m      Physical Exam  GENERAL: healthy, alert and no distress, speaking full sentences  EYES: Eyes grossly normal to inspection, PERRL and conjunctivae and sclerae normal.  No nystagmus.  All 4 visual fields intact.  HENT: ear canals and TM's normal; ear tube in the left ear noted -appropriate location.  Nares are non-congested. Oropharynx is pink and moist. No tender with palpation to the sinuses.  NECK: Supple, no lymphadenopathy or thyromegaly.  RESP: lungs clear to auscultation - no rales, rhonchi or wheezes  CV: regular rate and rhythm, no murmur, no peripheral edema with strong pedal pulses " bilaterally  ABDOMEN: soft, nontender, nondistended, no palpable masses or organomegaly with normal bowel sounds  MS: no gross musculoskeletal defects noted.  No focal weakness.  Tender with palpation to the lower spine  NEURO: Normal strength and tone, mentation intact and speech normal.  No focal neurological deficit  Diabetic foot exam: Monofilament was normal.  No calluses.  Skin dry and clean, no open wound.  Toenails normal.       Recent Labs   Lab Test 06/09/23  0950 11/16/22  1603    139   POTASSIUM 4.3 3.6   CR 1.09 1.03   A1C 7.7* 6.6*        Diagnostics:  Recent Results (from the past 168 hour(s))   HEMOGLOBIN A1C    Collection Time: 10/11/23 10:01 AM   Result Value Ref Range    Hemoglobin A1C 6.4 (H) <5.7 %   Basic metabolic panel  (Ca, Cl, CO2, Creat, Gluc, K, Na, BUN)    Collection Time: 10/11/23 10:01 AM   Result Value Ref Range    Sodium 140 135 - 145 mmol/L    Potassium 4.0 3.4 - 5.3 mmol/L    Chloride 102 98 - 107 mmol/L    Carbon Dioxide (CO2) 29 22 - 29 mmol/L    Anion Gap 9 7 - 15 mmol/L    Urea Nitrogen 13.5 6.0 - 20.0 mg/dL    Creatinine 1.13 0.67 - 1.17 mg/dL    GFR Estimate 78 >60 mL/min/1.73m2    Calcium 9.3 8.6 - 10.0 mg/dL    Glucose 84 70 - 99 mg/dL   Hepatitis B Surface Antibody    Collection Time: 10/11/23 10:01 AM   Result Value Ref Range    Hepatitis B Surface Antibody Instrument Value 0.00 <8.00 m[IU]/mL    Hepatitis B Surface Antibody Nonreactive       No EKG required for low risk surgery (cataract, skin procedure, breast biopsy, etc).    Revised Cardiac Risk Index (RCRI):  The patient has the following serious cardiovascular risks for perioperative complications:   - No serious cardiac risks = 0 points     RCRI Interpretation: 0 points: Class I (very low risk - 0.4% complication rate)         Signed Electronically by: Calos Ty Mai, MD  Copy of this evaluation report is provided to requesting physician.

## 2023-10-11 NOTE — H&P (VIEW-ONLY)
[address] [address]  Phone: [phone]  Fax: [phone]  Primary Provider: Shiloh Hester  Pre-op Performing Provider: SHILOH HESTER      PREOPERATIVE EVALUATION:  Today's date: 10/11/2023    Humberto is a 53 year old male who presents for a preoperative evaluation.      10/11/2023     9:20 AM   Additional Questions   Roomed by Malia   Accompanied by self       Surgical Information:  Surgery/Procedure: Back   Surgery Location: [facility]  Surgeon: Dr. Aranda  Surgery Date: 10/20/23  Time of Surgery:   Where patient plans to recover: At home with family  Fax number for surgical facility: Note does not need to be faxed, will be available electronically in Epic.    Assessment & Plan     The proposed surgical procedure is considered LOW risk.      ICD-10-CM    1. Preop general physical exam  Z01.818       2. Chronic bilateral low back pain without sciatica  M54.50 tiZANidine (ZANAFLEX) 4 MG capsule    G89.29 oxyCODONE-acetaminophen (PERCOCET) 5-325 MG tablet      3. Hypertension goal BP (blood pressure) < 140/90  I10 Basic metabolic panel  (Ca, Cl, CO2, Creat, Gluc, K, Na, BUN)     Basic metabolic panel  (Ca, Cl, CO2, Creat, Gluc, K, Na, BUN)      4. Type 2 diabetes mellitus with hyperglycemia, without long-term current use of insulin (H)  E11.65 HEMOGLOBIN A1C     HEMOGLOBIN A1C      5. RICARDO (obstructive sleep apnea) - on cpap  G47.33       6. Morbid obesity (H)  E66.01       7. Psychophysiological insomnia  F51.04 traZODone (DESYREL) 150 MG tablet      8. Hepatitis B vaccination status unknown  Z78.9 Hepatitis B Surface Antibody     Hepatitis B Surface Antibody         His back pain has been bad and affecting his daily activity.  Over-the-counter medication has not helped.  He was given a small amount of Percocet to be taken as needed for severe pain until the procedure.  Potential side effect discussed and he was instructed not to operate any type of machine  while taking the Percocet.  He was informed that is not intended for long-term treatment.    -Multiple comorbidities which include diabetes, obesity, hyperlipidemia and sleep apnea.  The goal for his blood pressure to be less than 140/90.  His blood pressure has been stable and controlled with hydrochlorothiazide and atenolol.  Tolerating the medications well.  No change in medication today.  Emphasized on importance of control his modifiable risks and encouraged exercising, healthy diet and weight management.  He does not smoke and denied of excessive alcohol intake or drug use.  Follow-up in 6 months, earlier as needed.    -Last hemoglobin A1c about 4 months ago was 7.7.  Been tolerating the metformin and Jardiance well, no side effect.  He also has morbid obesity, sleep apnea, hyperlipidemia and high blood pressure.  Unfortunately, his insurance did not cover for Ozempic.  His hemoglobin A1c today was 6.4 and the goal for his hemoglobin A1c less than 7.0.  No change in medication today.  Again, emphasized on healthy lifestyle modification as discussed above.  Up-to-date for eye exam.  Encouraged him to check his feet daily/strictly for unintended open wound or infection.  Follow-up in 6 months.    -Sleep apnea with morbid obesity.  BMI today will 43.  He is also has diabetes and high blood pressure as mentioned above.  Not able to exercise due to chronic back pain.  Saw weight management last year but did not feel it was helpful and therefore stopped following up.  His health insurance not pay for Ozempic.  Will continue with the metformin and Jardiance.  Recommend to follow-up to discuss about pharmacological options.  In the meantime, encouraged to increase physical activities and exercise daily as tolerated.  Healthy diet also discussed and encouraged.  No history of thyroid problem.  Continue with the CPAP daily as prescribed.  He was informed that weight loss will have great benefit to his health overall  especially with diabetes, high blood pressure, sleep apnea and back pain.    -Also has trouble with falling and maintaining sleep.  Never been on medication for it before.  We will have him try the trazodone.  Potential side effect discussed.  Also discussed about sleeping hygiene.  Follow-up as needed    Risks and Recommendations:  Cardiovascular:  No history of CAD, CVA or PAD; HTN and DM controlled.  Low risk procedure.  No no further cardiovascular work-up is needed for this procedure.    Diabetes:  Diabetes is controlled with metformin and Jardiance, hemoglobin A1c today was 6.4.  Please see above for further details.  -Hold metformin for 24 hours and the Jardiance for 3 days for procedure; resume them after procedure    Pulmonary:   No pulmonary risk identified.  Never smoked.  No history of asthma or COPD.   - Oxygen as needed to maintain O2 sat above 94% during and after procedure.    Obstructive Sleep Apnea:   Known to have sleep apnea, tolerating the CPAP well.  Encouraged to wear CPAP daily as prescribed.  - Oxygen as needed to maintain O2 sat above 94% during and after procedure.    Anemia/Bleeding/Clotting:   No history of bleeding disorder.  No history of PE/DVT.  PE/DVT prophylaxis per surgeon's recommendation/desire.   No history of anemia or blood transfusion and is okay to be transfused if necessary.     Social and Substance:   No social or substance concerns identified.  No concern of mental health.  Not on chronic pain meds.  No drugs or alcohol.  Never smoking.  Lives with his wife.    Infection:   No history of MRSA  Prophylactic antibiotics per surgeon's recommendation/desire.  Shower with provided antimicrobial shampoo the day before and a.m. the procedure       Antiplatelet or Anticoagulation Medication Instructions:  Additional Medication Instructions:  Please take your prescribed medications as prescribed except.  No Aspirin or NSAID (Ibuprofen, Aleve, Motrin, Naproxen, ect.) for 7 days  before the procedure  Ok to take Tylenol as needed for pain.  Hold the HTCZ on am of the procedure; resume it after the procedure   Hold Metfomin for 24 hr and Jardiance for 3 days before the procedure; resume after the procedure  Take shower with provided shampoo the day before and am of procedure  No alcohol or smoking for 24 before the procedure  Nothing to eat for 8 hrs, but may drink clear fluid up to 2 hrs before the procedure.      RECOMMENDATION:  APPROVAL GIVEN to proceed with proposed procedure, without further diagnostic evaluation.      Subjective       HPI related to upcoming procedure: Epidural injection        10/11/2023     8:57 AM   Preop Questions   1. Have you ever had a heart attack or stroke? No   2. Have you ever had surgery on your heart or blood vessels, such as a stent placement, a coronary artery bypass, or surgery on an artery in your head, neck, heart, or legs? No   3. Do you have chest pain with activity? YES -due to deconditioning, chronic and overall stable   4. Do you have a history of  heart failure? No   5. Do you currently have a cold, bronchitis or symptoms of other infection? No   6. Do you have a cough, shortness of breath, or wheezing? No   7. Do you or anyone in your family have previous history of blood clots? No   8. Do you or does anyone in your family have a serious bleeding problem such as prolonged bleeding following surgeries or cuts? No   9. Have you ever had problems with anemia or been told to take iron pills? No   10. Have you had any abnormal blood loss such as black, tarry or bloody stools? No   11. Have you ever had a blood transfusion? No   12. Are you willing to have a blood transfusion if it is medically needed before, during, or after your surgery? Yes   13. Have you or any of your relatives ever had problems with anesthesia? No   14. Do you have sleep apnea, excessive snoring or daytime drowsiness? YES -has sleep apnea on CPAP   14a. Do you have a CPAP  machine? No   15. Do you have any artifical heart valves or other implanted medical devices like a pacemaker, defibrillator, or continuous glucose monitor? No   16. Do you have artificial joints? No   17. Are you allergic to latex? No       Health Care Directive:  Patient does not have a Health Care Directive or Living Will: Discussed advance care planning with patient; however, patient declined at this time.    Preoperative Review of :   reviewed - controlled substances reflected in medication list.      Humberto is here today for pre-op physical. He is scheduled for epidural injection for chronic low back pain due to DJD of the spine in about a week.  It is expected to be the same day procedure with MAC anesthesia. No personal or family history of anesthesia complication or pre-matured CAD or MI.  Been on and off the Medrol dosepack in the last 6 months for the back pain.  Not taking Aspirin or other form of blood thinner.  Last dose of NSAID was couple days ago.      Humberto has to have high blood pressure, diabetes, obesity with sleep apnea.  Been using the CPAP regularly as prescribed, tolerating well.  Weight overall has been stable.  Not able to exercise due to chronic back pain.  Been trying to eat healthy diet.  Saw weight management last year but did not follow up as he did not feel it was helping.  Insulin did not pay for Ozempic.  He takes metformin and Jardiance for diabetes, atenolol and hydrochlorothiazide for high blood pressure; tolerating the medication well.  Not checking his blood sugar or blood pressure.  No symptoms of hypoglycemia.  Not able to exercise due to back pain.  No acute change in vision.  Up-to-date with eye exam.  Been having trouble preventing and following sleep, over-the-counter medication has not been effective.     He generally is doing well today. No headache or dizziness. No runny nose, nasal congestion, ST, coughing, fever or chill.  No chest pain or SOB.  No N/V/D/C or  problem with urination.  Never smoked and denied of having breathing problem.  No history of asthma or COPD.     Review of Systems  Constitutional, neuro, ENT, endocrine, pulmonary, cardiac, gastrointestinal, genitourinary, musculoskeletal, integument and psychiatric systems are negative, except as otherwise noted.    Patient Active Problem List    Diagnosis Date Noted    Type 2 diabetes mellitus with hyperglycemia, without long-term current use of insulin (H) 06/12/2023     Priority: Medium    Acanthosis nigricans 02/25/2021     Priority: Medium    Skin tag 02/25/2021     Priority: Medium    Vitamin D deficiency 12/08/2019     Priority: Medium    Hyperuricemia 12/08/2019     Priority: Medium    RICARDO (obstructive sleep apnea) - on cpap 09/22/2019     Priority: Medium    Chronic bilateral low back pain without sciatica 09/22/2019     Priority: Medium    Seasonal allergic rhinitis due to pollen, unspecified chronicity 01/23/2018     Priority: Medium    Morbid obesity (H) 12/06/2017     Priority: Medium    Insomnia 10/15/2015     Priority: Medium    Hypertension goal BP (blood pressure) < 140/90 05/06/2013     Priority: Medium    GERD (gastroesophageal reflux disease) 04/24/2013     Priority: Medium      Past Medical History:   Diagnosis Date    Acid reflux disease since 2006    Back pain chronic    Cellulitis of left upper arm and forearm 11/22/2013    Elevated serum creatinine 10/15/2015    Hypertension     Pre-diabetes 2/25/2021     Past Surgical History:   Procedure Laterality Date    COLONOSCOPY N/A 3/25/2021    Procedure: Colonoscopy Incomplete;  Surgeon: Humberto Antoine DO;  Location: [facility]    COLONOSCOPY N/A 7/15/2021    Procedure: COLONOSCOPY;  Surgeon: Humberto Antoine DO;  Location: [facility]    ENT SURGERY      ESOPHAGOSCOPY, GASTROSCOPY, DUODENOSCOPY (EGD), COMBINED N/A 3/25/2021    Procedure: Esophagogastroduodenoscopy with biopsy;  Surgeon: Humberto Antoine DO;  Location: [facility]      Current Outpatient Medications   Medication Sig Dispense Refill    albuterol (PROAIR HFA/PROVENTIL HFA/VENTOLIN HFA) 108 (90 Base) MCG/ACT inhaler Inhale 2 puffs into the lungs every 4 hours as needed for shortness of breath or wheezing 18 g 0    allopurinol (ZYLOPRIM) 100 MG tablet TAKE 1 TABLET (100 MG) BY MOUTH DAILY 90 tablet 0    ASPIRIN LOW DOSE 81 MG EC tablet TAKE ONE TABLET BY MOUTH ONCE DAILY 90 tablet 2    atenolol (TENORMIN) 50 MG tablet TAKE ONE TABLET BY MOUTH ONCE DAILY 90 tablet 1    atorvastatin (LIPITOR) 10 MG tablet Take 1 tablet (10 mg) by mouth daily 90 tablet 1    cetirizine (ZYRTEC) 10 MG tablet TAKE ONE TABLET BY MOUTH ONCE DAILY AS NEEDED FOR ALLERGIES 30 tablet 5    clotrimazole-betamethasone (LOTRISONE) 1-0.05 % external cream Apply topically 2 times daily 45 g 1    Continuous Blood Gluc  (FREESTYLE FANNIE 2 READER) GORDO Use to read blood sugars as per 's instructions. 1 each 0    Continuous Blood Gluc Sensor (FREESTYLE FANNIE 2 SENSOR) MISC Change every 14 days. 2 each 11    empagliflozin (JARDIANCE) 25 MG TABS tablet TAKE 1 TABLET (25 MG) BY MOUTH DAILY 90 tablet 0    famotidine (PEPCID) 40 MG tablet Take 1 tablet (40 mg) by mouth nightly as needed for heartburn (acid reflux) 90 tablet 1    fluticasone (FLONASE) 50 MCG/ACT nasal spray SPRAY 1-2 SPRAYS IN EACH NOSTRIL EVERY DAY 16 g 5    gabapentin (NEURONTIN) 300 MG capsule Take 1 capsule (300 mg) by mouth 3 times daily as needed for neuropathic pain 42 capsule 0    hydrochlorothiazide (HYDRODIURIL) 25 MG tablet TAKE ONE TABLET BY MOUTH ONCE DAILY 90 tablet 2    metFORMIN (GLUCOPHAGE XR) 500 MG 24 hr tablet Take 2 tablets (1,000 mg) by mouth 2 times daily (with meals) 120 tablet 3    methylPREDNISolone (MEDROL DOSEPAK) 4 MG tablet therapy pack FOLLOW PACKAGE DIRECTIONS 21 tablet 0    order for DME Equipment being ordered: shower chair 1 Device 0    order for DME 1 Device Auto CPAP @@ 7-15 cm with heated humidity via  "mask of choice      pantoprazole (PROTONIX) 40 MG EC tablet TAKE 1 TABLET (40 MG) BY MOUTH DAILY STOP OMEPRAZOLE 90 tablet 2    sildenafil (REVATIO) 20 MG tablet Take 1-2 tablets (20-40 mg) by mouth daily as needed (sexual activity) 30 tablet 4    tiZANidine (ZANAFLEX) 4 MG capsule Take 1 tablet in AM and 1 tablet at bedtime 60 capsule 0    traZODone (DESYREL) 100 MG tablet TAKE ONE TABLET BY MOUTH EVERY NIGHT AT BEDTIME 30 tablet 6       No Known Allergies     Social History     Tobacco Use    Smoking status: Never    Smokeless tobacco: Never   Substance Use Topics    Alcohol use: No     Alcohol/week: 0.0 standard drinks of alcohol     Family History   Problem Relation Age of Onset    Diabetes Mother     Hypertension Mother     Heart Disease Mother     Heart Disease Father     Diabetes Father     Unknown/Adopted Maternal Grandmother     Unknown/Adopted Maternal Grandfather     Unknown/Adopted Paternal Grandmother     Unknown/Adopted Paternal Grandfather     No Known Problems Sister     No Known Problems Son     No Known Problems Daughter     No Known Problems Sister     No Known Problems Son     No Known Problems Daughter      History   Drug Use No         Objective     /86   Pulse 56   Temp 97.5  F (36.4  C)   Resp 15   Ht 1.85 m (6' 0.84\")   Wt 145.2 kg (320 lb 3.2 oz)   SpO2 99%   BMI 42.44 kg/m      Physical Exam  GENERAL: healthy, alert and no distress, speaking full sentences  EYES: Eyes grossly normal to inspection, PERRL and conjunctivae and sclerae normal.  No nystagmus.  All 4 visual fields intact.  HENT: ear canals and TM's normal; ear tube in the left ear noted -appropriate location.  Nares are non-congested. Oropharynx is pink and moist. No tender with palpation to the sinuses.  NECK: Supple, no lymphadenopathy or thyromegaly.  RESP: lungs clear to auscultation - no rales, rhonchi or wheezes  CV: regular rate and rhythm, no murmur, no peripheral edema with strong pedal pulses " bilaterally  ABDOMEN: soft, nontender, nondistended, no palpable masses or organomegaly with normal bowel sounds  MS: no gross musculoskeletal defects noted.  No focal weakness.  Tender with palpation to the lower spine  NEURO: Normal strength and tone, mentation intact and speech normal.  No focal neurological deficit  Diabetic foot exam: Monofilament was normal.  No calluses.  Skin dry and clean, no open wound.  Toenails normal.       Recent Labs   Lab Test 06/09/23  0950 11/16/22  1603    139   POTASSIUM 4.3 3.6   CR 1.09 1.03   A1C 7.7* 6.6*        Diagnostics:  Recent Results (from the past 168 hour(s))   HEMOGLOBIN A1C    Collection Time: 10/11/23 10:01 AM   Result Value Ref Range    Hemoglobin A1C 6.4 (H) <5.7 %   Basic metabolic panel  (Ca, Cl, CO2, Creat, Gluc, K, Na, BUN)    Collection Time: 10/11/23 10:01 AM   Result Value Ref Range    Sodium 140 135 - 145 mmol/L    Potassium 4.0 3.4 - 5.3 mmol/L    Chloride 102 98 - 107 mmol/L    Carbon Dioxide (CO2) 29 22 - 29 mmol/L    Anion Gap 9 7 - 15 mmol/L    Urea Nitrogen 13.5 6.0 - 20.0 mg/dL    Creatinine 1.13 0.67 - 1.17 mg/dL    GFR Estimate 78 >60 mL/min/1.73m2    Calcium 9.3 8.6 - 10.0 mg/dL    Glucose 84 70 - 99 mg/dL   Hepatitis B Surface Antibody    Collection Time: 10/11/23 10:01 AM   Result Value Ref Range    Hepatitis B Surface Antibody Instrument Value 0.00 <8.00 m[IU]/mL    Hepatitis B Surface Antibody Nonreactive       No EKG required for low risk surgery (cataract, skin procedure, breast biopsy, etc).    Revised Cardiac Risk Index (RCRI):  The patient has the following serious cardiovascular risks for perioperative complications:   - No serious cardiac risks = 0 points     RCRI Interpretation: 0 points: Class I (very low risk - 0.4% complication rate)         Signed Electronically by: Calos Ty Mai, MD  Copy of this evaluation report is provided to requesting physician.

## 2023-10-11 NOTE — Clinical Note
Please also let patient know that I recommend to hold off on the Jardiance for 3 days before the procedure.  Resume after procedure.  Metformin and hydrochlorothiazide for 24 hours before the procedure

## 2023-10-11 NOTE — PATIENT INSTRUCTIONS
Please take your prescribed medications as prescribed except.  No Aspirin or NSAID (Ibuprofen, Aleve, Motrin, Naproxen, ect.) for 7 days before the procedure  Ok to take Tylenol as needed for pain.  Hold the HTCZ on am of the procedure; resume it after the procedure   Hold Metfomin for 24 hr and Jardiance for 3 days before the procedure; resume after the procedure  Take shower with provided shampoo the day before and am of procedure  No alcohol or smoking for 24 before the procedure  Nothing to eat for 8 hrs, but may drink clear fluid up to 2 hrs before the procedure.

## 2023-10-11 NOTE — COMMUNITY RESOURCES LIST (ENGLISH)
10/11/2023   [facility] - Outpatient Clinics  N/A  For additional resource needs, please contact your health insurance member services or your primary care team.  Phone: [phone]   Email: N/A   Address: [address] [place] [id number]   Hours: N/A        Food and Nutrition       Food pantry  1  [place] [facility] Distance: 1.62 miles      Pickup   [address] [place] [address]  Language: English  Hours: Mon 1:00 PM - 3:00 PM , Wed 1:00 PM - 3:00 PM , Fri 9:00 AM - 11:00 AM  Fees: Free   Phone: (552) 321-5476 Email: info@[employer] Website: http://sites.[employer].Topio/[employer]/home?authuser=0     2  [place] [facility] Distance: 13.79 miles      Pickup   [address] [place] [address]  Language: English  Hours: Thu 12:00 PM - 4:00 PM  Fees: Free   Phone: (629) 727-4705 Email: sammie@[facility] Website: https://www.Cause.it.[employer]io/[facility]/          Housing       Housing search assistance  3  [place] [employer] [facility] [employer] Distance: 23.06 miles      In-Person   [address] [place] [address]  Language: English  Hours: Mon - Fri 8:00 AM - 4:00 PM  Fees: Free   Phone: (537) 345-8455 Email: Pilo@[employer].org Website: https://[employer].org/          Important Numbers & Websites       [address]  Poison Control   (718) 644-7798 [place]  Suicide and Crisis Lifeline   [address] [facility].org  Childhelp National Child Abuse Hotline   546.278.5084 Childhelphotline.org  National Sexual Assault Hotline   (930) 401-7736 (HOPE) Rainn.org  National Runaway Safeline   (243) 337-6114 (RUNAWAY) [facility].org  Pregnancy & Postpartum Support [place]   Call/text 586-465-0526 Ppsupport[place].org  Substance Abuse National Helpline ([facility]   512-571-HELP (0265) Findtreatment.gov  Emergency Services   916

## 2023-10-17 ENCOUNTER — PATIENT OUTREACH (OUTPATIENT)
Dept: CARE COORDINATION | Facility: CLINIC | Age: 53
End: 2023-10-17
Payer: COMMERCIAL

## 2023-10-18 NOTE — DISCHARGE INSTRUCTIONS
Gout    Gout is an inflammation of a joint due to a build-up of gout crystals in the joint fluid. This occurs when there is an excess of uric acid (a normal waste product) in the body. Uric acid builds up in the body when the kidneys are unable to filter enough of it from the blood. This may occur with age. It is also associated with kidney disease. Gout occurs more often in people with obesity, diabetes, high blood pressure, or high levels of fats in the blood. It may run in families. Gout tends to come and go. A flare up of gout is called an attack. Drinking alcohol or eating certain foods (such as shellfish or foods with additives such as high-fructose corn syrup) may increase uric acid levels in the blood and cause a gout attack.  During a gout attack, the affected joint may become a hot, red, swollen and painful. If you have had one attack of gout, you are likely to have another. An attack of gout can be treated with medicine. If these attacks become frequent, a daily medicine may be prescribed to help the kidneys remove uric acid from the body.  Home care  During a gout attack:    Rest painful joints. If gout affects the joints of your foot or leg, you may want to use crutches for the first few days to keep from bearing weight on the affected joint.    When sitting or lying down, raise the painful joint to a level higher than your heart.    Apply an ice pack (ice cubes in a plastic bag wrapped in a thin towel) over the injured area for 20 minutes every 1 to 2 hours the first day for pain relief. Continue this 3 to 4 times a day for swelling and pain.    Avoid alcohol and foods listed below (see Preventing attacks) during a gout attack. Drink extra fluid to help flush the uric acid through your kidneys.    If you were prescribed a medicine to treat gout, take it as your healthcare provider has instructed. Don't skip doses.    Take anti-inflammatory medicine as directed.     If pain medicines have been  prescribed, take them exactly as directed.    Preventing attacks    Minimize or avoid alcohol use. Excess alcohol intake can cause a gout attack.    Limit these foods and beverages:    Organ meats, such as kidneys and liver    Certain seafoods (anchovies, sardines, shrimp, scallops, herring, mackerel)    Wild game, meat extracts and meat gravies    Foods and beverages sweetened with high-fructose corn syrup, such as sodas    Eat a healthy diet including low-fat and nonfat dairy, whole grains, and vegetables.    If you are overweight, talk to your healthcare provider about a weight reduction plan. Avoid fasting or extreme low calorie diets (less than 900 calories per day). This will increase uric acid levels in the body.    If you have diabetes or high blood pressure, work with your doctor to manage these conditions.    Protect the joint from injury. Trauma can trigger a gout attack.  Follow-up care  Follow up with your healthcare provider, or as advised.   When to seek medical advice  Call your healthcare provider if you have any of the following:    Fever over 100.4 F (38. C) with worsening joint pain    Increasing redness around the joint    Pain developing in another joint    Repeated vomiting, abdominal pain, or blood in the vomit or stool (black or red color)  Date Last Reviewed: 3/1/2017    0147-6843 [employer]. [address] [place] [address], [place] [address]. All rights reserved. This information is not intended as a substitute for professional medical care. Always follow your healthcare professional's instructions.          Gout Diet  Gout is a painful condition caused by an excess of uric acid, a waste product made by the body. Uric acid forms crystals that collect in the joints. The immune response to these crystals brings on symptoms of joint pain and swelling. This is called a gout attack. Often, medications and diet changes are combined to manage gout. Below are some guidelines for changing  your diet to help you manage gout and prevent attacks. Your health care provider will help you determine the best eating plan for you.     Eating to manage gout  Weight loss for those who are overweight may help reduce gout attacks.  Eat less of these foods  Eating too many foods containing purines may raise the levels of uric acid in your body. This raises your risk for a gout attack. Try to limit these foods and drinks:    Alcohol, such as beer and red wine. You may be told to avoid alcohol completely.    Soft drinks that contain sugar or high fructose corn syrup    Certain fish, including anchovies, sardines, fish eggs, and herring    Shellfish    Certain meats, such as red meat, hot dogs, luncheon meats, and turkey    Organ meats, such as liver, kidneys, and sweetbreads    Legumes, such as dried beans and peas    Other high fat foods such as gravy, whole milk, and high fat cheeses    Vegetables such as asparagus, cauliflower, spinach, and mushrooms used to be thought to contribute to an increased risk for a gout attack, but recent studies show that high purine vegetables don't increase the risk for a gout attack.  Eat more of these foods  Other foods may be helpful for people with gout. Add some of these foods to your diet:    Cherries contain chemicals that may lower uric acid.    Omega fatty acids. These are found in some fatty fish such as salmon, certain oils (flax, olive, or nut), and nuts themselves. Omega fatty acids may help prevent inflammation due to gout.    Dairy products that are low-fat or fat-free, such as cheese and yogurt    Complex carbohydrate foods, including whole grains, brown rice, oats, and beans    Coffee, in moderation    Water, approximately 64 ounces per day  Follow-up care  Follow up with your healthcare provider as advised.  When to seek medical advice  Call your healthcare provider right away if any of these occur:    Return of gout symptoms, usually at night:    Severe pain,  four quadrants but they are somewhat sluggish. Extremities: No clubbing, cyanosis, or edema bilaterally. Full range of motion without deformity   Skin: Skin color, texture, turgor normal. No rashes or lesions. Neurologic: Alert and oriented x3, neurovascularly intact with sensory/motor intact upper extremities/lower extremities, bilaterally.  Cranial nerves:II-XII intact, grossly non-focal.  Mental status: Alert, appropriate, pleasant          Medications:   Medications:    [START ON 10/19/2023] metoprolol succinate  50 mg Oral Daily    magnesium sulfate  2,000 mg IntraVENous Once    [START ON 10/19/2023] predniSONE  40 mg Oral Daily    ipratropium 0.5 mg-albuterol 2.5 mg  1 Dose Inhalation Q4H WA RT    insulin lispro  0-8 Units SubCUTAneous 2 times per day    insulin glargine  35 Units SubCUTAneous Nightly    insulin lispro  15 Units SubCUTAneous TID WC    insulin lispro  0-4 Units SubCUTAneous 2 times per day    sennosides-docusate sodium  2 tablet Oral BID    guaiFENesin  600 mg Oral BID    miconazole   Topical BID    levofloxacin  500 mg IntraVENous Q24H    amLODIPine  10 mg Oral Daily    atorvastatin  10 mg Oral Daily    fluticasone  1 spray Nasal BID    cetirizine HCl  5 mg Oral Daily    magnesium oxide  400 mg Oral Daily    montelukast  10 mg Oral Nightly    pantoprazole  40 mg Oral QAM AC    sodium chloride flush  5-40 mL IntraVENous 2 times per day    enoxaparin  40 mg SubCUTAneous Daily    fluticasone  1 puff Inhalation BID RT      Infusions:    dextrose      sodium chloride 25 mL/hr at 10/16/23 1733     PRN Meds: bisacodyl, 10 mg, Daily PRN  Benzocaine-Menthol, 1 lozenge, Q2H PRN  glucose, 4 tablet, PRN  dextrose bolus, 125 mL, PRN   Or  dextrose bolus, 250 mL, PRN  glucagon (rDNA), 1 mg, PRN  dextrose, , Continuous PRN  sodium chloride flush, 5-40 mL, PRN  sodium chloride, , PRN  ondansetron, 4 mg, Q8H PRN   Or  ondansetron, 4 mg, Q6H PRN  polyethylene glycol, 17 g, Daily PRN  acetaminophen, 650 mg, swelling, and heat in a joint, especially the base of the big toe    Affected joint is hard to move    Skin of the affected joint is purple or red    Fever of 100.4 F (38 C) or higher    Pain that doesn't get better even with prescribed medicine   Date Last Reviewed: 1/12/2016 2000-2017 [employer]. [address] [place] [address]. All rights reserved. This information is not intended as a substitute for professional medical care. Always follow your healthcare professional's instructions.         10/16/23 1430    Order Status: Completed Specimen: Nasopharyngeal Updated: 10/16/23 1552     Adenovirus Detection by PCR NOT DETECTED     Coronavirus 229E PCR NOT DETECTED     Coronavirus HKU1 PCR NOT DETECTED     Coronavirus NL63 PCR NOT DETECTED     Coronavirus OC43 PCR NOT DETECTED     SARS-CoV-2 NOT DETECTED     Comment:         Fact sheet for Healthcare Providers: Ortega  Fact sheet for Patients: [email or web address]          METHODOLOGY: Multiplex PCR, ANTONIA NON-PROBE Detection          Human Metapneumovirus PCR NOT DETECTED     Rhinovirus Enterovirus PCR NOT DETECTED     Influenza A by PCR NOT DETECTED     Influenza A H1 Pandemic PCR NOT DETECTED     Influenza A H1 (2009) PCR NOT DETECTED     Influenza A H3 PCR NOT DETECTED     Influenza B by PCR NOT DETECTED     Parainfluenza 1 PCR NOT DETECTED     Parainfluenza 2 PCR NOT DETECTED     Parainfluenza 3 PCR NOT DETECTED     Parainfluenza 4 PCR NOT DETECTED     RSV PCR NOT DETECTED     Bordetella parapertussis by PCR NOT DETECTED     B Pertussis by PCR NOT DETECTED     Chlamydophila Pneumonia PCR NOT DETECTED     Mycoplasma pneumo by PCR NOT DETECTED    Culture, Respiratory [[id number]] Collected: 10/15/23 1717    Order Status: Completed Specimen: Sputum Updated: 10/18/23 0737     Specimen SPUTUM EXPECTORANT     Special Requests NONE     Culture Final Report Normal respiratory hector    Narrative:      SETUP DATE/TIME:  10/16/2023 0859    Legionella antigen, urine [[id number]] Collected: 10/15/23 1716    Order Status: Completed Specimen: Urine Updated: 10/16/23 1039     Legionella Urinary Ag NEGATIVE     Comment: (NOTE)  PRESUMPTIVE NEGATIVE FOR LEGIONELLA PNEUMOPHILIA SEROGROUP 1   ANTIGEN IN URINE SUGGESTING NO RECENT OR CURRENT INFECTION. INFECTION DUE TO LEGIONELLA CANNOT BE RULED OUT SINCE OTHER   SEROGROUPS AND SPECIES MAY CAUSE DISEASE.  ANTIGEN MAY NOT BE PRESENT   IN URINE IN EARLY INFECTION, AND

## 2023-10-20 ENCOUNTER — ANESTHESIA (OUTPATIENT)
Dept: SURGERY | Facility: CLINIC | Age: 53
End: 2023-10-20
Payer: COMMERCIAL

## 2023-10-20 ENCOUNTER — HOSPITAL ENCOUNTER (OUTPATIENT)
Facility: CLINIC | Age: 53
Discharge: HOME | End: 2023-10-20
Attending: ANESTHESIOLOGY | Admitting: ANESTHESIOLOGY
Payer: COMMERCIAL

## 2023-10-20 ENCOUNTER — HOSPITAL ENCOUNTER (OUTPATIENT)
Dept: GENERAL RADIOLOGY | Facility: CLINIC | Age: 53
Discharge: HOME | End: 2023-10-20
Attending: ANESTHESIOLOGY | Admitting: ANESTHESIOLOGY
Payer: COMMERCIAL

## 2023-10-20 ENCOUNTER — ANESTHESIA EVENT (OUTPATIENT)
Dept: SURGERY | Facility: CLINIC | Age: 53
End: 2023-10-20
Payer: COMMERCIAL

## 2023-10-20 VITALS
HEIGHT: 73 IN | DIASTOLIC BLOOD PRESSURE: 93 MMHG | OXYGEN SATURATION: 99 % | TEMPERATURE: 98.3 F | SYSTOLIC BLOOD PRESSURE: 141 MMHG | RESPIRATION RATE: 18 BRPM | BODY MASS INDEX: 41.75 KG/M2 | WEIGHT: 315 LBS | HEART RATE: 73 BPM

## 2023-10-20 DIAGNOSIS — M54.50 CHRONIC BILATERAL LOW BACK PAIN WITHOUT SCIATICA: ICD-10-CM

## 2023-10-20 DIAGNOSIS — G89.29 CHRONIC BILATERAL LOW BACK PAIN WITHOUT SCIATICA: ICD-10-CM

## 2023-10-20 LAB — GLUCOSE BLDC GLUCOMTR-MCNC: 90 MG/DL (ref 70–99)

## 2023-10-20 PROCEDURE — 370N000017 HC ANESTHESIA TECHNICAL FEE, PER MIN: Performed by: ANESTHESIOLOGY

## 2023-10-20 PROCEDURE — 250N000011 HC RX IP 250 OP 636: Performed by: NURSE ANESTHETIST, CERTIFIED REGISTERED

## 2023-10-20 PROCEDURE — 250N000011 HC RX IP 250 OP 636: Mod: JZ | Performed by: ANESTHESIOLOGY

## 2023-10-20 PROCEDURE — 82962 GLUCOSE BLOOD TEST: CPT

## 2023-10-20 PROCEDURE — 250N000009 HC RX 250: Performed by: NURSE ANESTHETIST, CERTIFIED REGISTERED

## 2023-10-20 PROCEDURE — 64483 NJX AA&/STRD TFRM EPI L/S 1: CPT | Mod: 50 | Performed by: ANESTHESIOLOGY

## 2023-10-20 PROCEDURE — 999N000179 XR SURGERY CARM FLUORO LESS THAN 5 MIN W STILLS: Mod: TC

## 2023-10-20 RX ORDER — LIDOCAINE HYDROCHLORIDE 10 MG/ML
INJECTION, SOLUTION INFILTRATION; PERINEURAL PRN
Status: DISCONTINUED | OUTPATIENT
Start: 2023-10-20 | End: 2023-10-20

## 2023-10-20 RX ORDER — ONDANSETRON 2 MG/ML
4 INJECTION INTRAMUSCULAR; INTRAVENOUS EVERY 30 MIN PRN
Status: DISCONTINUED | OUTPATIENT
Start: 2023-10-20 | End: 2023-10-20 | Stop reason: HOSPADM

## 2023-10-20 RX ORDER — FENTANYL CITRATE 50 UG/ML
25 INJECTION, SOLUTION INTRAMUSCULAR; INTRAVENOUS
Status: DISCONTINUED | OUTPATIENT
Start: 2023-10-20 | End: 2023-10-20 | Stop reason: HOSPADM

## 2023-10-20 RX ORDER — OXYCODONE HYDROCHLORIDE 5 MG/1
5 TABLET ORAL
Status: DISCONTINUED | OUTPATIENT
Start: 2023-10-20 | End: 2023-10-20 | Stop reason: HOSPADM

## 2023-10-20 RX ORDER — OXYCODONE HYDROCHLORIDE 5 MG/1
10 TABLET ORAL
Status: DISCONTINUED | OUTPATIENT
Start: 2023-10-20 | End: 2023-10-20 | Stop reason: HOSPADM

## 2023-10-20 RX ORDER — TRIAMCINOLONE ACETONIDE 40 MG/ML
INJECTION, SUSPENSION INTRA-ARTICULAR; INTRAMUSCULAR PRN
Status: DISCONTINUED | OUTPATIENT
Start: 2023-10-20 | End: 2023-10-20 | Stop reason: HOSPADM

## 2023-10-20 RX ORDER — PROPOFOL 10 MG/ML
INJECTION, EMULSION INTRAVENOUS PRN
Status: DISCONTINUED | OUTPATIENT
Start: 2023-10-20 | End: 2023-10-20

## 2023-10-20 RX ORDER — ACETAMINOPHEN 325 MG/1
975 TABLET ORAL EVERY 6 HOURS PRN
Status: DISCONTINUED | OUTPATIENT
Start: 2023-10-20 | End: 2023-10-20 | Stop reason: HOSPADM

## 2023-10-20 RX ORDER — ONDANSETRON 4 MG/1
4 TABLET, ORALLY DISINTEGRATING ORAL EVERY 30 MIN PRN
Status: DISCONTINUED | OUTPATIENT
Start: 2023-10-20 | End: 2023-10-20 | Stop reason: HOSPADM

## 2023-10-20 RX ORDER — IOPAMIDOL 612 MG/ML
INJECTION, SOLUTION INTRATHECAL PRN
Status: DISCONTINUED | OUTPATIENT
Start: 2023-10-20 | End: 2023-10-20 | Stop reason: HOSPADM

## 2023-10-20 RX ADMIN — PROPOFOL 50 MG: 10 INJECTION, EMULSION INTRAVENOUS at 12:22

## 2023-10-20 RX ADMIN — PROPOFOL 50 MG: 10 INJECTION, EMULSION INTRAVENOUS at 12:14

## 2023-10-20 RX ADMIN — PROPOFOL 50 MG: 10 INJECTION, EMULSION INTRAVENOUS at 12:24

## 2023-10-20 RX ADMIN — PROPOFOL 50 MG: 10 INJECTION, EMULSION INTRAVENOUS at 12:19

## 2023-10-20 RX ADMIN — PROPOFOL 50 MG: 10 INJECTION, EMULSION INTRAVENOUS at 12:17

## 2023-10-20 RX ADMIN — LIDOCAINE HYDROCHLORIDE 50 MG: 10 INJECTION, SOLUTION INFILTRATION; PERINEURAL at 12:14

## 2023-10-20 RX ADMIN — PROPOFOL 50 MG: 10 INJECTION, EMULSION INTRAVENOUS at 12:15

## 2023-10-20 ASSESSMENT — ACTIVITIES OF DAILY LIVING (ADL)
ADLS_ACUITY_SCORE: 35
ADLS_ACUITY_SCORE: 33

## 2023-10-20 NOTE — ANESTHESIA PREPROCEDURE EVALUATION
Anesthesia Pre-Procedure Evaluation    Patient: Humberto Roberts   MRN: [id number] : 1970        Procedure : Procedure(s):  Inject epidural lumbar          Past Medical History:   Diagnosis Date     Acid reflux disease since 2006     Back pain chronic     Cellulitis of left upper arm and forearm 2013     Elevated serum creatinine 10/15/2015     Hypertension      Pre-diabetes 2021      Past Surgical History:   Procedure Laterality Date     COLONOSCOPY N/A 3/25/2021    Procedure: Colonoscopy Incomplete;  Surgeon: Humberto Antoine DO;  Location: [facility]     COLONOSCOPY N/A 7/15/2021    Procedure: COLONOSCOPY;  Surgeon: Humberto Antoine DO;  Location:  GI     ENT SURGERY       ESOPHAGOSCOPY, GASTROSCOPY, DUODENOSCOPY (EGD), COMBINED N/A 3/25/2021    Procedure: Esophagogastroduodenoscopy with biopsy;  Surgeon: Humberto Antoine DO;  Location:  GI      No Known Allergies   Social History     Tobacco Use     Smoking status: Never     Smokeless tobacco: Never   Substance Use Topics     Alcohol use: No     Alcohol/week: 0.0 standard drinks of alcohol      Wt Readings from Last 1 Encounters:   10/11/23 145.2 kg (320 lb 3.2 oz)        Anesthesia Evaluation   Pt has had prior anesthetic. Type: MAC and General.    No history of anesthetic complications       ROS/MED HX  ENT/Pulmonary:     (+) sleep apnea,                                      Neurologic:  - neg neurologic ROS     Cardiovascular:     (+) Dyslipidemia hypertension- -   -  - -                                      METS/Exercise Tolerance: >4 METS    Hematologic:  - neg hematologic  ROS     Musculoskeletal: Comment: LBP      GI/Hepatic:     (+) GERD,                   Renal/Genitourinary:  - neg Renal ROS     Endo:     (+)  type II DM, Last HgA1c: 6.4, date: 10/11/23,           Obesity,       Psychiatric/Substance Use:  - neg psychiatric ROS     Infectious Disease:  - neg infectious disease ROS     Malignancy:  - neg  "malignancy ROS     Other:  - neg other ROS          Physical Exam    Airway  airway exam normal      Mallampati: II   TM distance: > 3 FB   Neck ROM: full   Mouth opening: > 3 cm    Respiratory Devices and Support         Dental  no notable dental history         Cardiovascular   cardiovascular exam normal       Rhythm and rate: regular and normal     Pulmonary   pulmonary exam normal        breath sounds clear to auscultation       OUTSIDE LABS:  CBC:   Lab Results   Component Value Date    WBC 17.9 (H) 04/26/2021    WBC 6.2 12/20/2018    HGB 15.7 04/26/2021    HGB 15.7 12/20/2018    HCT 46.9 04/26/2021    HCT 47.0 12/20/2018     04/26/2021     12/20/2018     BMP:   Lab Results   Component Value Date     10/11/2023     06/09/2023    POTASSIUM 4.0 10/11/2023    POTASSIUM 4.3 06/09/2023    CHLORIDE 102 10/11/2023    CHLORIDE 100 06/09/2023    CO2 29 10/11/2023    CO2 30 (H) 06/09/2023    BUN 13.5 10/11/2023    BUN 11.3 06/09/2023    CR 1.13 10/11/2023    CR 1.09 06/09/2023    GLC 84 10/11/2023     (H) 06/09/2023     COAGS: No results found for: \"PTT\", \"INR\", \"FIBR\"  POC: No results found for: \"BGM\", \"HCG\", \"HCGS\"  HEPATIC:   Lab Results   Component Value Date    ALBUMIN 3.8 11/16/2022    PROTTOTAL 6.8 11/16/2022    ALT 37 11/16/2022    AST 21 11/16/2022    ALKPHOS 94 11/16/2022    BILITOTAL 0.2 11/16/2022     OTHER:   Lab Results   Component Value Date    A1C 6.4 (H) 10/11/2023    CHERIE 9.3 10/11/2023    MAG 2.0 02/24/2021    TSH 1.26 02/24/2021    .0 (H) 04/26/2021    SED 5 12/20/2018       Anesthesia Plan    ASA Status:  3    NPO Status:  NPO Appropriate    Anesthesia Type: MAC.     - Reason for MAC: Deep or markedly invasive procedure (G8), immobility needed   Induction: Intravenous.   Maintenance: TIVA.        Consents    Anesthesia Plan(s) and associated risks, benefits, and realistic alternatives discussed. Questions answered and patient/representative(s) expressed " understanding.     - Discussed:     - Discussed with:  Patient      - Extended Intubation/Ventilatory Support Discussed: No.      - Patient is DNR/DNI Status: No     Use of blood products discussed: No .     Postoperative Care    Pain management: Multi-modal analgesia.   PONV prophylaxis: Background Propofol Infusion     Comments:    Other Comments: The risks and benefits of anesthesia, and the alternatives where applicable, have been discussed with the patient, and they wish to proceed.            RADHA Bey CRNA

## 2023-10-20 NOTE — ANESTHESIA POSTPROCEDURE EVALUATION
Patient: Humberto Roberts    Procedure: Procedure(s):  Bilateral lumbar 3-4 transforaminal epidural injections       Anesthesia Type:  MAC    Note:  Disposition: Outpatient   Postop Pain Control: Uneventful            Sign Out: Well controlled pain   PONV: No   Neuro/Psych: Uneventful            Sign Out: Acceptable/Baseline neuro status   Airway/Respiratory: Uneventful            Sign Out: Acceptable/Baseline resp. status   CV/Hemodynamics: Uneventful            Sign Out: Acceptable CV status   Other NRE: NONE   DID A NON-ROUTINE EVENT OCCUR? No    Event details/Postop Comments:  Pt was happy with anesthesia care.  No complications.  I will follow up with the pt if needed.           Last vitals:  Vitals Value Taken Time   BP     Temp     Pulse     Resp     SpO2 100 % 10/20/23 1237   Vitals shown include unfiled device data.    Electronically Signed By: RADHA Bey CRNA  October 20, 2023  12:37 PM

## 2023-10-20 NOTE — OP NOTE
PRIMARY PROBLEM: Low back pain and lower extremity radicular pains    INDICATIONS FOR PROCEDURE:   1.This patient suffers from moderate to severe low back pain and lower extremity radicular pains.    2.This pain has persisted for more than 4 weeks and is causing significant functional disability when they are trying to perform ADL's.   3. They failed conservative care which consisted of giving this pain time to liz, PT, medications  4. Preoperative NRS pain score was verbally reported to me today as a  8/10.   5. The patients radicular pains correlates to their MRI which shows increased disc herniation at L3-4 with bilateral foraminal stenosis.  He also has Modic changes at L1-L2 although I personally reviewed the MRI and the Modic changes do appear to be subtle.  He also has moderate facet hypertrophy at L3-4 L4-5 and L5-S1.  6.  An order was sent to me to perform the technical component of a lumbar epidural steroid injection.    PROCEDURE: Bilateral L3-4  Transforaminal Epidural Steroid Injection with fluoroscopic guidance and contrast.     PROCEDURE DETAILS: After written informed consent was obtained from the patient, the patient was escorted to the procedure room.  The patient was placed in the prone position.  A  time out  was conducted to verify patient identity, procedure to be performed, side, site, allergies and any special requirements.  The skin over the thoracolumbar region was prepped and draped in normal sterile fashion with ChloraPrep. Fluoroscopy was used to identify the neural foramen in AP view and the skin was anesthetized with 2 mL of 1% lidocaine with bicarbonate buffer. A 22-gauge Quincke spinal needle was advanced through this location and advanced under fluoroscopic guidance towards the neural foramen.  The target zone was the 6 o clock position of the pedicle.   Prior to entering the foramen, the depth of the needle was gauged with a lateral view on fluoroscopy. While still in a lateral  view, the needle was slowly advanced to avoid injury to the spinal nerve.  Then, in the oblique view (approximately 28 degrees), after negative aspiration, 1.5 mL of Omnipaque contrast dye was injected revealing epidural spread without evidence of intravascular or intrathecal spread.  Then a 3cc solution of 20 mg of Triamcinolone in 2 mL of  Preservative-Free saline was slowly injected into the epidural space at each segment.  This was repeated bilaterally at his L3-4 segment.  I did have good epidural spread but initially the contrast did show spread posteriorly as evidenced by a lateral fluoroscopic image.  The needles had to be manipulated more medially and anteriorly until I had good epidural spread.  After injection of the medication, as the needle tip was withdrawn, it was flushed with local anesthetic.   The patient was monitored with blood pressure and pulse oximetry machines with the assistance of an RN throughout the procedure.  The patient was alert and responsive to questions throughout the procedure.   The patient tolerated the procedure well and was observed in the post-procedural area.  The patient was dismissed without apparent complications.     BLOOD LOSS: < 5 cc    DIAGNOSIS:  1.  Increase disc herniation at L3-4 with bilateral foraminal stenosis  2.  History consistent with discogenic pain as well as SI joint dysfunction.  Less likely facet mediated    PLAN:  1. Performed a technically successful bilateral L3-4   transforaminal epidural steroid injections.  2. The patient was instructed to follow-up with the referring provider and discharge instructions were given for post-operative care.  If this is not helpful given his history consistent with SI joint dysfunction I recommend bilateral SI joint actions.    Joe Aranda MD  Diplomate of the American Board of Anesthesiology, Pain Medicine

## 2023-10-20 NOTE — INTERVAL H&P NOTE
"I have reviewed the surgical (or preoperative) H&P that is linked to this encounter, and examined the patient. There are no significant changes    Clinical Conditions Present on Arrival:  Clinically Significant Risk Factors Present on Admission                 # Drug Induced Platelet Defect: home medication list includes an antiplatelet medication  # Severe Obesity: Estimated body mass index is 42.44 kg/m  as calculated from the following:    Height as of 10/11/23: 1.85 m (6' 0.84\").    Weight as of 10/11/23: 145.2 kg (320 lb 3.2 oz).       "

## 2023-10-20 NOTE — DISCHARGE INSTRUCTIONS

## 2023-10-20 NOTE — ANESTHESIA CARE TRANSFER NOTE
Patient: Humberto Roberts    Procedure: Procedure(s):  Bilateral lumbar 3-4 transforaminal epidural injections       Diagnosis: Chronic bilateral low back pain without sciatica [M54.50, G89.29]  Diagnosis Additional Information: No value filed.    Anesthesia Type:   MAC     Note:    Oropharynx: oropharynx clear of all foreign objects and spontaneously breathing  Level of Consciousness: drowsy  Oxygen Supplementation: nasal cannula    Independent Airway: airway patency satisfactory and stable  Dentition: dentition unchanged  Vital Signs Stable: post-procedure vital signs reviewed and stable  Report to RN Given: handoff report given  Patient transferred to: Phase II    Handoff Report: Identifed the Patient, Identified the Reponsible Provider, Reviewed the pertinent medical history, Discussed the surgical course, Reviewed Intra-OP anesthesia mangement and issues during anesthesia, Set expectations for post-procedure period and Allowed opportunity for questions and acknowledgement of understanding      Vitals:  Vitals Value Taken Time   BP     Temp     Pulse     Resp     SpO2 100 % 10/20/23 1236   Vitals shown include unfiled device data.    Electronically Signed By: RADHA Bey CRNA  October 20, 2023  12:37 PM

## 2023-10-25 ENCOUNTER — TELEPHONE (OUTPATIENT)
Dept: FAMILY MEDICINE | Facility: CLINIC | Age: 53
End: 2023-10-25
Payer: COMMERCIAL

## 2023-10-25 DIAGNOSIS — M54.50 CHRONIC BILATERAL LOW BACK PAIN WITHOUT SCIATICA: Primary | ICD-10-CM

## 2023-10-25 DIAGNOSIS — G89.29 CHRONIC BILATERAL LOW BACK PAIN WITHOUT SCIATICA: Primary | ICD-10-CM

## 2023-10-25 DIAGNOSIS — G89.29 CHRONIC BILATERAL LOW BACK PAIN WITHOUT SCIATICA: ICD-10-CM

## 2023-10-25 DIAGNOSIS — M54.50 CHRONIC BILATERAL LOW BACK PAIN WITHOUT SCIATICA: ICD-10-CM

## 2023-10-25 NOTE — TELEPHONE ENCOUNTER
FYI - Status Update    Who is Calling: patient    Update: patient back is still in a lot of pain. Dr. Aranda told him there is another procedure he can do to give him more relief. Patient requesting that referral put in    Does caller want a call/response back: yes, [phone]

## 2023-10-26 RX ORDER — OXYCODONE AND ACETAMINOPHEN 5; 325 MG/1; MG/1
1 TABLET ORAL 2 TIMES DAILY PRN
Qty: 20 TABLET | Refills: 0 | Status: SHIPPED | OUTPATIENT
Start: 2023-10-26 | End: 2024-03-19

## 2023-10-27 ENCOUNTER — TELEPHONE (OUTPATIENT)
Dept: NEUROSURGERY | Facility: CLINIC | Age: 53
End: 2023-10-27
Payer: COMMERCIAL

## 2023-10-27 ENCOUNTER — MYC MEDICAL ADVICE (OUTPATIENT)
Dept: FAMILY MEDICINE | Facility: CLINIC | Age: 53
End: 2023-10-27
Payer: COMMERCIAL

## 2023-10-27 NOTE — TELEPHONE ENCOUNTER
If the injection is not helping and he failed physical therapy, I recommend him to be seen by the neurosurgery for further evaluation and management.  Please let me know if he is interested.

## 2023-10-27 NOTE — LETTER
[address] [address]  Phone: [phone]  Fax: [phone]    November 2, 2023      Humberto Roberts                                                                                                                                [address] [place] [id number]      Dear Mr. Roberts,    We are concerned about your health care.  We recently provided you with a medication refill.  Many medications require routine follow-up with your Doctor.       At this time we ask that: You schedule a routine office visit with your physician to follow your medications.  Call the clinic at [phone] Option 1 to schedule.      Your prescription:  Refilled x 1 time, need to follow up before refilling again.         Thank you,     Calos Hester MD/Care Team

## 2023-10-27 NOTE — TELEPHONE ENCOUNTER
"Sending to neurosurgery team to see if it's necessary to see them as patient says that Dr. Aranda told him when he had his injection on Friday that he would be able to do a procedure where he \"burned the nerves\". Dr. Aranda says he can do this if ordered. He's also had an MRI and physical therapy. Does he need to schedule with Dr. Groves or can the procedure just be ordered for patient? Please call him today as he is in a lot of pain. [phone]    "

## 2023-10-27 NOTE — TELEPHONE ENCOUNTER
Received call from patient on neurosurgery nurse line. Reviewed with patient that we cannot provide any recommendations until he is seen in clinic. Recommended patient reach out to PCP for any immediate needs. Patient voiced agreement and understanding     Call transferred to  to assist with scheduling patient per neurosurgery referral.

## 2023-10-27 NOTE — TELEPHONE ENCOUNTER
Patient has not been seen by neurosurgery. Patient will need appointment with neurosurgery prior to treatment being ordered.     Attempted to reach out to patient, no answer, no VM option.     Encounter routed to scheduling team to assist.

## 2023-10-27 NOTE — TELEPHONE ENCOUNTER
Patient informed via mychart of note below. 11/1 reminder if not read to send letter.   Closing encounter.   Jessica Ortiz MA

## 2023-10-30 ENCOUNTER — PRE VISIT (OUTPATIENT)
Dept: NEUROSURGERY | Facility: CLINIC | Age: 53
End: 2023-10-30
Payer: COMMERCIAL

## 2023-10-30 NOTE — CONFIDENTIAL NOTE
NEUROSURGERY- NEW PREVISIT PLANNING       Record Status/Location     Referring Provider Referral Calos Hester MD    Diagnosis Referral M54.50, G89.29 (ICD-10-CM) - Chronic bilateral low back pain without sciatica    MRI (HEAD, NECK, SPINE) Pacs Lumbar 9/7/23     [facility]      CT Na    X-ray Pacs Lumbar 9/16/19 [facility] [place]    INJECTION Encounter 10/20/23 Bilateral Lumbar 3-4 Transforaminal Epidural Steroid Injection with fluoroscopic guidance and contrast.    PHYSICAL THERAPY Encounters 2023 x2 [facility]    SURGERY Na

## 2023-10-31 ENCOUNTER — PATIENT OUTREACH (OUTPATIENT)
Dept: CARE COORDINATION | Facility: CLINIC | Age: 53
End: 2023-10-31
Payer: COMMERCIAL

## 2023-11-09 ENCOUNTER — TELEPHONE (OUTPATIENT)
Dept: PALLIATIVE MEDICINE | Facility: CLINIC | Age: 53
End: 2023-11-09

## 2023-11-09 ENCOUNTER — OFFICE VISIT (OUTPATIENT)
Dept: NEUROSURGERY | Facility: CLINIC | Age: 53
End: 2023-11-09
Attending: FAMILY MEDICINE
Payer: COMMERCIAL

## 2023-11-09 VITALS
TEMPERATURE: 98.3 F | OXYGEN SATURATION: 98 % | SYSTOLIC BLOOD PRESSURE: 122 MMHG | DIASTOLIC BLOOD PRESSURE: 80 MMHG | BODY MASS INDEX: 41.88 KG/M2 | WEIGHT: 315 LBS | HEART RATE: 65 BPM | RESPIRATION RATE: 18 BRPM

## 2023-11-09 DIAGNOSIS — G89.29 CHRONIC BILATERAL LOW BACK PAIN WITHOUT SCIATICA: ICD-10-CM

## 2023-11-09 DIAGNOSIS — M54.50 CHRONIC BILATERAL LOW BACK PAIN WITHOUT SCIATICA: ICD-10-CM

## 2023-11-09 PROCEDURE — 99243 OFF/OP CNSLTJ NEW/EST LOW 30: CPT | Performed by: NEUROLOGICAL SURGERY

## 2023-11-09 ASSESSMENT — PAIN SCALES - GENERAL: PAINLEVEL: SEVERE PAIN (7)

## 2023-11-09 NOTE — PROGRESS NOTES
I was asked by Dr. Hester to see this patient in consultation    53 year old male with back pain.  Several months of severe throbbing low back pain radiating side to side, worse when he tries to sleep, makes it impossible to sleep in a bed, and can only sleep in a recliner.  No significant radiation to the legs currently.  Has done past PT without improvement.  He underwent bilateral L3-4 transforaminal MIKEY which improved a substantial component of his radiating pain, but the back pain persists.  MR Lumbar, personally reviewed, with mild spondylotic change, mild/moderate right 3-4 foraminal stenosis.       Past Medical History:   Diagnosis Date    Acid reflux disease since 2006    Back pain chronic    Cellulitis of left upper arm and forearm 11/22/2013    Elevated serum creatinine 10/15/2015    Hypertension     Pre-diabetes 2/25/2021     Past Surgical History:   Procedure Laterality Date    COLONOSCOPY N/A 3/25/2021    Procedure: Colonoscopy Incomplete;  Surgeon: Humberot Antoine DO;  Location:  GI    COLONOSCOPY N/A 7/15/2021    Procedure: COLONOSCOPY;  Surgeon: Humberto Antoine DO;  Location:  GI    ENT SURGERY      ESOPHAGOSCOPY, GASTROSCOPY, DUODENOSCOPY (EGD), COMBINED N/A 3/25/2021    Procedure: Esophagogastroduodenoscopy with biopsy;  Surgeon: Humberto Antoine DO;  Location:  GI    INJECT EPIDURAL TRANSFORAMINAL N/A 10/20/2023    Procedure: Bilateral Lumbar 3-4 Transforaminal Epidural Steroid Injection with fluoroscopic guidance and contrast.;  Surgeon: Joe Aranda MD;  Location:  OR     Social History     Socioeconomic History    Marital status:      Spouse name: Not on file    Number of children: Not on file    Years of education: Not on file    Highest education level: Not on file   Occupational History    Not on file   Tobacco Use    Smoking status: Never    Smokeless tobacco: Never   Vaping Use    Vaping Use: Never used   Substance and Sexual Activity    Alcohol  use: No     Alcohol/week: 0.0 standard drinks of alcohol    Drug use: No    Sexual activity: Yes     Partners: Female   Other Topics Concern    Not on file   Social History Narrative    Not on file     Social Determinants of Health     Financial Resource Strain: Low Risk  (10/11/2023)    Financial Resource Strain     Within the past 12 months, have you or your family members you live with been unable to get utilities (heat, electricity) when it was really needed?: No   Food Insecurity: High Risk (10/11/2023)    Food Insecurity     Within the past 12 months, did you worry that your food would run out before you got money to buy more?: Yes     Within the past 12 months, did the food you bought just not last and you didn t have money to get more?: Yes   Transportation Needs: Unknown (10/11/2023)    Transportation Needs     Within the past 12 months, has lack of transportation kept you from medical appointments, getting your medicines, non-medical meetings or appointments, work, or from getting things that you need?: Patient refused   Physical Activity: Not on file   Stress: Not on file   Social Connections: Not on file   Interpersonal Safety: Low Risk  (10/11/2023)    Interpersonal Safety     Do you feel physically and emotionally safe where you currently live?: Yes     Within the past 12 months, have you been hit, slapped, kicked or otherwise physically hurt by someone?: No     Within the past 12 months, have you been humiliated or emotionally abused in other ways by your partner or ex-partner?: No   Housing Stability: High Risk (10/11/2023)    Housing Stability     Do you have housing? : No     Are you worried about losing your housing?: No     Family History   Problem Relation Age of Onset    Diabetes Mother     Hypertension Mother     Heart Disease Mother     Heart Disease Father     Diabetes Father     Unknown/Adopted Maternal Grandmother     Unknown/Adopted Maternal Grandfather     Unknown/Adopted Paternal  Grandmother     Unknown/Adopted Paternal Grandfather     No Known Problems Sister     No Known Problems Son     No Known Problems Daughter     No Known Problems Sister     No Known Problems Son     No Known Problems Daughter         ROS: 10 point ROS neg other than the symptoms noted above in the HPI.    Physical Exam  /80   Pulse 65   Temp 98.3  F (36.8  C) (Temporal)   Resp 18   Wt 316 lb (143.3 kg)   SpO2 98%   BMI 41.88 kg/m    HEENT:  Normocephalic, atraumatic.  PERRLA.  EOM s intact.  Visual fields full to gross exam  Neck:  Supple, non-tender, without lymphadenopathy.  Heart:  No peripheral edema  Lungs:  No SOB  Abdomen:  Non-distended.   Skin:  Warm and dry.  Extremities:  No edema, cyanosis or clubbing.  Psychiatric:  No apparent distress  Musculoskeletal:  Normal bulk and tone    NEUROLOGICAL EXAMINATION:     Mental status:  Alert and Oriented x 3, speech is fluent.  Cranial nerves:  II-XII intact.   Motor:    Shoulder Abduction:  Right:  5/5   Left:  5/5  Biceps:                      Right:  5/5   Left:  5/5  Triceps:                     Right:  5/5   Left:  5/5  Wrist Extensors:       Right:  5/5   Left:  5/5  Wrist Flexors:           Right:  5/5   Left:  5/5  interosseus :            Right:  5/5   Left:  5/5  Hip Flexion:                Right: 5/5  Left:  5/5  Quadriceps:             Right:  5/5  Left:  5/5  Hamstrings:             Right:  5/5  Left:  5/5  Gastroc Soleus:        Right:  5/5  Left:  5/5  Tib/Ant:                      Right:  5/5  Left:  5/5  EHL:                     Right:  5/5  Left:  5/5  Sensation:  Intact  Reflexes:  Negative Babinski.  Negative Clonus.  Negative Martinez's.  Coordination:  Smooth finger to nose testing.   Negative pronator drift.  Smooth tandem walking.  Tenderness over bilateral SI joints    A/P:  53 year old male with back pain    I had a discussion with the patient, reviewing the history, symptoms, and imaging  Will try SI joint injections  If no  improvement, may consider MIS Right L3-4 foraminal decompression

## 2023-11-09 NOTE — NURSING NOTE
"Humberto Roberts is a 53 year old male who presents for:  Chief Complaint   Patient presents with    Neurologic Problem     Chronic bilateral low back pain without sciatica        Initial Vitals:  /80   Pulse 65   Temp 98.3  F (36.8  C) (Temporal)   Resp 18   Wt 316 lb (143.3 kg)   SpO2 98%   BMI 41.88 kg/m   Estimated body mass index is 41.88 kg/m  as calculated from the following:    Height as of 10/20/23: 6' 0.84\" (1.85 m).    Weight as of this encounter: 316 lb (143.3 kg).. Body surface area is 2.71 meters squared. BP completed using cuff size: large  Severe Pain (7)    Nursing Comments:     Paty Santana    "

## 2023-11-09 NOTE — LETTER
11/9/2023         RE: Humberto Roberts  [address] [facility] [address]        Dear Colleague,    Thank you for referring your patient, Humberto Roberts, to the [facility] [place]. Please see a copy of my visit note below.    I was asked by Dr. Hester to see this patient in consultation    53 year old male with back pain.  Several months of severe throbbing low back pain radiating side to side, worse when he tries to sleep, makes it impossible to sleep in a bed, and can only sleep in a recliner.  No significant radiation to the legs currently.  Has done past PT without improvement.  He underwent bilateral L3-4 transforaminal MIKEY which improved a substantial component of his radiating pain, but the back pain persists.  MR Lumbar, personally reviewed, with mild spondylotic change, mild/moderate right 3-4 foraminal stenosis.       Past Medical History:   Diagnosis Date     Acid reflux disease since 2006     Back pain chronic     Cellulitis of left upper arm and forearm 11/22/2013     Elevated serum creatinine 10/15/2015     Hypertension      Pre-diabetes 2/25/2021     Past Surgical History:   Procedure Laterality Date     COLONOSCOPY N/A 3/25/2021    Procedure: Colonoscopy Incomplete;  Surgeon: Humberto Antoine DO;  Location:  GI     COLONOSCOPY N/A 7/15/2021    Procedure: COLONOSCOPY;  Surgeon: Humberto Antoine DO;  Location:  GI     ENT SURGERY       ESOPHAGOSCOPY, GASTROSCOPY, DUODENOSCOPY (EGD), COMBINED N/A 3/25/2021    Procedure: Esophagogastroduodenoscopy with biopsy;  Surgeon: Humberto Antoine DO;  Location:  GI     INJECT EPIDURAL TRANSFORAMINAL N/A 10/20/2023    Procedure: Bilateral Lumbar 3-4 Transforaminal Epidural Steroid Injection with fluoroscopic guidance and contrast.;  Surgeon: Joe Aranda MD;  Location:  OR     Social History     Socioeconomic History     Marital status:      Spouse name: Not on file     Number of children: Not  on file     Years of education: Not on file     Highest education level: Not on file   Occupational History     Not on file   Tobacco Use     Smoking status: Never     Smokeless tobacco: Never   Vaping Use     Vaping Use: Never used   Substance and Sexual Activity     Alcohol use: No     Alcohol/week: 0.0 standard drinks of alcohol     Drug use: No     Sexual activity: Yes     Partners: Female   Other Topics Concern     Not on file   Social History Narrative     Not on file     Social Determinants of Health     Financial Resource Strain: Low Risk  (10/11/2023)    Financial Resource Strain      Within the past 12 months, have you or your family members you live with been unable to get utilities (heat, electricity) when it was really needed?: No   Food Insecurity: High Risk (10/11/2023)    Food Insecurity      Within the past 12 months, did you worry that your food would run out before you got money to buy more?: Yes      Within the past 12 months, did the food you bought just not last and you didn t have money to get more?: Yes   Transportation Needs: Unknown (10/11/2023)    Transportation Needs      Within the past 12 months, has lack of transportation kept you from medical appointments, getting your medicines, non-medical meetings or appointments, work, or from getting things that you need?: Patient refused   Physical Activity: Not on file   Stress: Not on file   Social Connections: Not on file   Interpersonal Safety: Low Risk  (10/11/2023)    Interpersonal Safety      Do you feel physically and emotionally safe where you currently live?: Yes      Within the past 12 months, have you been hit, slapped, kicked or otherwise physically hurt by someone?: No      Within the past 12 months, have you been humiliated or emotionally abused in other ways by your partner or ex-partner?: No   Housing Stability: High Risk (10/11/2023)    Housing Stability      Do you have housing? : No      Are you worried about losing your  housing?: No     Family History   Problem Relation Age of Onset     Diabetes Mother      Hypertension Mother      Heart Disease Mother      Heart Disease Father      Diabetes Father      Unknown/Adopted Maternal Grandmother      Unknown/Adopted Maternal Grandfather      Unknown/Adopted Paternal Grandmother      Unknown/Adopted Paternal Grandfather      No Known Problems Sister      No Known Problems Son      No Known Problems Daughter      No Known Problems Sister      No Known Problems Son      No Known Problems Daughter         ROS: 10 point ROS neg other than the symptoms noted above in the HPI.    Physical Exam  /80   Pulse 65   Temp 98.3  F (36.8  C) (Temporal)   Resp 18   Wt 316 lb (143.3 kg)   SpO2 98%   BMI 41.88 kg/m    HEENT:  Normocephalic, atraumatic.  PERRLA.  EOM s intact.  Visual fields full to gross exam  Neck:  Supple, non-tender, without lymphadenopathy.  Heart:  No peripheral edema  Lungs:  No SOB  Abdomen:  Non-distended.   Skin:  Warm and dry.  Extremities:  No edema, cyanosis or clubbing.  Psychiatric:  No apparent distress  Musculoskeletal:  Normal bulk and tone    NEUROLOGICAL EXAMINATION:     Mental status:  Alert and Oriented x 3, speech is fluent.  Cranial nerves:  II-XII intact.   Motor:    Shoulder Abduction:  Right:  5/5   Left:  5/5  Biceps:                      Right:  5/5   Left:  5/5  Triceps:                     Right:  5/5   Left:  5/5  Wrist Extensors:       Right:  5/5   Left:  5/5  Wrist Flexors:           Right:  5/5   Left:  5/5  interosseus :            Right:  5/5   Left:  5/5  Hip Flexion:                Right: 5/5  Left:  5/5  Quadriceps:             Right:  5/5  Left:  5/5  Hamstrings:             Right:  5/5  Left:  5/5  Gastroc Soleus:        Right:  5/5  Left:  5/5  Tib/Ant:                      Right:  5/5  Left:  5/5  EHL:                     Right:  5/5  Left:  5/5  Sensation:  Intact  Reflexes:  Negative Babinski.  Negative Clonus.  Negative  Martinez's.  Coordination:  Smooth finger to nose testing.   Negative pronator drift.  Smooth tandem walking.  Tenderness over bilateral SI joints    A/P:  53 year old male with back pain    I had a discussion with the patient, reviewing the history, symptoms, and imaging  Will try SI joint injections  If no improvement, may consider MIS Right L3-4 foraminal decompression          Again, thank you for allowing me to participate in the care of your patient.        Sincerely,        Cole Groves MD

## 2023-11-09 NOTE — TELEPHONE ENCOUNTER
Spoke with patient and scheduled him for 12/14 @0900 with Dr. Aranda for a Sacroiliac Joint injection bilateral.

## 2023-11-09 NOTE — TELEPHONE ENCOUNTER
Call attempted, but no answer an voicemail not set up.  Hannah Carpenter, [facility] Scheduling

## 2023-11-20 ENCOUNTER — TELEPHONE (OUTPATIENT)
Dept: FAMILY MEDICINE | Facility: CLINIC | Age: 53
End: 2023-11-20
Payer: COMMERCIAL

## 2023-11-21 NOTE — TELEPHONE ENCOUNTER
FYI - Status Update    Who is Calling: patient    Update: patient got a text that he was next on the wait list but he is unable to log into [employer] to confirm it. He would really like the appt but he can't get into his chart    Does caller want a call/response back: Yes     Could we send this information to you in [employer] or would you prefer to receive a phone call?:   Patient would prefer a phone call   Okay to leave a detailed message?: Yes at Cell number on file:    Telephone Information:   Mobile [phone]

## 2023-11-28 DIAGNOSIS — E79.0 HYPERURICEMIA: ICD-10-CM

## 2023-11-28 DIAGNOSIS — K21.9 GASTROESOPHAGEAL REFLUX DISEASE, UNSPECIFIED WHETHER ESOPHAGITIS PRESENT: ICD-10-CM

## 2023-11-29 RX ORDER — PANTOPRAZOLE SODIUM 40 MG/1
40 TABLET, DELAYED RELEASE ORAL DAILY
Qty: 90 TABLET | Refills: 1 | Status: SHIPPED | OUTPATIENT
Start: 2023-11-29 | End: 2024-03-19

## 2023-12-01 RX ORDER — ALLOPURINOL 100 MG/1
100 TABLET ORAL DAILY
Qty: 90 TABLET | Refills: 0 | Status: SHIPPED | OUTPATIENT
Start: 2023-12-01 | End: 2024-03-01

## 2023-12-14 ENCOUNTER — HOSPITAL ENCOUNTER (OUTPATIENT)
Facility: CLINIC | Age: 53
Discharge: HOME | End: 2023-12-14
Attending: ANESTHESIOLOGY | Admitting: ANESTHESIOLOGY
Payer: COMMERCIAL

## 2023-12-14 ENCOUNTER — HOSPITAL ENCOUNTER (OUTPATIENT)
Dept: GENERAL RADIOLOGY | Facility: CLINIC | Age: 53
Discharge: HOME | End: 2023-12-14
Attending: ANESTHESIOLOGY | Admitting: ANESTHESIOLOGY
Payer: COMMERCIAL

## 2023-12-14 VITALS
DIASTOLIC BLOOD PRESSURE: 77 MMHG | WEIGHT: 315 LBS | RESPIRATION RATE: 18 BRPM | HEIGHT: 72 IN | OXYGEN SATURATION: 99 % | TEMPERATURE: 97.5 F | HEART RATE: 74 BPM | SYSTOLIC BLOOD PRESSURE: 127 MMHG | BODY MASS INDEX: 42.66 KG/M2

## 2023-12-14 DIAGNOSIS — M46.1 SACROILIITIS (H): Primary | ICD-10-CM

## 2023-12-14 DIAGNOSIS — G89.29 CHRONIC BILATERAL LOW BACK PAIN WITHOUT SCIATICA: ICD-10-CM

## 2023-12-14 DIAGNOSIS — M54.50 CHRONIC BILATERAL LOW BACK PAIN WITHOUT SCIATICA: ICD-10-CM

## 2023-12-14 LAB — GLUCOSE BLDC GLUCOMTR-MCNC: 101 MG/DL (ref 70–99)

## 2023-12-14 PROCEDURE — 82962 GLUCOSE BLOOD TEST: CPT

## 2023-12-14 PROCEDURE — 272N000001 HC OR GENERAL SUPPLY STERILE: Performed by: ANESTHESIOLOGY

## 2023-12-14 PROCEDURE — 250N000009 HC RX 250: Performed by: ANESTHESIOLOGY

## 2023-12-14 PROCEDURE — 250N000011 HC RX IP 250 OP 636: Mod: JZ | Performed by: ANESTHESIOLOGY

## 2023-12-14 PROCEDURE — 999N000180 XR SURGERY CARM FLUORO LESS THAN 5 MIN: Mod: TC

## 2023-12-14 PROCEDURE — 999N000141 HC STATISTIC PRE-PROCEDURE NURSING ASSESSMENT: Performed by: ANESTHESIOLOGY

## 2023-12-14 PROCEDURE — 360N000074 HC SURGERY LEVEL 1, PER MIN: Performed by: ANESTHESIOLOGY

## 2023-12-14 RX ORDER — IOPAMIDOL 612 MG/ML
INJECTION, SOLUTION INTRATHECAL PRN
Status: DISCONTINUED | OUTPATIENT
Start: 2023-12-14 | End: 2023-12-14 | Stop reason: HOSPADM

## 2023-12-14 RX ORDER — TRIAMCINOLONE ACETONIDE 40 MG/ML
INJECTION, SUSPENSION INTRA-ARTICULAR; INTRAMUSCULAR PRN
Status: DISCONTINUED | OUTPATIENT
Start: 2023-12-14 | End: 2023-12-14 | Stop reason: HOSPADM

## 2023-12-14 RX ORDER — LIDOCAINE HYDROCHLORIDE 40 MG/ML
INJECTION, SOLUTION RETROBULBAR PRN
Status: DISCONTINUED | OUTPATIENT
Start: 2023-12-14 | End: 2023-12-14 | Stop reason: HOSPADM

## 2023-12-14 ASSESSMENT — ACTIVITIES OF DAILY LIVING (ADL): ADLS_ACUITY_SCORE: 35

## 2023-12-14 NOTE — DISCHARGE INSTRUCTIONS

## 2023-12-14 NOTE — OP NOTE
Procedure: Attempted bilateral SI joint injection    Anesthesia: Local    Technique: After written and verbal informed consent was obtained he was brought to the procedure area placed in the prone position and his back was sterilely prepped with ChloraPrep.  1% lidocaine was used for skin anesthesia.  I then attempted to place a 22-gauge Quincke needle into his right SI joint and after numerous different angles were attempted and trying different images on the fluoroscopy machine was very difficult to visualize any opening into the SI joint and I did inject Isovue contrast after several attempts which felt like I could be into the joint but the contrast prove that I was not in the joint.  The procedure was abandoned due to multiple attempts and due to the lack of visualization of any opening into the joint.    Treatment plan: I am going to refer him to another location for a CT-guided SI joint injection.  This is rare to do this but I do not think there is any other way of doing it given the amount of degeneration that I saw on the SI joint and the lack of any clear approach into the joint.    The procedure was not completed but abandoned due to inability to complete the intended injection.

## 2024-01-05 ENCOUNTER — TELEPHONE (OUTPATIENT)
Dept: FAMILY MEDICINE | Facility: CLINIC | Age: 54
End: 2024-01-05
Payer: COMMERCIAL

## 2024-01-05 DIAGNOSIS — E11.65 TYPE 2 DIABETES MELLITUS WITH HYPERGLYCEMIA, WITHOUT LONG-TERM CURRENT USE OF INSULIN (H): ICD-10-CM

## 2024-01-05 DIAGNOSIS — M54.50 CHRONIC BILATERAL LOW BACK PAIN WITHOUT SCIATICA: Primary | ICD-10-CM

## 2024-01-05 DIAGNOSIS — G89.29 CHRONIC BILATERAL LOW BACK PAIN WITHOUT SCIATICA: Primary | ICD-10-CM

## 2024-01-05 RX ORDER — EMPAGLIFLOZIN 25 MG/1
25 TABLET, FILM COATED ORAL DAILY
Qty: 90 TABLET | Refills: 0 | Status: SHIPPED | OUTPATIENT
Start: 2024-01-05 | End: 2024-04-29

## 2024-01-05 NOTE — TELEPHONE ENCOUNTER
Patient calling about a referral for a back injection. Said Dr. Aranda was suppose to send in a referral. Unable to see referral. Told patient he can also end a mychart into that specific doctor. Transferred over to specialty clinic.    Zulema Olivares RN on 1/5/2024 at 8:23 AM

## 2024-01-05 NOTE — TELEPHONE ENCOUNTER
Per Dr. Aranda's note,     The procedure was abandoned due to multiple attempts and due to the lack of visualization of any opening into the joint.     Treatment plan: I am going to refer him to another location for a CT-guided SI joint injection.  This is rare to do this but I do not think there is any other way of doing it given the amount of degeneration that I saw on the SI joint and the lack of any clear approach into the joint.    Order not placed by Dr. Aranda's team. Will ask Germain if ok to place this referral.    Berenice Smith RN on 1/5/2024 at 9:18 AM

## 2024-01-05 NOTE — TELEPHONE ENCOUNTER
Verbal orders from Dr. Groves for CT guided SI joint injections. [facility] : Phone: [phone]   fax: [phone].  Patient updated. Reviewed plan to do CT guided inj, then if now relief 2 weeks after, patient to schedule a follow up appointment with Dr. Groves. Patient will call [facility] later today to get the ball rolling with scheduling the injection.    Orders faxed with Manoj's note to [phone]. Verified via [employer].    Berenice Smith RN on 1/5/2024 at 11:08 AM

## 2024-01-05 NOTE — TELEPHONE ENCOUNTER
[facility] called [facility] and was upset that he has not gotten a call for a different injection Dr Aranda recommended him getting.    Per Dr Aranda  from 12/14/2023    Treatment plan: I am going to refer him to another location for a CT-guided SI joint injection.  This is rare to do this but I do not think there is any other way of doing it given the amount of degeneration that I saw on the SI joint and the lack of any clear approach into the joint.     REJI Ng is gong to try and get a hold of Dr Aranda for orders.     Patient will be notified with an update. Please leave a message for patient if he doesn't answer.     KEVIN/MA

## 2024-01-07 ENCOUNTER — HOSPITAL ENCOUNTER (EMERGENCY)
Facility: CLINIC | Age: 54
Discharge: HOME | End: 2024-01-08
Attending: FAMILY MEDICINE | Admitting: FAMILY MEDICINE
Payer: COMMERCIAL

## 2024-01-07 VITALS
OXYGEN SATURATION: 99 % | DIASTOLIC BLOOD PRESSURE: 101 MMHG | RESPIRATION RATE: 18 BRPM | HEART RATE: 99 BPM | TEMPERATURE: 98 F | SYSTOLIC BLOOD PRESSURE: 177 MMHG

## 2024-01-07 DIAGNOSIS — H92.02 LEFT EAR PAIN: ICD-10-CM

## 2024-01-07 DIAGNOSIS — H69.92 DYSFUNCTION OF LEFT EUSTACHIAN TUBE: ICD-10-CM

## 2024-01-07 PROCEDURE — 99283 EMERGENCY DEPT VISIT LOW MDM: CPT | Performed by: FAMILY MEDICINE

## 2024-01-07 PROCEDURE — 99282 EMERGENCY DEPT VISIT SF MDM: CPT | Performed by: FAMILY MEDICINE

## 2024-01-07 ASSESSMENT — ACTIVITIES OF DAILY LIVING (ADL): ADLS_ACUITY_SCORE: 33

## 2024-01-08 NOTE — DISCHARGE INSTRUCTIONS
1.   over-the-counter Sudafed and use this for the next few days to help see if this decreases the pressure in your ear.  I would also do saline sprays in your nose daily.

## 2024-01-08 NOTE — ED TRIAGE NOTES
L ear pain for a couple days.     Triage Assessment (Adult)       Row Name 01/07/24 2035          Triage Assessment    Airway WDL WDL        Respiratory WDL    Respiratory WDL WDL        Cardiac WDL    Cardiac WDL WDL

## 2024-01-08 NOTE — ED PROVIDER NOTES
History     Chief Complaint   Patient presents with    Otalgia     HPI  Humberto Roberts is a 53 year old male who presents with concerns of left ear pain.  This been going on for the last couple of days.  Patient also cannot hear out of his ear.  Patient recently had a tube placed in his ear because of a chronic infection.  He is not sure if it still in there.  Denies any fevers or chills.  Patient states he is having trouble hearing out of his ear.  Patient has not had a stuffy nose or sore throat.    Allergies:  No Known Allergies    Problem List:    Patient Active Problem List    Diagnosis Date Noted    Type 2 diabetes mellitus with hyperglycemia, without long-term current use of insulin (H) 06/12/2023     Priority: Medium    Acanthosis nigricans 02/25/2021     Priority: Medium    Skin tag 02/25/2021     Priority: Medium    Vitamin D deficiency 12/08/2019     Priority: Medium    Hyperuricemia 12/08/2019     Priority: Medium    RICARDO (obstructive sleep apnea) - on cpap 09/22/2019     Priority: Medium    Chronic bilateral low back pain without sciatica 09/22/2019     Priority: Medium    Seasonal allergic rhinitis due to pollen, unspecified chronicity 01/23/2018     Priority: Medium    Morbid obesity (H) 12/06/2017     Priority: Medium    Insomnia 10/15/2015     Priority: Medium    Hypertension goal BP (blood pressure) < 140/90 05/06/2013     Priority: Medium    GERD (gastroesophageal reflux disease) 04/24/2013     Priority: Medium        Past Medical History:    Past Medical History:   Diagnosis Date    Acid reflux disease since 2006    Back pain chronic    Cellulitis of left upper arm and forearm 11/22/2013    Elevated serum creatinine 10/15/2015    Hypertension     Pre-diabetes 2/25/2021       Past Surgical History:    Past Surgical History:   Procedure Laterality Date    COLONOSCOPY N/A 3/25/2021    Procedure: Colonoscopy Incomplete;  Surgeon: Humberto Antoine DO;  Location:  GI    COLONOSCOPY N/A  7/15/2021    Procedure: COLONOSCOPY;  Surgeon: Humberto Antoine DO;  Location: [facility]    ENT SURGERY      ESOPHAGOSCOPY, GASTROSCOPY, DUODENOSCOPY (EGD), COMBINED N/A 3/25/2021    Procedure: Esophagogastroduodenoscopy with biopsy;  Surgeon: Humberto Antoine DO;  Location: [facility]    INJECT EPIDURAL TRANSFORAMINAL N/A 10/20/2023    Procedure: Bilateral Lumbar 3-4 Transforaminal Epidural Steroid Injection with fluoroscopic guidance and contrast.;  Surgeon: Joe Aranda MD;  Location: [facility]       Family History:    Family History   Problem Relation Age of Onset    Diabetes Mother     Hypertension Mother     Heart Disease Mother     Heart Disease Father     Diabetes Father     Unknown/Adopted Maternal Grandmother     Unknown/Adopted Maternal Grandfather     Unknown/Adopted Paternal Grandmother     Unknown/Adopted Paternal Grandfather     No Known Problems Sister     No Known Problems Son     No Known Problems Daughter     No Known Problems Sister     No Known Problems Son     No Known Problems Daughter        Social History:  Marital Status:   [2]  Social History     Tobacco Use    Smoking status: Never    Smokeless tobacco: Never   Vaping Use    Vaping Use: Never used   Substance Use Topics    Alcohol use: No     Alcohol/week: 0.0 standard drinks of alcohol    Drug use: No        Medications:    albuterol (PROAIR HFA/PROVENTIL HFA/VENTOLIN HFA) 108 (90 Base) MCG/ACT inhaler  allopurinol (ZYLOPRIM) 100 MG tablet  ASPIRIN LOW DOSE 81 MG EC tablet  atenolol (TENORMIN) 50 MG tablet  atorvastatin (LIPITOR) 10 MG tablet  cetirizine (ZYRTEC) 10 MG tablet  clotrimazole-betamethasone (LOTRISONE) 1-0.05 % external cream  Continuous Blood Gluc  (FREESTYLE FANNIE 2 READER) GORDO  Continuous Blood Gluc Sensor (FREESTYLE FANNIE 2 SENSOR) MISC  famotidine (PEPCID) 40 MG tablet  fluticasone (FLONASE) 50 MCG/ACT nasal spray  hydrochlorothiazide (HYDRODIURIL) 25 MG tablet  JARDIANCE 25 MG TABS  tablet  metFORMIN (GLUCOPHAGE XR) 500 MG 24 hr tablet  methylPREDNISolone (MEDROL DOSEPAK) 4 MG tablet therapy pack  order for DME  order for DME  oxyCODONE-acetaminophen (PERCOCET) 5-325 MG tablet  pantoprazole (PROTONIX) 40 MG EC tablet  sildenafil (REVATIO) 20 MG tablet  tiZANidine (ZANAFLEX) 4 MG capsule  traZODone (DESYREL) 150 MG tablet          Review of Systems   All other systems reviewed and are negative.      Physical Exam   BP: (!) 177/101  Pulse: 99  Temp: 98  F (36.7  C)  Resp: 18  SpO2: 99 %      Physical Exam  Vitals and nursing note reviewed.   Constitutional:       General: He is not in acute distress.     Appearance: Normal appearance. He is not ill-appearing.   HENT:      Head: Normocephalic.      Right Ear: Tympanic membrane is not perforated, erythematous, retracted or bulging.      Left Ear: No tenderness. A PE tube (Has migrated out and has not in place) is present. Tympanic membrane is bulging. Tympanic membrane is not injected, scarred or perforated.   Neurological:      Mental Status: He is alert.         ED Course                 Procedures        No results found for this or any previous visit (from the past 24 hour(s)).    Medications - No data to display    Exam shows some fluid in his ear but this does not appear to be infected.  The PE tube seems to have migrated out and that could be 1 reason he is having so much pressure in his ear.  This does not appear to be infected so I do not recommend any antibiotics.  Would recommend that he  over-the-counter Sudafed and saline sprays to help with the congestion.  Patient will be discharged at this time    Assessments & Plan (with Medical Decision Making)  Left ear pain, eustachian tube dysfunction     I have reviewed the nursing notes.    I have reviewed the findings, diagnosis, plan and need for follow up with the patient.        1/7/2024   [facility] EMERGENCY DEPT       Amor Conklin MD  01/08/24  0014

## 2024-01-15 ENCOUNTER — TELEPHONE (OUTPATIENT)
Dept: OTOLARYNGOLOGY | Facility: CLINIC | Age: 54
End: 2024-01-15
Payer: COMMERCIAL

## 2024-01-15 DIAGNOSIS — H90.3 SENSORINEURAL HEARING LOSS, ASYMMETRICAL: ICD-10-CM

## 2024-01-15 DIAGNOSIS — H65.22 CHRONIC SEROUS OTITIS MEDIA, LEFT EAR: Primary | ICD-10-CM

## 2024-01-15 NOTE — TELEPHONE ENCOUNTER
No consent to communicate with spouse on file. LM for pt to call clinic back. Per Dr. Sykes pt is ok to wait for first available at this time. Patient has not been seen by Dr. Sykes since 2/1/2023 and no showed follow up appointment. Patient was to see audiology around that time in regards to possible hearing aids as well. Dolores Cuadra, CMA

## 2024-01-15 NOTE — TELEPHONE ENCOUNTER
[facility] Call Center    Phone Message    May a detailed message be left on voicemail: yes     Reason for Call: Other: Pts wife calling to schedule soonest available to follow up on ER visit from 1/7 for ear pain. States pt did not receive any antibiotics and is concerned about the tube that was placed by Dr Sykes. Please call to discuss concerns. Thanks.     Action Taken: Other: ENT    Travel Screening: Not Applicable

## 2024-01-16 ENCOUNTER — TELEPHONE (OUTPATIENT)
Dept: FAMILY MEDICINE | Facility: CLINIC | Age: 54
End: 2024-01-16
Payer: COMMERCIAL

## 2024-01-16 RX ORDER — CIPROFLOXACIN AND DEXAMETHASONE 3; 1 MG/ML; MG/ML
4 SUSPENSION/ DROPS AURICULAR (OTIC) 2 TIMES DAILY
Qty: 7.5 ML | Refills: 0 | Status: SHIPPED | OUTPATIENT
Start: 2024-01-16 | End: 2024-01-26

## 2024-01-16 NOTE — TELEPHONE ENCOUNTER
Patient called back that he is still not able to hear out of left ear despite taking sudafed and nasal saline per ED recommendations. Hearing loss was not sudden. He denies pain, drainage, and fever.     Sent patient Ciprodex to help alleviate ear troubles. Patient will call back after treatment if he is still experiencing issues.     Niru Urena RN on 1/16/2024 at 9:57 AM

## 2024-01-16 NOTE — TELEPHONE ENCOUNTER
LVM for patient stating scheduled appointment in May is appropriate.     Niru Urena RN on 1/16/2024 at 8:31 AM

## 2024-01-16 NOTE — TELEPHONE ENCOUNTER
Pharmacy called stating patient's insurance does not cover Ciprodex, but will cover Cipro HC. New order was placed and sent to pharmacy.     Niru Urena RN on 1/16/2024 at 3:46 PM

## 2024-01-19 ENCOUNTER — TRANSFERRED RECORDS (OUTPATIENT)
Dept: HEALTH INFORMATION MANAGEMENT | Facility: CLINIC | Age: 54
End: 2024-01-19
Payer: COMMERCIAL

## 2024-01-22 NOTE — TELEPHONE ENCOUNTER
Addressed in 1/15/24 telephone encounter.    Berenice Smith RN on 1/22/2024 at 12:32 PM    
Prescription was sent today for Ciprodex to the pharmacy however the patient's insurance does not cover this. Closest option that insurance will cover is generic cortisporin. Please send replacement rx if appropriate or call pharmacy with questions [phone]     Thank you,  Luis Alberto Martinez Pharm.D.  [facility]  [phone]  
WDL

## 2024-01-25 ENCOUNTER — TELEPHONE (OUTPATIENT)
Dept: OTOLARYNGOLOGY | Facility: CLINIC | Age: 54
End: 2024-01-25
Payer: COMMERCIAL

## 2024-01-25 DIAGNOSIS — H65.22 CHRONIC SEROUS OTITIS MEDIA, LEFT EAR: Primary | ICD-10-CM

## 2024-01-25 DIAGNOSIS — R21 RASH AND NONSPECIFIC SKIN ERUPTION: ICD-10-CM

## 2024-01-25 DIAGNOSIS — I10 ESSENTIAL HYPERTENSION: ICD-10-CM

## 2024-01-25 DIAGNOSIS — E11.65 TYPE 2 DIABETES MELLITUS WITH HYPERGLYCEMIA, WITHOUT LONG-TERM CURRENT USE OF INSULIN (H): ICD-10-CM

## 2024-01-25 RX ORDER — CLOTRIMAZOLE AND BETAMETHASONE DIPROPIONATE 10; .64 MG/G; MG/G
CREAM TOPICAL 2 TIMES DAILY
Qty: 45 G | Refills: 1 | Status: SHIPPED | OUTPATIENT
Start: 2024-01-25

## 2024-01-25 NOTE — TELEPHONE ENCOUNTER
"[facility] Call Center    Phone Message    May a detailed message be left on voicemail: yes     Reason for Call: Other: per patient wife tube is \"falling\" out of his ear would like to talk with a nurse. Please contact for assistance      Action Taken: Other: ENT    Travel Screening: Not Applicable                                                                   "

## 2024-01-25 NOTE — TELEPHONE ENCOUNTER
Attempted to call patient as there is no consent to communicate on file for patient's wife, no answer. Left voicemail and requested patient call back at [phone].       Aleena Cameron RN   [facility]

## 2024-01-25 NOTE — TELEPHONE ENCOUNTER
Patient returned call, he states that the tube in his left ear is falling out. He states that he is experiencing pain that feels like it is radiating up his ear canal. He also endorsed hearing loss. He denies drainage and feels like the fluid is building up within his ear and causing pressure. He denies any fever or chills. He states that he has been using the Ciprodex that was prescribed by Dr. Sykes on 1/16/24 following his emergency department visit for this issue on 1/7/24 and states that he felt like this was helping at first, but now does not seem to be doing anything. Are there any other recommendations for patient at this time?    Aleena Cameron RN   [facility]

## 2024-01-26 RX ORDER — ATORVASTATIN CALCIUM 10 MG/1
10 TABLET, FILM COATED ORAL DAILY
Qty: 90 TABLET | Refills: 1 | Status: SHIPPED | OUTPATIENT
Start: 2024-01-26 | End: 2024-07-26

## 2024-01-26 RX ORDER — PREDNISONE 10 MG/1
10 TABLET ORAL 2 TIMES DAILY
Qty: 10 TABLET | Refills: 2 | Status: SHIPPED | OUTPATIENT
Start: 2024-01-26 | End: 2024-01-31

## 2024-01-26 RX ORDER — ATENOLOL 50 MG/1
TABLET ORAL
Qty: 90 TABLET | Refills: 1 | Status: SHIPPED | OUTPATIENT
Start: 2024-01-26 | End: 2024-07-26

## 2024-01-26 RX ORDER — CEFDINIR 300 MG/1
300 CAPSULE ORAL 2 TIMES DAILY
Qty: 20 CAPSULE | Refills: 1 | Status: SHIPPED | OUTPATIENT
Start: 2024-01-26 | End: 2024-02-05

## 2024-01-29 DIAGNOSIS — M46.1 SACROILIITIS (H): Primary | ICD-10-CM

## 2024-02-05 DIAGNOSIS — I10 HYPERTENSION GOAL BP (BLOOD PRESSURE) < 140/90: ICD-10-CM

## 2024-02-05 RX ORDER — ASPIRIN 81 MG/1
TABLET, COATED ORAL
Qty: 90 TABLET | Refills: 2 | Status: SHIPPED | OUTPATIENT
Start: 2024-02-05

## 2024-02-14 ENCOUNTER — NURSE TRIAGE (OUTPATIENT)
Dept: NURSING | Facility: CLINIC | Age: 54
End: 2024-02-14
Payer: COMMERCIAL

## 2024-02-14 ENCOUNTER — HOSPITAL ENCOUNTER (EMERGENCY)
Facility: CLINIC | Age: 54
Discharge: HOME | End: 2024-02-14
Attending: NURSE PRACTITIONER | Admitting: NURSE PRACTITIONER
Payer: COMMERCIAL

## 2024-02-14 ENCOUNTER — APPOINTMENT (OUTPATIENT)
Dept: ULTRASOUND IMAGING | Facility: CLINIC | Age: 54
End: 2024-02-14
Attending: NURSE PRACTITIONER
Payer: COMMERCIAL

## 2024-02-14 VITALS
SYSTOLIC BLOOD PRESSURE: 153 MMHG | DIASTOLIC BLOOD PRESSURE: 97 MMHG | BODY MASS INDEX: 43.54 KG/M2 | TEMPERATURE: 98.3 F | HEART RATE: 64 BPM | RESPIRATION RATE: 16 BRPM | OXYGEN SATURATION: 99 % | WEIGHT: 315 LBS

## 2024-02-14 DIAGNOSIS — R60.0 EDEMA OF LEFT LOWER LEG: ICD-10-CM

## 2024-02-14 PROCEDURE — 250N000013 HC RX MED GY IP 250 OP 250 PS 637: Performed by: NURSE PRACTITIONER

## 2024-02-14 PROCEDURE — 99283 EMERGENCY DEPT VISIT LOW MDM: CPT | Performed by: NURSE PRACTITIONER

## 2024-02-14 PROCEDURE — 99284 EMERGENCY DEPT VISIT MOD MDM: CPT | Mod: 25 | Performed by: NURSE PRACTITIONER

## 2024-02-14 PROCEDURE — 93971 EXTREMITY STUDY: CPT | Mod: LT

## 2024-02-14 RX ORDER — ACETAMINOPHEN 500 MG
1000 TABLET ORAL ONCE
Status: COMPLETED | OUTPATIENT
Start: 2024-02-14 | End: 2024-02-14

## 2024-02-14 RX ADMIN — ACETAMINOPHEN 1000 MG: 500 TABLET ORAL at 21:45

## 2024-02-14 ASSESSMENT — ACTIVITIES OF DAILY LIVING (ADL): ADLS_ACUITY_SCORE: 35

## 2024-02-15 NOTE — ED TRIAGE NOTES
Pt presents with left leg pain. Pt states feels tightness to back of calf. Pt has had recent back injections. Concerned about DVT. Pain started 3-4 days ago.      Triage Assessment (Adult)       Row Name 02/14/24 2673          Triage Assessment    Airway WDL WDL        Respiratory WDL    Respiratory WDL WDL        Skin Circulation/Temperature WDL    Skin Circulation/Temperature WDL WDL        Cardiac WDL    Cardiac WDL WDL        Peripheral/Neurovascular WDL    Peripheral Neurovascular WDL WDL        Cognitive/Neuro/Behavioral WDL    Cognitive/Neuro/Behavioral WDL WDL

## 2024-02-15 NOTE — DISCHARGE INSTRUCTIONS
Your ultrasound is negative for a blood clot.  Continue to monitor your symptoms.  Sometimes compression stockings worn during the daytime can be helpful if you do have persistent swelling.    I recommend you follow-up with your primary care provider if symptoms continue to be persistent.

## 2024-02-15 NOTE — TELEPHONE ENCOUNTER
Nurse Triage SBAR    Is this a 2nd Level Triage? YES, LICENSED PRACTITIONER REVIEW IS REQUIRED    Situation: Reporting Left leg swelling.     Background: Denies injury.  Patient on diuretic for HTN    Assessment: Left leg swelling last few days. .Denies chest pain, dyspnea     Protocol Recommended Disposition:   See HCP Within 4 Hours (Or PCP Triage)    Recommendation: Does patient need ED tonight?     Paged to provider Paged Dr. Mccain via [employer] web, who advised ED.    Provider Recommendation Follow Up:   Reached patient/caregiver. Informed of provider's recommendations. Patient verbalized understanding and agrees with the plan.     Kelsie Almeida RN  [facility]      Does the patient meet one of the following criteria for ADS visit consideration? 16+ years old, with an [facility] PCP     TIP  Providers, please consider if this condition is appropriate for management at one of our Acute and Diagnostic Services sites.     If patient is a good candidate, please use dotphrase <dot>triageresponse and select Refer to ADS to document.    Reason for Disposition   [1] Thigh, calf, or ankle swelling AND [2] only 1 side    Additional Information   Negative: SEVERE difficulty breathing (e.g., struggling for each breath, speaks in single words)   Negative: Looks like a broken bone or dislocated joint (e.g., crooked or deformed)   Negative: Sounds like a life-threatening emergency to the triager   Negative: Difficulty breathing at rest   Negative: Entire foot is cool or blue in comparison to other side   Negative: [1] Can't walk or can barely walk AND [2] new-onset   Negative: [1] Difficulty breathing with exertion (e.g., walking) AND [2] new-onset or worsening   Negative: [1] Red area or streak AND [2] fever   Negative: [1] Swelling is painful to touch AND [2] fever   Negative: [1] Cast on leg or ankle AND [2] now increased pain   Negative: Patient sounds very sick or weak to the triager   Negative: SEVERE leg  swelling (e.g., swelling extends above knee, entire leg is swollen, weeping fluid)   Negative: [1] Red area or streak [2] large (> 2 in. or 5 cm)   Negative: [1] Thigh or calf pain AND [2] only 1 side AND [3] present > 1 hour    Protocols used: Leg Swelling and Edema-A-AH

## 2024-02-15 NOTE — ED PROVIDER NOTES
History     Chief Complaint   Patient presents with    Leg Pain     HPI  Humberto Roberts is a 53 year old male who presents with left calf tightness and swelling. Symptoms started 3-4 days ago. Denies any known injury. Denies fever or chills. Denies chest pain or shortness of breath.  Patient concern for DVT.  He has no prior history of DVT or PE.  No recent surgery or prolonged travel.    Allergies:  No Known Allergies    Problem List:    Patient Active Problem List    Diagnosis Date Noted    Type 2 diabetes mellitus with hyperglycemia, without long-term current use of insulin (H) 06/12/2023     Priority: Medium    Acanthosis nigricans 02/25/2021     Priority: Medium    Skin tag 02/25/2021     Priority: Medium    Vitamin D deficiency 12/08/2019     Priority: Medium    Hyperuricemia 12/08/2019     Priority: Medium    RICARDO (obstructive sleep apnea) - on cpap 09/22/2019     Priority: Medium    Chronic bilateral low back pain without sciatica 09/22/2019     Priority: Medium    Seasonal allergic rhinitis due to pollen, unspecified chronicity 01/23/2018     Priority: Medium    Morbid obesity (H) 12/06/2017     Priority: Medium    Insomnia 10/15/2015     Priority: Medium    Hypertension goal BP (blood pressure) < 140/90 05/06/2013     Priority: Medium    GERD (gastroesophageal reflux disease) 04/24/2013     Priority: Medium        Past Medical History:    Past Medical History:   Diagnosis Date    Acid reflux disease since 2006    Back pain chronic    Cellulitis of left upper arm and forearm 11/22/2013    Elevated serum creatinine 10/15/2015    Hypertension     Pre-diabetes 2/25/2021       Past Surgical History:    Past Surgical History:   Procedure Laterality Date    COLONOSCOPY N/A 3/25/2021    Procedure: Colonoscopy Incomplete;  Surgeon: Humberto Antoine DO;  Location:  GI    COLONOSCOPY N/A 7/15/2021    Procedure: COLONOSCOPY;  Surgeon: Humberto Antoine DO;  Location:  GI    ENT SURGERY       ESOPHAGOSCOPY, GASTROSCOPY, DUODENOSCOPY (EGD), COMBINED N/A 3/25/2021    Procedure: Esophagogastroduodenoscopy with biopsy;  Surgeon: Humberto Antoine DO;  Location: [facility]    INJECT EPIDURAL TRANSFORAMINAL N/A 10/20/2023    Procedure: Bilateral Lumbar 3-4 Transforaminal Epidural Steroid Injection with fluoroscopic guidance and contrast.;  Surgeon: Joe Aranda MD;  Location: [facility] OR       Family History:    Family History   Problem Relation Age of Onset    Diabetes Mother     Hypertension Mother     Heart Disease Mother     Heart Disease Father     Diabetes Father     Unknown/Adopted Maternal Grandmother     Unknown/Adopted Maternal Grandfather     Unknown/Adopted Paternal Grandmother     Unknown/Adopted Paternal Grandfather     No Known Problems Sister     No Known Problems Son     No Known Problems Daughter     No Known Problems Sister     No Known Problems Son     No Known Problems Daughter        Social History:  Marital Status:   [2]  Social History     Tobacco Use    Smoking status: Never    Smokeless tobacco: Never   Vaping Use    Vaping Use: Never used   Substance Use Topics    Alcohol use: No     Alcohol/week: 0.0 standard drinks of alcohol    Drug use: No        Medications:    albuterol (PROAIR HFA/PROVENTIL HFA/VENTOLIN HFA) 108 (90 Base) MCG/ACT inhaler  allopurinol (ZYLOPRIM) 100 MG tablet  ASPIRIN LOW DOSE 81 MG EC tablet  atenolol (TENORMIN) 50 MG tablet  atorvastatin (LIPITOR) 10 MG tablet  cetirizine (ZYRTEC) 10 MG tablet  clotrimazole-betamethasone (LOTRISONE) 1-0.05 % external cream  Continuous Blood Gluc  (FREESTYLE FANNIE 2 READER) GORDO  Continuous Blood Gluc Sensor (FREESTYLE FANNIE 2 SENSOR) MISC  famotidine (PEPCID) 40 MG tablet  fluticasone (FLONASE) 50 MCG/ACT nasal spray  hydrochlorothiazide (HYDRODIURIL) 25 MG tablet  JARDIANCE 25 MG TABS tablet  metFORMIN (GLUCOPHAGE XR) 500 MG 24 hr tablet  methylPREDNISolone (MEDROL DOSEPAK) 4 MG tablet therapy pack  order for  DME  order for DME  oxyCODONE-acetaminophen (PERCOCET) 5-325 MG tablet  pantoprazole (PROTONIX) 40 MG EC tablet  sildenafil (REVATIO) 20 MG tablet  tiZANidine (ZANAFLEX) 4 MG capsule  traZODone (DESYREL) 150 MG tablet          Review of Systems  As mentioned above in the history present illness. All other systems were reviewed and are negative.    Physical Exam   BP: (!) 153/97  Pulse: 64  Temp: 98.3  F (36.8  C)  Resp: 16  Weight: 145.6 kg (321 lb)  SpO2: 99 %      Physical Exam  Constitutional:       General: He is not in acute distress.     Appearance: Normal appearance. He is well-developed. He is not ill-appearing.   HENT:      Head: Normocephalic and atraumatic.      Right Ear: External ear normal.      Left Ear: External ear normal.      Nose: Nose normal.      Mouth/Throat:      Mouth: Mucous membranes are moist.   Eyes:      Conjunctiva/sclera: Conjunctivae normal.   Cardiovascular:      Rate and Rhythm: Normal rate and regular rhythm.      Heart sounds: Normal heart sounds. No murmur heard.  Pulmonary:      Effort: Pulmonary effort is normal. No respiratory distress.      Breath sounds: Normal breath sounds.   Musculoskeletal:         General: Normal range of motion.      Comments: No obvious unilateral edema. Subjective tightness in the left calf.    Skin:     General: Skin is warm and dry.      Findings: No rash.   Neurological:      General: No focal deficit present.      Mental Status: He is alert and oriented to person, place, and time.         ED Course               Procedures         Results for orders placed or performed during the hospital encounter of 02/14/24 (from the past 24 hour(s))   US Lower Extremity Venous Duplex Left    Narrative    EXAM: US LOWER EXTREMITY VENOUS DUPLEX LEFT  LOCATION: [facility]  DATE: 2/14/2024    INDICATION: calf tightness and LE swelling  COMPARISON: None.  TECHNIQUE: Venous Duplex ultrasound of the left lower extremity with and  without compression, augmentation and duplex. Color flow and spectral Doppler with waveform analysis performed.    FINDINGS: Exam includes the common femoral, femoral, popliteal, and contralateral common femoral veins as well as segmentally visualized deep calf veins and greater saphenous vein.     LEFT: No deep vein thrombosis. No superficial thrombophlebitis. No popliteal cyst.      Impression    IMPRESSION:  1.  No deep venous thrombosis in the left lower extremity.       Medications   acetaminophen (TYLENOL) tablet 1,000 mg (1,000 mg Oral $Given 2/14/24 6195)       Assessments & Plan (with Medical Decision Making)     Left lower extremity ultrasound is negative for DVT.  This is likely peripheral edema.  No evidence of pitting edema and I did not appreciate any unilateral edema when comparing his left leg to his right leg.  He was given a dose of Tylenol for complaint of headache.  I recommend follow-up with his primary care provider.    Plan:  Your ultrasound is negative for a blood clot.  Continue to monitor your symptoms.  Sometimes compression stockings worn during the daytime can be helpful if you do have persistent swelling.    I recommend you follow-up with your primary care provider if symptoms continue to be persistent.    Discharge Medication List as of 2/14/2024 11:22 PM          Final diagnoses:   Edema of left lower leg       2/14/2024   [facility] EMERGENCY DEPT       Josy, RADHA Self CNP  02/14/24 7675

## 2024-02-19 ENCOUNTER — OFFICE VISIT (OUTPATIENT)
Dept: AUDIOLOGY | Facility: CLINIC | Age: 54
End: 2024-02-19
Payer: COMMERCIAL

## 2024-02-19 DIAGNOSIS — H90.A21 SENSORINEURAL HEARING LOSS (SNHL) OF RIGHT EAR WITH RESTRICTED HEARING OF LEFT EAR: ICD-10-CM

## 2024-02-19 DIAGNOSIS — H90.A32 MIXED CONDUCTIVE AND SENSORINEURAL HEARING LOSS OF LEFT EAR WITH RESTRICTED HEARING OF RIGHT EAR: Primary | ICD-10-CM

## 2024-02-19 PROCEDURE — 92567 TYMPANOMETRY: CPT | Performed by: AUDIOLOGIST

## 2024-02-19 PROCEDURE — 92557 COMPREHENSIVE HEARING TEST: CPT | Performed by: AUDIOLOGIST

## 2024-02-19 NOTE — PROGRESS NOTES
AUDIOLOGY REPORT     SUMMARY: Audiology visit completed. See audiogram for results.     RECOMMENDATIONS: Follow-up with ENT    Francois Downey Licensed Audiologist #[id number]

## 2024-02-20 NOTE — PROGRESS NOTES
History of Present Illness - Humberto Roberts is a 53 year old male presenting in clinic today for a recheck on Patient presents with:  Follow Up    Patient with previous history of tubes in his ears presents for follow-up.  He had tubes done a year ago and now the last several months after upper respiratory infection has had pressure and plugging of his left ear.  The right ear does not seem to bother him any longer.  No current nasal congestion or drainage.      BP Readings from Last 1 Encounters:   02/21/24 132/78       BP noted to be well controlled today in office.     Humberto IS NOT a smoker/uses chewing tobacco.      Past Medical History -   Past Medical History:   Diagnosis Date    Acid reflux disease since 2006    Back pain chronic    Cellulitis of left upper arm and forearm 11/22/2013    Elevated serum creatinine 10/15/2015    Hypertension     Pre-diabetes 2/25/2021       Current Medications -   Current Outpatient Medications:     albuterol (PROAIR HFA/PROVENTIL HFA/VENTOLIN HFA) 108 (90 Base) MCG/ACT inhaler, Inhale 2 puffs into the lungs every 4 hours as needed for shortness of breath or wheezing, Disp: 18 g, Rfl: 0    allopurinol (ZYLOPRIM) 100 MG tablet, TAKE 1 TABLET (100 MG) BY MOUTH DAILY, Disp: 90 tablet, Rfl: 0    ASPIRIN LOW DOSE 81 MG EC tablet, TAKE ONE TABLET BY MOUTH ONCE DAILY, Disp: 90 tablet, Rfl: 2    atenolol (TENORMIN) 50 MG tablet, TAKE ONE TABLET BY MOUTH ONCE DAILY, Disp: 90 tablet, Rfl: 1    atorvastatin (LIPITOR) 10 MG tablet, TAKE 1 TABLET (10 MG) BY MOUTH DAILY, Disp: 90 tablet, Rfl: 1    cetirizine (ZYRTEC) 10 MG tablet, TAKE ONE TABLET BY MOUTH ONCE DAILY AS NEEDED FOR ALLERGIES, Disp: 30 tablet, Rfl: 5    clotrimazole-betamethasone (LOTRISONE) 1-0.05 % external cream, Apply topically 2 times daily, Disp: 45 g, Rfl: 1    Continuous Blood Gluc  (FREESTYLE FANNIE 2 READER) GORDO, Use to read blood sugars as per 's instructions., Disp: 1 each, Rfl: 0    Continuous  Blood Gluc Sensor (FREESTYLE FANNIE 2 SENSOR[place] [facility] [place] every 14 days., Disp: 2 each, Rfl: 11    famotidine (PEPCID) 40 MG tablet, Take 1 tablet (40 mg) by mouth nightly as needed for heartburn (acid reflux), Disp: 90 tablet, Rfl: 1    fluticasone (FLONASE) 50 MCG/ACT nasal spray, SPRAY 1-2 SPRAYS IN EACH NOSTRIL EVERY DAY, Disp: 16 g, Rfl: 5    hydrochlorothiazide (HYDRODIURIL) 25 MG tablet, TAKE ONE TABLET BY MOUTH ONCE DAILY, Disp: 90 tablet, Rfl: 2    JARDIANCE 25 MG TABS tablet, TAKE ONE TABLET BY MOUTH ONCE DAILY, Disp: 90 tablet, Rfl: 0    metFORMIN (GLUCOPHAGE XR) 500 MG 24 hr tablet, Take 2 tablets (1,000 mg) by mouth 2 times daily (with meals), Disp: 120 tablet, Rfl: 3    order for DME, Equipment being ordered: shower chair, Disp: 1 Device, Rfl: 0    order for DME, 1 Device Auto CPAP @@ 7-15 cm with heated humidity via mask of choice, Disp: , Rfl:     pantoprazole (PROTONIX) 40 MG EC tablet, TAKE 1 TABLET (40 MG) BY MOUTH DAILY STOP OMEPRAZOLE, Disp: 90 tablet, Rfl: 1    sildenafil (REVATIO) 20 MG tablet, Take 1-2 tablets (20-40 mg) by mouth daily as needed (sexual activity), Disp: 30 tablet, Rfl: 4    tiZANidine (ZANAFLEX) 4 MG capsule, Take 1 capsule (4 mg) by mouth 2 times daily as needed for muscle spasms, Disp: 60 capsule, Rfl: 3    methylPREDNISolone (MEDROL DOSEPAK) 4 MG tablet therapy pack, FOLLOW PACKAGE DIRECTIONS (Patient not taking: Reported on 2/21/2024), Disp: 21 tablet, Rfl: 0    oxyCODONE-acetaminophen (PERCOCET) 5-325 MG tablet, TAKE ONE TABLET BY MOUTH TWICE A DAY AS NEEDED FOR PAIN (Patient not taking: Reported on 2/21/2024), Disp: 20 tablet, Rfl: 0    traZODone (DESYREL) 150 MG tablet, Take 1 tablet (150 mg) by mouth at bedtime (Patient not taking: Reported on 2/21/2024), Disp: 90 tablet, Rfl: 1    Allergies - No Known Allergies    Social History -   Social History     Socioeconomic History    Marital status:    Tobacco Use    Smoking status: Never    Smokeless tobacco: Never    Vaping Use    Vaping Use: Never used   Substance and Sexual Activity    Alcohol use: No     Alcohol/week: 0.0 standard drinks of alcohol    Drug use: No    Sexual activity: Yes     Partners: Female     Social Determinants of Health     Financial Resource Strain: Low Risk  (10/11/2023)    Financial Resource Strain     Within the past 12 months, have you or your family members you live with been unable to get utilities (heat, electricity) when it was really needed?: No   Food Insecurity: High Risk (10/11/2023)    Food Insecurity     Within the past 12 months, did you worry that your food would run out before you got money to buy more?: Yes     Within the past 12 months, did the food you bought just not last and you didn t have money to get more?: Yes   Transportation Needs: Unknown (10/11/2023)    Transportation Needs     Within the past 12 months, has lack of transportation kept you from medical appointments, getting your medicines, non-medical meetings or appointments, work, or from getting things that you need?: Patient refused   Interpersonal Safety: Low Risk  (10/11/2023)    Interpersonal Safety     Do you feel physically and emotionally safe where you currently live?: Yes     Within the past 12 months, have you been hit, slapped, kicked or otherwise physically hurt by someone?: No     Within the past 12 months, have you been humiliated or emotionally abused in other ways by your partner or ex-partner?: No   Housing Stability: High Risk (10/11/2023)    Housing Stability     Do you have housing? : No     Are you worried about losing your housing?: No       Family History -   Family History   Problem Relation Age of Onset    Diabetes Mother     Hypertension Mother     Heart Disease Mother     Heart Disease Father     Diabetes Father     Unknown/Adopted Maternal Grandmother     Unknown/Adopted Maternal Grandfather     Unknown/Adopted Paternal Grandmother     Unknown/Adopted Paternal Grandfather     No Known  "Problems Sister     No Known Problems Son     No Known Problems Daughter     No Known Problems Sister     No Known Problems Son     No Known Problems Daughter        Review of Systems - As per HPI and PMHx, otherwise review of system review of the head and neck negative. Otherwise 10+ review of system is negative    Physical Exam  /78   Temp 97.7  F (36.5  C) (Temporal)   Ht 1.88 m (6' 2\")   Wt 143.8 kg (317 lb 0.3 oz)   BMI 40.70 kg/m    BMI: Body mass index is 40.7 kg/m .    General - The patient is well nourished and well developed, and appears to have good nutritional status.  Alert and oriented to person and place, answers questions and cooperates with examination appropriately.    SKIN - No suspicious lesions or rashes.  Respiration - No respiratory distress.  Head and Face - Normocephalic and atraumatic, with no gross asymmetry noted of the contour of the facial features.  The facial nerve is intact, with strong symmetric movements.    Voice and Breathing - The patient was breathing comfortably without the use of accessory muscles. The patients voice was clear and strong, and had appropriate pitch and quality.    Ears - Bilateral pinna and EACs with normal appearing overlying skin.  Right tympanic membrane intact with good mobility on pneumatic otoscopy . Bony landmarks of the ossicular chain are normal. The tympanic membrane is normal in appearance. No retraction, perforation, or masses.  No fluid or purulence was seen in the external canal or the middle ear.   On the left side serous fluid seen behind otherwise retracted tympanic membrane.  Ear canal appears to be clean and dry.  Eyes - Extraocular movements intact.  Sclera were not icteric or injected, conjunctiva were pink and moist.    Mouth - Examination of the oral cavity showed pink, healthy oral mucosa. No lesions or ulcerations noted.  The tongue was mobile and midline, and the dentition were in good condition.      Throat - The walls of " the oropharynx were smooth, pink, moist, symmetric, and had no lesions or ulcerations.  The tonsillar pillars and soft palate were symmetric.   Neck - Normal midline excursion of the laryngotracheal complex during swallowing.  Full range of motion on passive movement.  Palpation of the occipital, submental, submandibular, internal jugular chain, and supraclavicular nodes did not demonstrate any abnormal lymph nodes or masses.  The carotid pulse was palpable bilaterally.  Palpation of the thyroid was soft and smooth, with no nodules or goiter appreciated.  The trachea was mobile and midline.    Nose - External contour is symmetric, no gross deflection or scars.  Nasal mucosa is pink and moist with no abnormal mucus.  The septum was midline and non-obstructive, turbinates of normal size and position.  No polyps, masses, or purulence noted on examination.    Neuro - Nonfocal neuro exam is normal, CN 2 through 12 intact, normal gait and muscle tone.      Performed in clinic today:  Audiologic Studies - An audiogram and tympanogram were performed today as part of the evaluation and personally reviewed. The tympanogram shows Type C curves on the right and Type B curves on the left, with normal canal volumes and middle ear pressures.  The audiogram showed moderate sensorineural hearing loss  on the right and moderate mixed loss with significant conductive component on the left.    At this point we discussed all findings.  Patient definitely will benefit from replacement of his tube.  Today we discussed slightly longer duration tube.  Dura-Vent tube was proposed.  Patient understands risks and benefits of tube placement such as perforation post tube otorrhea need to fix the perforation in the future and wishes to go ahead with the procedure.  Patient is appropriately positioned under the microscope.  Unable to visualize large amount of fluid behind the drum.  Posterior inferior quadrant is anesthetized with topical phenol.   A radial myringotomy incision was made.  Large amount of mucoid fluid was suctioned followed by placement of Dura-Vent type tube.  Patient tolerated procedure well.    A/P - Humberto Roberts is a 53 year old male Patient presents with:  Follow Up    Patient had replacement PE tube today for chronic mucoid otitis.  He will follow-up in about 6 to 7 weeks.  Patient is also counseled on addressing his sensorineural component of his loss in both ears.  He is interested in hearing aids.  Will work with audiology towards the goal.  At Humberto next appointment they will need a hearing test.      Miguel Sykes MD

## 2024-02-21 ENCOUNTER — OFFICE VISIT (OUTPATIENT)
Dept: OTOLARYNGOLOGY | Facility: OTHER | Age: 54
End: 2024-02-21
Payer: COMMERCIAL

## 2024-02-21 VITALS
DIASTOLIC BLOOD PRESSURE: 78 MMHG | TEMPERATURE: 97.7 F | SYSTOLIC BLOOD PRESSURE: 132 MMHG | BODY MASS INDEX: 40.43 KG/M2 | HEIGHT: 74 IN | WEIGHT: 315 LBS

## 2024-02-21 DIAGNOSIS — H65.32 CHRONIC MUCOID OTITIS MEDIA OF LEFT EAR: Primary | ICD-10-CM

## 2024-02-21 DIAGNOSIS — H90.A32 MIXED CONDUCTIVE AND SENSORINEURAL HEARING LOSS OF LEFT EAR WITH RESTRICTED HEARING OF RIGHT EAR: ICD-10-CM

## 2024-02-21 PROCEDURE — 69433 CREATE EARDRUM OPENING: CPT | Mod: LT | Performed by: OTOLARYNGOLOGY

## 2024-02-21 PROCEDURE — 99213 OFFICE O/P EST LOW 20 MIN: CPT | Mod: 25 | Performed by: OTOLARYNGOLOGY

## 2024-02-21 NOTE — LETTER
2/21/2024         RE: Humberto Roberts  [address] [place] [id number]        Dear Colleague,    Thank you for referring your patient, Humberto Roberts, to the [facility]. Please see a copy of my visit note below.    History of Present Illness - Humberto Roberts is a 53 year old male presenting in clinic today for a recheck on Patient presents with:  Follow Up    Patient with previous history of tubes in his ears presents for follow-up.  He had tubes done a year ago and now the last several months after upper respiratory infection has had pressure and plugging of his left ear.  The right ear does not seem to bother him any longer.  No current nasal congestion or drainage.      BP Readings from Last 1 Encounters:   02/21/24 132/78       BP noted to be well controlled today in office.     Humberto IS NOT a smoker/uses chewing tobacco.      Past Medical History -   Past Medical History:   Diagnosis Date     Acid reflux disease since 2006     Back pain chronic     Cellulitis of left upper arm and forearm 11/22/2013     Elevated serum creatinine 10/15/2015     Hypertension      Pre-diabetes 2/25/2021       Current Medications -   Current Outpatient Medications:      albuterol (PROAIR HFA/PROVENTIL HFA/VENTOLIN HFA) 108 (90 Base) MCG/ACT inhaler, Inhale 2 puffs into the lungs every 4 hours as needed for shortness of breath or wheezing, Disp: 18 g, Rfl: 0     allopurinol (ZYLOPRIM) 100 MG tablet, TAKE 1 TABLET (100 MG) BY MOUTH DAILY, Disp: 90 tablet, Rfl: 0     ASPIRIN LOW DOSE 81 MG EC tablet, TAKE ONE TABLET BY MOUTH ONCE DAILY, Disp: 90 tablet, Rfl: 2     atenolol (TENORMIN) 50 MG tablet, TAKE ONE TABLET BY MOUTH ONCE DAILY, Disp: 90 tablet, Rfl: 1     atorvastatin (LIPITOR) 10 MG tablet, TAKE 1 TABLET (10 MG) BY MOUTH DAILY, Disp: 90 tablet, Rfl: 1     cetirizine (ZYRTEC) 10 MG tablet, TAKE ONE TABLET BY MOUTH ONCE DAILY AS NEEDED FOR ALLERGIES, Disp: 30 tablet, Rfl: 5      clotrimazole-betamethasone (LOTRISONE) 1-0.05 % external cream, Apply topically 2 times daily, Disp: 45 g, Rfl: 1     Continuous Blood Gluc  (FREESTYLE FANNIE 2 READER) G[facility]O, Use to read blood sugars as per 's instructions., Disp: 1 each, Rfl: 0     Continuous Blood Gluc Sensor (FREESTYLE FANNIE 2 SENSOR) [facility] [place], Change every 14 days., Disp: 2 each, Rfl: 11     famotidine (PEPCID) 40 MG tablet, Take 1 tablet (40 mg) by mouth nightly as needed for heartburn (acid reflux), Disp: 90 tablet, Rfl: 1     fluticasone (FLONASE) 50 MCG/ACT nasal spray, SPRAY 1-2 SPRAYS IN EACH NOSTRIL EVERY DAY, Disp: 16 g, Rfl: 5     hydrochlorothiazide (HYDRODIURIL) 25 MG tablet, TAKE ONE TABLET BY MOUTH ONCE DAILY, Disp: 90 tablet, Rfl: 2     JARDIANCE 25 MG TABS tablet, TAKE ONE TABLET BY MOUTH ONCE DAILY, Disp: 90 tablet, Rfl: 0     metFORMIN (GLUCOPHAGE XR) 500 MG 24 hr tablet, Take 2 tablets (1,000 mg) by mouth 2 times daily (with meals), Disp: 120 tablet, Rfl: 3     order for DME, Equipment being ordered: shower chair, Disp: 1 Device, Rfl: 0     order for DME, 1 Device Auto CPAP @@ 7-15 cm with heated humidity via mask of choice, Disp: , Rfl:      pantoprazole (PROTONIX) 40 MG EC tablet, TAKE 1 TABLET (40 MG) BY MOUTH DAILY STOP OMEPRAZOLE, Disp: 90 tablet, Rfl: 1     sildenafil (REVATIO) 20 MG tablet, Take 1-2 tablets (20-40 mg) by mouth daily as needed (sexual activity), Disp: 30 tablet, Rfl: 4     tiZANidine (ZANAFLEX) 4 MG capsule, Take 1 capsule (4 mg) by mouth 2 times daily as needed for muscle spasms, Disp: 60 capsule, Rfl: 3     methylPREDNISolone (MEDROL DOSEPAK) 4 MG tablet therapy pack, FOLLOW PACKAGE DIRECTIONS (Patient not taking: Reported on 2/21/2024), Disp: 21 tablet, Rfl: 0     oxyCODONE-acetaminophen (PERCOCET) 5-325 MG tablet, TAKE ONE TABLET BY MOUTH TWICE A DAY AS NEEDED FOR PAIN (Patient not taking: Reported on 2/21/2024), Disp: 20 tablet, Rfl: 0     traZODone (DESYREL) 150 MG tablet, Take 1  tablet (150 mg) by mouth at bedtime (Patient not taking: Reported on 2/21/2024), Disp: 90 tablet, Rfl: 1    Allergies - No Known Allergies    Social History -   Social History     Socioeconomic History     Marital status:    Tobacco Use     Smoking status: Never     Smokeless tobacco: Never   Vaping Use     Vaping Use: Never used   Substance and Sexual Activity     Alcohol use: No     Alcohol/week: 0.0 standard drinks of alcohol     Drug use: No     Sexual activity: Yes     Partners: Female     Social Determinants of Health     Financial Resource Strain: Low Risk  (10/11/2023)    Financial Resource Strain      Within the past 12 months, have you or your family members you live with been unable to get utilities (heat, electricity) when it was really needed?: No   Food Insecurity: High Risk (10/11/2023)    Food Insecurity      Within the past 12 months, did you worry that your food would run out before you got money to buy more?: Yes      Within the past 12 months, did the food you bought just not last and you didn t have money to get more?: Yes   Transportation Needs: Unknown (10/11/2023)    Transportation Needs      Within the past 12 months, has lack of transportation kept you from medical appointments, getting your medicines, non-medical meetings or appointments, work, or from getting things that you need?: Patient refused   Interpersonal Safety: Low Risk  (10/11/2023)    Interpersonal Safety      Do you feel physically and emotionally safe where you currently live?: Yes      Within the past 12 months, have you been hit, slapped, kicked or otherwise physically hurt by someone?: No      Within the past 12 months, have you been humiliated or emotionally abused in other ways by your partner or ex-partner?: No   Housing Stability: High Risk (10/11/2023)    Housing Stability      Do you have housing? : No      Are you worried about losing your housing?: No       Family History -   Family History   Problem  "Relation Age of Onset     Diabetes Mother      Hypertension Mother      Heart Disease Mother      Heart Disease Father      Diabetes Father      Unknown/Adopted Maternal Grandmother      Unknown/Adopted Maternal Grandfather      Unknown/Adopted Paternal Grandmother      Unknown/Adopted Paternal Grandfather      No Known Problems Sister      No Known Problems Son      No Known Problems Daughter      No Known Problems Sister      No Known Problems Son      No Known Problems Daughter        Review of Systems - As per HPI and PMHx, otherwise review of system review of the head and neck negative. Otherwise 10+ review of system is negative    Physical Exam  /78   Temp 97.7  F (36.5  C) (Temporal)   Ht 1.88 m (6' 2\")   Wt 143.8 kg (317 lb 0.3 oz)   BMI 40.70 kg/m    BMI: Body mass index is 40.7 kg/m .    General - The patient is well nourished and well developed, and appears to have good nutritional status.  Alert and oriented to person and place, answers questions and cooperates with examination appropriately.    SKIN - No suspicious lesions or rashes.  Respiration - No respiratory distress.  Head and Face - Normocephalic and atraumatic, with no gross asymmetry noted of the contour of the facial features.  The facial nerve is intact, with strong symmetric movements.    Voice and Breathing - The patient was breathing comfortably without the use of accessory muscles. The patients voice was clear and strong, and had appropriate pitch and quality.    Ears - Bilateral pinna and EACs with normal appearing overlying skin.  Right tympanic membrane intact with good mobility on pneumatic otoscopy . Bony landmarks of the ossicular chain are normal. The tympanic membrane is normal in appearance. No retraction, perforation, or masses.  No fluid or purulence was seen in the external canal or the middle ear.   On the left side serous fluid seen behind otherwise retracted tympanic membrane.  Ear canal appears to be clean and " dry.  Eyes - Extraocular movements intact.  Sclera were not icteric or injected, conjunctiva were pink and moist.    Mouth - Examination of the oral cavity showed pink, healthy oral mucosa. No lesions or ulcerations noted.  The tongue was mobile and midline, and the dentition were in good condition.      Throat - The walls of the oropharynx were smooth, pink, moist, symmetric, and had no lesions or ulcerations.  The tonsillar pillars and soft palate were symmetric.   Neck - Normal midline excursion of the laryngotracheal complex during swallowing.  Full range of motion on passive movement.  Palpation of the occipital, submental, submandibular, internal jugular chain, and supraclavicular nodes did not demonstrate any abnormal lymph nodes or masses.  The carotid pulse was palpable bilaterally.  Palpation of the thyroid was soft and smooth, with no nodules or goiter appreciated.  The trachea was mobile and midline.    Nose - External contour is symmetric, no gross deflection or scars.  Nasal mucosa is pink and moist with no abnormal mucus.  The septum was midline and non-obstructive, turbinates of normal size and position.  No polyps, masses, or purulence noted on examination.    Neuro - Nonfocal neuro exam is normal, CN 2 through 12 intact, normal gait and muscle tone.      Performed in clinic today:  Audiologic Studies - An audiogram and tympanogram were performed today as part of the evaluation and personally reviewed. The tympanogram shows Type C curves on the right and Type B curves on the left, with normal canal volumes and middle ear pressures.  The audiogram showed moderate sensorineural hearing loss  on the right and moderate mixed loss with significant conductive component on the left.    At this point we discussed all findings.  Patient definitely will benefit from replacement of his tube.  Today we discussed slightly longer duration tube.  Dura-Vent tube was proposed.  Patient understands risks and benefits  of tube placement such as perforation post tube otorrhea need to fix the perforation in the future and wishes to go ahead with the procedure.  Patient is appropriately positioned under the microscope.  Unable to visualize large amount of fluid behind the drum.  Posterior inferior quadrant is anesthetized with topical phenol.  A radial myringotomy incision was made.  Large amount of mucoid fluid was suctioned followed by placement of Dura-Vent type tube.  Patient tolerated procedure well.    A/P - Humberto Roberts is a 53 year old male Patient presents with:  Follow Up    Patient had replacement PE tube today for chronic mucoid otitis.  He will follow-up in about 6 to 7 weeks.  Patient is also counseled on addressing his sensorineural component of his loss in both ears.  He is interested in hearing aids.  Will work with audiology towards the goal.  At Humberto next appointment they will need a hearing test.      Miguel Sykes MD           Again, thank you for allowing me to participate in the care of your patient.        Sincerely,        Miguel Sykes MD, MD

## 2024-03-01 DIAGNOSIS — E79.0 HYPERURICEMIA: ICD-10-CM

## 2024-03-01 RX ORDER — ALLOPURINOL 100 MG/1
100 TABLET ORAL DAILY
Qty: 30 TABLET | Refills: 0 | Status: SHIPPED | OUTPATIENT
Start: 2024-03-01 | End: 2024-08-20

## 2024-03-19 ENCOUNTER — OFFICE VISIT (OUTPATIENT)
Dept: FAMILY MEDICINE | Facility: CLINIC | Age: 54
End: 2024-03-19
Payer: COMMERCIAL

## 2024-03-19 VITALS
HEIGHT: 74 IN | RESPIRATION RATE: 18 BRPM | HEART RATE: 58 BPM | OXYGEN SATURATION: 98 % | DIASTOLIC BLOOD PRESSURE: 82 MMHG | WEIGHT: 315 LBS | BODY MASS INDEX: 40.43 KG/M2 | SYSTOLIC BLOOD PRESSURE: 128 MMHG | TEMPERATURE: 97.1 F

## 2024-03-19 DIAGNOSIS — M54.50 CHRONIC BILATERAL LOW BACK PAIN WITHOUT SCIATICA: ICD-10-CM

## 2024-03-19 DIAGNOSIS — E79.0 HYPERURICEMIA: ICD-10-CM

## 2024-03-19 DIAGNOSIS — E66.01 MORBID OBESITY (H): ICD-10-CM

## 2024-03-19 DIAGNOSIS — I10 HYPERTENSION GOAL BP (BLOOD PRESSURE) < 140/90: Primary | ICD-10-CM

## 2024-03-19 DIAGNOSIS — G89.29 CHRONIC BILATERAL LOW BACK PAIN WITHOUT SCIATICA: ICD-10-CM

## 2024-03-19 DIAGNOSIS — E11.65 TYPE 2 DIABETES MELLITUS WITH HYPERGLYCEMIA, WITHOUT LONG-TERM CURRENT USE OF INSULIN (H): ICD-10-CM

## 2024-03-19 DIAGNOSIS — K21.9 GASTROESOPHAGEAL REFLUX DISEASE WITHOUT ESOPHAGITIS: ICD-10-CM

## 2024-03-19 DIAGNOSIS — F33.1 MODERATE EPISODE OF RECURRENT MAJOR DEPRESSIVE DISORDER (H): ICD-10-CM

## 2024-03-19 LAB
ALBUMIN SERPL BCG-MCNC: 4.4 G/DL (ref 3.5–5.2)
ALP SERPL-CCNC: 75 U/L (ref 40–150)
ALT SERPL W P-5'-P-CCNC: 26 U/L (ref 0–70)
ANION GAP SERPL CALCULATED.3IONS-SCNC: 9 MMOL/L (ref 7–15)
AST SERPL W P-5'-P-CCNC: 19 U/L (ref 0–45)
BILIRUB SERPL-MCNC: 0.3 MG/DL
BUN SERPL-MCNC: 13.8 MG/DL (ref 6–20)
CALCIUM SERPL-MCNC: 9.6 MG/DL (ref 8.6–10)
CHLORIDE SERPL-SCNC: 99 MMOL/L (ref 98–107)
CHOLEST SERPL-MCNC: 110 MG/DL
CREAT SERPL-MCNC: 1.16 MG/DL (ref 0.67–1.17)
DEPRECATED HCO3 PLAS-SCNC: 31 MMOL/L (ref 22–29)
EGFRCR SERPLBLD CKD-EPI 2021: 75 ML/MIN/1.73M2
FASTING STATUS PATIENT QL REPORTED: YES
GLUCOSE SERPL-MCNC: 101 MG/DL (ref 70–99)
HBA1C MFR BLD: 6.4 %
HDLC SERPL-MCNC: 27 MG/DL
LDLC SERPL CALC-MCNC: 48 MG/DL
NONHDLC SERPL-MCNC: 83 MG/DL
POTASSIUM SERPL-SCNC: 3.5 MMOL/L (ref 3.4–5.3)
PROT SERPL-MCNC: 7.4 G/DL (ref 6.4–8.3)
SODIUM SERPL-SCNC: 139 MMOL/L (ref 135–145)
TRIGL SERPL-MCNC: 174 MG/DL
URATE SERPL-MCNC: 6.1 MG/DL (ref 3.4–7)

## 2024-03-19 PROCEDURE — 83036 HEMOGLOBIN GLYCOSYLATED A1C: CPT | Performed by: FAMILY MEDICINE

## 2024-03-19 PROCEDURE — 90472 IMMUNIZATION ADMIN EACH ADD: CPT | Performed by: FAMILY MEDICINE

## 2024-03-19 PROCEDURE — 90715 TDAP VACCINE 7 YRS/> IM: CPT | Performed by: FAMILY MEDICINE

## 2024-03-19 PROCEDURE — 36415 COLL VENOUS BLD VENIPUNCTURE: CPT | Performed by: FAMILY MEDICINE

## 2024-03-19 PROCEDURE — 90750 HZV VACC RECOMBINANT IM: CPT | Performed by: FAMILY MEDICINE

## 2024-03-19 PROCEDURE — 90471 IMMUNIZATION ADMIN: CPT | Performed by: FAMILY MEDICINE

## 2024-03-19 PROCEDURE — 84550 ASSAY OF BLOOD/URIC ACID: CPT | Performed by: FAMILY MEDICINE

## 2024-03-19 PROCEDURE — 80053 COMPREHEN METABOLIC PANEL: CPT | Performed by: FAMILY MEDICINE

## 2024-03-19 PROCEDURE — 96127 BRIEF EMOTIONAL/BEHAV ASSMT: CPT | Performed by: FAMILY MEDICINE

## 2024-03-19 PROCEDURE — 80061 LIPID PANEL: CPT | Performed by: FAMILY MEDICINE

## 2024-03-19 PROCEDURE — 99214 OFFICE O/P EST MOD 30 MIN: CPT | Mod: 25 | Performed by: FAMILY MEDICINE

## 2024-03-19 RX ORDER — OXYCODONE AND ACETAMINOPHEN 5; 325 MG/1; MG/1
1 TABLET ORAL 2 TIMES DAILY PRN
Qty: 30 TABLET | Refills: 0 | Status: SHIPPED | OUTPATIENT
Start: 2024-03-19 | End: 2024-04-30

## 2024-03-19 RX ORDER — TIZANIDINE HYDROCHLORIDE 4 MG/1
CAPSULE, GELATIN COATED ORAL
Qty: 180 CAPSULE | Refills: 2 | Status: SHIPPED | OUTPATIENT
Start: 2024-03-19 | End: 2024-06-21

## 2024-03-19 RX ORDER — PANTOPRAZOLE SODIUM 40 MG/1
40 TABLET, DELAYED RELEASE ORAL DAILY
Qty: 90 TABLET | Refills: 1 | Status: SHIPPED | OUTPATIENT
Start: 2024-03-19

## 2024-03-19 RX ORDER — FAMOTIDINE 40 MG/1
40 TABLET, FILM COATED ORAL AT BEDTIME
Qty: 90 TABLET | Refills: 3 | Status: SHIPPED | OUTPATIENT
Start: 2024-03-19

## 2024-03-19 ASSESSMENT — PATIENT HEALTH QUESTIONNAIRE - PHQ9
SUM OF ALL RESPONSES TO PHQ QUESTIONS 1-9: 13
10. IF YOU CHECKED OFF ANY PROBLEMS, HOW DIFFICULT HAVE THESE PROBLEMS MADE IT FOR YOU TO DO YOUR WORK, TAKE CARE OF THINGS AT HOME, OR GET ALONG WITH OTHER PEOPLE: VERY DIFFICULT
SUM OF ALL RESPONSES TO PHQ QUESTIONS 1-9: 13

## 2024-03-19 ASSESSMENT — PAIN SCALES - GENERAL: PAINLEVEL: SEVERE PAIN (7)

## 2024-03-19 NOTE — Clinical Note
Please let patient know that I am starting him on Ozempic as discussed for his diabetes in his weight.  He will need to give himself the injection weekly as discussed.  Let me know before it runs out if he tolerates; will increase its dose gradually monthly.  Also let me know if his insurance is not paying for it.

## 2024-03-19 NOTE — PROGRESS NOTES
Assessment & Plan     (I10) Hypertension goal BP (blood pressure) < 140/90  (primary encounter diagnosis)  Comment:  BP is normal and stable with atenolol and hydrochlorothiazide, been tolerating them well.  Also has morbid obesity, sleep apnea, chronic low back pain and DM.  No history of CVA, CAD or high cholesterol.  His cholesterol level today was normal/therapeutic.  His kidney function and electrolytes were also normal today.  The goal for his blood pressure to be around 140/90.  Will continue with the atenolol and hydrochlorothiazide at the current doses.  Healthy/low salt diet, staying active and weight loss encouraged and discussed.  Follow up in 6 month, earlier as needed     Plan: Comprehensive metabolic panel (BMP + Alb, Alk         Phos, ALT, AST, Total. Bili, TP)            (E11.65) Type 2 diabetes mellitus with hyperglycemia, without long-term current use of insulin (H)  Comment: Also has obesity, sleep apnea and high blood pressure.  No history of CVA, CAD or high cholesterol.  He is on Lipitor for cardioprotection.  His hemoglobin A1c is 6.4 with Jardiance and metformin, dropped from 7.7 about 9 months ago.  He is morbidly obese with acanthosis nigricans.  Not able to exercise due to chronic low back pain.  He is -[place] heritage.  His blood pressure is controlled.  Exercising, healthy diet and weight loss discussed as above.  Continue with metformin and added the Ozempic which will help with his weight.  He feels comfortable with injecting himself the medication.  If his insurance is covering for the Ozempic, will stop Jardiance.  Potential side effect discussed.  No contraindication for Ozempic identified.  No history of gastroparesis or thyroid cancer.  Will continue to monitor closely.  UTD for his eye exam.  Encouraged him to stay as active as possible.  Again emphasized importance of healthy diet.    Plan: Lipid panel reflex to direct LDL Non-fasting,         HEMOGLOBIN A1C,  Comprehensive metabolic panel         (BMP + Alb, Alk Phos, ALT, AST, Total. Bili,         TP)            (K21.9) Gastroesophageal reflux disease without esophagitis  Comment: Chronic, not controlled with the Pepcid.  Encouraged to avoid any known triggers, especially with greasy or spicy food.  Also encouraged to avoid late meals as well as excessive alcohol or NSAID intake.  Denied of drinking alcohol but has been taking NSAID product regularly for his back pain.  No history of GI bleeding.  Will continue with Pepcid, adding Protonix.  Symptoms the need to be seen or call in discussed.   Plan: famotidine (PEPCID) 40 MG tablet            (E66.01) Morbid obesity (H)  Comment: Today's BMI was 41.   Also has high blood pressure, sleep apnea, chronic low back pain and DM.  Not able to exercise due to chronic low back pain.  Encouraged to stay active as tolerated.  Healthy and portion diet discussed and encouraged.  He was referred to weight management last year but it was not covered by his health insurance.  Discussed about the goal for his weight and his BMI.  No history of thyroid problem.  Will continue with the metformin and Jardiance.  Will start him on the Ozempic as mentioned above.  Follow-up in 3 months for reevaluation and will consider adding other weight loss medication.  He was encouraged to work closely with the sleep medicine for his sleep apnea.     (M54.50,  G89.29) Chronic bilateral low back pain without sciatica  Comment: Known to have arthritis of the spine with chronic lower back pain without radiculopathy; has been on disability for this for years.  It prevents him from exercising or just being active.  It keeps him up at night. Epidural injections x2 with little to no effect.  Physical therapy worsened of the pain.  MRIs showed minimal arthritis no spinal or foraminal stenosis or herniated disc.  Diclofenac and Zanaflex are not really helping much.  Physical exam today with no focal neurological  deficit and clinical presentation did not suggest equina syndrome or infection.  Clinical presentation is most suggestive of muscle spasm.  He is morbidly obese.  Pain is affecting his life tremendously.  He has not been able to sleep on his back for years.  Over-the-counter medication have not been helping.     I discussed with him about treatment options.  Will work on weight loss as above.  Continue with Tylenol or Motrin as needed.  Also will continue with the Zanaflex at least at bedtime and during the day as needed.  He has failed all conservative management.  Will start him on oxycodone up to 2 times a day as needed for severe pain.  He denies of drug use.  Drug screen obtained.  Continue with his normal activities as tolerated.  Symptoms the need to be seen or call in discussed.       He does not have neuropathic pain and therefore unlikely gabapentin or Lyrica will be helping.  Consider adding Cymbalta at the next visit for his depression.     Plan: Urine Drug Screen, oxyCODONE-acetaminophen         (PERCOCET) 5-325 MG tablet, tiZANidine         (ZANAFLEX) 4 MG capsule            (E79.0) Hyperuricemia  Comment: Uric acid level was normal.  Has not had a gout flare for a while.  Kidney function electrolytes were normal.  He is on a low-dose panel.  He was given the option of stopping the allopurinol.  Food avoid to prevent gouty discussed and encouraged.  He does not drink alcohol excessively.    Plan: Comprehensive metabolic panel (BMP + Alb, Alk         Phos, ALT, AST, Total. Bili, TP), Uric acid              MED REC REQUIRED  Post Medication Reconciliation Status: discharge medications reconciled and changed, per note/orders  Depression Screening Follow Up        3/19/2024     7:28 AM   PHQ   PHQ-9 Total Score 13   Q9: Thoughts of better off dead/self-harm past 2 weeks Not at all         3/19/2024     7:28 AM   Last PHQ-9   1.  Little interest or pleasure in doing things 1   2.  Feeling down, depressed,  or hopeless 1   3.  Trouble falling or staying asleep, or sleeping too much 2   4.  Feeling tired or having little energy 2   5.  Poor appetite or overeating 1   6.  Feeling bad about yourself 2   7.  Trouble concentrating 2   8.  Moving slowly or restless 2   Q9: Thoughts of better off dead/self-harm past 2 weeks 0   PHQ-9 Total Score 13         Follow Up Actions Taken  Crisis resource information provided in After Visit Summary  Patient declined referral.       Work on weight loss  Regular exercise    Subjective   Humberto is a 53 year old, presenting for the following health issues:  Hospital F/U (ED on 2/14 for edema on L lower leg/Med check )      3/19/2024     7:27 AM   Additional Questions   Roomed by Mariana MCKEE       ED/UC Followup:    Facility:  [facility]   Date of visit: 2/14  Reason for visit: LOWER L LEG EDEMA  Current Status: STILL HAVING EDEMA  OFF AND ON     Humberto was seen today for follow-up on his diabetes, high blood pressure and chronic pain.   He has chronic low back pain for years secondary to degenerative joint disease of the spine.  He in fact has been on disability for it for years.  Failed PT, epidural injection, muscle relaxant and OTC meds.  The same kind of pain that he has had - constant dull achy pain in his lower back that becomes sharp with movement or certain activities.  No radiation to the leg.  The back feels stiff and tight with a lot of muscle spasm.  Worse when he lays down flat and therefore has not been able to sleep on his bed for over a year.  Been sleeping on the recliner.  It keeps him up at night and prevent him to do his daily activities.  No problem with control his bowel movement or bladder.  No weakness on the legs.  No fever or chills.  No unusual activities.  Epidural injections x2 with little to no effect.  Physical therapy typically made it worse. Medrol Dosepak has been working well for acute flare-up.  Not taking the pain meds chronically.       His  "blood pressure has been controlled with atenolol and hydrochlorothiazide with no side effect.  Not checking his blood pressure at home.  No headache or dizziness.  No chest pain, shortness of breath, dyspnea, leg swelling or orthopnea.  Not exercising due to chronic back pain.  Denied of excessive salt/caffeine intake.  He DM and is on the metformin and Jardiance.  Not checking her blood sugar at home.  No history of CVA, CAD or high cholesterol.  No numbness or tingling sensation.  No acute change in his vision.        Review of Systems  Constitutional, HEENT, cardiovascular, pulmonary, gi and gu systems are negative, except as otherwise noted.      Objective    /82 (Cuff Size: Adult Large)   Pulse 58   Temp 97.1  F (36.2  C) (Temporal)   Resp 18   Ht 1.88 m (6' 2\")   Wt 145.6 kg (321 lb)   SpO2 98%   BMI 41.21 kg/m    Body mass index is 41.21 kg/m .  Physical Exam   GENERAL: alert and no distress.  RESP: lungs clear to auscultation - no rales, rhonchi or wheezes  CV: regular rate and rhythm, no murmur, no peripheral edema  ABDOMEN: soft, nontender, no hepatosplenomegaly, no masses and bowel sounds normal  MS: no gross musculoskeletal defects noted, no edema.  Walk without limping.  Both legs are equal in strength.  Hips knee exam was normal.  Tender with patient to lumbar sacral spinous paraspinous muscle which is very tight and spastic.  Negative straight leg maneuver.  NEURO: Normal strength and tone, mentation intact and speech normal.  DTR +2 throughout.  No focal neurological deficit.  Negative clonus.      Results for orders placed or performed in visit on 03/19/24   Lipid panel reflex to direct LDL Non-fasting     Status: Abnormal   Result Value Ref Range    Cholesterol 110 <200 mg/dL    Triglycerides 174 (H) <150 mg/dL    Direct Measure HDL 27 (L) >=40 mg/dL    LDL Cholesterol Calculated 48 <=100 mg/dL    Non HDL Cholesterol 83 <130 mg/dL    Patient Fasting > 8hrs? Yes     Narrative    " Cholesterol  Desirable:  <200 mg/dL    Triglycerides  Normal:  Less than 150 mg/dL  Borderline High:  150-199 mg/dL  High:  200-499 mg/dL  Very High:  Greater than or equal to 500 mg/dL    Direct Measure HDL  Female:  Greater than or equal to 50 mg/dL   Male:  Greater than or equal to 40 mg/dL    LDL Cholesterol  Desirable:  <100mg/dL  Above Desirable:  100-129 mg/dL   Borderline High:  130-159 mg/dL   High:  160-189 mg/dL   Very High:  >= 190 mg/dL    Non HDL Cholesterol  Desirable:  130 mg/dL  Above Desirable:  130-159 mg/dL  Borderline High:  160-189 mg/dL  High:  190-219 mg/dL  Very High:  Greater than or equal to 220 mg/dL   HEMOGLOBIN A1C     Status: Abnormal   Result Value Ref Range    Hemoglobin A1C 6.4 (H) <5.7 %   Comprehensive metabolic panel (BMP + Alb, Alk Phos, ALT, AST, Total. Bili, TP)     Status: Abnormal   Result Value Ref Range    Sodium 139 135 - 145 mmol/L    Potassium 3.5 3.4 - 5.3 mmol/L    Carbon Dioxide (CO2) 31 (H) 22 - 29 mmol/L    Anion Gap 9 7 - 15 mmol/L    Urea Nitrogen 13.8 6.0 - 20.0 mg/dL    Creatinine 1.16 0.67 - 1.17 mg/dL    GFR Estimate 75 >60 mL/min/1.73m2    Calcium 9.6 8.6 - 10.0 mg/dL    Chloride 99 98 - 107 mmol/L    Glucose 101 (H) 70 - 99 mg/dL    Alkaline Phosphatase 75 40 - 150 U/L    AST 19 0 - 45 U/L    ALT 26 0 - 70 U/L    Protein Total 7.4 6.4 - 8.3 g/dL    Albumin 4.4 3.5 - 5.2 g/dL    Bilirubin Total 0.3 <=1.2 mg/dL   Uric acid     Status: Normal   Result Value Ref Range    Uric Acid 6.1 3.4 - 7.0 mg/dL           Signed Electronically by: Calos Ty Mai, MD        Answers submitted by the patient for this visit:  Patient Health Questionnaire (Submitted on 3/19/2024)  If you checked off any problems, how difficult have these problems made it for you to do your work, take care of things at home, or get along with other people?: Very difficult  PHQ9 TOTAL SCORE: 13

## 2024-03-20 ENCOUNTER — TELEPHONE (OUTPATIENT)
Dept: FAMILY MEDICINE | Facility: CLINIC | Age: 54
End: 2024-03-20
Payer: COMMERCIAL

## 2024-03-20 NOTE — TELEPHONE ENCOUNTER
----- Message from Calos Ty Mai, MD sent at 3/20/2024  8:13 AM CDT -----  May want to give him a call about the lab result.  I do not think he can read and I am not sure if his MyChart is working.  Thank you

## 2024-03-23 PROBLEM — F33.1 MODERATE EPISODE OF RECURRENT MAJOR DEPRESSIVE DISORDER (H): Status: ACTIVE | Noted: 2024-03-23

## 2024-03-25 ENCOUNTER — TELEPHONE (OUTPATIENT)
Dept: FAMILY MEDICINE | Facility: CLINIC | Age: 54
End: 2024-03-25
Payer: COMMERCIAL

## 2024-03-25 NOTE — TELEPHONE ENCOUNTER
If patient will continue on Ozempic 0.25 mg weekly long term rather than increasing to 0.5 mg he will need additional pen needles. Each pen can deliver 8 doses of 0.25 mg but the kit only comes with 6 pen needles.     Thank You,    Jessica Mills  Pharm.D.  , Pharmacist in Charge    [facility]  [address] [place] [id number]  [email or web address]  [email or web address]  Office: [phone]  Cell: [phone]

## 2024-04-05 ENCOUNTER — HOSPITAL ENCOUNTER (EMERGENCY)
Facility: CLINIC | Age: 54
Discharge: HOME | End: 2024-04-05
Attending: PHYSICIAN ASSISTANT | Admitting: PHYSICIAN ASSISTANT
Payer: COMMERCIAL

## 2024-04-05 VITALS
SYSTOLIC BLOOD PRESSURE: 161 MMHG | RESPIRATION RATE: 20 BRPM | BODY MASS INDEX: 40.91 KG/M2 | TEMPERATURE: 99.2 F | OXYGEN SATURATION: 98 % | HEART RATE: 61 BPM | WEIGHT: 315 LBS | DIASTOLIC BLOOD PRESSURE: 92 MMHG

## 2024-04-05 DIAGNOSIS — J06.9 VIRAL URI WITH COUGH: ICD-10-CM

## 2024-04-05 LAB — DEPRECATED S PYO AG THROAT QL EIA: NEGATIVE

## 2024-04-05 PROCEDURE — 87651 STREP A DNA AMP PROBE: CPT | Performed by: PHYSICIAN ASSISTANT

## 2024-04-05 PROCEDURE — 99283 EMERGENCY DEPT VISIT LOW MDM: CPT | Performed by: PHYSICIAN ASSISTANT

## 2024-04-05 ASSESSMENT — COLUMBIA-SUICIDE SEVERITY RATING SCALE - C-SSRS
1. IN THE PAST MONTH, HAVE YOU WISHED YOU WERE DEAD OR WISHED YOU COULD GO TO SLEEP AND NOT WAKE UP?: NO
6. HAVE YOU EVER DONE ANYTHING, STARTED TO DO ANYTHING, OR PREPARED TO DO ANYTHING TO END YOUR LIFE?: NO
2. HAVE YOU ACTUALLY HAD ANY THOUGHTS OF KILLING YOURSELF IN THE PAST MONTH?: NO

## 2024-04-05 ASSESSMENT — ACTIVITIES OF DAILY LIVING (ADL): ADLS_ACUITY_SCORE: 33

## 2024-04-06 LAB — GROUP A STREP BY PCR: NOT DETECTED

## 2024-04-06 NOTE — DISCHARGE INSTRUCTIONS
Your strep throat test was negative today.  I think you probably have a virus causing your symptoms.  This can last 7 to 10 days.  Try over-the-counter cold medications such as NyQuil or DayQuil for symptom relief.  Be sure you are drinking lots of fluids.  If you have any worsening symptoms please return to the emergency department.    Thank you for choosing [facility]. It was a pleasure taking care of you today. If you have any questions, please call [phone].    Akiko Bello PA-C

## 2024-04-06 NOTE — ED TRIAGE NOTES
Patient presents with concern for strep throat and plugged ears. Patient states that his grandson just had strep.

## 2024-04-06 NOTE — ED PROVIDER NOTES
History     Chief Complaint   Patient presents with    Pharyngitis       HPI  Humberto Roberts is a 54 year old male who presents to the emergency department complaining of sore throat.  This started about 4 days ago and is associated with cough and nasal congestion.  He has had no associated fevers or persistent body aches.  No difficulties breathing.  His grandson tested positive for strep throat this last week.  He has been taking Robitussin which has helped his cough some.        Allergies:  No Known Allergies    Problem List:    Patient Active Problem List    Diagnosis Date Noted    Moderate episode of recurrent major depressive disorder (H) 03/23/2024     Priority: Medium    Type 2 diabetes mellitus with hyperglycemia, without long-term current use of insulin (H) 06/12/2023     Priority: Medium    Acanthosis nigricans 02/25/2021     Priority: Medium    Skin tag 02/25/2021     Priority: Medium    Vitamin D deficiency 12/08/2019     Priority: Medium    Hyperuricemia 12/08/2019     Priority: Medium    RICARDO (obstructive sleep apnea) - on cpap 09/22/2019     Priority: Medium    Chronic bilateral low back pain without sciatica 09/22/2019     Priority: Medium    Seasonal allergic rhinitis due to pollen, unspecified chronicity 01/23/2018     Priority: Medium    Morbid obesity (H) 12/06/2017     Priority: Medium    Insomnia 10/15/2015     Priority: Medium    Hypertension goal BP (blood pressure) < 140/90 05/06/2013     Priority: Medium    GERD (gastroesophageal reflux disease) 04/24/2013     Priority: Medium        Past Medical History:    Past Medical History:   Diagnosis Date    Acid reflux disease since 2006    Back pain chronic    Cellulitis of left upper arm and forearm 11/22/2013    Elevated serum creatinine 10/15/2015    Hypertension     Pre-diabetes 2/25/2021       Past Surgical History:    Past Surgical History:   Procedure Laterality Date    COLONOSCOPY N/A 3/25/2021    Procedure: Colonoscopy Incomplete;   Surgeon: Humberto Antoine DO;  Location: [facility]    COLONOSCOPY N/A 7/15/2021    Procedure: COLONOSCOPY;  Surgeon: Humberto Antoine DO;  Location: [facility]    ENT SURGERY      ESOPHAGOSCOPY, GASTROSCOPY, DUODENOSCOPY (EGD), COMBINED N/A 3/25/2021    Procedure: Esophagogastroduodenoscopy with biopsy;  Surgeon: Humberto Antoine DO;  Location: [facility]    INJECT EPIDURAL TRANSFORAMINAL N/A 10/20/2023    Procedure: Bilateral Lumbar 3-4 Transforaminal Epidural Steroid Injection with fluoroscopic guidance and contrast.;  Surgeon: Joe Aranda MD;  Location: [facility]       Family History:    Family History   Problem Relation Age of Onset    Diabetes Mother     Hypertension Mother     Heart Disease Mother     Heart Disease Father     Diabetes Father     Unknown/Adopted Maternal Grandmother     Unknown/Adopted Maternal Grandfather     Unknown/Adopted Paternal Grandmother     Unknown/Adopted Paternal Grandfather     No Known Problems Sister     No Known Problems Son     No Known Problems Daughter     No Known Problems Sister     No Known Problems Son     No Known Problems Daughter        Social History:  Marital Status:   [2]  Social History     Tobacco Use    Smoking status: Never    Smokeless tobacco: Never   Vaping Use    Vaping Use: Never used   Substance Use Topics    Alcohol use: No     Alcohol/week: 0.0 standard drinks of alcohol    Drug use: No        Medications:    albuterol (PROAIR HFA/PROVENTIL HFA/VENTOLIN HFA) 108 (90 Base) MCG/ACT inhaler  allopurinol (ZYLOPRIM) 100 MG tablet  ASPIRIN LOW DOSE 81 MG EC tablet  atenolol (TENORMIN) 50 MG tablet  atorvastatin (LIPITOR) 10 MG tablet  cetirizine (ZYRTEC) 10 MG tablet  clotrimazole-betamethasone (LOTRISONE) 1-0.05 % external cream  Continuous Blood Gluc  (FREESTYLE FANNIE 2 READER) GORDO  Continuous Blood Gluc Sensor (FREESTYLE FANNIE 2 SENSOR) MISC  famotidine (PEPCID) 40 MG tablet  fluticasone (FLONASE) 50 MCG/ACT nasal  spray  hydrochlorothiazide (HYDRODIURIL) 25 MG tablet  JARDIANCE 25 MG TABS tablet  metFORMIN (GLUCOPHAGE XR) 500 MG 24 hr tablet  order for DME  order for DME  oxyCODONE-acetaminophen (PERCOCET) 5-325 MG tablet  pantoprazole (PROTONIX) 40 MG EC tablet  semaglutide (OZEMPIC) 2 MG/3ML pen  sildenafil (REVATIO) 20 MG tablet  tiZANidine (ZANAFLEX) 4 MG capsule          Review of Systems   All other systems reviewed and are negative.          Physical Exam   BP: (!) 161/92  Pulse: 61  Temp: 99.2  F (37.3  C)  Resp: 20  Weight: 144.5 kg (318 lb 9.6 oz)  SpO2: 98 %      Physical Exam  Vitals and nursing note reviewed.   Constitutional:       General: He is not in acute distress.     Appearance: He is well-developed. He is not ill-appearing, toxic-appearing or diaphoretic.   HENT:      Head: Normocephalic and atraumatic.      Right Ear: Tympanic membrane normal.      Left Ear: Tympanic membrane normal.      Nose: No congestion.      Mouth/Throat:      Mouth: Mucous membranes are moist.      Pharynx: Uvula midline. Posterior oropharyngeal erythema present. No pharyngeal swelling, oropharyngeal exudate or uvula swelling.      Tonsils: No tonsillar exudate or tonsillar abscesses.   Eyes:      Conjunctiva/sclera: Conjunctivae normal.      Pupils: Pupils are equal, round, and reactive to light.   Cardiovascular:      Rate and Rhythm: Normal rate and regular rhythm.      Heart sounds: Normal heart sounds.   Pulmonary:      Effort: Pulmonary effort is normal. No respiratory distress.      Breath sounds: Normal breath sounds.   Musculoskeletal:      Cervical back: Neck supple.   Skin:     General: Skin is warm and dry.   Neurological:      General: No focal deficit present.      Mental Status: He is alert and oriented to person, place, and time.   Psychiatric:         Mood and Affect: Mood normal.             ED Course        Procedures      Results for orders placed or performed during the hospital encounter of 04/05/24 (from the  past 24 hour(s))   Streptococcus A Rapid Scr w Reflx to PCR    Specimen: Throat; Swab   Result Value Ref Range    Group A Strep antigen Negative Negative       Medications - No data to display    Assessments & Plan (with Medical Decision Making)  Humberto Roberts is a 54 year old male who presented to the ED with a 4-day history of cough, congestion, and sore throat.  On arrival to the ED he was afebrile.  Mildly hypertensive but otherwise normal vital signs.  Exam revealed some mild erythema to the oropharynx but no evidence of tonsillar abscess.  Rest of exam benign.  Patient was screened for strep pharyngitis given his exposure but this came back negative.  I went over these results with the patient and he was reassured.  Discussed he likely has a virus and encouraged continued over-the-counter symptomatic therapies, fluids, and rest.  Discussed typical duration of illness.  He was provided instructions on when to return to the ED.  All questions answered and patient discharged home in stable condition.     I have reviewed the nursing notes.    I have reviewed the findings, diagnosis, plan and need for follow up with the patient.    New Prescriptions    No medications on file       Final diagnoses:   Viral URI with cough     Note: Chart documentation done in part with Dragon Voice Recognition software. Although reviewed after completion, some word and grammatical errors may remain.    4/5/2024   [facility] EMERGENCY DEPT       Akiko Bello PA-C  04/05/24 [id number]

## 2024-04-09 NOTE — PROGRESS NOTES
History of Present Illness - Humberto Roberts is a 54 year old male presenting in clinic today for a recheck on Patient presents with:  Follow Up: ears    Humberto Roberts is a 54 year old male presents to clinic on 02/21/24 for chronic mucoid otitis media of the left ear. He did have a tube placed that day. He was recently seen in the ER due to URI. He feels symptoms have improved.     Present Symptoms include: all  and they are   getting better .  Humberto denies otolgia and hearing loss.      Body mass index is 40.83 kg/m .    Weight management plan: Patient was referred to their PCP to discuss a diet and exercise plan.    BP Readings from Last 1 Encounters:   04/24/24 122/82       BP noted to be well controlled today in office.     Humberto IS NOT a smoker/uses chewing tobacco.Past Medical History -   Past Medical History:   Diagnosis Date    Acid reflux disease since 2006    Back pain chronic    Cellulitis of left upper arm and forearm 11/22/2013    Elevated serum creatinine 10/15/2015    Hypertension     Pre-diabetes 2/25/2021       Current Medications -   Current Outpatient Medications:     albuterol (PROAIR HFA/PROVENTIL HFA/VENTOLIN HFA) 108 (90 Base) MCG/ACT inhaler, Inhale 2 puffs into the lungs every 4 hours as needed for shortness of breath or wheezing, Disp: 18 g, Rfl: 0    allopurinol (ZYLOPRIM) 100 MG tablet, TAKE 1 TABLET (100 MG) BY MOUTH DAILY, Disp: 30 tablet, Rfl: 0    ASPIRIN LOW DOSE 81 MG EC tablet, TAKE ONE TABLET BY MOUTH ONCE DAILY, Disp: 90 tablet, Rfl: 2    atenolol (TENORMIN) 50 MG tablet, TAKE ONE TABLET BY MOUTH ONCE DAILY, Disp: 90 tablet, Rfl: 1    atorvastatin (LIPITOR) 10 MG tablet, TAKE 1 TABLET (10 MG) BY MOUTH DAILY, Disp: 90 tablet, Rfl: 1    cetirizine (ZYRTEC) 10 MG tablet, TAKE ONE TABLET BY MOUTH ONCE DAILY AS NEEDED FOR ALLERGIES, Disp: 30 tablet, Rfl: 5    clotrimazole-betamethasone (LOTRISONE) 1-0.05 % external cream, Apply topically 2 times daily, Disp: 45 g, Rfl: 1     Continuous Blood Gluc  (FREESTYLE FANNIE 2 READER) GORDO, Use to read blood sugars as per 's instructions., Disp: 1 each, Rfl: 0    Continuous Blood Gluc Sensor (FREESTYLE FANNIE 2 SENSOR) [facility] [place], Change every 14 days., Disp: 2 each, Rfl: 11    famotidine (PEPCID) 40 MG tablet, Take 1 tablet (40 mg) by mouth at bedtime, Disp: 90 tablet, Rfl: 3    fluticasone (FLONASE) 50 MCG/ACT nasal spray, SPRAY 1-2 SPRAYS IN EACH NOSTRIL EVERY DAY, Disp: 16 g, Rfl: 5    hydrochlorothiazide (HYDRODIURIL) 25 MG tablet, TAKE ONE TABLET BY MOUTH ONCE DAILY, Disp: 90 tablet, Rfl: 2    JARDIANCE 25 MG TABS tablet, TAKE ONE TABLET BY MOUTH ONCE DAILY, Disp: 90 tablet, Rfl: 0    metFORMIN (GLUCOPHAGE XR) 500 MG 24 hr tablet, Take 2 tablets (1,000 mg) by mouth 2 times daily (with meals), Disp: 120 tablet, Rfl: 3    order for DME, Equipment being ordered: shower chair, Disp: 1 Device, Rfl: 0    order for DME, 1 Device Auto CPAP @@ 7-15 cm with heated humidity via mask of choice, Disp: , Rfl:     oxyCODONE-acetaminophen (PERCOCET) 5-325 MG tablet, Take 1 tablet by mouth 2 times daily as needed for pain Please to make it last a month, Disp: 30 tablet, Rfl: 0    pantoprazole (PROTONIX) 40 MG EC tablet, Take 1 tablet (40 mg) by mouth daily Stop omeprazole, Disp: 90 tablet, Rfl: 1    semaglutide (OZEMPIC) 2 MG/3ML pen, Inject 0.25 mg Subcutaneous every 7 days, Disp: 3 mL, Rfl: 0    sildenafil (REVATIO) 20 MG tablet, Take 1-2 tablets (20-40 mg) by mouth daily as needed (sexual activity), Disp: 30 tablet, Rfl: 4    tiZANidine (ZANAFLEX) 4 MG capsule, Take 1 tablet in am and 1 at bedtime, Disp: 180 capsule, Rfl: 2    Allergies - No Known Allergies    Social History -   Social History     Socioeconomic History    Marital status:    Tobacco Use    Smoking status: Never    Smokeless tobacco: Never   Vaping Use    Vaping Use: Never used   Substance and Sexual Activity    Alcohol use: No     Alcohol/week: 0.0 standard drinks of  alcohol    Drug use: No    Sexual activity: Yes     Partners: Female     Social Determinants of Health     Financial Resource Strain: Low Risk  (10/11/2023)    Financial Resource Strain     Within the past 12 months, have you or your family members you live with been unable to get utilities (heat, electricity) when it was really needed?: No   Food Insecurity: High Risk (10/11/2023)    Food Insecurity     Within the past 12 months, did you worry that your food would run out before you got money to buy more?: Yes     Within the past 12 months, did the food you bought just not last and you didn t have money to get more?: Yes   Transportation Needs: Unknown (10/11/2023)    Transportation Needs     Within the past 12 months, has lack of transportation kept you from medical appointments, getting your medicines, non-medical meetings or appointments, work, or from getting things that you need?: Patient refused   Interpersonal Safety: Low Risk  (10/11/2023)    Interpersonal Safety     Do you feel physically and emotionally safe where you currently live?: Yes     Within the past 12 months, have you been hit, slapped, kicked or otherwise physically hurt by someone?: No     Within the past 12 months, have you been humiliated or emotionally abused in other ways by your partner or ex-partner?: No   Housing Stability: High Risk (10/11/2023)    Housing Stability     Do you have housing? : No     Are you worried about losing your housing?: No       Family History -   Family History   Problem Relation Age of Onset    Diabetes Mother     Hypertension Mother     Heart Disease Mother     Heart Disease Father     Diabetes Father     Unknown/Adopted Maternal Grandmother     Unknown/Adopted Maternal Grandfather     Unknown/Adopted Paternal Grandmother     Unknown/Adopted Paternal Grandfather     No Known Problems Sister     No Known Problems Son     No Known Problems Daughter     No Known Problems Sister     No Known Problems Son     No  Known Problems Daughter        Review of Systems - As per HPI and PMHx, otherwise review of system review of the head and neck negative. Otherwise 10+ review of system is negative    Physical Exam  /82   Temp 97.2  F (36.2  C) (Temporal)   Wt 144.2 kg (318 lb)   BMI 40.83 kg/m    BMI: Body mass index is 40.83 kg/m .    General - The patient is well nourished and well developed, and appears to have good nutritional status.  Alert and oriented to person and place, answers questions and cooperates with examination appropriately.    SKIN - No suspicious lesions or rashes.  Respiration - No respiratory distress.  Head and Face - Normocephalic and atraumatic, with no gross asymmetry noted of the contour of the facial features.  The facial nerve is intact, with strong symmetric movements.    Voice and Breathing - The patient was breathing comfortably without the use of accessory muscles. The patients voice was clear and strong, and had appropriate pitch and quality.    Ears - Bilateral pinna and EACs with normal appearing overlying skin. Tympanic membrane intact with good mobility on pneumatic otoscopy bilaterally. Bony landmarks of the ossicular chain are normal. The tympanic membranes are normal in appearance. No retraction, perforation, or masses.  No fluid or purulence was seen in the external canal or the middle ear. Tube placed anterior inferior in the tympanic membrane of the left ear. Tube appears patent.     Eyes - Extraocular movements intact.  Sclera were not icteric or injected, conjunctiva were pink and moist.    Mouth - Examination of the oral cavity showed pink, healthy oral mucosa. No lesions or ulcerations noted.  The tongue was mobile and midline, and the dentition were in good condition.      Throat - The walls of the oropharynx were smooth, pink, moist, symmetric, and had no lesions or ulcerations.  The tonsillar pillars and soft palate were symmetric. The uvula was midline on  elevation.    Nose - External contour is symmetric, no gross deflection or scars.  Nasal mucosa is pink and moist with no abnormal mucus.  The septum was midline and non-obstructive, turbinates of normal size and position.  No polyps, masses, or purulence noted on examination.    Neuro - Nonfocal neuro exam is normal, CN 2 through 12 intact, normal gait and muscle tone.      Performed in clinic today:  Audiologic Studies - An audiogram and tympanogram were performed today as part of the evaluation and personally reviewed. The tympanogram shows Type A curves on the right and Type NA - could not seal curves on the left, with normal canal volumes and middle ear pressures.  The audiogram showed sensorineural hearing loss on the right and moderate mixed loss with significant conductive component on the left.        A/P - Hubmerto Roberts is a 54 year old male Patient presents with:  Follow Up: ears    Humberto Roberts is a 54 year old male presents to clinic for a tube placement recheck. Based on my evaluation tube is working well and is in place. He is already scheduled with the audiologist to start the process of getting hearing aids. Recommended that he follows up in 8 months for another tube placement evaluation. I suspect that his tube will fall out close to that time frame. Will see how his symptoms are doing and determine if a new plan is needed.     Humberto should follow up in 8 months.      At Humberto next appointment they will not need a hearing test.      Miguel Sykes MD

## 2024-04-15 ENCOUNTER — TELEPHONE (OUTPATIENT)
Dept: FAMILY MEDICINE | Facility: CLINIC | Age: 54
End: 2024-04-15
Payer: COMMERCIAL

## 2024-04-15 DIAGNOSIS — Z13.79 GENETIC TESTING: Primary | ICD-10-CM

## 2024-04-15 NOTE — TELEPHONE ENCOUNTER
Unable to find patients blood type in chart. Routed to provider to review and advise.    Zaria Glass LPN

## 2024-04-15 NOTE — TELEPHONE ENCOUNTER
FYI - Status Update    Who is Calling: patient    Update: wondering his blood type for paperwork    Does caller want a call/response back: Yes     Could we send this information to you in [employer] or would you prefer to receive a phone call?:   Patient would prefer a phone call   Okay to leave a detailed message?: Yes at Cell number on file:  PLEASE LEAVE BLOOD TYPE ON VOICEMAIL IF KNOWN  Telephone Information:   Mobile [phone]

## 2024-04-16 NOTE — TELEPHONE ENCOUNTER
Patient said that he is needing this for genetic/DNA testing that he is having done.    Keisha Olivares XRO/

## 2024-04-16 NOTE — TELEPHONE ENCOUNTER
If he needs it, will need to check it.  It was ordered, may get it done at any time.  I need to know why he needed for the lab's diagnosis

## 2024-04-24 ENCOUNTER — OFFICE VISIT (OUTPATIENT)
Dept: AUDIOLOGY | Facility: OTHER | Age: 54
End: 2024-04-24
Payer: COMMERCIAL

## 2024-04-24 ENCOUNTER — OFFICE VISIT (OUTPATIENT)
Dept: OTOLARYNGOLOGY | Facility: OTHER | Age: 54
End: 2024-04-24
Payer: COMMERCIAL

## 2024-04-24 VITALS
SYSTOLIC BLOOD PRESSURE: 122 MMHG | DIASTOLIC BLOOD PRESSURE: 82 MMHG | BODY MASS INDEX: 40.83 KG/M2 | WEIGHT: 315 LBS | TEMPERATURE: 97.2 F

## 2024-04-24 DIAGNOSIS — H65.22 CHRONIC SEROUS OTITIS MEDIA, LEFT EAR: ICD-10-CM

## 2024-04-24 DIAGNOSIS — H90.3 ASYMMETRICAL SENSORINEURAL HEARING LOSS: Primary | ICD-10-CM

## 2024-04-24 DIAGNOSIS — H90.A32 MIXED CONDUCTIVE AND SENSORINEURAL HEARING LOSS OF LEFT EAR WITH RESTRICTED HEARING OF RIGHT EAR: Primary | ICD-10-CM

## 2024-04-24 DIAGNOSIS — H69.92 EUSTACHIAN TUBE DYSFUNCTION, LEFT: ICD-10-CM

## 2024-04-24 PROCEDURE — 99213 OFFICE O/P EST LOW 20 MIN: CPT | Performed by: OTOLARYNGOLOGY

## 2024-04-24 PROCEDURE — 92557 COMPREHENSIVE HEARING TEST: CPT | Performed by: AUDIOLOGIST

## 2024-04-24 PROCEDURE — 92567 TYMPANOMETRY: CPT | Performed by: AUDIOLOGIST

## 2024-04-24 ASSESSMENT — PAIN SCALES - GENERAL: PAINLEVEL: NO PAIN (0)

## 2024-04-24 NOTE — PROGRESS NOTES
AUDIOLOGY REPORT:    Patient was referred from ENT by Dr. Sykes for audiology evaluation. The patient had a left PE tube placed on 2/21/2024. He has a prior history of a left PE tube placed in February 2023. The patient reports that his hearing started to improve a couple of weeks after the tube was placed. He reports an occasional plugged feeling in the left ear, but has not had drainage or ear pain. The patient was last tested on 2/19/2024, and results showed mild to moderately severe sensorineural hearing loss in the right ear and moderate to moderately severe mixed hearing loss in the left ear.    Testing:    Otoscopy:   Otoscopic exam indicates PE tube present in the left ear. The right ear is clear.     Tympanograms:    RIGHT: normal eardrum mobility with borderline negative pressure     LEFT:   could not seal, consistent with a patent PE tube    Thresholds:   Pure Tone Thresholds assessed using conventional audiometry with good reliability from 250-8000 Hz bilaterally using insert earphones and circumaural headphones     RIGHT:   mild to moderately severe essentially  sensorineural hearing loss    LEFT:    normal hearing sensitivity through 2000 Hz sloping to mild to moderate essentially sensorineural hearing loss    Speech Reception Threshold:    RIGHT: 40 dB HL    LEFT:   30 dB HL  Results are in agreement with pure tone average.     Word Recognition Score:     RIGHT: 92% at 80 dB HL using NU-6 recorded word list.    LEFT:   100% at 70 dB HL using NU-6 recorded word list.    Discussed results with the patient. Compared to 2/19/2024, right ear thresholds are stable, and left ear thresholds are 15-30 dB better at all tested frequencies.    Patient was returned to ENT for follow up.     Francois Artis, CCC-A  Licensed Audiologist #[id number]  4/24/2024

## 2024-04-24 NOTE — LETTER
4/24/2024         RE: Humbreto Roberts  [address] [facility] [address]        Dear Colleague,    Thank you for referring your patient, Humberto Roberts, to the [facility]. Please see a copy of my visit note below.    History of Present Illness - Humberto Roberts is a 54 year old male presenting in clinic today for a recheck on Patient presents with:  Follow Up: ears    Humberto Roberts is a 54 year old male presents to clinic on 02/21/24 for chronic mucoid otitis media of the left ear. He did have a tube placed that day. He was recently seen in the ER due to URI. He feels symptoms have improved.     Present Symptoms include: all  and they are   getting better .  Humberto denies otolgia and hearing loss.      Body mass index is 40.83 kg/m .    Weight management plan: Patient was referred to their PCP to discuss a diet and exercise plan.    BP Readings from Last 1 Encounters:   04/24/24 122/82       BP noted to be well controlled today in office.     Humberto IS NOT a smoker/uses chewing tobacco.Past Medical History -   Past Medical History:   Diagnosis Date     Acid reflux disease since 2006     Back pain chronic     Cellulitis of left upper arm and forearm 11/22/2013     Elevated serum creatinine 10/15/2015     Hypertension      Pre-diabetes 2/25/2021       Current Medications -   Current Outpatient Medications:      albuterol (PROAIR HFA/PROVENTIL HFA/VENTOLIN HFA) 108 (90 Base) MCG/ACT inhaler, Inhale 2 puffs into the lungs every 4 hours as needed for shortness of breath or wheezing, Disp: 18 g, Rfl: 0     allopurinol (ZYLOPRIM) 100 MG tablet, TAKE 1 TABLET (100 MG) BY MOUTH DAILY, Disp: 30 tablet, Rfl: 0     ASPIRIN LOW DOSE 81 MG EC tablet, TAKE ONE TABLET BY MOUTH ONCE DAILY, Disp: 90 tablet, Rfl: 2     atenolol (TENORMIN) 50 MG tablet, TAKE ONE TABLET BY MOUTH ONCE DAILY, Disp: 90 tablet, Rfl: 1     atorvastatin (LIPITOR) 10 MG tablet, TAKE 1 TABLET (10 MG) BY MOUTH DAILY, Disp: 90  tablet, Rfl: 1     cetirizine (ZYRTEC) 10 MG tablet, TAKE ONE TABLET BY MOUTH ONCE DAILY AS NEEDED FOR ALLERGIES, Disp: 30 tablet, Rfl: 5     clotrimazole-betamethasone (LOTRISONE) 1-0.05 % external cream, Apply topically 2 times daily, Disp: 45 g, Rfl: 1     Continuous Blood Gluc  (FREESTYLE FANNIE 2 READER) GORDO, Use to read blood sugars as per 's instructions., Disp: 1 each, Rfl: 0     Continuous Blood Gluc Sensor (FREESTYLE FANNIE 2 SENSOR) [facility] [place], Change every 14 days., Disp: 2 each, Rfl: 11     famotidine (PEPCID) 40 MG tablet, Take 1 tablet (40 mg) by mouth at bedtime, Disp: 90 tablet, Rfl: 3     fluticasone (FLONASE) 50 MCG/ACT nasal spray, SPRAY 1-2 SPRAYS IN EACH NOSTRIL EVERY DAY, Disp: 16 g, Rfl: 5     hydrochlorothiazide (HYDRODIURIL) 25 MG tablet, TAKE ONE TABLET BY MOUTH ONCE DAILY, Disp: 90 tablet, Rfl: 2     JARDIANCE 25 MG TABS tablet, TAKE ONE TABLET BY MOUTH ONCE DAILY, Disp: 90 tablet, Rfl: 0     metFORMIN (GLUCOPHAGE XR) 500 MG 24 hr tablet, Take 2 tablets (1,000 mg) by mouth 2 times daily (with meals), Disp: 120 tablet, Rfl: 3     order for DME, Equipment being ordered: shower chair, Disp: 1 Device, Rfl: 0     order for DME, 1 Device Auto CPAP @@ 7-15 cm with heated humidity via mask of choice, Disp: , Rfl:      oxyCODONE-acetaminophen (PERCOCET) 5-325 MG tablet, Take 1 tablet by mouth 2 times daily as needed for pain Please to make it last a month, Disp: 30 tablet, Rfl: 0     pantoprazole (PROTONIX) 40 MG EC tablet, Take 1 tablet (40 mg) by mouth daily Stop omeprazole, Disp: 90 tablet, Rfl: 1     semaglutide (OZEMPIC) 2 MG/3ML pen, Inject 0.25 mg Subcutaneous every 7 days, Disp: 3 mL, Rfl: 0     sildenafil (REVATIO) 20 MG tablet, Take 1-2 tablets (20-40 mg) by mouth daily as needed (sexual activity), Disp: 30 tablet, Rfl: 4     tiZANidine (ZANAFLEX) 4 MG capsule, Take 1 tablet in am and 1 at bedtime, Disp: 180 capsule, Rfl: 2    Allergies - No Known Allergies    Social History  -   Social History     Socioeconomic History     Marital status:    Tobacco Use     Smoking status: Never     Smokeless tobacco: Never   Vaping Use     Vaping Use: Never used   Substance and Sexual Activity     Alcohol use: No     Alcohol/week: 0.0 standard drinks of alcohol     Drug use: No     Sexual activity: Yes     Partners: Female     Social Determinants of Health     Financial Resource Strain: Low Risk  (10/11/2023)    Financial Resource Strain      Within the past 12 months, have you or your family members you live with been unable to get utilities (heat, electricity) when it was really needed?: No   Food Insecurity: High Risk (10/11/2023)    Food Insecurity      Within the past 12 months, did you worry that your food would run out before you got money to buy more?: Yes      Within the past 12 months, did the food you bought just not last and you didn t have money to get more?: Yes   Transportation Needs: Unknown (10/11/2023)    Transportation Needs      Within the past 12 months, has lack of transportation kept you from medical appointments, getting your medicines, non-medical meetings or appointments, work, or from getting things that you need?: Patient refused   Interpersonal Safety: Low Risk  (10/11/2023)    Interpersonal Safety      Do you feel physically and emotionally safe where you currently live?: Yes      Within the past 12 months, have you been hit, slapped, kicked or otherwise physically hurt by someone?: No      Within the past 12 months, have you been humiliated or emotionally abused in other ways by your partner or ex-partner?: No   Housing Stability: High Risk (10/11/2023)    Housing Stability      Do you have housing? : No      Are you worried about losing your housing?: No       Family History -   Family History   Problem Relation Age of Onset     Diabetes Mother      Hypertension Mother      Heart Disease Mother      Heart Disease Father      Diabetes Father      Unknown/Adopted  Maternal Grandmother      Unknown/Adopted Maternal Grandfather      Unknown/Adopted Paternal Grandmother      Unknown/Adopted Paternal Grandfather      No Known Problems Sister      No Known Problems Son      No Known Problems Daughter      No Known Problems Sister      No Known Problems Son      No Known Problems Daughter        Review of Systems - As per HPI and PMHx, otherwise review of system review of the head and neck negative. Otherwise 10+ review of system is negative    Physical Exam  /82   Temp 97.2  F (36.2  C) (Temporal)   Wt 144.2 kg (318 lb)   BMI 40.83 kg/m    BMI: Body mass index is 40.83 kg/m .    General - The patient is well nourished and well developed, and appears to have good nutritional status.  Alert and oriented to person and place, answers questions and cooperates with examination appropriately.    SKIN - No suspicious lesions or rashes.  Respiration - No respiratory distress.  Head and Face - Normocephalic and atraumatic, with no gross asymmetry noted of the contour of the facial features.  The facial nerve is intact, with strong symmetric movements.    Voice and Breathing - The patient was breathing comfortably without the use of accessory muscles. The patients voice was clear and strong, and had appropriate pitch and quality.    Ears - Bilateral pinna and EACs with normal appearing overlying skin. Tympanic membrane intact with good mobility on pneumatic otoscopy bilaterally. Bony landmarks of the ossicular chain are normal. The tympanic membranes are normal in appearance. No retraction, perforation, or masses.  No fluid or purulence was seen in the external canal or the middle ear. Tube placed anterior inferior in the tympanic membrane of the left ear. Tube appears patent.     Eyes - Extraocular movements intact.  Sclera were not icteric or injected, conjunctiva were pink and moist.    Mouth - Examination of the oral cavity showed pink, healthy oral mucosa. No lesions or  ulcerations noted.  The tongue was mobile and midline, and the dentition were in good condition.      Throat - The walls of the oropharynx were smooth, pink, moist, symmetric, and had no lesions or ulcerations.  The tonsillar pillars and soft palate were symmetric. The uvula was midline on elevation.    Nose - External contour is symmetric, no gross deflection or scars.  Nasal mucosa is pink and moist with no abnormal mucus.  The septum was midline and non-obstructive, turbinates of normal size and position.  No polyps, masses, or purulence noted on examination.    Neuro - Nonfocal neuro exam is normal, CN 2 through 12 intact, normal gait and muscle tone.      Performed in clinic today:  Audiologic Studies - An audiogram and tympanogram were performed today as part of the evaluation and personally reviewed. The tympanogram shows Type A curves on the right and Type NA - could not seal curves on the left, with normal canal volumes and middle ear pressures.  The audiogram showed sensorineural hearing loss on the right and moderate mixed loss with significant conductive component on the left.        A/P - Humberto Roberts is a 54 year old male Patient presents with:  Follow Up: ears    Humberto Roberts is a 54 year old male presents to clinic for a tube placement recheck. Based on my evaluation tube is working well and is in place. He is already scheduled with the audiologist to start the process of getting hearing aids. Recommended that he follows up in 8 months for another tube placement evaluation. I suspect that his tube will fall out close to that time frame. Will see how his symptoms are doing and determine if a new plan is needed.     Humberto should follow up in 8 months.      At Humberto next appointment they will not need a hearing test.      Miguel Sykes MD           Again, thank you for allowing me to participate in the care of your patient.        Sincerely,        Miguel Sykes MD, MD

## 2024-04-29 DIAGNOSIS — E11.65 TYPE 2 DIABETES MELLITUS WITH HYPERGLYCEMIA, WITHOUT LONG-TERM CURRENT USE OF INSULIN (H): ICD-10-CM

## 2024-04-29 DIAGNOSIS — G89.29 CHRONIC BILATERAL LOW BACK PAIN WITHOUT SCIATICA: ICD-10-CM

## 2024-04-29 DIAGNOSIS — M54.50 CHRONIC BILATERAL LOW BACK PAIN WITHOUT SCIATICA: ICD-10-CM

## 2024-04-29 RX ORDER — EMPAGLIFLOZIN 25 MG/1
25 TABLET, FILM COATED ORAL DAILY
Qty: 90 TABLET | Refills: 0 | Status: SHIPPED | OUTPATIENT
Start: 2024-04-29 | End: 2024-07-26

## 2024-04-30 ENCOUNTER — TELEPHONE (OUTPATIENT)
Dept: FAMILY MEDICINE | Facility: CLINIC | Age: 54
End: 2024-04-30

## 2024-04-30 DIAGNOSIS — E11.65 TYPE 2 DIABETES MELLITUS WITH HYPERGLYCEMIA, WITHOUT LONG-TERM CURRENT USE OF INSULIN (H): ICD-10-CM

## 2024-04-30 DIAGNOSIS — E66.01 MORBID OBESITY (H): ICD-10-CM

## 2024-04-30 DIAGNOSIS — G89.29 CHRONIC BILATERAL LOW BACK PAIN WITHOUT SCIATICA: ICD-10-CM

## 2024-04-30 DIAGNOSIS — M54.50 CHRONIC BILATERAL LOW BACK PAIN WITHOUT SCIATICA: ICD-10-CM

## 2024-04-30 RX ORDER — SEMAGLUTIDE 0.68 MG/ML
0.25 INJECTION, SOLUTION SUBCUTANEOUS
Qty: 3 ML | Refills: 0 | Status: SHIPPED | OUTPATIENT
Start: 2024-04-30 | End: 2024-05-07

## 2024-04-30 RX ORDER — OXYCODONE AND ACETAMINOPHEN 5; 325 MG/1; MG/1
1 TABLET ORAL 2 TIMES DAILY PRN
Qty: 30 TABLET | Refills: 0 | Status: SHIPPED | OUTPATIENT
Start: 2024-04-30 | End: 2024-07-26

## 2024-04-30 NOTE — TELEPHONE ENCOUNTER
Reason for Call:  Appointment Request    Patient requesting this type of appt:  Preventive     Requested provider: Calos Hester    Reason patient unable to be scheduled: Not within requested timeframe    When does patient want to be seen/preferred time: 3-7 days    Comments: annual     Could we send this information to you in [employer] or would you prefer to receive a phone call?:   Patient would prefer a phone call   Okay to leave a detailed message?: Yes at Cell number on file:    Telephone Information:   Mobile [phone]       Call taken on 4/30/2024 at 12:07 PM by Tory Johnson

## 2024-05-01 NOTE — TELEPHONE ENCOUNTER
He was no-show for his appointment yesterday.  Extremely hard to get in in the timeframe that he requested.  If he is doing well, physical exam could be done may be in this fall.  Thank you

## 2024-05-05 ENCOUNTER — TELEPHONE (OUTPATIENT)
Dept: FAMILY MEDICINE | Facility: CLINIC | Age: 54
End: 2024-05-05
Payer: COMMERCIAL

## 2024-05-05 NOTE — TELEPHONE ENCOUNTER
Pt is questioning whether he should be increasing his Ozempic dose.  He has been on the 0.25mg dose for 6 weeks and has been tolerating well. Please send new Rx to [place] pharmacy if you would like to increase to 0.5mg dose.  Thanks!    Angie Cramer, PharmD  [facility]  [phone]

## 2024-05-07 ENCOUNTER — VIRTUAL VISIT (OUTPATIENT)
Dept: FAMILY MEDICINE | Facility: CLINIC | Age: 54
End: 2024-05-07
Payer: COMMERCIAL

## 2024-05-07 DIAGNOSIS — E66.01 MORBID OBESITY (H): ICD-10-CM

## 2024-05-07 DIAGNOSIS — M54.50 CHRONIC BILATERAL LOW BACK PAIN WITHOUT SCIATICA: ICD-10-CM

## 2024-05-07 DIAGNOSIS — G89.29 CHRONIC BILATERAL LOW BACK PAIN WITHOUT SCIATICA: ICD-10-CM

## 2024-05-07 DIAGNOSIS — G47.33 OSA (OBSTRUCTIVE SLEEP APNEA): ICD-10-CM

## 2024-05-07 DIAGNOSIS — E11.65 TYPE 2 DIABETES MELLITUS WITH HYPERGLYCEMIA, WITHOUT LONG-TERM CURRENT USE OF INSULIN (H): Primary | ICD-10-CM

## 2024-05-07 DIAGNOSIS — I10 HYPERTENSION GOAL BP (BLOOD PRESSURE) < 140/90: ICD-10-CM

## 2024-05-07 DIAGNOSIS — M47.26 OSTEOARTHRITIS OF SPINE WITH RADICULOPATHY, LUMBAR REGION: ICD-10-CM

## 2024-05-07 PROCEDURE — 99214 OFFICE O/P EST MOD 30 MIN: CPT | Mod: 95 | Performed by: FAMILY MEDICINE

## 2024-05-07 RX ORDER — SEMAGLUTIDE 0.68 MG/ML
0.5 INJECTION, SOLUTION SUBCUTANEOUS
Qty: 3 ML | Refills: 0 | Status: SHIPPED | OUTPATIENT
Start: 2024-05-07 | End: 2024-06-11

## 2024-05-07 ASSESSMENT — ENCOUNTER SYMPTOMS: BACK PAIN: 1

## 2024-05-07 ASSESSMENT — PATIENT HEALTH QUESTIONNAIRE - PHQ9: SUM OF ALL RESPONSES TO PHQ QUESTIONS 1-9: 6

## 2024-05-07 NOTE — PROGRESS NOTES
Humberto is a 54 year old who is being evaluated via a billable video visit.    What phone number would you like to be contacted at? [phone]  How would you like to obtain your AVS? MyChart      Assessment & Plan       ICD-10-CM    1. Type 2 diabetes mellitus with hyperglycemia, without long-term current use of insulin (H)  E11.65 semaglutide (OZEMPIC, 0.25 OR 0.5 MG/DOSE,) 2 MG/3ML pen      2. Morbid obesity (H)  E66.01 semaglutide (OZEMPIC, 0.25 OR 0.5 MG/DOSE,) 2 MG/3ML pen      3. RICARDO (obstructive sleep apnea) - on cpap  G47.33       4. Hypertension goal BP (blood pressure) < 140/90  I10       5. Chronic bilateral low back pain without sciatica  M54.50 semaglutide (OZEMPIC, 0.25 OR 0.5 MG/DOSE,) 2 MG/3ML pen    G89.29 Adult Neurosurgery  Referral      6. Osteoarthritis of spine with radiculopathy, lumbar region  M47.26 Adult Neurosurgery  Referral         Known to have diabetes, hemoglobin A1c a month ago was 6.4.  He is also morbidly obese with chronic low back pain, hypertensive and sleep apnea.  Tolerating the Ozempic and metformin well.  No hypoglycemic symptoms.  Will continue with the metformin, increase the Ozempic from 0.25 to 0.5 mg weekly.  Will continue to taper up the Ozempic dose to the max dose of 2.4 mg weekly as tolerated.  Encouraged to continue to monitor the blood sugar.  Hypoglycemic symptoms discussed.  Emphasized about the importance of exercising, healthy diet and weight loss.  I believe the Ozempic will help him with the weight loss, consequently will help with his sleep apnea, back pain and high blood pressure.  Will continue with the Jardiance as well.    He also has chronic lower back pain for years - been disable because of it.  MRI on September 2023 showed multilevel degenerative change with mild to moderate bilateral L3-L4 and left L5-S1 neuroforaminal stenosis.  He had 2 epidural injections with no effect.  Physical therapy worsen his pain.  Pain is unbearable  that keep him up at night.  He in fact has been sleeping in a recliner for years because of it.  Not able to move around much because of the pain.  He denies of any focal neurological symptoms or symptoms of equina syndrome.  It has affected his life tremendously.    Will refer him to neurosurgery for further evaluation and treatment options since failed conservative management.  Will continue with oxycodone, increased from 1 tablet daily up to 2 tablets daily.  Will continue with the Zanaflex as needed for muscle spasm.    Symptoms that need to be seen or call in discussed.  Otherwise follow-up in 6 months for physical.    He felt comfortable with the plan and all of his questions were answered.    25 minutes spent by me on the date of the encounter doing chart review, history and exam, documentation and further activities per the note        Work on weight loss  Regular exercise    Kamran Paul is a 54 year old, presenting for the following health issues:  Medication Problem and Back Pain      5/7/2024     9:55 AM   Additional Questions   Roomed by Angelita ASHFORD     Via the Health Maintenance questionnaire, the patient has reported the following services have been completed -Eye Exam, this information has been sent to the abstraction team.  Video Start Time:  10:05 AM    Back Pain     History of Present Illness       Back Pain:  He presents for follow up of back pain. Patient's back pain is a new problem.    Original cause of back pain: not sure  First noticed back pain: more than 1 month ago  Patient feels back pain: dailyLocation of back pain:  Right lower back  Description of back pain: burning and other  Back pain spreads: nowhere    Since patient first noticed back pain, pain is: unchanged  Does back pain interfere with his job:  Not applicable  On a scale of 1-10 (10 being the worst), patient describes pain as:  10  What makes back pain worse: bending, lying down and standing   Acupuncture: not  tried  Acetaminophen: not helpful  Activity or exercise: not helpful  Chiropractor:  Not helpful  Cold: not helpful  Heat: not helpful  Massage: not helpful  Muscle relaxants: not helpful  NSAIDS: not helpful  Opioids: not helpful  Physical Therapy: not helpful  Rest: not helpful  Steroid Injection: helpful  Stretching: not helpful  Surgery: not tried  TENS unit: not tried  Topical pain relievers: not helpful  Other healthcare providers patient is seeing for back pain: None    He eats 2-3 servings of fruits and vegetables daily.He consumes 3 sweetened beverage(s) daily.He exercises with enough effort to increase his heart rate 9 or less minutes per day.  He exercises with enough effort to increase his heart rate 3 or less days per week.   He is taking medications regularly.       Humberto was seen today for diabetes and back pain follow-up.  Known to have diabetes for years, hemoglobin A1c a month ago was 6.4.  He is also morbidly obese with chronic low back pain, hypertensive and sleep apnea.  Tolerating the Ozempic, Jardiance and metformin well.  No hypoglycemic symptoms.  Not checking his blood sugar at home.  Wondering if the Ozempic dose can be increased.  Currently at 0.25 mg weekly.  He will review some positive effect on his weight (lost about 10 pounds in the last month).  No nausea or vomiting.  No personal or family history of thyroid cancer.    He also has chronic lower back pain for years - been disable because of it.  MRI on September 2023 showed multilevel degenerative change with mild to moderate bilateral L3-L4 and left L5-S1 neuroforaminal stenosis.  He had 2 epidural injections with no effect.  Physical therapy worsen his pain.  Pain is unbearable that keep him up at night.  He in fact has been sleeping in a recliner for years because of it.  Not able to move around much because of the pain.  He denies of any focal neurological symptoms or symptoms of equina syndrome.  It has affected his life  tremendously.  The same kind of pain that he has had, mainly on the lower back.  A dull constant achy pain that becomes sharp with movement or activity.  Not able to walk or stand for more than 20 minutes.  Has not been active and moving around because of it.  Back feels stiff.  Radiates down to his leg bilaterally with certain movements.  No leg weakness.  No fever or chills.  No unusual activities or recent history of trauma.  No problem with control bowel movement or urination.        Review of Systems  Constitutional, HEENT, cardiovascular, pulmonary, gi and gu systems are negative, except as otherwise noted.      Objective           Vitals:  No vitals were obtained today due to virtual visit.    Physical Exam   GENERAL: alert and no distress, speaking in full sentences.  RESP: No audible wheeze, cough, no labored breathing.    NEURO: Cranial nerves grossly intact.  Mentation and speech appropriate for age.  PSYCH: Appropriate affect, tone, and pace of words    No results found for any visits on 05/07/24.      Video-Visit Details    Type of service:  Video Visit   Video End Time: 10:15 AM  Originating Location (pt. Location): Home    Distant Location (provider location):  On-site  Platform used for Video Visit: Antonella  Signed Electronically by: Calos Ty Mai, MD

## 2024-05-14 ENCOUNTER — PATIENT OUTREACH (OUTPATIENT)
Dept: CARE COORDINATION | Facility: CLINIC | Age: 54
End: 2024-05-14
Payer: COMMERCIAL

## 2024-05-23 ENCOUNTER — OFFICE VISIT (OUTPATIENT)
Dept: NEUROSURGERY | Facility: CLINIC | Age: 54
End: 2024-05-23
Attending: FAMILY MEDICINE
Payer: COMMERCIAL

## 2024-05-23 VITALS
HEART RATE: 77 BPM | RESPIRATION RATE: 16 BRPM | TEMPERATURE: 98.4 F | OXYGEN SATURATION: 100 % | BODY MASS INDEX: 39.67 KG/M2 | WEIGHT: 309 LBS | SYSTOLIC BLOOD PRESSURE: 120 MMHG | DIASTOLIC BLOOD PRESSURE: 76 MMHG

## 2024-05-23 DIAGNOSIS — G89.29 CHRONIC BILATERAL LOW BACK PAIN WITHOUT SCIATICA: ICD-10-CM

## 2024-05-23 DIAGNOSIS — M54.50 CHRONIC BILATERAL LOW BACK PAIN WITHOUT SCIATICA: ICD-10-CM

## 2024-05-23 DIAGNOSIS — M47.26 OSTEOARTHRITIS OF SPINE WITH RADICULOPATHY, LUMBAR REGION: ICD-10-CM

## 2024-05-23 PROCEDURE — 99213 OFFICE O/P EST LOW 20 MIN: CPT | Performed by: NEUROLOGICAL SURGERY

## 2024-05-23 ASSESSMENT — PAIN SCALES - GENERAL: PAINLEVEL: WORST PAIN (10)

## 2024-05-23 NOTE — NURSING NOTE
"Humberto Roberts is a 54 year old male who presents for:  Chief Complaint   Patient presents with    Neurologic Problem     Chronic bilateral low back pain without sciatica    Osteoarthritis of spine with radiculopathy, lumbar region          Initial Vitals:  /76   Pulse 77   Temp 98.4  F (36.9  C) (Temporal)   Resp 16   Wt 309 lb (140.2 kg)   SpO2 100%   BMI 39.67 kg/m   Estimated body mass index is 39.67 kg/m  as calculated from the following:    Height as of 3/19/24: 6' 2\" (1.88 m).    Weight as of this encounter: 309 lb (140.2 kg).. Body surface area is 2.71 meters squared. BP completed using cuff size: large  Worst Pain (10)    Nursing Comments:     Paty Santana    "

## 2024-05-23 NOTE — PROGRESS NOTES
53 year old male with back pain.  Several months of severe throbbing low back pain radiating side to side, worse when he tries to sleep, makes it impossible to sleep in a bed, and can only sleep in a recliner.  No significant radiation to the legs currently.  Has done past PT without improvement.  He underwent bilateral L3-4 transforaminal MIKEY which improved a substantial component of his radiating pain, but the back pain persists.  MR Lumbar, personally reviewed, with mild spondylotic change, mild/moderate right 3-4 foraminal stenosis.    Returns for follow up.  Underwent SI joint injection without improvement.  Currently, he is predominantly having right>left low back pain without radiating to the legs.       Past Medical History:   Diagnosis Date    Acid reflux disease since 2006    Back pain chronic    Cellulitis of left upper arm and forearm 11/22/2013    Elevated serum creatinine 10/15/2015    Hypertension     Pre-diabetes 2/25/2021     Past Surgical History:   Procedure Laterality Date    COLONOSCOPY N/A 3/25/2021    Procedure: Colonoscopy Incomplete;  Surgeon: Humberto Antoine DO;  Location:  GI    COLONOSCOPY N/A 7/15/2021    Procedure: COLONOSCOPY;  Surgeon: Humberto Antoine DO;  Location:  GI    ENT SURGERY      ESOPHAGOSCOPY, GASTROSCOPY, DUODENOSCOPY (EGD), COMBINED N/A 3/25/2021    Procedure: Esophagogastroduodenoscopy with biopsy;  Surgeon: Humberto Antoine DO;  Location: [facility]    INJECT EPIDURAL TRANSFORAMINAL N/A 10/20/2023    Procedure: Bilateral Lumbar 3-4 Transforaminal Epidural Steroid Injection with fluoroscopic guidance and contrast.;  Surgeon: Joe Aranda MD;  Location:  OR     Social History     Socioeconomic History    Marital status:      Spouse name: Not on file    Number of children: Not on file    Years of education: Not on file    Highest education level: Not on file   Occupational History    Not on file   Tobacco Use    Smoking status: Never     Smokeless tobacco: Never   Vaping Use    Vaping status: Never Used   Substance and Sexual Activity    Alcohol use: No     Alcohol/week: 0.0 standard drinks of alcohol    Drug use: No    Sexual activity: Yes     Partners: Female   Other Topics Concern    Not on file   Social History Narrative    Not on file     Social Determinants of Health     Financial Resource Strain: Low Risk  (10/11/2023)    Financial Resource Strain     Within the past 12 months, have you or your family members you live with been unable to get utilities (heat, electricity) when it was really needed?: No   Food Insecurity: High Risk (10/11/2023)    Food Insecurity     Within the past 12 months, did you worry that your food would run out before you got money to buy more?: Yes     Within the past 12 months, did the food you bought just not last and you didn t have money to get more?: Yes   Transportation Needs: Unknown (10/11/2023)    Transportation Needs     Within the past 12 months, has lack of transportation kept you from medical appointments, getting your medicines, non-medical meetings or appointments, work, or from getting things that you need?: Patient refused   Physical Activity: Not on file   Stress: Not on file   Social Connections: Not on file   Interpersonal Safety: Low Risk  (10/11/2023)    Interpersonal Safety     Do you feel physically and emotionally safe where you currently live?: Yes     Within the past 12 months, have you been hit, slapped, kicked or otherwise physically hurt by someone?: No     Within the past 12 months, have you been humiliated or emotionally abused in other ways by your partner or ex-partner?: No   Housing Stability: High Risk (10/11/2023)    Housing Stability     Do you have housing? : No     Are you worried about losing your housing?: No     Family History   Problem Relation Age of Onset    Diabetes Mother     Hypertension Mother     Heart Disease Mother     Heart Disease Father     Diabetes Father      Unknown/Adopted Maternal Grandmother     Unknown/Adopted Maternal Grandfather     Unknown/Adopted Paternal Grandmother     Unknown/Adopted Paternal Grandfather     No Known Problems Sister     No Known Problems Son     No Known Problems Daughter     No Known Problems Sister     No Known Problems Son     No Known Problems Daughter         ROS: 10 point ROS neg other than the symptoms noted above in the HPI.    Physical Exam  /76   Pulse 77   Temp 98.4  F (36.9  C) (Temporal)   Resp 16   Wt 140.2 kg (309 lb)   SpO2 100%   BMI 39.67 kg/m    HEENT:  Normocephalic, atraumatic.  PERRLA.  EOM s intact.  Visual fields full to gross exam  Neck:  Supple, non-tender, without lymphadenopathy.  Heart:  No peripheral edema  Lungs:  No SOB  Abdomen:  Non-distended.   Skin:  Warm and dry.  Extremities:  No edema, cyanosis or clubbing.  Psychiatric:  No apparent distress  Musculoskeletal:  Normal bulk and tone    NEUROLOGICAL EXAMINATION:     Mental status:  Alert and Oriented x 3, speech is fluent.  Cranial nerves:  II-XII intact.   Motor:    Shoulder Abduction:  Right:  5/5   Left:  5/5  Biceps:                      Right:  5/5   Left:  5/5  Triceps:                     Right:  5/5   Left:  5/5  Wrist Extensors:       Right:  5/5   Left:  5/5  Wrist Flexors:           Right:  5/5   Left:  5/5  interosseus :            Right:  5/5   Left:  5/5  Hip Flexion:                Right: 5/5  Left:  5/5  Quadriceps:             Right:  5/5  Left:  5/5  Hamstrings:             Right:  5/5  Left:  5/5  Gastroc Soleus:        Right:  5/5  Left:  5/5  Tib/Ant:                      Right:  5/5  Left:  5/5  EHL:                     Right:  5/5  Left:  5/5  Sensation:  Intact  Reflexes:  Negative Babinski.  Negative Clonus.  Negative Martinez's.  Coordination:  Smooth finger to nose testing.   Negative pronator drift.  Smooth tandem walking.      A/P:  53 year old male with back pain    Worst pain currently back pain  Will refer for  L3-5 MBB/RFA  Will refer for eval with Dr. More to discuss possible further injection options such as trigger point injections if needed

## 2024-05-23 NOTE — PATIENT INSTRUCTIONS
Ordered a L3-5 bilateral Medial Branch Block/Radiofrequency Ablation (MBB/RFA) in [place]. [facility] will call you within 48 hours to schedule this. If you don't here from them, please schedule by calling Operating Room scheduling team s phone number: [phone], option 2 (or [phone]). This injection order consists of a series of up to 3 injections. The first two injections are medial branch blocks. They are diagnostic, meaning they are not intended to provide lasting pain relief. After the first MBB injection, you will be sent home with a pain log. When complete, you will follow the instructions to send back to the clinic. We will read the results and call you to let you know if results are positive and if you will move forward with the next injection (MBB #2). If the 2nd MBB is also positive, you will move on to the 3rd injection which is the Radiofrequency Ablation. The RFA is the therapeutic injection intended to provide lasting pain relief. RFA uses an electrical current created by radio waves. The radio waves make heat that destroys nerves along the spine that are causing pain.    Referral to Dr. Ortiz with Spine for management. They will call you to schedule. [phone]      [facility]  [facility] - [address] [address]  Telephone:  [phone]        Fax:  [phone]

## 2024-05-23 NOTE — LETTER
5/23/2024         RE: Humberto Roberts  [address] [facility] [address]        Dear Colleague,    Thank you for referring your patient, Humberto Roberts, to the [facility] [place]. Please see a copy of my visit note below.    53 year old male with back pain.  Several months of severe throbbing low back pain radiating side to side, worse when he tries to sleep, makes it impossible to sleep in a bed, and can only sleep in a recliner.  No significant radiation to the legs currently.  Has done past PT without improvement.  He underwent bilateral L3-4 transforaminal MIKEY which improved a substantial component of his radiating pain, but the back pain persists.  MR Lumbar, personally reviewed, with mild spondylotic change, mild/moderate right 3-4 foraminal stenosis.    Returns for follow up.  Underwent SI joint injection without improvement.  Currently, he is predominantly having right>left low back pain without radiating to the legs.       Past Medical History:   Diagnosis Date     Acid reflux disease since 2006     Back pain chronic     Cellulitis of left upper arm and forearm 11/22/2013     Elevated serum creatinine 10/15/2015     Hypertension      Pre-diabetes 2/25/2021     Past Surgical History:   Procedure Laterality Date     COLONOSCOPY N/A 3/25/2021    Procedure: Colonoscopy Incomplete;  Surgeon: Humberto Antoine DO;  Location:  GI     COLONOSCOPY N/A 7/15/2021    Procedure: COLONOSCOPY;  Surgeon: Humberto Antoine DO;  Location:  GI     ENT SURGERY       ESOPHAGOSCOPY, GASTROSCOPY, DUODENOSCOPY (EGD), COMBINED N/A 3/25/2021    Procedure: Esophagogastroduodenoscopy with biopsy;  Surgeon: Humberto Antoine DO;  Location:  GI     INJECT EPIDURAL TRANSFORAMINAL N/A 10/20/2023    Procedure: Bilateral Lumbar 3-4 Transforaminal Epidural Steroid Injection with fluoroscopic guidance and contrast.;  Surgeon: Joe Aranda MD;  Location:  OR     Social History      Socioeconomic History     Marital status:      Spouse name: Not on file     Number of children: Not on file     Years of education: Not on file     Highest education level: Not on file   Occupational History     Not on file   Tobacco Use     Smoking status: Never     Smokeless tobacco: Never   Vaping Use     Vaping status: Never Used   Substance and Sexual Activity     Alcohol use: No     Alcohol/week: 0.0 standard drinks of alcohol     Drug use: No     Sexual activity: Yes     Partners: Female   Other Topics Concern     Not on file   Social History Narrative     Not on file     Social Determinants of Health     Financial Resource Strain: Low Risk  (10/11/2023)    Financial Resource Strain      Within the past 12 months, have you or your family members you live with been unable to get utilities (heat, electricity) when it was really needed?: No   Food Insecurity: High Risk (10/11/2023)    Food Insecurity      Within the past 12 months, did you worry that your food would run out before you got money to buy more?: Yes      Within the past 12 months, did the food you bought just not last and you didn t have money to get more?: Yes   Transportation Needs: Unknown (10/11/2023)    Transportation Needs      Within the past 12 months, has lack of transportation kept you from medical appointments, getting your medicines, non-medical meetings or appointments, work, or from getting things that you need?: Patient refused   Physical Activity: Not on file   Stress: Not on file   Social Connections: Not on file   Interpersonal Safety: Low Risk  (10/11/2023)    Interpersonal Safety      Do you feel physically and emotionally safe where you currently live?: Yes      Within the past 12 months, have you been hit, slapped, kicked or otherwise physically hurt by someone?: No      Within the past 12 months, have you been humiliated or emotionally abused in other ways by your partner or ex-partner?: No   Housing Stability: High  Risk (10/11/2023)    Housing Stability      Do you have housing? : No      Are you worried about losing your housing?: No     Family History   Problem Relation Age of Onset     Diabetes Mother      Hypertension Mother      Heart Disease Mother      Heart Disease Father      Diabetes Father      Unknown/Adopted Maternal Grandmother      Unknown/Adopted Maternal Grandfather      Unknown/Adopted Paternal Grandmother      Unknown/Adopted Paternal Grandfather      No Known Problems Sister      No Known Problems Son      No Known Problems Daughter      No Known Problems Sister      No Known Problems Son      No Known Problems Daughter         ROS: 10 point ROS neg other than the symptoms noted above in the HPI.    Physical Exam  /76   Pulse 77   Temp 98.4  F (36.9  C) (Temporal)   Resp 16   Wt 140.2 kg (309 lb)   SpO2 100%   BMI 39.67 kg/m    HEENT:  Normocephalic, atraumatic.  PERRLA.  EOM s intact.  Visual fields full to gross exam  Neck:  Supple, non-tender, without lymphadenopathy.  Heart:  No peripheral edema  Lungs:  No SOB  Abdomen:  Non-distended.   Skin:  Warm and dry.  Extremities:  No edema, cyanosis or clubbing.  Psychiatric:  No apparent distress  Musculoskeletal:  Normal bulk and tone    NEUROLOGICAL EXAMINATION:     Mental status:  Alert and Oriented x 3, speech is fluent.  Cranial nerves:  II-XII intact.   Motor:    Shoulder Abduction:  Right:  5/5   Left:  5/5  Biceps:                      Right:  5/5   Left:  5/5  Triceps:                     Right:  5/5   Left:  5/5  Wrist Extensors:       Right:  5/5   Left:  5/5  Wrist Flexors:           Right:  5/5   Left:  5/5  interosseus :            Right:  5/5   Left:  5/5  Hip Flexion:                Right: 5/5  Left:  5/5  Quadriceps:             Right:  5/5  Left:  5/5  Hamstrings:             Right:  5/5  Left:  5/5  Gastroc Soleus:        Right:  5/5  Left:  5/5  Tib/Ant:                      Right:  5/5  Left:  5/5  EHL:                      Right:  5/5  Left:  5/5  Sensation:  Intact  Reflexes:  Negative Babinski.  Negative Clonus.  Negative Martinez's.  Coordination:  Smooth finger to nose testing.   Negative pronator drift.  Smooth tandem walking.      A/P:  53 year old male with back pain    Worst pain currently back pain  Will refer for L3-5 MBB/RFA  Will refer for eval with Dr. More to discuss possible further injection options such as trigger point injections if needed          Again, thank you for allowing me to participate in the care of your patient.        Sincerely,        Cole Groves MD

## 2024-05-24 ENCOUNTER — TELEPHONE (OUTPATIENT)
Dept: PHYSICAL MEDICINE AND REHAB | Facility: CLINIC | Age: 54
End: 2024-05-24

## 2024-05-24 ENCOUNTER — HOSPITAL ENCOUNTER (OUTPATIENT)
Facility: CLINIC | Age: 54
End: 2024-05-24
Attending: ANESTHESIOLOGY | Admitting: ANESTHESIOLOGY
Payer: COMMERCIAL

## 2024-05-24 ENCOUNTER — OFFICE VISIT (OUTPATIENT)
Dept: AUDIOLOGY | Facility: CLINIC | Age: 54
End: 2024-05-24
Payer: COMMERCIAL

## 2024-05-24 DIAGNOSIS — H90.3 ASYMMETRICAL SENSORINEURAL HEARING LOSS: Primary | ICD-10-CM

## 2024-05-24 PROCEDURE — 92590 PR HEARING AID EXAM MONAURAL: CPT | Mod: RT | Performed by: AUDIOLOGIST

## 2024-05-24 NOTE — PROGRESS NOTES
AUDIOLOGY REPORT    SUBJECTIVE: Humberto Roberts is a 54 year old male was seen in the Audiology Clinic at  [facility] on 5/24/24 to discuss concerns with hearing and functional communication difficulties. The patient was unaccompanied to today's appointment. Humberto has been seen previously on 4/24/2024, and results revealed a asymmetrical sensorineural hearing loss. The patient was medically evaluated and determined to be cleared for a right hearing aid by Miguel Sykes MD. Humberto notes difficulty with communication in a variety of listening situations.    OBJECTIVE:  Patient is a hearing aid candidate. Patient would like to move forward with a hearing aid evaluation today. Therefore, the patient was presented with different options for amplification to help aid in communication. Discussed styles, levels of technology and monaural vs. binaural fitting.     The hearing aid(s) mutually chosen were:  Right: Phonak Audeo L70-R  COLOR: velvet black  BATTERY SIZE: rechargeable  EARMOLD/TIPS: dome  CANAL/ LENGTH: 2M    ASSESSMENT:     ICD-10-CM    1. Asymmetrical sensorineural hearing loss  H90.3 Hearing Aid Exam, Monaural (85328)          Reviewed purchase information and warranty information with patient. The 45 day trial period was explained to patient. The patient was given a copy of the [place] Department [employer] consumer brochure on purchasing hearing instruments. Patient risk factors have been provided to the patient in writing prior to the sale of the hearing aid per FDA regulation. The risk factors are also available in the User Instructional Booklet to be presented on the day of the hearing aid fitting. Hearing aid(s) ordered. Hearing aid evaluation completed.    PLAN: Humberto is scheduled to return for a hearing aid fitting and programming appointment. Purchase agreement will be completed on that date. Please contact this clinic with any questions or concerns.      Garima  Francois Weller, CCC-A  [place] Licensed Audiologist #[id number]  5/24/2024         None

## 2024-06-05 ENCOUNTER — TELEPHONE (OUTPATIENT)
Dept: PALLIATIVE MEDICINE | Facility: CLINIC | Age: 54
End: 2024-06-05
Payer: COMMERCIAL

## 2024-06-11 DIAGNOSIS — E11.65 TYPE 2 DIABETES MELLITUS WITH HYPERGLYCEMIA, WITHOUT LONG-TERM CURRENT USE OF INSULIN (H): ICD-10-CM

## 2024-06-11 DIAGNOSIS — G89.29 CHRONIC BILATERAL LOW BACK PAIN WITHOUT SCIATICA: ICD-10-CM

## 2024-06-11 DIAGNOSIS — E66.01 MORBID OBESITY (H): ICD-10-CM

## 2024-06-11 DIAGNOSIS — M54.50 CHRONIC BILATERAL LOW BACK PAIN WITHOUT SCIATICA: ICD-10-CM

## 2024-06-11 RX ORDER — SEMAGLUTIDE 0.68 MG/ML
0.5 INJECTION, SOLUTION SUBCUTANEOUS
Qty: 3 ML | Refills: 0 | Status: SHIPPED | OUTPATIENT
Start: 2024-06-11 | End: 2024-07-09

## 2024-06-14 ENCOUNTER — TELEPHONE (OUTPATIENT)
Dept: AUDIOLOGY | Facility: CLINIC | Age: 54
End: 2024-06-14

## 2024-06-14 NOTE — TELEPHONE ENCOUNTER
Called patient twice today and there was a busy signal. His hearing aid had not arrived this morning and we need to find a time to reschedule his hearing aid fitting. I have some availability on 6/24 or 6/28. I will also try sending him a [employer] message.    Francois Artis, CCC-A  [place] Licensed Audiologist #[id number]  6/14/2024

## 2024-06-21 ENCOUNTER — TELEPHONE (OUTPATIENT)
Dept: ANESTHESIOLOGY | Facility: CLINIC | Age: 54
End: 2024-06-21

## 2024-06-21 ENCOUNTER — OFFICE VISIT (OUTPATIENT)
Dept: NEUROSURGERY | Facility: CLINIC | Age: 54
End: 2024-06-21
Attending: NEUROLOGICAL SURGERY
Payer: COMMERCIAL

## 2024-06-21 VITALS
DIASTOLIC BLOOD PRESSURE: 82 MMHG | BODY MASS INDEX: 38.5 KG/M2 | HEART RATE: 71 BPM | SYSTOLIC BLOOD PRESSURE: 132 MMHG | HEIGHT: 74 IN | WEIGHT: 300 LBS

## 2024-06-21 DIAGNOSIS — M47.816 LUMBAR FACET ARTHROPATHY: ICD-10-CM

## 2024-06-21 DIAGNOSIS — G89.29 CHRONIC BILATERAL LOW BACK PAIN WITHOUT SCIATICA: ICD-10-CM

## 2024-06-21 DIAGNOSIS — M62.838 MUSCLE SPASM: ICD-10-CM

## 2024-06-21 DIAGNOSIS — M54.50 CHRONIC BILATERAL LOW BACK PAIN WITHOUT SCIATICA: ICD-10-CM

## 2024-06-21 DIAGNOSIS — M47.816 LUMBAR SPONDYLOSIS: Primary | ICD-10-CM

## 2024-06-21 PROCEDURE — 99204 OFFICE O/P NEW MOD 45 MIN: CPT | Performed by: PHYSICAL MEDICINE & REHABILITATION

## 2024-06-21 RX ORDER — CYCLOBENZAPRINE HCL 5 MG
5-10 TABLET ORAL AT BEDTIME
Qty: 60 TABLET | Refills: 2 | Status: SHIPPED | OUTPATIENT
Start: 2024-06-21 | End: 2024-08-20

## 2024-06-21 ASSESSMENT — PAIN SCALES - GENERAL: PAINLEVEL: SEVERE PAIN (7)

## 2024-06-21 NOTE — LETTER
6/21/2024      Humberto Roberts  [address] [place] [id number]      Dear Colleague,    Thank you for referring your patient, Humberto Roberts, to the [facility] [place]. Please see a copy of my visit note below.    Patient seen at the request of Dr Groves for an opinion and evaluation of low back pain.      HISTORY OF PRESENT ILLNESS:  Humberto Roberts is a 54 year old male who presents with a chief complaint of bilateral low back pain.  He is accompanied today by his wife.    Briefly, he has a longstanding history of chronic low back pain and has been treated most recently through the pain clinic in [place] having undergone bilateral L3-4 transforaminal epidural steroid injection October 2023 reporting good relief for about 1 to 2 months.  Subsequent bilateral sacroiliac joint injections were attempted in December 2023 with fluoroscopic guidance but access was reportedly challenging and he was referred for CT-guided SI joint injection.  He states that this did not provide any meaningful benefit.  He has also been seen in the neurosurgery clinic by my colleague, Dr. Groves who did not recommend surgical intervention at this time and he was referred for consideration of diagnostic lumbar medial branch blocks.     He also states that he tried physical therapy x 1 to 2 years ago which significantly aggravated his pain and he is not interested in additional PT at this time.    Currently, he localizes pain to the bilateral low lumbar region near the level of the iliac crests equal on the right and left.  He denies any radicular pain, no lower extremity weakness or numbness/tingling.  Pain is described as constant dull, aching, burning and intermittently sharp in nature.  Pain score 7/10 at rest today and 10/10 at its worst in the last week.  Symptoms are worse with generalized activity, caring items or lumbar extension.  He reports significant pain related sleep disturbance.  He cannot lay  comfortably in his bed and has been sleeping in a lazy boy/recliner for several months sleeping at most up to 5 hours with interrupted rest.  He has tried tizanidine which has not been helpful and does take up to 2 tabs of Percocet 5/325 mg as needed but this does not provide any sustained, meaningful relief; however does help reduce some of his pain so he can sleep.    PRIOR INJURIES/TREATMENT:   Ice/Heat: +  Physical Therapy:   Prior PT -significantly exacerbated his pain and symptoms.     - Current Pain Medications -   Percocet 5/325 mg 1-2 tabs as needed for pain  Tizanidine as needed.    - Prior/Trialed Pain Medications -   NSAIDs: Ibuprofen, naproxen  AED: Gabapentin  Topicals: Diclofenac gel, diclofenac patch  Oral prednisone  Fiorinal prn  Opioids: Tylenol 3    Prior Procedures:  Date    Procedure   Improvement (%)  10/20/23 B L3-4 TFESI  X1-2 months    01/19/24 B SIJ (+CT)  No sig relief    Prior Related Surgery: none   Other (acupuncture, OMT, CMM, TENS, DME, etc.):     Specialists Seen - (with most recent, available notes and clinic visits reviewed)   1. Pain Clinic - Dr. Aranda  2. Neurosurgery - Dr. Groves      IMAGING - reviewed   09/07/23 MRI lumbar spine  IMPRESSION:  Multilevel degenerative changes, as described. Compared to prior, mild  to moderate bilateral L3-L4 and left L5-S1 neural foraminal stenosis  appears slightly progressed. Otherwise, no significant change    Review Of Systems:  I am responding to those symptoms which are directly relevant to the specific indication for my consultation. I recommend that the patient follow up with their primary or referring provider to pursue any other symptoms which may be of concern.       Medical History:  He  has a past medical history of Acid reflux disease (since 2006), Back pain (chronic), Cellulitis of left upper arm and forearm (11/22/2013), Elevated serum creatinine (10/15/2015), Hypertension, and Pre-diabetes (2/25/2021).     He  has a past  "surgical history that includes ENT surgery; Colonoscopy (N/A, 3/25/2021); Esophagoscopy, gastroscopy, duodenoscopy (EGD), combined (N/A, 3/25/2021); Colonoscopy (N/A, 7/15/2021); and Inject epidural transforaminal (N/A, 10/20/2023).    Family History  His family history includes Diabetes in his father and mother; Heart Disease in his father and mother; Hypertension in his mother; No Known Problems in his daughter, daughter, sister, sister, son, and son; Unknown/Adopted in his maternal grandfather, maternal grandmother, paternal grandfather, and paternal grandmother.     Social History:  Work: Retired  Current living situation: Lives with wife and family) and  He  reports that he has never smoked. He has never used smokeless tobacco. He reports that he does not drink alcohol and does not use drugs.        Current Medications:   He has a current medication list which includes the following prescription(s): albuterol, allopurinol, aspirin low dose, atenolol, atorvastatin, cetirizine, clotrimazole-betamethasone, freestyle wai 2 reader, freestyle wai 2 sensor, famotidine, fluticasone, hydrochlorothiazide, jardiance, metformin, order for dme, order for dme, oxycodone-acetaminophen, ozempic (0.25 or 0.5 mg/dose), pantoprazole, sildenafil, and tizanidine.     Allergies:   No Known Allergies    PHYSICAL EXAMINATION:  /82   Pulse 71   Ht 1.88 m (6' 2\")   Wt 136.1 kg (300 lb)   BMI 38.52 kg/m     General: Pleasant, straightforward, WDWN individual.  Mental Status: Pleasant, direct, appropriate mood and affect  Resp: breathing is unlabored without audible wheeze  Vascular: No visible cyanosis, no venous stasis changes  Heme: No visible ecchymosis or erythema on extremities  Skin: No notable rash    Neurologic:  Strength: All major muscle groups of the bilateral lower extremities have normal and symmetric muscle strength     DTRs: bilateral lower extremity stretch reflexes are equal and symmetric     Gait: reveals " normal brandt and stride     Musculoskeletal:  Lumbar spine: Mildly reduced with forward flexion and pain with extension.  + Bilateral facet loading.  Upper lumbar paraspinal muscle fullness.  No gross pelvic obliquity.  No tenderness at the SI joint       ASSESSMENT:  Humberto Roberts is a pleasant 54 year old male who presents with chronic axial/mechanical low back pain    #. Lumbar spondylosis  (primary encounter diagnosis)  #. Chronic bilateral low back pain without sciatica  #. Lumbar facet arthropathy  #. Muscle spasm     Complicating co-morbidities include:   #. Obesity Body mass index is 38.52 kg/m .. Recommend healthy lifestyle modifications through diet and exercise.  He is also on Ozempic which may help with weight loss  #.  Type 2 diabetes mellitus, well-controlled        PLAN:  -Imaging and prior procedures evaluated in detail.   -Flexeril (cyclobenzaprine) is generally marketed as a muscle relaxant, the similarities in chemical structure to the tricyclic antidepressants make it a very useful medication to treat pain and it can also be helpful for treating sleep disturbance.  Begin Flexeril at a dose of 5 mg 2-3 hours before bedtime.  Taking the medication in this fashion can help decrease morning grogginess.  Increase the dose by 5 mg every week as tolerated until a total dose of 10-15 mg is achieved.    -Refer for Bilateral L4-5, L5-S1 medial branch block #1  The pathway from diagnostic medial branch blocks to radiofrequency denervation was discussed in detail with the patient today.  Risks and benefits were discussed and all questions were answered.  The patient states understanding and wishes to proceed.  There are no contraindications.  The patient understands they will have 2 diagnostic medial branch blocks; and after each block they will be required to keep a pain diary recording their pain scores approximately every hour until the pain has returned to baseline.  During the time after the block,  the patient was instructed to perform activities which typically aggravates their pain.  The patient will contact the medical spine staff after each diagnostic block to assess the amount of benefit the patient received.  If the patient has a significantly positive response to each of these diagnostic blocks, they may move forward with the radiofrequency ablation procedure.   -RTC for procedure.     Ready to learn, no apparent learning barriers.  Education provided on treatment plan according to patient's preferred learning style.  Patient verbalizes understanding.   __________________________________  Aldo More MD  Physical Medicine & Rehabilitation        I spent a total of 45 minutes on the day of the visit.   Time spent by me doing chart review, history and exam, documentation and further activities per the note          Again, thank you for allowing me to participate in the care of your patient.        Sincerely,        Aldo More MD

## 2024-06-21 NOTE — PROGRESS NOTES
Patient seen at the request of Dr Groves for an opinion and evaluation of low back pain.      HISTORY OF PRESENT ILLNESS:  Humberto Roberts is a 54 year old male who presents with a chief complaint of bilateral low back pain.  He is accompanied today by his wife.    Briefly, he has a longstanding history of chronic low back pain and has been treated most recently through the pain clinic in [place] having undergone bilateral L3-4 transforaminal epidural steroid injection October 2023 reporting good relief for about 1 to 2 months.  Subsequent bilateral sacroiliac joint injections were attempted in December 2023 with fluoroscopic guidance but access was reportedly challenging and he was referred for CT-guided SI joint injection.  He states that this did not provide any meaningful benefit.  He has also been seen in the neurosurgery clinic by my colleague, Dr. Grvoes who did not recommend surgical intervention at this time and he was referred for consideration of diagnostic lumbar medial branch blocks.     He also states that he tried physical therapy x 1 to 2 years ago which significantly aggravated his pain and he is not interested in additional PT at this time.    Currently, he localizes pain to the bilateral low lumbar region near the level of the iliac crests equal on the right and left.  He denies any radicular pain, no lower extremity weakness or numbness/tingling.  Pain is described as constant dull, aching, burning and intermittently sharp in nature.  Pain score 7/10 at rest today and 10/10 at its worst in the last week.  Symptoms are worse with generalized activity, caring items or lumbar extension.  He reports significant pain related sleep disturbance.  He cannot lay comfortably in his bed and has been sleeping in a lazy boy/recliner for several months sleeping at most up to 5 hours with interrupted rest.  He has tried tizanidine which has not been helpful and does take up to 2 tabs of Percocet 5/325 mg as  needed but this does not provide any sustained, meaningful relief; however does help reduce some of his pain so he can sleep.    PRIOR INJURIES/TREATMENT:   Ice/Heat: +  Physical Therapy:   Prior PT -significantly exacerbated his pain and symptoms.     - Current Pain Medications -   Percocet 5/325 mg 1-2 tabs as needed for pain  Tizanidine as needed.    - Prior/Trialed Pain Medications -   NSAIDs: Ibuprofen, naproxen  AED: Gabapentin  Topicals: Diclofenac gel, diclofenac patch  Oral prednisone  Fiorinal prn  Opioids: Tylenol 3    Prior Procedures:  Date    Procedure   Improvement (%)  10/20/23 B L3-4 TFESI  X1-2 months    01/19/24 B SIJ (+CT)  No sig relief    Prior Related Surgery: none   Other (acupuncture, OMT, CMM, TENS, DME, etc.):     Specialists Seen - (with most recent, available notes and clinic visits reviewed)   1. Pain Clinic - Dr. Aranda  2. Neurosurgery - Dr. Groves      IMAGING - reviewed   09/07/23 MRI lumbar spine  IMPRESSION:  Multilevel degenerative changes, as described. Compared to prior, mild  to moderate bilateral L3-L4 and left L5-S1 neural foraminal stenosis  appears slightly progressed. Otherwise, no significant change    Review Of Systems:  I am responding to those symptoms which are directly relevant to the specific indication for my consultation. I recommend that the patient follow up with their primary or referring provider to pursue any other symptoms which may be of concern.       Medical History:  He  has a past medical history of Acid reflux disease (since 2006), Back pain (chronic), Cellulitis of left upper arm and forearm (11/22/2013), Elevated serum creatinine (10/15/2015), Hypertension, and Pre-diabetes (2/25/2021).     He  has a past surgical history that includes ENT surgery; Colonoscopy (N/A, 3/25/2021); Esophagoscopy, gastroscopy, duodenoscopy (EGD), combined (N/A, 3/25/2021); Colonoscopy (N/A, 7/15/2021); and Inject epidural transforaminal (N/A, 10/20/2023).    Family  "History  His family history includes Diabetes in his father and mother; Heart Disease in his father and mother; Hypertension in his mother; No Known Problems in his daughter, daughter, sister, sister, son, and son; Unknown/Adopted in his maternal grandfather, maternal grandmother, paternal grandfather, and paternal grandmother.     Social History:  Work: Retired  Current living situation: Lives with wife and family) and  He  reports that he has never smoked. He has never used smokeless tobacco. He reports that he does not drink alcohol and does not use drugs.        Current Medications:   He has a current medication list which includes the following prescription(s): albuterol, allopurinol, aspirin low dose, atenolol, atorvastatin, cetirizine, clotrimazole-betamethasone, freestyle wai 2 reader, freestyle wai 2 sensor, famotidine, fluticasone, hydrochlorothiazide, jardiance, metformin, order for dme, order for dme, oxycodone-acetaminophen, ozempic (0.25 or 0.5 mg/dose), pantoprazole, sildenafil, and tizanidine.     Allergies:   No Known Allergies    PHYSICAL EXAMINATION:  /82   Pulse 71   Ht 1.88 m (6' 2\")   Wt 136.1 kg (300 lb)   BMI 38.52 kg/m     General: Pleasant, straightforward, WDWN individual.  Mental Status: Pleasant, direct, appropriate mood and affect  Resp: breathing is unlabored without audible wheeze  Vascular: No visible cyanosis, no venous stasis changes  Heme: No visible ecchymosis or erythema on extremities  Skin: No notable rash    Neurologic:  Strength: All major muscle groups of the bilateral lower extremities have normal and symmetric muscle strength     DTRs: bilateral lower extremity stretch reflexes are equal and symmetric     Gait: reveals normal brandt and stride     Musculoskeletal:  Lumbar spine: Mildly reduced with forward flexion and pain with extension.  + Bilateral facet loading.  Upper lumbar paraspinal muscle fullness.  No gross pelvic obliquity.  No tenderness at the " SI joint       ASSESSMENT:  Humberto Roberts is a pleasant 54 year old male who presents with chronic axial/mechanical low back pain    #. Lumbar spondylosis  (primary encounter diagnosis)  #. Chronic bilateral low back pain without sciatica  #. Lumbar facet arthropathy  #. Muscle spasm     Complicating co-morbidities include:   #. Obesity Body mass index is 38.52 kg/m .. Recommend healthy lifestyle modifications through diet and exercise.  He is also on Ozempic which may help with weight loss  #.  Type 2 diabetes mellitus, well-controlled        PLAN:  -Imaging and prior procedures evaluated in detail.   -Flexeril (cyclobenzaprine) is generally marketed as a muscle relaxant, the similarities in chemical structure to the tricyclic antidepressants make it a very useful medication to treat pain and it can also be helpful for treating sleep disturbance.  Begin Flexeril at a dose of 5 mg 2-3 hours before bedtime.  Taking the medication in this fashion can help decrease morning grogginess.  Increase the dose by 5 mg every week as tolerated until a total dose of 10-15 mg is achieved.    -Refer for Bilateral L4-5, L5-S1 medial branch block #1  The pathway from diagnostic medial branch blocks to radiofrequency denervation was discussed in detail with the patient today.  Risks and benefits were discussed and all questions were answered.  The patient states understanding and wishes to proceed.  There are no contraindications.  The patient understands they will have 2 diagnostic medial branch blocks; and after each block they will be required to keep a pain diary recording their pain scores approximately every hour until the pain has returned to baseline.  During the time after the block, the patient was instructed to perform activities which typically aggravates their pain.  The patient will contact the medical spine staff after each diagnostic block to assess the amount of benefit the patient received.  If the patient has a  significantly positive response to each of these diagnostic blocks, they may move forward with the radiofrequency ablation procedure.   -RTC for procedure.     Ready to learn, no apparent learning barriers.  Education provided on treatment plan according to patient's preferred learning style.  Patient verbalizes understanding.   __________________________________  Aldo More MD  Physical Medicine & Rehabilitation        I spent a total of 45 minutes on the day of the visit.   Time spent by me doing chart review, history and exam, documentation and further activities per the note

## 2024-06-21 NOTE — PATIENT INSTRUCTIONS
It was a pleasure seeing you today in our [facility]. We discussed the following:    #. Flexeril (cyclobenzaprine) is generally marketed as a muscle relaxant, the similarities in chemical structure to the tricyclic antidepressants make it a very useful medication to treat pain and it can also be helpful for treating sleep disturbance.  Begin Flexeril at a dose of 5 mg 2-3 hours before bedtime.  Taking the medication in this fashion can help decrease morning grogginess.  Increase the dose by 5 mg every week as tolerated until a total dose of 10-15 mg is achieved.             Medial Branch Blocks and Radiofrequency Ablation (RFA) aka Neurotomy  Back or neck pain may be due to problems with certain nerves near your spine. If so, a medial branch neurotomy can help relieve your pain. Neurotomy destroys a nerve to relieve pain or stop involuntary movements. In some cases, your doctor may give you a nerve block to see how your body will respond to neurotomy. The treatment uses heat, cold, radiofrequency, or chemicals to destroy the nerves near a problem joint. This keeps some pain messages from traveling to your brain, and helps relieve your symptoms.   Medial branch nerves  Each vertebra in your spine has facets (flat surfaces). They touch where the vertebrae fit together. This forms a facet joint. Each facet joint has at least 2 medial branch nerves. They are part of the nerve pathway to and from each facet joint. A facet joint in your back or neck can become inflamed (swollen and irritated). Pain messages may then travel along the nerve pathway from the facet joint to your brain.     Blocking pain messages  Medial branch nerves in each facet joint send and carry messages about back or neck pain. Destroying a few of these nerves can keep certain pain messages from reaching your brain. This can help bring you relief. The relief typically lasts for months to years.   Risks and complications  Risks and  complications are rare, but can include:  Infection  Increased pain, numbness, or weakness  Nerve damage  Bleeding  Failure to relieve pain  [employer] last reviewed this educational content on 4/1/2018 2000-2021 [employer]. All rights reserved. This information is not intended as a substitute for professional medical care. Always follow your healthcare professional's instructions.          Preparing for Spine Injection Therapy                 Why Do I Need Spine Injection Therapy?  Your Health Care Provider is recommending spine injection therapy to  help relieve your back and neck pain. This will be in addition to other therapies such as medications and physical therapy. The purpose of these injections is to reduce the amount of inflammation (swelling, pain, heat, redness, loss of body function) around the nerves thus reducing the amount of pain.      The medications you will receive with the injections will include:   Anesthetic - medication to numb the painful area.      To reduce your discomfort during the injection procedure, you will receive a numbing medication injection prior to the placement of the needles. You will be lying on your stomach during the injection procedure. We will use a low-dose x-ray (fluoroscopy) to help guide needle placement.  You must have a  for this procedure. We will need to reschedule your injection if you do not have a  with you.       What are the different types of injections and procedure?  Below, is a brief description of the different types of injections we use to deliver pain medication as close as possible to the nerves in the painful area:     Medial Branch Block injections: This is a test to see if your pain is      coming from a specific nerve. This injection is similar to a facet joint injection but contains only the numbing medication.  You will keep a pain score diary for the rest of the day and the following morning after receiving the  injection.    Radiofrequency ablation: This is a procedure that uses radio waves and numbing medication to block the nerves that feel pain at the joint. The pain relief effects can last for a long time, but are not permanent. The procedure is similar to the Medial Branch Block but requires additional testing to ensure that the needle is near the nerve before numbing and ablating it.  Doctors often order sedation for this procedure.  Please see the sections on sedation below.       What Should I Expect if I Receive Sedation? THIS IS ORDERED BY THE PHYSICIAN  This is conscious sedation.  The medications we give to you will help you relax and reduce your anxiety.  You will still be awake for the procedure so we can ask you questions and hear your answers. You will NOT receive sedation for the diagnostic test blocks in order to better help evaluate your response to the injection.      What Are My Responsibilities With Sedation?  You must stop eating and drinking 8 (eight) hours before your procedure. You can have clear fluids (water, coffee/tea without milk) up to 2 hours prior to the injections. Nothing by mouth for 2 hours prior to injection.  This includes gum, mints, or chew.  Take your morning medications with a small sip of water.    You must have a  who will check-in with you, and stay in the building while the procedure is underway.  If you do not have a  your sedation will be cancelled.  We will monitor you for at least 30 minutes after the procedure before being discharged home.        What Are the Risks and Complications For This Procedure?  Risks and complication are rare, but can still occur.  You should understand, discuss, and accept these risks before agreeing to the procedure. They include, but are not limited to:  infection  nerve damage   paralysis  injection failure or a need for further injections or additional procedures  continued or worsening of symptoms/pain,   medication  reaction,  dural leak (into the hole covering around the spinal cord. This may cause a a spinal headache)  leak of the medication into the spinal canal, nerves, or blood vessel.  death        What do I need to do before the procedure?   If you do not follow these instructions your procedure may be cancelled.  Tell us if you are on major blood thinners such as Coumadin, Xarelto , Plavix, Eliquis , Pradaxa , or others.    Contact the doctor who prescribed your blood thinner to ask for permission to stop taking it before you have the injection.    Schedule your pain injection procedure after your doctor gave their permission.   We will notify you when to stop and re-start your blood thinner.  Tell us if you have any allergies to contrast dye.  If you do, we may give additional medications to take before the procedure.  Tell us if you are pregnant, or possibly pregnant.  If so, you cannot receive steroid medications or be exposed to fluoroscopic X-rays.  Tell us if you have been sick during the 10 days before the procedure. This includes:   colds   gastrointestinal illness or discomfort  dental sores,   skin infection, or any other type of infection.  Tell us if you have taken antibiotics during the 10 days prior to the procedure.  Do not drink alcohol the night before or on the day of the procedure.  You must shower the night before and on the day of your procedure.  Wear comfortable, clean clothing.  If you have an outside MRI (Magnetic Resonance Imaging photo), please bring it with you.     What Will Happen After the Procedure?  If you did not receive sedation we will monitor you for 15 minutes after the procedure. If you received sedation, we will monitor you for at least 30 minutes after the procedure      How soon can I expect pain relief?  You have received 2 types of medications with your injection:    Anesthetic - numbing medication which only acts for a few hours    Steroid which may take 3-14 days to be  effective.   You can expect to feel your normal pain after the anesthetic wears off, until the steroid becomes effective.     How should I care for myself at home?  Get plenty of rest and avoid twisting, bending movements, heavy lifting, or strenuous activity for the first 24 hours. This will help the steroid be more effective. Medial Branch Block injections will have different discharge instructions.  Those will be discussed at that injection appointment.  Resume your pain medications  Apply ice packs (on for 20 minutes at a time), every 2-3 hours to your injection area for the first 2-3 days to help with pain control.    Avoid heat (pads or water bottles), which can cause the veins to open up, making the steroid less effective. You can use heat after 48 hours.  Take showers only for the first 48 hours.  No baths, hot tubs, swimming, or soaking for 48 hours to reduce the risk of infection.      When should I call the doctor?  Call us if you have any of the following:   Fever more than 100.5 degrees Fahrenheit   Signs of infection   Severe headache   Severe back pain   Increased numbness or weakness in your legs or arms   Loss of bladder or bowel control  Nausea   Other concerns

## 2024-06-25 ENCOUNTER — TELEPHONE (OUTPATIENT)
Dept: PHYSICAL MEDICINE AND REHAB | Facility: CLINIC | Age: 54
End: 2024-06-25
Payer: COMMERCIAL

## 2024-06-26 NOTE — TELEPHONE ENCOUNTER
[facility] Call Center    Phone Message    May a detailed message be left on voicemail: yes     Reason for Call: Other: Pt is returning call and would like a call back to schedule surgery. Pt is stating that he will take the soonest appt. And please leave details on VM. Pt     Action Taken: Message routed to:  [facility] PM[facility]    Travel Screening: Not Applicable     Date of Service:

## 2024-06-27 ENCOUNTER — TELEPHONE (OUTPATIENT)
Dept: PHYSICAL MEDICINE AND REHAB | Facility: CLINIC | Age: 54
End: 2024-06-27
Payer: COMMERCIAL

## 2024-06-27 PROBLEM — M54.50 CHRONIC BILATERAL LOW BACK PAIN WITHOUT SCIATICA: Status: ACTIVE | Noted: 2019-09-22

## 2024-06-27 PROBLEM — G89.29 CHRONIC BILATERAL LOW BACK PAIN WITHOUT SCIATICA: Status: ACTIVE | Noted: 2019-09-22

## 2024-06-27 PROBLEM — M47.816 LUMBAR FACET ARTHROPATHY: Status: ACTIVE | Noted: 2024-06-21

## 2024-06-27 PROBLEM — M47.816 LUMBAR SPONDYLOSIS: Status: ACTIVE | Noted: 2024-06-21

## 2024-06-27 NOTE — TELEPHONE ENCOUNTER
Called patient to schedule procedure with Dr. More    Date of Procedure: 7/15/24    Arrival time given: Yes: Arrival Time 7am       Procedure Location: [facility]     Verified Location with Patient:  Yes  Address provided to the patient     Pre-op H&P Required:  No: Local anesthesia        Post-Op/Follow Up Appt:  Not Indicated in Request      Informed patient they will need a  to drive them home:  Yes    Patients : Spouse     Patient is aware that pre-op RN from the procedure center will call 2-3 days prior to scheduled procedure to confirm arrival time and review any instructions:  Yes       Additional Comments: N/A        Leola Lund MA on 6/27/2024 at 12:03 PM      P: [phone]*

## 2024-06-27 NOTE — TELEPHONE ENCOUNTER
Third attempt: Phone the patient and left VM. Patient's phone goes straight to . Stated to call and schedule injection procedure with dr. Inman at [phone].

## 2024-06-28 ENCOUNTER — OFFICE VISIT (OUTPATIENT)
Dept: AUDIOLOGY | Facility: CLINIC | Age: 54
End: 2024-06-28
Payer: COMMERCIAL

## 2024-06-28 DIAGNOSIS — H90.3 BILATERAL SENSORINEURAL HEARING LOSS: Primary | ICD-10-CM

## 2024-06-28 PROCEDURE — V5241 DISPENSING FEE, MONAURAL: HCPCS | Mod: RT | Performed by: AUDIOLOGIST

## 2024-06-28 PROCEDURE — V5011 HEARING AID FITTING/CHECKING: HCPCS | Performed by: AUDIOLOGIST

## 2024-06-28 PROCEDURE — V5257 HEARING AID, DIGIT, MON, BTE: HCPCS | Mod: NU | Performed by: AUDIOLOGIST

## 2024-06-28 PROCEDURE — V5020 CONFORMITY EVALUATION: HCPCS | Performed by: AUDIOLOGIST

## 2024-06-28 NOTE — PROGRESS NOTES
AUDIOLOGY REPORT    SUBJECTIVE: Humberto Roberts, a 54 year old male, was seen in the Audiology Clinic at [facility] today for a right hearing aid fitting. Previous results have revealed an asymmetrical sensorineural hearing loss, and the patient was only a candidate for a right hearing aid at this time. The patient was given medical clearance to pursue amplification by  Miguel Sykes MD.      OBJECTIVE:  Prior to fitting, a hearing aid check was performed to ensure device functionality. The hearing aid conformity evaluation was completed.The hearing aids were placed and they provided a good fit. Real-ear-probe-microphone measurements were completed on the [employer] system and were a good match to NAL-NL2 target with soft sounds audible, moderate sounds comfortable, and loud sounds below discomfort. UCLs are verified through maximum power output measures and demonstrate appropriate limiting of loud inputs. Real ear data could not be saved due to a connection problem. Mr. Roberts was oriented to proper hearing aid use, care, cleaning (no water, dry brush), batteries (rechargeable,  use, toxicity, low-battery signal), aid insertion/removal, user booklet, warranty information, storage cases, and other hearing aid details. The patient confirmed understanding of hearing aid use and care, and showed proper insertion of hearing aid and batteries while in the office today. Mr. Roberts reported good volume and sound quality today.    Overall gain was reduced to 90% for comfort. The hearing aid was paired to the patient's phone.    EAR(S) FIT:    MA HEARING AID MAKE: Right: Phonak Audeo L70-R  MA HEARING AID MODEL #: Right: [id number]  HEARING AID STYLE: Right:  KEIRA CARLOS SIZE: Right: medium closed   LENGTH: Right: 2M SDS 5   SERIAL NUMBERS: Right: [id number]  WARRANTY END DATE: Right: 7/10/2027     ASSESSMENT: Right hearing aid fitting completed today. Verification measures were  performed. The 45 day trial period was explained to patient, and they expressed understanding. Mr. Roberts signed the Hearing Aid Purchase Agreement and was given a copy, as well as details on his hearing aids. Patient was counseled that exact out of pocket amounts cannot be determined for hearing aid claims being sent to insurance. Any insurance coverage information presented to the patient is an estimate only, and is not a guarantee of payment. Patient has been advised to check with their own insurance.    PLAN: Mr. Roberts will return for follow-up in 2-3 weeks for a hearing aid review appointment. Please call this clinic with questions regarding today s appointment.    Francois Artis, CCC-A  [place] Licensed Audiologist #[id number]  6/28/2024

## 2024-06-28 NOTE — PATIENT INSTRUCTIONS

## 2024-07-01 ENCOUNTER — TELEPHONE (OUTPATIENT)
Dept: PHYSICAL MEDICINE AND REHAB | Facility: CLINIC | Age: 54
End: 2024-07-01
Payer: COMMERCIAL

## 2024-07-01 NOTE — TELEPHONE ENCOUNTER
Phoned the patient and left VM. Stated that the patient is already scheduled with another provider on 7/11/24. Stated to call back the pain clinic if you want to keep the 7/15/24 procedure with dr. More at [phone].

## 2024-07-01 NOTE — TELEPHONE ENCOUNTER
Patient is scheduled for his Medial Branch Block injection with Dr. More 7/15/24.     There is another appt scheduled for the same injections on 7/11/24, which patient informed Dr. More he wanted Dr. More to complete the injections.     Routed to the injection schedulers to have them assist with contacting the patient to verify cancellation of the 7/11/24 injection with the patient, as he does not need the same injection completed by two different providers.     WON SinghN RN  RN Care Coordinator for Physical Medicine and Rehabilitation  [facility] [address] [address]  Office: [phone]  Fax: [phone]

## 2024-07-09 DIAGNOSIS — E66.01 MORBID OBESITY (H): ICD-10-CM

## 2024-07-09 DIAGNOSIS — G89.29 CHRONIC BILATERAL LOW BACK PAIN WITHOUT SCIATICA: ICD-10-CM

## 2024-07-09 DIAGNOSIS — E11.65 TYPE 2 DIABETES MELLITUS WITH HYPERGLYCEMIA, WITHOUT LONG-TERM CURRENT USE OF INSULIN (H): ICD-10-CM

## 2024-07-09 DIAGNOSIS — M54.50 CHRONIC BILATERAL LOW BACK PAIN WITHOUT SCIATICA: ICD-10-CM

## 2024-07-10 NOTE — TELEPHONE ENCOUNTER
Dose increased to 1 mg weekly.  I Encouraged him to let me know how he does in a month.  If he does well, will increase to 1.7 mg dose at the next refill.  I also need him to give me an update on his weight loss.  Thank you

## 2024-07-12 ENCOUNTER — OFFICE VISIT (OUTPATIENT)
Dept: AUDIOLOGY | Facility: CLINIC | Age: 54
End: 2024-07-12
Payer: COMMERCIAL

## 2024-07-12 NOTE — PROGRESS NOTES
AUDIOLOGY REPORT    SUBJECTIVE:  Humberto Roberts is a 54 year old male who was seen in the Audiology Clinic at the [facility] on 7/12/2024  for a follow-up check regarding the fitting of new hearing aids. His hearing test 4/24/2024 revealed an asymmetrical sensorineural hearing loss, moderate sensorineural hearing loss right ear and minimal sloping to moderate sensorineural hearing loss. The patient was given medical clearance to pursue amplification by  Miguel Sykes MD. He was fit with a right Phonak L70-R on 6/28/2024.      He is adjusting to amplification very well. He noted that when streaming calls, he hears about 75% from the HA liya and 25% from the phone even with phone at max volume. He also notes wind noise is loud, he usually takes the hearing aid out when in the car.    OBJECTIVE:   Based on patient report, the following changes were made; no programming changes.     Reviewed 45 day trial period, care, cleaning (no water, dry brush), batteries (size rechargeable) insertion/removal, toxicity, low-battery signal), aid insertion/removal, volume adjustment (if applicable), user booklet, warranty information, storage cases, and other hearing aid details.     Also discussed advantages of binaural amplification, especially in light of his excellent initial outcome with right amplification.    No charge visit today (in warranty hearing aid check).    ASSESSMENT: A follow-up appointment for hearing aid fitting was completed today.  Changes to hearing aid was completed as outlined above.     PLAN:  Humberto will return 7/19 for hearing aid fitting, binaural. Please call this clinic with any questions regarding today s appointment.    Heraclio Downey.  [place] Licensed Audiologist #[id number]

## 2024-07-15 ENCOUNTER — HOSPITAL ENCOUNTER (OUTPATIENT)
Facility: AMBULATORY SURGERY CENTER | Age: 54
Discharge: HOME | End: 2024-07-15
Attending: PHYSICAL MEDICINE & REHABILITATION | Admitting: PHYSICAL MEDICINE & REHABILITATION
Payer: COMMERCIAL

## 2024-07-15 ENCOUNTER — ANCILLARY PROCEDURE (OUTPATIENT)
Dept: RADIOLOGY | Facility: AMBULATORY SURGERY CENTER | Age: 54
End: 2024-07-15
Attending: PHYSICAL MEDICINE & REHABILITATION
Payer: COMMERCIAL

## 2024-07-15 VITALS
BODY MASS INDEX: 38.5 KG/M2 | DIASTOLIC BLOOD PRESSURE: 81 MMHG | SYSTOLIC BLOOD PRESSURE: 134 MMHG | OXYGEN SATURATION: 99 % | WEIGHT: 300 LBS | HEIGHT: 74 IN | RESPIRATION RATE: 18 BRPM | TEMPERATURE: 97.1 F

## 2024-07-15 DIAGNOSIS — M54.50 CHRONIC BILATERAL LOW BACK PAIN WITHOUT SCIATICA: ICD-10-CM

## 2024-07-15 DIAGNOSIS — G89.29 CHRONIC BILATERAL LOW BACK PAIN WITHOUT SCIATICA: ICD-10-CM

## 2024-07-15 LAB — GLUCOSE BLDC GLUCOMTR-MCNC: 82 MG/DL (ref 70–99)

## 2024-07-15 PROCEDURE — 82962 GLUCOSE BLOOD TEST: CPT | Performed by: PATHOLOGY

## 2024-07-15 PROCEDURE — 64493 INJ PARAVERT F JNT L/S 1 LEV: CPT | Mod: XS,LT,KX

## 2024-07-15 RX ORDER — IOPAMIDOL 408 MG/ML
INJECTION, SOLUTION INTRATHECAL PRN
Status: DISCONTINUED | OUTPATIENT
Start: 2024-07-15 | End: 2024-07-15 | Stop reason: HOSPADM

## 2024-07-15 NOTE — OP NOTE
PROCEDURE NOTE: 1st Diagnostic Lumbar Medial Branch Block    PROCEDURE DATE: 7/15/2024    PATIENT NAME: Humberto Roberts  YOB: 1970    ATTENDING PHYSICIAN: Aldo More MD   RESIDENT/FELLOW PHYSICIAN: None    PREOPERATIVE DIAGNOSIS:   Lumbar spondylosis  Lumbar facet pain  POSTOPERATIVE DIAGNOSIS: same    PROCEDURE PERFORMED: 1st diagnostic medial branch block at the Bilateral L4, L5, and Sacral ala to treat the Bilateral  L4-5 and L5-S1 facet joints (L3, L4 medial branch nerve blocks and L5 dorsal rami nerve blocks)      FLUOROSCOPY WAS USED.     INDICATIONS FOR PROCEDURE:   Humberto Roberts is a 54 year old male with a clinical picture consistent with bilateral axial low back pain and lumbar facet arthropathy who presents for diagnostic medial branch block at the levels noted above to assess if there is a facetogenic component to his low back pain.     PROCEDURE AND FINDINGS:    Humberto was greeted in the pre-procedure holding area. The risk, benefits and alternatives to the procedure were again reviewed with the patient and written informed consent was placed in the chart. An IV line was not placed.  A 500 mL bag of NS was not connected to the patient. He was taken to the procedure room and positioned prone on the fluoroscopy table.  Routine monitors were applied including blood pressure cuff, and pulse oximetry. Prior to the procedure a time out was completed, verifying correct patient, procedure, site, positioning, and implants and/or special equipment.     The skin was prepped and draped in the usual sterile fashion. An AP fluoroscopic view was obtained to identify the vertebral bodies, the transverse processes and the superior articulating processes at the L4, L5, and Sacral ala aforementioned levels on the bilateral side(s). The skin overlying the junction of the TP-SAP at the aforementioned levels was anesthetized using a 27-gauge 1-1/4 inch needle with 1% preservative-free lidocaine for a  total volume of 0.5 ml per level. Next, a 25-gauge 5- inch Quincke spinal needle was advanced under an oblique fluoroscopic view to the junction of the superior articular process and transverse process(es). Correct needle position was then confirmed in the AP and lateral views.      After negative aspiration, 0.3 mls of Isovue-M contrast was injected at each site under live fluoroscopy; no vascular run off was identified and the contrast spread was adequate. At this point, after negative aspiration, 0.5mL of 0.5% Bupivacaine, was injected at each site.  The needle was then re-styletted removed. The needle insertion site was dressed appropriately.     Humberto was taken to the recovery room where he was monitored for a brief period of time. He tolerated the procedure well and was discharged home in stable condition with post procedural instructions.    Before the procedure, he reported a pain score of 8/10.   After the procedure, he reported a pain score of 4/10.      Follow-up will be Determined after block sheet reviewed.    COMPLICATIONS: None    COMMENTS: None

## 2024-07-15 NOTE — DISCHARGE INSTRUCTIONS
Home Care Instructions after a Medial Branch Block      In a medial branch block, a local anesthetic (numbing medicine) is injected near the medial branch nerve. This stops the transmission of pain signals from the facet joint. If this reduces your pain and improves your mobility, it may tell the doctor which facet joint is causing the pain. This procedure is a diagnostic procedure and is typically short lasting, with intentions of doing a radiofrequency ablation. With this injection, a steroid to increase the longevity of the blocks effect is rarely used.    Activity  -You may resume most normal activity levels with the exception of strenuous activity. It is important for us to know if your pain with normal activity is relieved after this injection.  -DO NOT shower for 24 hours  -DO NOT remove bandaid for 24 hours    Pain  -You may experience soreness at the injection site for one or two days  -You may use an ice pack for 20 minutes every 2 hours for the first 24 hours  -You may use a heating pad after the first 24 hours  -You may use Tylenol (acetaminophen) every 4 hours or other pain medicines as     directed by your physician    You may experience numbness radiating into your legs or arms (depending on the procedure location). This numbness may last several hours. Until sensation returns to normal; please use caution in walking, climbing stairs, and stepping out of your vehicle, etc.      PLEASE KEEP TRACK OF YOUR SYMPTOMS AND NOTE YOUR IMPROVEMENT FOR YOUR DOCTOR.     Fill out the pain diary and fax it back to us. You do not need to call us if the pain diary was faxed. [phone] is the FAX number  If you do not have access to a fax machine, attach it to [employer].  You do not need to call us if the pain diary was attached to [employer].   If you cannot fax or send via [employer], then call our call center and request to speak with a nurse for follow up after a procedure       Please contact us if you have:  -Severe  pain  -Fever more than 101.5 degrees Fahrenheit  -Signs of infection at the injection site (redness, swelling, or drainage)    FOR PAIN CENTER PATIENTS:  If you have questions, please contact the Pain Clinic at [phone] Option #1 between the hours of 7:00 am and 3:00 pm Monday through Friday. After office hours you can contact the on call provider by dialing [phone]. If you need immediate attention, we recommend that you go to a hospital emergency room or dial [phone]. Fax number is [phone]      FOR PM&R PATIENTS:  For patients seen by the PM and R service, please call [phone]. (Monday through Friday 8a-5p).  After business hours and weekends call [phone] and ask for the PM and R doctor on call). If you need to fax a pain diary to PM&R the fax number is [phone]. If you are unable to fax, uploading to [employer] is encouraged, then send to provider. If you have procedure scheduling questions please call [phone] Option #2.

## 2024-07-26 DIAGNOSIS — E11.65 TYPE 2 DIABETES MELLITUS WITH HYPERGLYCEMIA, WITHOUT LONG-TERM CURRENT USE OF INSULIN (H): ICD-10-CM

## 2024-07-26 DIAGNOSIS — G89.29 CHRONIC BILATERAL LOW BACK PAIN WITHOUT SCIATICA: ICD-10-CM

## 2024-07-26 DIAGNOSIS — I10 ESSENTIAL HYPERTENSION: ICD-10-CM

## 2024-07-26 DIAGNOSIS — M54.50 CHRONIC BILATERAL LOW BACK PAIN WITHOUT SCIATICA: ICD-10-CM

## 2024-07-26 DIAGNOSIS — E66.01 MORBID OBESITY (H): ICD-10-CM

## 2024-07-26 RX ORDER — ATORVASTATIN CALCIUM 10 MG/1
10 TABLET, FILM COATED ORAL DAILY
Qty: 90 TABLET | Refills: 1 | Status: SHIPPED | OUTPATIENT
Start: 2024-07-26

## 2024-07-26 RX ORDER — OXYCODONE AND ACETAMINOPHEN 5; 325 MG/1; MG/1
1 TABLET ORAL 2 TIMES DAILY PRN
Qty: 30 TABLET | Refills: 0 | Status: SHIPPED | OUTPATIENT
Start: 2024-07-26 | End: 2024-08-20

## 2024-07-26 RX ORDER — METFORMIN HCL 500 MG
1000 TABLET, EXTENDED RELEASE 24 HR ORAL 2 TIMES DAILY WITH MEALS
Qty: 120 TABLET | Refills: 3 | Status: SHIPPED | OUTPATIENT
Start: 2024-07-26

## 2024-07-26 RX ORDER — EMPAGLIFLOZIN 25 MG/1
25 TABLET, FILM COATED ORAL DAILY
Qty: 90 TABLET | Refills: 0 | Status: SHIPPED | OUTPATIENT
Start: 2024-07-26

## 2024-07-26 RX ORDER — ATENOLOL 50 MG/1
TABLET ORAL
Qty: 90 TABLET | Refills: 1 | Status: SHIPPED | OUTPATIENT
Start: 2024-07-26

## 2024-07-26 RX ORDER — HYDROCHLOROTHIAZIDE 25 MG/1
TABLET ORAL
Qty: 90 TABLET | Refills: 2 | Status: SHIPPED | OUTPATIENT
Start: 2024-07-26

## 2024-07-26 NOTE — TELEPHONE ENCOUNTER
PDMP review done. Pt has follow up with PCP next month. Refills sent for meds and narcotic rx. No abnl refill patterns and no refills since April

## 2024-07-30 DIAGNOSIS — E11.65 TYPE 2 DIABETES MELLITUS WITH HYPERGLYCEMIA, WITHOUT LONG-TERM CURRENT USE OF INSULIN (H): ICD-10-CM

## 2024-07-30 DIAGNOSIS — G89.29 CHRONIC BILATERAL LOW BACK PAIN WITHOUT SCIATICA: ICD-10-CM

## 2024-07-30 DIAGNOSIS — M54.50 CHRONIC BILATERAL LOW BACK PAIN WITHOUT SCIATICA: ICD-10-CM

## 2024-07-30 DIAGNOSIS — E66.01 MORBID OBESITY (H): ICD-10-CM

## 2024-07-30 RX ORDER — SEMAGLUTIDE 1.34 MG/ML
1 INJECTION, SOLUTION SUBCUTANEOUS
Qty: 3 ML | Refills: 0 | Status: SHIPPED | OUTPATIENT
Start: 2024-07-30 | End: 2024-08-20

## 2024-08-20 ENCOUNTER — OFFICE VISIT (OUTPATIENT)
Dept: FAMILY MEDICINE | Facility: CLINIC | Age: 54
End: 2024-08-20

## 2024-08-20 VITALS
DIASTOLIC BLOOD PRESSURE: 62 MMHG | BODY MASS INDEX: 38.24 KG/M2 | TEMPERATURE: 97.3 F | HEART RATE: 80 BPM | OXYGEN SATURATION: 99 % | SYSTOLIC BLOOD PRESSURE: 118 MMHG | HEIGHT: 74 IN | RESPIRATION RATE: 20 BRPM | WEIGHT: 298 LBS

## 2024-08-20 DIAGNOSIS — G89.29 CHRONIC BILATERAL LOW BACK PAIN WITHOUT SCIATICA: ICD-10-CM

## 2024-08-20 DIAGNOSIS — G47.33 OSA (OBSTRUCTIVE SLEEP APNEA): ICD-10-CM

## 2024-08-20 DIAGNOSIS — M47.816 LUMBAR FACET ARTHROPATHY: ICD-10-CM

## 2024-08-20 DIAGNOSIS — E79.0 HYPERURICEMIA: ICD-10-CM

## 2024-08-20 DIAGNOSIS — F11.20 CONTINUOUS OPIOID DEPENDENCE (H): ICD-10-CM

## 2024-08-20 DIAGNOSIS — Z00.00 ROUTINE GENERAL MEDICAL EXAMINATION AT A HEALTH CARE FACILITY: Primary | ICD-10-CM

## 2024-08-20 DIAGNOSIS — E66.01 MORBID OBESITY (H): ICD-10-CM

## 2024-08-20 DIAGNOSIS — M47.816 LUMBAR SPONDYLOSIS: ICD-10-CM

## 2024-08-20 DIAGNOSIS — M54.50 CHRONIC BILATERAL LOW BACK PAIN WITHOUT SCIATICA: ICD-10-CM

## 2024-08-20 DIAGNOSIS — E55.9 VITAMIN D DEFICIENCY: ICD-10-CM

## 2024-08-20 DIAGNOSIS — F51.04 PSYCHOPHYSIOLOGICAL INSOMNIA: ICD-10-CM

## 2024-08-20 DIAGNOSIS — E11.65 TYPE 2 DIABETES MELLITUS WITH HYPERGLYCEMIA, WITHOUT LONG-TERM CURRENT USE OF INSULIN (H): ICD-10-CM

## 2024-08-20 DIAGNOSIS — F33.1 MODERATE EPISODE OF RECURRENT MAJOR DEPRESSIVE DISORDER (H): ICD-10-CM

## 2024-08-20 DIAGNOSIS — I10 HYPERTENSION GOAL BP (BLOOD PRESSURE) < 140/90: ICD-10-CM

## 2024-08-20 LAB
ABO/RH(D): NORMAL
ALBUMIN SERPL BCG-MCNC: 4.5 G/DL (ref 3.5–5.2)
ALP SERPL-CCNC: 78 U/L (ref 40–150)
ALT SERPL W P-5'-P-CCNC: 32 U/L (ref 0–70)
AMPHETAMINES UR QL SCN: NORMAL
ANION GAP SERPL CALCULATED.3IONS-SCNC: 11 MMOL/L (ref 7–15)
AST SERPL W P-5'-P-CCNC: 21 U/L (ref 0–45)
BARBITURATES UR QL SCN: NORMAL
BENZODIAZ UR QL SCN: NORMAL
BILIRUB SERPL-MCNC: 0.4 MG/DL
BUN SERPL-MCNC: 11.2 MG/DL (ref 6–20)
BZE UR QL SCN: NORMAL
CALCIUM SERPL-MCNC: 9.6 MG/DL (ref 8.8–10.4)
CANNABINOIDS UR QL SCN: NORMAL
CHLORIDE SERPL-SCNC: 100 MMOL/L (ref 98–107)
CREAT SERPL-MCNC: 1.28 MG/DL (ref 0.67–1.17)
CREAT UR-MCNC: 142.4 MG/DL
EGFRCR SERPLBLD CKD-EPI 2021: 67 ML/MIN/1.73M2
FENTANYL UR QL: NORMAL
GLUCOSE SERPL-MCNC: 87 MG/DL (ref 70–99)
HBA1C MFR BLD: 5.4 %
HCO3 SERPL-SCNC: 29 MMOL/L (ref 22–29)
MICROALBUMIN UR-MCNC: <12 MG/L
MICROALBUMIN/CREAT UR: NORMAL MG/G{CREAT}
OPIATES UR QL SCN: NORMAL
PCP QUAL URINE (ROCHE): NORMAL
POTASSIUM SERPL-SCNC: 3.6 MMOL/L (ref 3.4–5.3)
PROT SERPL-MCNC: 7.4 G/DL (ref 6.4–8.3)
PSA SERPL DL<=0.01 NG/ML-MCNC: 0.88 NG/ML (ref 0–3.5)
SODIUM SERPL-SCNC: 140 MMOL/L (ref 135–145)
SPECIMEN EXPIRATION DATE: NORMAL
TSH SERPL DL<=0.005 MIU/L-ACNC: 1.13 UIU/ML (ref 0.3–4.2)

## 2024-08-20 PROCEDURE — 86900 BLOOD TYPING SEROLOGIC ABO: CPT | Performed by: FAMILY MEDICINE

## 2024-08-20 PROCEDURE — 82043 UR ALBUMIN QUANTITATIVE: CPT | Performed by: FAMILY MEDICINE

## 2024-08-20 PROCEDURE — 99396 PREV VISIT EST AGE 40-64: CPT | Performed by: FAMILY MEDICINE

## 2024-08-20 PROCEDURE — 99207 PR FOOT EXAM NO CHARGE: CPT | Performed by: FAMILY MEDICINE

## 2024-08-20 PROCEDURE — 36415 COLL VENOUS BLD VENIPUNCTURE: CPT | Performed by: FAMILY MEDICINE

## 2024-08-20 PROCEDURE — 90471 IMMUNIZATION ADMIN: CPT | Performed by: FAMILY MEDICINE

## 2024-08-20 PROCEDURE — 80053 COMPREHEN METABOLIC PANEL: CPT | Performed by: FAMILY MEDICINE

## 2024-08-20 PROCEDURE — G0103 PSA SCREENING: HCPCS | Performed by: FAMILY MEDICINE

## 2024-08-20 PROCEDURE — 90750 HZV VACC RECOMBINANT IM: CPT | Performed by: FAMILY MEDICINE

## 2024-08-20 PROCEDURE — 84443 ASSAY THYROID STIM HORMONE: CPT | Performed by: FAMILY MEDICINE

## 2024-08-20 PROCEDURE — 82570 ASSAY OF URINE CREATININE: CPT | Performed by: FAMILY MEDICINE

## 2024-08-20 PROCEDURE — 99214 OFFICE O/P EST MOD 30 MIN: CPT | Mod: 25 | Performed by: FAMILY MEDICINE

## 2024-08-20 PROCEDURE — 80307 DRUG TEST PRSMV CHEM ANLYZR: CPT | Performed by: FAMILY MEDICINE

## 2024-08-20 PROCEDURE — 86901 BLOOD TYPING SEROLOGIC RH(D): CPT | Performed by: FAMILY MEDICINE

## 2024-08-20 PROCEDURE — 83036 HEMOGLOBIN GLYCOSYLATED A1C: CPT | Performed by: FAMILY MEDICINE

## 2024-08-20 RX ORDER — CYCLOBENZAPRINE HCL 10 MG
10 TABLET ORAL 2 TIMES DAILY PRN
Qty: 180 TABLET | Refills: 1 | Status: SHIPPED | OUTPATIENT
Start: 2024-08-20

## 2024-08-20 RX ORDER — OXYCODONE AND ACETAMINOPHEN 5; 325 MG/1; MG/1
1 TABLET ORAL 2 TIMES DAILY PRN
Qty: 60 TABLET | Refills: 0 | Status: SHIPPED | OUTPATIENT
Start: 2024-08-20 | End: 2024-09-01

## 2024-08-20 RX ORDER — TRAZODONE HYDROCHLORIDE 100 MG/1
100 TABLET ORAL AT BEDTIME
Qty: 90 TABLET | Refills: 3 | Status: SHIPPED | OUTPATIENT
Start: 2024-08-20

## 2024-08-20 SDOH — HEALTH STABILITY: PHYSICAL HEALTH
ON AVERAGE, HOW MANY DAYS PER WEEK DO YOU ENGAGE IN MODERATE TO STRENUOUS EXERCISE (LIKE A BRISK WALK)?: PATIENT DECLINED

## 2024-08-20 ASSESSMENT — PATIENT HEALTH QUESTIONNAIRE - PHQ9
10. IF YOU CHECKED OFF ANY PROBLEMS, HOW DIFFICULT HAVE THESE PROBLEMS MADE IT FOR YOU TO DO YOUR WORK, TAKE CARE OF THINGS AT HOME, OR GET ALONG WITH OTHER PEOPLE: NOT DIFFICULT AT ALL
SUM OF ALL RESPONSES TO PHQ QUESTIONS 1-9: 12
SUM OF ALL RESPONSES TO PHQ QUESTIONS 1-9: 12

## 2024-08-20 ASSESSMENT — PAIN SCALES - GENERAL: PAINLEVEL: SEVERE PAIN (7)

## 2024-08-20 NOTE — LETTER
My Depression Action Plan  Name: Humberto Roberts   Date of Birth 1970  Date: 9/7/2024    My doctor: Calos Hester   My clinic: [address] [address] [phone]            GREEN    ZONE   Good Control    What it looks like:   Things are going generally well. You have normal ups and downs. You may even feel depressed from time to time, but bad moods usually last less than a day.   What you need to do:  Continue to care for yourself (see self care plan)  Check your depression survival kit and update it as needed  Follow your physician s recommendations including any medication.  Do not stop taking medication unless you consult with your physician first.             YELLOW         ZONE Getting Worse    What it looks like:   Depression is starting to interfere with your life.   It may be hard to get out of bed; you may be starting to isolate yourself from others.  Symptoms of depression are starting to last most all day and this has happened for several days.   You may have suicidal thoughts but they are not constant.   What you need to do:     Call your care team. Your response to treatment will improve if you keep your care team informed of your progress. Yellow periods are signs an adjustment may need to be made.     Continue your self-care.  Just get dressed and ready for the day.  Don't give yourself time to talk yourself out of it.    Talk to someone in your support network.    Open up your Depression Self-Care Plan/Wellness Kit.             RED    ZONE Medical Alert - Get Help    What it looks like:   Depression is seriously interfering with your life.   You may experience these or other symptoms: You can t get out of bed most days, can t work or engage in other necessary activities, you have trouble taking care of basic hygiene, or basic responsibilities, thoughts of suicide or death that will not go away, self-injurious behavior.      What you need to do:  Call your care team and request a same-day appointment. If they are not available (weekends or after hours) call your local crisis line, emergency room or 911.          Depression Self-Care Plan / Wellness Kit    Many people find that medication and therapy are helpful treatments for managing depression. In addition, making small changes to your everyday life can help to boost your mood and improve your wellbeing. Below are some tips for you to consider. Be sure to talk with your medical provider and/or behavioral health consultant if your symptoms are worsening or not improving.     Sleep   Sleep hygiene  means all of the habits that support good, restful sleep. It includes maintaining a consistent bedtime and wake time, using your bedroom only for sleeping or sex, and keeping the bedroom dark and free of distractions like a computer, smartphone, or television.     Develop a Healthy Routine  Maintain good hygiene. Get out of bed in the morning, make your bed, brush your teeth, take a shower, and get dressed. Don t spend too much time viewing media that makes you feel stressed. Find time to relax each day.    Exercise  Get some form of exercise every day. This will help reduce pain and release endorphins, the  feel good  chemicals in your brain. It can be as simple as just going for a walk or doing some gardening, anything that will get you moving.      Diet  Strive to eat healthy foods, including fruits and vegetables. Drink plenty of water. Avoid excessive sugar, caffeine, alcohol, and other mood-altering substances.     Stay Connected with Others  Stay in touch with friends and family members.    Manage Your Mood  Try deep breathing, massage therapy, biofeedback, or meditation. Take part in fun activities when you can. Try to find something to smile about each day.     Psychotherapy  Be open to working with a therapist if your provider recommends it.     Medication  Be sure to take your  medication as prescribed. Most anti-depressants need to be taken every day. It usually takes several weeks for medications to work. Not all medicines work for all people. It is important to follow-up with your provider to make sure you have a treatment plan that is working for you. Do not stop your medication abruptly without first discussing it with your provider.    Crisis Resources   These hotlines are for both adults and children. They and are open 24 hours a day, 7 days a week unless noted otherwise.    National Suicide Prevention Lifeline   988 or 8-045-466-UUGZ (5338)    Crisis Text Line    www.crisistextline.org  Text HOME to 873506 from anywhere in the [place], anytime, about any type of crisis. A live, trained crisis counselor will receive the text and respond quickly.    [facility] Lifeline for LGBTQ Youth  A national crisis intervention and suicide lifeline for LGBTQ youth under 25. Provides a safe place to talk without judgement. Call 1-873.342.2082; text START to 255281 or visit www.theChartITrightvorproject.org to talk to a trained counselor.    For [facility] crisis numbers, visit the [facility] website at:  https://[place].gov/dhs/people-we-serve/adults/health-care/mental-health/resources/crisis-contacts.jsp

## 2024-08-20 NOTE — PATIENT INSTRUCTIONS
Patient Education   Preventive Care Advice   This is general advice given by our system to help you stay healthy. However, your care team may have specific advice just for you. Please talk to your care team about your preventive care needs.  Nutrition  Eat 5 or more servings of fruits and vegetables each day.  Try wheat bread, brown rice and whole grain pasta (instead of white bread, rice, and pasta).  Get enough calcium and vitamin D. Check the label on foods and aim for 100% of the RDA (recommended daily allowance).  Lifestyle  Exercise at least 150 minutes each week  (30 minutes a day, 5 days a week).  Do muscle strengthening activities 2 days a week. These help control your weight and prevent disease.  No smoking.  Wear sunscreen to prevent skin cancer.  Have a dental exam and cleaning every 6 months.  Yearly exams  See your health care team every year to talk about:  Any changes in your health.  Any medicines your care team has prescribed.  Preventive care, family planning, and ways to prevent chronic diseases.  Shots (vaccines)   HPV shots (up to age 26), if you've never had them before.  Hepatitis B shots (up to age 59), if you've never had them before.  COVID-19 shot: Get this shot when it's due.  Flu shot: Get a flu shot every year.  Tetanus shot: Get a tetanus shot every 10 years.  Pneumococcal, hepatitis A, and RSV shots: Ask your care team if you need these based on your risk.  Shingles shot (for age 50 and up)  General health tests  Diabetes screening:  Starting at age 35, Get screened for diabetes at least every 3 years.  If you are younger than age 35, ask your care team if you should be screened for diabetes.  Cholesterol test: At age 39, start having a cholesterol test every 5 years, or more often if advised.  Bone density scan (DEXA): At age 50, ask your care team if you should have this scan for osteoporosis (brittle bones).  Hepatitis C: Get tested at least once in your life.  STIs (sexually  transmitted infections)  Before age 24: Ask your care team if you should be screened for STIs.  After age 24: Get screened for STIs if you're at risk. You are at risk for STIs (including HIV) if:  You are sexually active with more than one person.  You don't use condoms every time.  You or a partner was diagnosed with a sexually transmitted infection.  If you are at risk for HIV, ask about PrEP medicine to prevent HIV.  Get tested for HIV at least once in your life, whether you are at risk for HIV or not.  Cancer screening tests  Cervical cancer screening: If you have a cervix, begin getting regular cervical cancer screening tests starting at age 21.  Breast cancer scan (mammogram): If you've ever had breasts, begin having regular mammograms starting at age 40. This is a scan to check for breast cancer.  Colon cancer screening: It is important to start screening for colon cancer at age 45.  Have a colonoscopy test every 10 years (or more often if you're at risk) Or, ask your provider about stool tests like a FIT test every year or Cologuard test every 3 years.  To learn more about your testing options, visit:   .  For help making a decision, visit:   https://bit.ly/np56583.  Prostate cancer screening test: If you have a prostate, ask your care team if a prostate cancer screening test (PSA) at age 55 is right for you.  Lung cancer screening: If you are a current or former smoker ages 50 to 80, ask your care team if ongoing lung cancer screenings are right for you.  For informational purposes only. Not to replace the advice of your health care provider. Copyright   2023 [facility]. All rights reserved. Clinically reviewed by the [facility]. [employer] 108637 - REV 01/24.  Preventing Falls: Care Instructions  Injuries and health problems such as trouble walking or poor eyesight can increase your risk of falling. So can some medicines. But there are things you can do to help  "prevent falls. You can exercise to get stronger. You can also arrange your home to make it safer.    Talk to your doctor about the medicines you take. Ask if any of them increase the risk of falls and whether they can be changed or stopped.   Try to exercise regularly. It can help improve your strength and balance. This can help lower your risk of falling.     Practice fall safety and prevention.    Wear low-heeled shoes that fit well and give your feet good support. Talk to your doctor if you have foot problems that make this hard.  Carry a cellphone or wear a medical alert device that you can use to call for help.  Use stepladders instead of chairs to reach high objects. Don't climb if you're at risk for falls. Ask for help, if needed.  Wear the correct eyeglasses, if you need them.    Make your home safer.    Remove rugs, cords, clutter, and furniture from walkways.  Keep your house well lit. Use night-lights in hallways and bathrooms.  Install and use sturdy handrails on stairways.  Wear nonskid footwear, even inside. Don't walk barefoot or in socks without shoes.    Be safe outside.    Use handrails, curb cuts, and ramps whenever possible.  Keep your hands free by using a shoulder bag or backpack.  Try to walk in well-lit areas. Watch out for uneven ground, changes in pavement, and debris.  Be careful in the winter. Walk on the grass or gravel when sidewalks are slippery. Use de-icer on steps and walkways. Add non-slip devices to shoes.    Put grab bars and nonskid mats in your shower or tub and near the toilet. Try to use a shower chair or bath bench when bathing.   Get into a tub or shower by putting in your weaker leg first. Get out with your strong side first. Have a phone or medical alert device in the bathroom with you.   Where can you learn more?  Go to https://www.[employer].net/patiented  Enter G117 in the search box to learn more about \"Preventing Falls: Care Instructions.\"  Current as of: July 17, " 2023               Content Version: 14.0    6093-8027 [employer].   Care instructions adapted under license by your healthcare professional. If you have questions about a medical condition or this instruction, always ask your healthcare professional. [employer] disclaims any warranty or liability for your use of this information.      Learning About Depression Screening  What is depression screening?  Depression screening is a way to see if you have depression symptoms. It may be done by a doctor or counselor. It's often part of a routine checkup. That's because your mental health is just as important as your physical health.  Depression is a mental health condition that affects how you feel, think, and act. You may:  Have less energy.  Lose interest in your daily activities.  Feel sad and grouchy for a long time.  Depression is very common. It affects people of all ages.  Many things can lead to depression. Some people become depressed after they have a stroke or find out they have a major illness like cancer or heart disease. The death of a loved one or a breakup may lead to depression. It can run in families. Most experts believe that a combination of inherited genes and stressful life events can cause it.  What happens during screening?  You may be asked to fill out a form about your depression symptoms. You and the doctor will discuss your answers. The doctor may ask you more questions to learn more about how you think, act, and feel.  What happens after screening?  If you have symptoms of depression, your doctor will talk to you about your options.  Doctors usually treat depression with medicines or counseling. Often, combining the two works best. Many people don't get help because they think that they'll get over the depression on their own. But people with depression may not get better unless they get treatment.  The cause of depression is not well understood. There may be many  "factors involved. But if you have depression, it's not your fault.  A serious symptom of depression is thinking about death or suicide. If you or someone you care about talks about this or about feeling hopeless, get help right away.  It's important to know that depression can be treated. Medicine, counseling, and self-care may help.  Where can you learn more?  Go to https://www.[employer].net/patiented  Enter T185 in the search box to learn more about \"Learning About Depression Screening.\"  Current as of: June 24, 2023  Content Version: 14.1 2006-2024 [employer].   Care instructions adapted under license by your healthcare professional. If you have questions about a medical condition or this instruction, always ask your healthcare professional. [employer] disclaims any warranty or liability for your use of this information.     Learning About Risk for Heart Attack and Stroke (01:56)  Your health professional recommends that you watch this short online health video.  Learn what raises your risk for having a heart attack or stroke and how you can lower your risk.   Purpose: Explains risk factors for heart attack and stroke and ways to lower the risk.  Goal: Users will understand what it means to be at risk for having a heart attack or stroke and what they can do to reduce their risk.    Watch: Scan the QR code or visit the link to view video       https://hwi.se/r/[email or web address]  Current as of: June 24, 2023  Content Version: 14.1 2006-2024 [employer].   Care instructions adapted under license by your healthcare professional. If you have questions about a medical condition or this instruction, always ask your healthcare professional. [employer] disclaims any warranty or liability for your use of this information.    Eating Healthy Foods: Care Instructions  With every meal, you can make healthy food choices. Try to eat a variety of fruits, vegetables, whole " "grains, lean proteins, and low-fat dairy products. This can help you get the right balance of nutrients, including vitamins and minerals. Small changes add up over time. You can start by adding one healthy food to your meals each day.    Try to make half your plate fruits and vegetables, one-fourth whole grains, and one-fourth lean proteins. Try including dairy with your meals.   Eat more fruits and vegetables. Try to have them with most meals and snacks.   Foods for healthy eating    Fruits    These can be fresh, frozen, canned, or dried.  Try to choose whole fruit rather than fruit juice.  Eat a variety of colors.    Vegetables    These can be fresh, frozen, canned, or dried.  Beans, peas, and lentils count too.    Whole grains    Choose whole-grain breads, cereals, and noodles.  Try brown rice.    Lean proteins    These can include lean meat, poultry, fish, and eggs.  You can also have tofu, beans, peas, lentils, nuts, and seeds.    Dairy    Try milk, yogurt, and cheese.  Choose low-fat or fat-free when you can.  If you need to, use lactose-free milk or fortified plant-based milk products, such as soy milk.    Water    Drink water when you're thirsty.  Limit sugar-sweetened drinks, including soda, fruit drinks, and sports drinks.  Where can you learn more?  Go to https://www.[employer]net/patiented  Enter T756 in the search box to learn more about \"Eating Healthy Foods: Care Instructions.\"  Current as of: September 20, 2023               Content Version: 14.0    3412-9104 [employer].   Care instructions adapted under license by your healthcare professional. If you have questions about a medical condition or this instruction, always ask your healthcare professional. [employer] disclaims any warranty or liability for your use of this information.      Body Mass Index: Care Instructions  Overview     Body mass index (BMI) can help you see if your weight is raising your risk for health " problems. It uses a formula to compare how much you weigh with how tall you are.  A BMI lower than 18.5 is considered underweight.  A BMI between 18.5 and 24.9 is considered healthy.  A BMI between 25 and 29.9 is considered overweight. A BMI of 30 or higher is considered obese.  If your BMI is in the normal range, it means that you have a lower risk for weight-related health problems. If your BMI is in the overweight or obese range, you may be at increased risk for weight-related health problems, such as high blood pressure, heart disease, stroke, arthritis or joint pain, and diabetes. If your BMI is in the underweight range, you may be at increased risk for health problems such as fatigue, lower protection (immunity) against illness, muscle loss, bone loss, hair loss, and hormone problems.  BMI is just one measure of your risk for weight-related health problems. You may be at higher risk for health problems if you are not active, you eat an unhealthy diet, or you drink too much alcohol or use tobacco products.  Follow-up care is a key part of your treatment and safety. Be sure to make and go to all appointments, and call your doctor if you are having problems. It's also a good idea to know your test results and keep a list of the medicines you take.  How can you care for yourself at home?  Practice healthy eating habits. This includes eating plenty of fruits, vegetables, whole grains, lean protein, and low-fat dairy.  If your doctor recommends it, get more exercise. Walking is a good choice. Bit by bit, increase the amount you walk every day. Try for at least 30 minutes on most days of the week.  Do not smoke. Smoking can increase your risk for health problems. If you need help quitting, talk to your doctor about stop-smoking programs and medicines. These can increase your chances of quitting for good.  Limit alcohol to 2 drinks a day for men and 1 drink a day for women. Too much alcohol can cause health  "problems.  If you have a BMI higher than 25  Your doctor may do other tests to check your risk for weight-related health problems. This may include measuring the distance around your waist. A waist measurement of more than 40 inches in men or 35 inches in women can increase the risk of weight-related health problems.  Talk with your doctor about steps you can take to stay healthy or improve your health. You may need to make lifestyle changes to lose weight and stay healthy, such as changing your diet and getting regular exercise.  If you have a BMI lower than 18.5  Your doctor may do other tests to check your risk for health problems.  Talk with your doctor about steps you can take to stay healthy or improve your health. You may need to make lifestyle changes to gain or maintain weight and stay healthy, such as getting more healthy foods in your diet and doing exercises to build muscle.  Where can you learn more?  Go to https://www.[employer].net/patiented  Enter S176 in the search box to learn more about \"Body Mass Index: Care Instructions.\"  Current as of: May 13, 2023               Content Version: 14.0    4827-7646 [employer].   Care instructions adapted under license by your healthcare professional. If you have questions about a medical condition or this instruction, always ask your healthcare professional. [employer] disclaims any warranty or liability for your use of this information.      Learning About Being Physically Active  What is physical activity?     Being physically active means doing any kind of activity that gets your body moving.  The types of physical activity that can help you get fit and stay healthy include:  Aerobic or \"cardio\" activities. These make your heart beat faster and make you breathe harder, such as brisk walking, riding a bike, or running. They strengthen your heart and lungs and build up your endurance.  Strength training activities. These make your " "muscles work against, or \"resist,\" something. Examples include lifting weights or doing push-ups. These activities help tone and strengthen your muscles and bones.  Stretches. These let you move your joints and muscles through their full range of motion. Stretching helps you be more flexible.  Reaching a balance between these three types of physical activity is important because each one contributes to your overall fitness.  What are the benefits of being active?  Being active is one of the best things you can do for your health. It helps you to:  Feel stronger and have more energy to do all the things you like to do.  Focus better at school or work.  Feel, think, and sleep better.  Reach and stay at a healthy weight.  Lose fat and build lean muscle.  Lower your risk for serious health problems, including diabetes, heart attack, high blood pressure, and some cancers.  Keep your heart, lungs, bones, muscles, and joints strong and healthy.  How can you make being active part of your life?  Start slowly. Make it your long-term goal to get at least 30 minutes of exercise on most days of the week. Walking is a good choice. You also may want to do other activities, such as running, swimming, cycling, or playing tennis or team sports.  Pick activities that you like--ones that make your heart beat faster, your muscles stronger, and your muscles and joints more flexible. If you find more than one thing you like doing, do them all. You don't have to do the same thing every day.  Get your heart pumping every day. Any activity that makes your heart beat faster and keeps it at that rate for a while counts.  Here are some great ways to get your heart beating faster:  Go for a brisk walk, run, or hike.  Go for a swim or bike ride.  Take an online exercise class or dance.  Play a game of touch football, basketball, or soccer.  Play tennis, pickleball, or racquetball.  Climb stairs.  Even some household chores can be aerobic. Just " "do them at a faster pace. Raking or mowing the lawn, sweeping the garage, and vacuuming and cleaning your home all can help get your heart rate up.  Strengthen your muscles during the week. You don't have to lift heavy weights or grow big, bulky muscles to get stronger. Doing a few simple activities that make your muscles work against, or \"resist,\" something can help you get stronger. Aim for at least twice a week.  For example, you can:  Do push-ups or sit-ups, which use your own body weight as resistance.  Lift weights or dumbbells or use stretch bands at home or in a gym or community center.  Stretch your muscles often. Stretching will help you as you become more active. It can help you stay flexible and loosen tight muscles. It can also help improve your balance and posture and can be a great way to relax.  Be sure to stretch the muscles you'll be using when you work out. It's best to warm your muscles slightly before you stretch them. Walk or do some other light aerobic activity for a few minutes. Then start stretching.  When you stretch your muscles:  Do it slowly. Stretching is not about going fast or making sudden movements.  Don't push or bounce during a stretch.  Hold each stretch for at least 15 to 30 seconds, if you can. You should feel a stretch in the muscle, but not pain.  Breathe out as you do the stretch. Then breathe in as you hold the stretch. Don't hold your breath.  If you're worried about how more activity might affect your health, have a checkup before you start. Follow any special advice your doctor gives you for getting a smart start.  Where can you learn more?  Go to https://www.[employer].net/patiented  Enter W332 in the search box to learn more about \"Learning About Being Physically Active.\"  Current as of: June 5, 2023  Content Version: 14.1    3015-0700 [employer].   Care instructions adapted under license by your healthcare professional. If you have questions about a " medical condition or this instruction, always ask your healthcare professional. [employer] disclaims any warranty or liability for your use of this information.

## 2024-08-20 NOTE — LETTER

## 2024-08-20 NOTE — LETTER
[facility] ([facility]) Contract: Opioid Prescription  I understand that my provider, ____________________________, is prescribing an opioid medication to assist me in managing my chronic pain and assist me in improving my daily function and activity level. If my activity level or general function changes, the medication may be changed or discontinued. I understand that my provider is not required to prescribe this medication. The risks, side effects, and benefits of taking this medication have been explained to me and I agree to the following conditions related to this treatment. Failure to adhere to these conditions will result in discontinuation of this medication and possible termination of care from the [facility].   I will take my medication as prescribed. I will not change the amount or frequency of the medication without provider approval.  I will only receive these medications from _____________________________________________ (or my provider s designee as determined by my provider).  I am being prescribed opioid medications to treat the following condition(s):________________________________________________.  If I am hospitalized or seen in an emergency department or urgent care for a condition that results in a prescription for another controlled substance (such as anti-anxiety, additional pain medication, sleep aid, or stimulants), I will inform the clinic within one business day of the additional medication.  I will notify the clinic within one business day if I seek any care elsewhere related to this medication.  I will participate in all additional treatments that my provider recommends, such as physical therapy or consultations with a pain or mental health specialist.   I will notify my provider of any history of addiction or current chemical dependence.  I will not use illegal drugs (includes marijuana).   I will comply with random drug screening to confirm proper use of my  medication.  I will comply with state law. I understand that I may not be able to operate a motor vehicle while taking these medications. I will not participate in any activities that will put myself or others at physical risk while taking any medications.  I will not give, sell, or trade my medications to anyone else.    I will not take someone else s medications.  I will not forge or change my prescriptions in any way.  I understand I will have one clinic appointment every __________months (or more frequently) as determined by my provider.  I understand it is my responsibility to schedule future appointments with my provider at the end of each visit.  I will not request early refills. I understand that lost and/or stolen prescriptions/medications will not be replaced.  I will request refills of these medications in the following manner:    I am responsible to keep track of my medications and plan ahead to arrange for refills. It is my responsibility to ensure that I do not run out of medication.  I will call [phone] and request a refill of my opioid medication(s).  All other medication refill request should be requested through your pharmacy.  I will give the clinic one full week notice prior to needing a refill.  I will not walk in or call into the clinic and request this medication to be refilled same day (I understand I must give a full week notice of the need for refill).  I understand that these prescriptions will be dispensed monthly with a maximum of one-month supply with no refills.  Prescriptions will only be available to be picked up during regular office hours (7:30am-5:00pm Monday-Friday). Refills will not be provided at night, on weekends, or during holidays.  I will treat my provider and clinic staff with respect. I will not yell, name-call, shout, or use offensive or vulgar language. I will not threaten or act in an intimidating manner on the telephone or while in the clinic toward my provider  or clinic staff.  I agree if I break this agreement, my physician reserves the right to stop prescribing the controlled substance for me - my medications will be discontinued in a medically safe fashion, and I will not be allowed to get pain medications from any other provider in this clinic and/or it may result in a termination of care from this clinic.       I understand this contract is in effect for all current and future opioid prescriptions received through the [facility].          _________________________________________________________________________                _________________  Signature of Patient or Personal Representative (state relationship)                                        Date    _________________________________________________________________________                 ________________                                              Signature of Provider (also please print name)                                                                               Date

## 2024-08-20 NOTE — PROGRESS NOTES
Preventive Care Visit  [facility]  Calos Ty Mai, MD, Family Medicine  Aug 20, 2024      Assessment & Plan     (Z00.00) Routine general medical examination at a health care facility  (primary encounter diagnosis)  Comment: Overall Humberto is healthy and doing well.  He was offered and recommended Pneumovax, hep A, hep B, influenza and COVID vaccinations today but he declined; he is willing to take the risks.  Discussed about safety issue, healthy diet, exercising, good sleeping hygiene, advanced directive care, falling prevention, and depression prevention.  Advance directive care discussed, information was given and he was recommended him to get it updated as soon as possible.  He was recommended to take it easy, especially with positional change prevent falling.  Hallways in the house should always be well lighted and clear of objects. Recommended daily exercise for at least 30 minutes, more tolerated.  Healthy diet with adequate fluid intake and resting discussed and encouraged.  No safety or mental health concerns - lives with his wife.  Follow in 1 year for physical, earlier as needed.  Recent lab results reviewed.  All of her questions were answered.     Prostate cancer screening :  Discussed with him about the pros and cons of prostate screening cancer with PSA.  Also educated about the potential false positive which may require unnecessary work-up.  He was informed of negative/normal PSA leve does not rule out prostate cancer completely although it is very unlikely.  He understands and is willing to take the risk.     Colon cancer screening: UTP - last colonoscopy was in 2021 and she was cleared for 10 years.      Skin cancer prevention: Above the waist skin exams showed no concerning lesion.  Offered below the waist skin exam today but he declined.  He was encouraged to be generous with sunblock and sun protective gears when she is outside for a long period of time.  He was also  encouraged to monitor and follow-up if noted any skin lesion with changes in size, shape, or color.      Plan: [employer]JUV[employer]ED (SHINGRIX),         PRIMARY CARE FOLLOW-UP SCHEDULING,         Comprehensive metabolic panel (BMP + Alb, Alk         Phos, ALT, AST, Total. Bili, TP), PSA, screen,         TSH with free T4 reflex            (I10) Hypertension goal BP (blood pressure) < 140/90  Comment: BP is normal and stable with atenolol and hydrochlorothiazide, been tolerating them well.  Also has morbid obesity, sleep apnea, chronic low back pain and DM.  No history of CVA, CAD or high cholesterol.  His cholesterol level today was normal/therapeutic.  His kidney function and electrolytes were also normal today.  The goal for his blood pressure to be less than 140/90.  Will continue with the atenolol and hydrochlorothiazide at the current doses.  Healthy/low salt diet, exercising and weight loss encouraged and discussed.  Follow up in 6 month, earlier as needed       (F33.1) Moderate episode of recurrent major depressive disorder (H)  Comment:  Stable and is doing well without medication.  No depression or anxiety concern.  Will continue to monitor.  Follow-up as needed.      (E11.65) Type 2 diabetes mellitus with hyperglycemia, without long-term current use of insulin (H)  Comment: Also has obesity, sleep apnea and high blood pressure.  No history of CVA, CAD or high cholesterol.  He is on Lipitor for cardioprotection.  His hemoglobin A1c is 5.4 with Jardiance and metformin.  He is morbidly obese with acanthosis nigricans.  Not able to exercise due to chronic low back pain.  He is -American heritage.  His blood pressure is controlled.  Exercising, healthy diet and weight loss discussed as above.  Continue with Jardiance and added the Ozempic which will help with his weight.  Will taper off the metformin with Ozempic.  He feels comfortable with injecting himself the medication.  Potential side effect  discussed, including gastroparesis, nausea, vomiting and constipation.  No personal or family history of thyroid cancer.  Encouraged him to get his eye exam done as soon as possible and annually.  Again emphasized on the importance of healthy diet, exercising and weight loss.    Plan: Albumin Random Urine Quantitative with Creat         Ratio, HEMOGLOBIN A1C, FOOT EXAM, Semaglutide,         2 MG/DOSE, (OZEMPIC) 8 MG/3ML pen            (F51.04) Psychophysiological insomnia  Comment: Doing well with the trazodone. Good sleeping hygiene discussed. Continue with trazodone as needed.     Plan: traZODone (DESYREL) 100 MG tablet            (E66.01) Morbid obesity (H)  Comment: Today's BMI was 38, dropped down from 41 a year ago.   Also has high blood pressure, sleep apnea, chronic low back pain and DM.  Not able to exercise due to chronic low back pain.  Encouraged to stay active as tolerated.  Healthy and portion diet discussed and encouraged.  He was referred to weight management last year but it was not covered by his health insurance.  Discussed about the goal for his weight and his BMI.  No history of thyroid problem.  Will continue with the metformin, Jardiance and Ozempic; continue to taper up the Ozempic dose as tolerated.  Follow-up in 3 months for re-evaluation and will consider adding other weight loss medication such as Wellbutrin and Naltroxen.  He was encouraged to work closely with the sleep medicine for his sleep apnea.   Plan: Semaglutide, 2 MG/DOSE, (OZEMPIC) 8 MG/3ML pen,        TSH with free T4 reflex        Healthy    (F11.20) Continuous opioid dependence (H)  (M54.50,  G89.29) Chronic bilateral low back pain without sciatica  (M47.816) Lumbar spondylosis  (M47.816) Lumbar facet arthropathy  Comment:  Known to have arthritis of the spine with chronic lower back pain without radiculopathy; has been on disability for this for years.  It prevents him from exercising or just being active.  It keeps him up  at night. Epidural injections x3 with little to no effect.  Physical therapy worsened of the pain.  MRIs showed minimal arthritis no spinal or foraminal stenosis or herniated disc.  Diclofenac is not really helping much.  Physical exam today with no focal neurological deficit and clinical presentation did not suggest equina syndrome or infection.  He is morbidly obese.  Pain is affecting his life tremendously.  He has not been able to sleep on his back for years.       I discussed with him about treatment options.  Will continue to work on weight loss as above.  Continue with Tylenol or Motrin as needed.  Also will continue with the Felxeril at least at bedtime and during the day as needed.  He has failed all conservative management.  Doing ok with oxycodone up to 2 times a day as needed for severe pain.  He denies of drug use.  Drug screen obtained today - negative.  Continue with his normal activities as tolerated.  Symptoms the need to be seen or call in discussed.      He was encouraged to work closely with neurosurgery who is trying to get the ablation for him.    Plan: cyclobenzaprine (FLEXERIL) 10 MG tablet, Urine         Drug Screen, DISCONTINUED:         oxyCODONE-acetaminophen (PERCOCET) 5-325 MG         tablet         (G47.33) RICARDO (obstructive sleep apnea) - not tolerating CPAP  Comment: Not tolerating the CPAP - has not used it for a long time.  Been losing weight with Ozempic, will continue to taper up the type Ozempic dose as tolerated.  Healthy diet and increased physical discussed and encouraged.  Discussed with him about the potential complication associated with uncontrolled sleep apnea.  He also has diabetes and high blood pressure.  His last sleep apnea study was in 2015.  He is interested to be referred in for reevaluation and therefore he was referred.      (E79.0) Hyperuricemia  Comment: Uric acid level 6 months ago was normal.  Has not had a gout flare for a while.  Kidney function and  electrolytes were normal.  No longer taking the allopurinol.  Food avoid to prevent gouty flare up discussed and encouraged.  He does not drink alcohol excessively.    (E55.9) Vitamin D deficiency  Comment: Continue with Vitamin D supplement. Food with high vitamin D source discussed and recommended       Patient has been advised of split billing requirements and indicates understanding: Yes        Counseling  Appropriate preventive services were addressed with this patient via screening, questionnaire, or discussion as appropriate for fall prevention, nutrition, physical activity, Tobacco-use cessation, social engagement, weight loss and cognition.  Checklist reviewing preventive services available has been given to the patient.  The patient's PHQ-9 score is consistent with moderate depression. He was provided with information regarding depression.       Work on weight loss  Regular exercise    Kamran Paul is a 54 year old, presenting for the following:  Physical        8/20/2024    12:59 PM   Additional Questions   Roomed by [facility]        Via the [employer] Maintenance questionnaire, the patient has reported the following services have been completed , this information has been sent to the abstraction team.  Health Care Directive  Patient does not have a Health Care Directive or Living Will: Discussed advance care planning with patient; information given to patient to review.    HPI    Humberto is a 50-year-old -American male who is here today for physical and general follow-up.     1.  His blood pressure has been controlled with atenolol and hydrochlorothiazide with no side effect.  Not checking his blood pressure at home.  No headache or dizziness.  No chest pain, shortness of breath, dyspnea, leg swelling or orthopnea.  Not exercising due to chronic back pain.  Denied of excessive salt/caffeine intake.  He is prediabetic but no history of CVA, CAD or high cholesterol.     2.  He has chronic low back  pain for years secondary to degenerative joint disease of the spine.  He in fact has been on disability for.  Been taking the diclofenac and Zanaflex as needed which have not been effective.  The same kind of pain that he has had - constant dull achy pain in his lowerr back that becomes sharp with tingling sensation that radiates down to his legs bilaterally with certain movement or activities.  It keeps him up at night and prevents him from being active -not able to exercise.  No problem with control his bowel movement or urination.  No weakness on the legs.  No fever or chills.  No unusual activities.  Epidural injections helped only temporarily and physical therapy typically made it worse. Medrol Dosepak has been working well for acute flareup..  Not taking the pain meds chronically.      3.    His acid reflux symptom has been controlled with Protonix and Pepcid.  Feels a difference if skipped the doses.  No longer having the tasting the acid in his throat. No choking or globus sensation. No excessive bloatedness.  No melena or coffee-ground emesis.  No abdominal pain.    Avoid greasy or spicy food completely.      4.  Also has high uric acid with history of gout.  Allopurinol has been working very well.  He has colchicine to take as needed for flare up and has been working well as well.  Last flareup was couple months ago     5.  He takes trazodone for sleeping which has been working very well.  He has trouble with falling and maintaining sleep when not taking the trazodone.   No side effect     6.  Tolerating the CPAP better and been using the CPAP as prescribed.  Last the sleep study was in 2015.      7.  He also needs refill for the triamcinolone cream for the rash on his ankle.  Its been working well with no side effect.  He takes Viagra for ED and has been working well.  No side effect.  Also needs a refill for it.     8.  Otherwise, he is doing well.  No major medical care or procedure done since the last  physical over a year ago. No headache, dizziness or acute visual change.  No runny nose, nasal congestion, coughing, fever or chill. No CP/SOB. No N/V/D/C or problem with urination.  No abdominal pain. Denied of STD risks.  No leg swelling.  Not exercising.  No alcohol, drugs or tobacco use. Feels safe, not working - lives with his wife.  Denied of being depressed. No other concern today          8/20/2024   General Health   How would you rate your overall physical health? (!) FAIR   Feel stress (tense, anxious, or unable to sleep) Patient declined            8/20/2024   Nutrition   Three or more servings of calcium each day? Yes   Diet: Low salt   How many servings of fruit and vegetables per day? (!) 2-3   How many sweetened beverages each day? 0-1            8/20/2024   Exercise   Days per week of moderate/strenous exercise Patient declined            8/20/2024   Social Factors   Frequency of gathering with friends or relatives Patient declined   Worry food won't last until get money to buy more Yes   Food not last or not have enough money for food? Yes   Do you have housing? (Housing is defined as stable permanent housing and does not include staying ouside in a car, in a tent, in an abandoned building, in an overnight shelter, or couch-surfing.) Yes   Are you worried about losing your housing? Yes   Lack of transportation? Yes   Unable to get utilities (heat,electricity)? Yes   Want help with housing or utility concern? (!) YES      (!) FOOD SECURITY CONCERN PRESENT (!) TRANSPORTATION CONCERN PRESENT(!) HOUSING CONCERN PRESENT(!) FINANCIAL RESOURCE STRAIN CONCERN      8/20/2024   Fall Risk   Fallen 2 or more times in the past year? Yes   Trouble with walking or balance? No   Gait Speed Test Interpretation Less than or equal to 5.00 seconds - PASS    Less than or equal to 5.00 seconds - PASS       Multiple values from one day are sorted in reverse-chronological order          8/20/2024   Dental   Dentist two  times every year? Yes            2024   TB Screening   Were you born outside of the [place]? No          Today's PHQ-9 Score:       2024    12:49 PM   PHQ-9 SCORE   PHQ-9 Total Score MyChart 12 (Moderate depression)   PHQ-9 Total Score 12         2024   Substance Use   Alcohol more than 3/day or more than 7/wk No   Do you use any other substances recreationally? No        Social History     Tobacco Use    Smoking status: Never    Smokeless tobacco: Never   Vaping Use    Vaping status: Never Used   Substance Use Topics    Alcohol use: No     Alcohol/week: 0.0 standard drinks of alcohol    Drug use: No           2024   STI Screening   New sexual partner(s) since last STI/HIV test? No      ASCVD Risk   The ASCVD Risk score (Cynthia GONZALEZ, et al., 2019) failed to calculate for the following reasons:    The valid total cholesterol range is 130 to 320 mg/dL    Fracture Risk Assessment Tool  Link to Frax Calculator  Use the information below to complete the Frax calculator  : 1970  Sex: male  Weight (kg): 135.2 kg (actual weight)  Height (cm): 188 cm  Previous Fragility Fracture:  No  History of parent with fractured hip:  No  Current Smoking:  No  Patient has been on glucocorticoids for more than 3 months (5mg/day or more): No  Rheumatoid Arthritis on Problem List:  No  Secondary Osteoporosis on Problem List:  No  Consumes 3 or more units of alcohol per day: No  Femoral Neck BMD (g/cm2)           Reviewed and updated as needed this visit by Provider   Tobacco  Allergies  Meds  Problems  Med Hx  Surg Hx   Soc Hx Sexual   Activity          Past Medical History:   Diagnosis Date    Acid reflux disease since     Back pain chronic    Cellulitis of left upper arm and forearm 2013    Elevated serum creatinine 10/15/2015    Hypertension     Pre-diabetes 2021     Past Surgical History:   Procedure Laterality Date    COLONOSCOPY N/A 3/25/2021    Procedure: Colonoscopy Incomplete;   Surgeon: Humberto Antoine DO;  Location: [facility]    COLONOSCOPY N/A 7/15/2021    Procedure: COLONOSCOPY;  Surgeon: Humberto Antoine DO;  Location:  GI    ENT SURGERY      ESOPHAGOSCOPY, GASTROSCOPY, DUODENOSCOPY (EGD), COMBINED N/A 3/25/2021    Procedure: Esophagogastroduodenoscopy with biopsy;  Surgeon: Humberto Antoine DO;  Location: [facility]    INJECT BLOCK MEDIAL BRANCH CERVICAL/THORACIC/LUMBAR Bilateral 7/15/2024    Procedure: Bilateral L4-5, L5-S1 medial branch block #1;  Surgeon: Aldo More MD;  Location: [facility] OR    INJECT EPIDURAL TRANSFORAMINAL N/A 10/20/2023    Procedure: Bilateral Lumbar 3-4 Transforaminal Epidural Steroid Injection with fluoroscopic guidance and contrast.;  Surgeon: Joe Aranda MD;  Location: [facility]     BP Readings from Last 3 Encounters:   08/20/24 118/62   07/15/24 134/81   06/21/24 132/82    Wt Readings from Last 3 Encounters:   08/20/24 135.2 kg (298 lb)   07/15/24 136.1 kg (300 lb)   06/21/24 136.1 kg (300 lb)                  Patient Active Problem List   Diagnosis    GERD (gastroesophageal reflux disease)    Hypertension goal BP (blood pressure) < 140/90    Insomnia    Morbid obesity (H)    Seasonal allergic rhinitis due to pollen, unspecified chronicity    RICARDO (obstructive sleep apnea) - on cpap    Chronic bilateral low back pain without sciatica    Vitamin D deficiency    Hyperuricemia    Acanthosis nigricans    Skin tag    Type 2 diabetes mellitus with hyperglycemia, without long-term current use of insulin (H)    Moderate episode of recurrent major depressive disorder (H)    Lumbar spondylosis    Lumbar facet arthropathy    Continuous opioid dependence (H)     Past Surgical History:   Procedure Laterality Date    COLONOSCOPY N/A 3/25/2021    Procedure: Colonoscopy Incomplete;  Surgeon: Humberto Antoine DO;  Location: [facility]    COLONOSCOPY N/A 7/15/2021    Procedure: COLONOSCOPY;  Surgeon: Humberto Antoine DO;  Location:  GI    ENT  SURGERY      ESOPHAGOSCOPY, GASTROSCOPY, DUODENOSCOPY (EGD), COMBINED N/A 3/25/2021    Procedure: Esophagogastroduodenoscopy with biopsy;  Surgeon: Humberto Antoine DO;  Location: [facility]    INJECT BLOCK MEDIAL BRANCH CERVICAL/THORACIC/LUMBAR Bilateral 7/15/2024    Procedure: Bilateral L4-5, L5-S1 medial branch block #1;  Surgeon: Aldo More MD;  Location: [facility] OR    INJECT EPIDURAL TRANSFORAMINAL N/A 10/20/2023    Procedure: Bilateral Lumbar 3-4 Transforaminal Epidural Steroid Injection with fluoroscopic guidance and contrast.;  Surgeon: Joe Aranda MD;  Location: [facility]       Social History     Tobacco Use    Smoking status: Never    Smokeless tobacco: Never   Substance Use Topics    Alcohol use: No     Alcohol/week: 0.0 standard drinks of alcohol     Family History   Problem Relation Age of Onset    Diabetes Mother     Hypertension Mother     Heart Disease Mother     Heart Disease Father     Diabetes Father     Unknown/Adopted Maternal Grandmother     Unknown/Adopted Maternal Grandfather     Unknown/Adopted Paternal Grandmother     Unknown/Adopted Paternal Grandfather     No Known Problems Sister     No Known Problems Son     No Known Problems Daughter     No Known Problems Sister     No Known Problems Son     No Known Problems Daughter          Current Outpatient Medications   Medication Sig Dispense Refill    albuterol (PROAIR HFA/PROVENTIL HFA/VENTOLIN HFA) 108 (90 Base) MCG/ACT inhaler Inhale 2 puffs into the lungs every 4 hours as needed for shortness of breath or wheezing 18 g 0    ASPIRIN LOW DOSE 81 MG EC tablet TAKE ONE TABLET BY MOUTH ONCE DAILY 90 tablet 2    atenolol (TENORMIN) 50 MG tablet TAKE ONE TABLET BY MOUTH ONCE DAILY 90 tablet 1    atorvastatin (LIPITOR) 10 MG tablet TAKE ONE TABLET BY MOUTH ONCE DAILY 90 tablet 1    cetirizine (ZYRTEC) 10 MG tablet TAKE ONE TABLET BY MOUTH ONCE DAILY AS NEEDED FOR ALLERGIES 30 tablet 5    clotrimazole-betamethasone (LOTRISONE) 1-0.05 %  external cream Apply topically 2 times daily 45 g 1    Continuous Blood Gluc  (FREESTYLE FANNIE 2 READER) GORDO Use to read blood sugars as per 's instructions. 1 each 0    Continuous Blood Gluc Sensor (FREESTYLE FANNIE 2 SENSOR) MISC Change every 14 days. 2 each 11    cyclobenzaprine (FLEXERIL) 10 MG tablet Take 1 tablet (10 mg) by mouth 2 times daily as needed 2-3 hours before bed. 180 tablet 1    famotidine (PEPCID) 40 MG tablet Take 1 tablet (40 mg) by mouth at bedtime 90 tablet 3    fluticasone (FLONASE) 50 MCG/ACT nasal spray SPRAY 1-2 SPRAYS IN EACH NOSTRIL EVERY DAY 16 g 5    hydrochlorothiazide (HYDRODIURIL) 25 MG tablet TAKE ONE TABLET BY MOUTH ONCE DAILY 90 tablet 2    JARDIANCE 25 MG TABS tablet TAKE ONE TABLET BY MOUTH ONCE DAILY 90 tablet 0    metFORMIN (GLUCOPHAGE XR) 500 MG 24 hr tablet TAKE 2 TABLETS (1,000 MG) BY MOUTH 2 TIMES DAILY (WITH MEALS) 120 tablet 3    order for DME Equipment being ordered: shower chair 1 Device 0    order for DME 1 Device Auto CPAP @@ 7-15 cm with heated humidity via mask of choice      pantoprazole (PROTONIX) 40 MG EC tablet Take 1 tablet (40 mg) by mouth daily Stop omeprazole 90 tablet 1    Semaglutide, 2 MG/DOSE, (OZEMPIC) 8 MG/3ML pen Inject 2 mg subcutaneously every 7 days 9 mL 5    sildenafil (REVATIO) 20 MG tablet Take 1-2 tablets (20-40 mg) by mouth daily as needed (sexual activity) 30 tablet 4    traZODone (DESYREL) 100 MG tablet Take 1 tablet (100 mg) by mouth at bedtime 90 tablet 3    oxyCODONE-acetaminophen (PERCOCET) 5-325 MG tablet Take 1 tablet by mouth 2 times daily as needed for pain. Please to make it last a month 60 tablet 0     No Known Allergies  Recent Labs   Lab Test 08/20/24  1402 03/19/24  0848 10/11/23  1001 06/09/23  0950 11/16/22  1603 04/26/21  1938 02/24/21  1452   A1C 5.4 6.4* 6.4*   < > 6.6*  --  6.1*   LDL  --  48  --   --  76  --  82   HDL  --  27*  --   --  26*  --  30*   TRIG  --  174*  --   --  216*  --  190*   ALT 32  "26  --   --  37 47 46   CR 1.28* 1.16 1.13   < > 1.03 1.27* 1.01   GFRESTIMATED 67 75 78   < > 87 65 86   GFRESTBLACK  --   --   --   --   --  75 >90   POTASSIUM 3.6 3.5 4.0   < > 3.6 3.7 3.7   TSH 1.13  --   --   --   --   --  1.26    < > = values in this interval not displayed.          Review of Systems  Constitutional, HEENT, cardiovascular, pulmonary, GI, , musculoskeletal, neuro, skin, endocrine and psych systems are negative, except as otherwise noted.     Objective    Exam  /62   Pulse 80   Temp 97.3  F (36.3  C) (Temporal)   Resp 20   Ht 1.88 m (6' 2.02\")   Wt 135.2 kg (298 lb)   SpO2 99%   BMI 38.24 kg/m     Estimated body mass index is 38.24 kg/m  as calculated from the following:    Height as of this encounter: 1.88 m (6' 2.02\").    Weight as of this encounter: 135.2 kg (298 lb).    Physical Exam  GENERAL: alert and no distress, speaking in full sentences.  EYES: Eyes grossly normal to inspection, PERRL and conjunctivae and sclerae normal.  No nystagmus.  All 4 visual fields are intact  HENT: Ear canals and TM's normal.  Nares are non-congested. Oropharynx is pink and moist. No tender with palpation to the sinuses.  NECK: no adenopathy, supple, no lymphadenopathy or thyromegaly.  No tender with palpation to the cervical spine or its paraspinous muscle.  No JV distention or carotid bruits.  RESP: lungs clear to auscultation - no rales, rhonchi or wheezes  CV: regular rate and rhythm, no murmur.  ABDOMEN: soft, nondistended, nontender, no palpable masses or organomegaly with normal bowel sounds.  MS: no gross musculoskeletal defects noted, no edema.  Walk with no limping, normal gait.  All 4 extremities are equally in strength. Ankle, knees, hips, shoulders, elbows and wrists exams normal.  Normal fine motor skills on fingers.  Back is straight, no lordosis or scoliosis.  Tender will present to the lumbar spine.  Negative straight leg maneuver.  SKIN: Above the with skin examination was " normal, no suspicious lesions or rashes.  Nigricans acanthosis noted.  Multiple large skin tags noted on the trunk and neck.  Thickened, scaly eczematous rash noted on the back of his ankles bilaterally.  Offered and recommended below the waist skin exam but he declined  NEURO: Normal strength and tone, mentation intact and speech normal.  Cranial nerves II through XII intact, DTR +2 throughout, no focal neurological deficit.  PSYCH: mentation appears normal, affect normal/bright.  Thoughts intact, no hallucination.  No suicidal or homicidal ideation.  LYMPH: no cervical, supraclavicular or axillary adenopathy.   (male): Offered and recommended but he declined.  He has no concern about it.          Results for orders placed or performed in visit on 08/20/24   TSH with free T4 reflex     Status: Normal   Result Value Ref Range    TSH 1.13 0.30 - 4.20 uIU/mL   PSA, screen     Status: Normal   Result Value Ref Range    Prostate Specific Antigen Screen 0.88 0.00 - 3.50 ng/mL    Narrative    This result is obtained using the Roche Elecsys total PSA method on the irving e601 immunoassay analyzer. Results obtained with different assay methods or kits cannot be used interchangeably.   Comprehensive metabolic panel (BMP + Alb, Alk Phos, ALT, AST, Total. Bili, TP)     Status: Abnormal   Result Value Ref Range    Sodium 140 135 - 145 mmol/L    Potassium 3.6 3.4 - 5.3 mmol/L    Carbon Dioxide (CO2) 29 22 - 29 mmol/L    Anion Gap 11 7 - 15 mmol/L    Urea Nitrogen 11.2 6.0 - 20.0 mg/dL    Creatinine 1.28 (H) 0.67 - 1.17 mg/dL    GFR Estimate 67 >60 mL/min/1.73m2    Calcium 9.6 8.8 - 10.4 mg/dL    Chloride 100 98 - 107 mmol/L    Glucose 87 70 - 99 mg/dL    Alkaline Phosphatase 78 40 - 150 U/L    AST 21 0 - 45 U/L    ALT 32 0 - 70 U/L    Protein Total 7.4 6.4 - 8.3 g/dL    Albumin 4.5 3.5 - 5.2 g/dL    Bilirubin Total 0.4 <=1.2 mg/dL   HEMOGLOBIN A1C     Status: Normal   Result Value Ref Range    Hemoglobin A1C 5.4 <5.7 %    Albumin Random Urine Quantitative with Creat Ratio     Status: None   Result Value Ref Range    Creatinine Urine mg/dL 142.4 mg/dL    Albumin Urine mg/L <12.0 mg/L    Albumin Urine mg/g Cr     Urine Drug Screen Panel     Status: Normal   Result Value Ref Range    Amphetamines Urine Screen Negative Screen Negative    Barbituates Urine Screen Negative Screen Negative    Benzodiazepine Urine Screen Negative Screen Negative    Cannabinoids Urine Screen Negative Screen Negative    Cocaine Urine Screen Negative Screen Negative    Fentanyl Qual Urine Screen Negative Screen Negative    Opiates Urine Screen Negative Screen Negative    PCP Urine Screen Negative Screen Negative   ABO and Rh     Status: None   Result Value Ref Range    ABO/RH(D) B POS     SPECIMEN EXPIRATION DATE [id number]    Urine Drug Screen     Status: Normal    Narrative    The following orders were created for panel order Urine Drug Screen.  Procedure                               Abnormality         Status                     ---------                               -----------         ------                     Urine Drug Screen Panel[[id number]]      Normal              Final result                 Please view results for these tests on the individual orders.        Signed Electronically by: Calos Ty Mai, MD

## 2024-09-05 ENCOUNTER — TELEPHONE (OUTPATIENT)
Dept: FAMILY MEDICINE | Facility: CLINIC | Age: 54
End: 2024-09-05
Payer: COMMERCIAL

## 2024-09-05 NOTE — TELEPHONE ENCOUNTER
PA needed on: jardiance 25   Insurance: [place] [place]  Ins. Phone:\ [phone]  Patient ID: [id number]  PCN: [id number]  BIN: [id number]  Group: [id number]    Please let us know when PA is granted/denied. Thank You      [facility], [place]

## 2024-09-05 NOTE — TELEPHONE ENCOUNTER
PA needed on ozempic ( 2 mg dose )    Insurance: [place] [place]  Ins. Phone: [phone]  Patient ID: [id number]  PCN: [id number]  BIN [id number]  [id number]    Please let us know when PA is granted/denied. Thank You      [facility], [place]

## 2024-09-09 NOTE — TELEPHONE ENCOUNTER
Prior Authorization Approval    Medication: JARDIANCE 25 MG PO TABS  Authorization Effective Date: 9/1/2024  Authorization Expiration Date: 9/9/2025  Approved Dose/Quantity:   Reference #:     Insurance Company: [employer] Clinical Review - Phone [phone] Fax [phone]  Expected CoPay: $    CoPay Card Available:      Financial Assistance Needed:   Which Pharmacy is filling the prescription: [facility] [address]   Pharmacy Notified: YES  Patient Notified: **Instructed pharmacy to notify patient when script is ready to /ship.**

## 2024-09-09 NOTE — TELEPHONE ENCOUNTER
Prior Authorization Approval    Medication: OZEMPIC (2 MG/DOSE) 8 MG/3ML SC SOPN  Authorization Effective Date: 9/1/2024  Authorization Expiration Date: 9/9/2025  Approved Dose/Quantity:   Reference #:     Insurance Company: [employer] Clinical Review - Phone [phone] Fax [phone]  Expected CoPay: $    CoPay Card Available:      Financial Assistance Needed:   Which Pharmacy is filling the prescription: [facility] [address]   Pharmacy Notified: YES  Patient Notified: **Instructed pharmacy to notify patient when script is ready to /ship.**

## 2024-09-09 NOTE — TELEPHONE ENCOUNTER
PA Initiation    Medication: OZEMPIC (2 MG/DOSE) 8 MG/3ML SC SOPN  Insurance Company: [employer] Clinical Review - Phone [phone] Fax [phone]  Pharmacy Filling the Rx: [address]   Filling Pharmacy Phone: [phone]  Filling Pharmacy Fax: [phone]  Start Date: 9/9/2024

## 2024-09-09 NOTE — TELEPHONE ENCOUNTER
PA Initiation    Medication: JARDIANCE 25 MG PO TABS  Insurance Company: [employer] Clinical Review - Phone [phone] Fax [phone]  Pharmacy Filling the Rx: [facility] [address]   Filling Pharmacy Phone: [phone]  Filling Pharmacy Fax: [phone]  Start Date: 9/9/2024

## 2024-10-03 ENCOUNTER — TELEPHONE (OUTPATIENT)
Dept: FAMILY MEDICINE | Facility: CLINIC | Age: 54
End: 2024-10-03
Payer: COMMERCIAL

## 2024-10-03 NOTE — TELEPHONE ENCOUNTER
Prior Authorization Retail Medication Request    Medication/Dose: Ozempic 8mg/3ml  Diagnosis and ICD code (if different than what is on RX):    New/renewal/insurance change PA/secondary ins. PA:  Previously Tried and Failed:    Rationale:      Insurance   Primary: MIGUE MORALES  Insurance ID:  [id number]    Secondary (if applicable):  Insurance ID:      Pharmacy Information (if different than what is on RX)  Name:  [facility]  Phone:  [phone]  Fax:[phone]    Thank you,  Katiana Arvizu, Pharmacy Tech  [facility]

## 2024-10-08 NOTE — TELEPHONE ENCOUNTER
PA Initiation    Medication: OZEMPIC (2 MG/DOSE) 8 MG/3ML SC SOPN  Insurance Company: [employer] - Phone [phone] Fax [phone]  Pharmacy Filling the Rx: [address]   Filling Pharmacy Phone: [phone]  Filling Pharmacy Fax: [phone]  Start Date: 10/8/2024

## 2024-10-09 DIAGNOSIS — E66.01 MORBID OBESITY (H): ICD-10-CM

## 2024-10-09 DIAGNOSIS — E11.65 TYPE 2 DIABETES MELLITUS WITH HYPERGLYCEMIA, WITHOUT LONG-TERM CURRENT USE OF INSULIN (H): ICD-10-CM

## 2024-10-09 NOTE — TELEPHONE ENCOUNTER
Prior Authorization Approval    Medication: OZEMPIC (2 MG/DOSE) 8 MG/3ML SC SOPN  Authorization Effective Date: 10/8/2024  Authorization Expiration Date: 10/8/2025  Approved Dose/Quantity:   Reference #:     Insurance Company: [employer] - Phone [phone] Fax [phone]  Expected CoPay: $    CoPay Card Available:      Financial Assistance Needed:   Which Pharmacy is filling the prescription: [facility] [address]   Pharmacy Notified: YES  Patient Notified: **Instructed pharmacy to notify patient when script is ready to /ship.**

## 2024-10-28 DIAGNOSIS — I10 HYPERTENSION GOAL BP (BLOOD PRESSURE) < 140/90: ICD-10-CM

## 2024-10-28 RX ORDER — ASPIRIN 81 MG/1
TABLET, COATED ORAL
Qty: 90 TABLET | Refills: 2 | Status: SHIPPED | OUTPATIENT
Start: 2024-10-28

## 2024-11-11 DIAGNOSIS — E11.65 TYPE 2 DIABETES MELLITUS WITH HYPERGLYCEMIA, WITHOUT LONG-TERM CURRENT USE OF INSULIN (H): ICD-10-CM

## 2024-11-11 RX ORDER — EMPAGLIFLOZIN 25 MG/1
25 TABLET, FILM COATED ORAL DAILY
Qty: 90 TABLET | Refills: 0 | Status: SHIPPED | OUTPATIENT
Start: 2024-11-11

## 2024-11-11 NOTE — TELEPHONE ENCOUNTER
Prescription approved per [facility] Refill Protocol.  Cecily Carty RN  [facility]

## 2024-11-22 ENCOUNTER — OFFICE VISIT (OUTPATIENT)
Dept: FAMILY MEDICINE | Facility: CLINIC | Age: 54
End: 2024-11-22
Payer: COMMERCIAL

## 2024-11-22 VITALS
OXYGEN SATURATION: 99 % | HEART RATE: 66 BPM | DIASTOLIC BLOOD PRESSURE: 66 MMHG | HEIGHT: 74 IN | RESPIRATION RATE: 15 BRPM | WEIGHT: 283.2 LBS | SYSTOLIC BLOOD PRESSURE: 124 MMHG | TEMPERATURE: 97.8 F | BODY MASS INDEX: 36.35 KG/M2

## 2024-11-22 DIAGNOSIS — G89.29 CHRONIC BILATERAL LOW BACK PAIN WITHOUT SCIATICA: ICD-10-CM

## 2024-11-22 DIAGNOSIS — M54.50 CHRONIC BILATERAL LOW BACK PAIN WITHOUT SCIATICA: ICD-10-CM

## 2024-11-22 DIAGNOSIS — F11.20 CONTINUOUS OPIOID DEPENDENCE (H): ICD-10-CM

## 2024-11-22 DIAGNOSIS — Z28.21 VACCINATION DECLINED: ICD-10-CM

## 2024-11-22 DIAGNOSIS — E66.01 MORBID OBESITY (H): ICD-10-CM

## 2024-11-22 DIAGNOSIS — E11.65 TYPE 2 DIABETES MELLITUS WITH HYPERGLYCEMIA, WITHOUT LONG-TERM CURRENT USE OF INSULIN (H): Primary | ICD-10-CM

## 2024-11-22 DIAGNOSIS — L30.9 ECZEMA, UNSPECIFIED TYPE: ICD-10-CM

## 2024-11-22 LAB
AMPHETAMINES UR QL SCN: NORMAL
BARBITURATES UR QL SCN: NORMAL
BENZODIAZ UR QL SCN: NORMAL
BZE UR QL SCN: NORMAL
CANNABINOIDS UR QL SCN: NORMAL
EST. AVERAGE GLUCOSE BLD GHB EST-MCNC: 103 MG/DL
FENTANYL UR QL: NORMAL
HBA1C MFR BLD: 5.2 %
OPIATES UR QL SCN: NORMAL
PCP QUAL URINE (ROCHE): NORMAL

## 2024-11-22 PROCEDURE — 36415 COLL VENOUS BLD VENIPUNCTURE: CPT | Performed by: FAMILY MEDICINE

## 2024-11-22 PROCEDURE — 80307 DRUG TEST PRSMV CHEM ANLYZR: CPT | Performed by: FAMILY MEDICINE

## 2024-11-22 PROCEDURE — G2211 COMPLEX E/M VISIT ADD ON: HCPCS | Performed by: FAMILY MEDICINE

## 2024-11-22 PROCEDURE — 99214 OFFICE O/P EST MOD 30 MIN: CPT | Performed by: FAMILY MEDICINE

## 2024-11-22 PROCEDURE — 83036 HEMOGLOBIN GLYCOSYLATED A1C: CPT | Performed by: FAMILY MEDICINE

## 2024-11-22 RX ORDER — TRIAMCINOLONE ACETONIDE 5 MG/G
OINTMENT TOPICAL
Qty: 450 G | Refills: 0 | Status: SHIPPED | OUTPATIENT
Start: 2024-11-22

## 2024-11-22 RX ORDER — CLOTRIMAZOLE AND BETAMETHASONE DIPROPIONATE 10; .64 MG/G; MG/G
CREAM TOPICAL 2 TIMES DAILY
Qty: 45 G | Refills: 1 | Status: CANCELLED | OUTPATIENT
Start: 2024-11-22

## 2024-11-22 RX ORDER — BUPROPION HYDROCHLORIDE 150 MG/1
150 TABLET ORAL EVERY MORNING
Qty: 30 TABLET | Refills: 3 | Status: SHIPPED | OUTPATIENT
Start: 2024-11-22

## 2024-11-22 ASSESSMENT — ANXIETY QUESTIONNAIRES
4. TROUBLE RELAXING: NEARLY EVERY DAY
3. WORRYING TOO MUCH ABOUT DIFFERENT THINGS: MORE THAN HALF THE DAYS
IF YOU CHECKED OFF ANY PROBLEMS ON THIS QUESTIONNAIRE, HOW DIFFICULT HAVE THESE PROBLEMS MADE IT FOR YOU TO DO YOUR WORK, TAKE CARE OF THINGS AT HOME, OR GET ALONG WITH OTHER PEOPLE: VERY DIFFICULT
2. NOT BEING ABLE TO STOP OR CONTROL WORRYING: NOT AT ALL
6. BECOMING EASILY ANNOYED OR IRRITABLE: NOT AT ALL
GAD7 TOTAL SCORE: 0
8. IF YOU CHECKED OFF ANY PROBLEMS, HOW DIFFICULT HAVE THESE MADE IT FOR YOU TO DO YOUR WORK, TAKE CARE OF THINGS AT HOME, OR GET ALONG WITH OTHER PEOPLE?: VERY DIFFICULT
7. FEELING AFRAID AS IF SOMETHING AWFUL MIGHT HAPPEN: NOT AT ALL
2. NOT BEING ABLE TO STOP OR CONTROL WORRYING: MORE THAN HALF THE DAYS
7. FEELING AFRAID AS IF SOMETHING AWFUL MIGHT HAPPEN: NOT AT ALL
1. FEELING NERVOUS, ANXIOUS, OR ON EDGE: NOT AT ALL
GAD7 TOTAL SCORE: 13
5. BEING SO RESTLESS THAT IT IS HARD TO SIT STILL: SEVERAL DAYS
5. BEING SO RESTLESS THAT IT IS HARD TO SIT STILL: NOT AT ALL
GAD7 TOTAL SCORE: 13
GAD7 TOTAL SCORE: 13
6. BECOMING EASILY ANNOYED OR IRRITABLE: NEARLY EVERY DAY
3. WORRYING TOO MUCH ABOUT DIFFERENT THINGS: NOT AT ALL
IF YOU CHECKED OFF ANY PROBLEMS ON THIS QUESTIONNAIRE, HOW DIFFICULT HAVE THESE PROBLEMS MADE IT FOR YOU TO DO YOUR WORK, TAKE CARE OF THINGS AT HOME, OR GET ALONG WITH OTHER PEOPLE: NOT DIFFICULT AT ALL
1. FEELING NERVOUS, ANXIOUS, OR ON EDGE: MORE THAN HALF THE DAYS
7. FEELING AFRAID AS IF SOMETHING AWFUL MIGHT HAPPEN: NOT AT ALL

## 2024-11-22 ASSESSMENT — PATIENT HEALTH QUESTIONNAIRE - PHQ9
SUM OF ALL RESPONSES TO PHQ QUESTIONS 1-9: 4
5. POOR APPETITE OR OVEREATING: NOT AT ALL

## 2024-11-22 ASSESSMENT — PAIN SCALES - GENERAL: PAINLEVEL_OUTOF10: NO PAIN (0)

## 2024-11-22 NOTE — LETTER

## 2024-11-22 NOTE — PROGRESS NOTES
Assessment & Plan     (E11.65) Type 2 diabetes mellitus with hyperglycemia, without long-term current use of insulin (H)  (primary encounter diagnosis)  Comment: Also has obesity, sleep apnea and high blood pressure. No history of CVA, CAD or high cholesterol.  He is on Lipitor for cardioprotection.  His hemoglobin A1c is 5.2 with Jardiance Ozempic, and metformin.  He is morbidly obese with acanthosis nigricans.  Not able to exercise due to chronic low back pain.  He is -American heritage.  His blood pressure is controlled.  Exercising, healthy diet and weight loss discussed and encouraged.  Continue Ozempic and metformin, stop the Jardiance.  Tolerating the Ozempic well, no contraindication identified.  No personal or family history of thyroid cancer.  Encouraged him to get his eye exam done as soon as possible and annually.  Again emphasized on the importance of healthy diet, exercising and weight loss.  Recheck the hemoglobin A1c and BMP in 3 months    Plan: HEMOGLOBIN A1C, Semaglutide, 2 MG/DOSE,         (OZEMPIC) 8 MG/3ML pen, CANCELED: OPTOMETRY         REFERRAL            (E66.01) Morbid obesity (H)  Comment:  Today's BMI was 36, dropped down from 38 about 3 months ago - lost 15 pounds in the last 3 months sand about 40 pounds since starting the Ozempic.  Also has high blood pressure, sleep apnea, chronic low back pain and DM.  Not able to exercise due to chronic low back pain.  Encouraged to stay active as tolerated.  Encouraged to continue with healthy and portion diet.  Discussed about the goal for his weight and his BMI.  No history of thyroid problem.  Will continue with the metformin and Ozempic; stoppled the Jardiance today.  Added low-dose of Wellbutrin today.  Potential side effect discussed.  Follow-up in 6 months, earlier as needed.  He was encouraged to work closely with the sleep medicine for his sleep apnea.     Plan: Semaglutide, 2 MG/DOSE, (OZEMPIC) 8 MG/3ML pen,        buPROPion  (WELLBUTRIN XL) 150 MG 24 hr tablet            (M54.50,  G89.29) Chronic bilateral low back pain without sciatica  (F11.20) Continuous opioid dependence (H)  Comment:  Known to have arthritis of the spine with chronic lower back pain without radiculopathy; has been on disability for this for years.  It prevents him from exercising or just being active.  It keeps him up at night. Epidural injections x3 with little to no effect.  Physical therapy worsened of the pain.  MRIs showed minimal arthritis no spinal or foraminal stenosis or herniated disc.  Diclofenac is not really helping much.  Physical exam today with no focal neurological deficit and clinical presentation did not suggest equina syndrome or infection.  He is morbidly obese, been losing weight intentionally as above.  Pain is affecting his life tremendously.  He has not been able to sleep on his back for years.       His pain has been better controlled with the Percocet which he takes only as needed.  Will continue with the Percocet and again he was strongly encouraged to use it only as needed.  No missed you concerned identified.  PHQ-9 and CHALINO-7 score today were 4 in 0 respectively.  He signed the pain contract today which were explained in great detail.  Drug screen today was negative/normal.  Will continue to work on weight loss as above.  Continue with Tylenol or Motrin as needed.  Also will continue with the Felxeril at least at bedtime and during the day as needed.  Continue with the oxycodone up to 2 times a day as needed for severe pain.  Also continue with his normal activities as tolerated.  Symptoms the need to be seen or call in discussed.       Plan: Urine Drug Screen            (L30.9) Eczema, unspecified type  Comment: Encouraged to be generous with lotions.  Avoid frequent and hot bath/shower.  Stop the Lotrisone, start the triamcinolone cream as needed.  Follow-up as needed.    Plan: triamcinolone (KENALOG) 0.5 % external ointment         "    (Z28.21) Vaccination declined  Comment: He was offered and recommended Pneumovax, hepatitis A, hepatitis B, COVID and influenza vaccination today but he declined.  Risks and benefits discussed and he is willing to take the risks.    The longitudinal plan of care for the diagnosis(es)/condition(s) as documented were addressed during this visit. Due to the added complexity in care, I will continue to support Humberto in the subsequent management and with ongoing continuity of care.        BMI  Estimated body mass index is 36.34 kg/m  as calculated from the following:    Height as of this encounter: 1.88 m (6' 2.02\").    Weight as of this encounter: 128.5 kg (283 lb 3.2 oz).   Weight management plan: Discussed healthy diet and exercise guidelines Ozempic, Wellbutrin and metformin      Work on weight loss  Regular exercise    Subjective   Humberto is a 54 year old, presenting for the following health issues:  Follow Up        11/22/2024     9:36 AM   Additional Questions   Roomed by Vicky     History of Present Illness       Reason for visit:  Medication follow up    He eats 2-3 servings of fruits and vegetables daily.He consumes 0 sweetened beverage(s) daily.He exercises with enough effort to increase his heart rate 10 to 19 minutes per day.  He exercises with enough effort to increase his heart rate 3 or less days per week.   He is taking medications regularly.     Humberto was seen today for follow-up on his chronic low back pain, diabetes, obesity and weight loss.  He was seen about 3 months ago for physical, please see my last dictation for further details.  Stated overall he has been doing well since her last visit.  Been taking the medication as prescribed.  He takes metformin, Ozempic and Jardiance for diabetes.  Not checking his blood sugar.  Denied of hypoglycemic symptoms.  No acute visual change.  Has not had an eye exam done for years, plans to get one done soon.  No numbness or tingling sensation.    Also " "been on Ozempic which has been very effective for weight loss, no side effect.  He in fact lost about 40 pounds since starting Ozempic about 9 months ago and 15 pounds since the last visit which is about 3 months ago.  He is happy with the medication and would like to continue with it.  He is also interested to try an adjunctive treatment for weight loss.  Been feeling much better with the weight loss -feeling more energy and his back pain has been much better controlled.  Been taking the Percocet as needed and not as much.  He is known to have DJD of the spine that failed injection and physical therapy.  Also been taking Tylenol Motrin and Flexeril as needed.    Also have the rash on his leg - been there for years.  Lotrisone helped but never take it away completely.  Not itching.  No change in detergent, lotion or shampoo.  No change in diet.  No recent history of traveling.  No pets        Review of Systems  Constitutional, HEENT, cardiovascular, pulmonary, gi and gu systems are negative, except as otherwise noted.      Objective    /66   Pulse 66   Temp 97.8  F (36.6  C) (Temporal)   Resp 15   Ht 1.88 m (6' 2.02\")   Wt 128.5 kg (283 lb 3.2 oz)   SpO2 99%   BMI 36.34 kg/m    Body mass index is 36.34 kg/m .  Physical Exam   GENERAL: alert and no distress  RESP: lungs clear to auscultation - no rales, rhonchi or wheezes  CV: regular rate and rhythm, no murmur, no peripheral edema  ABDOMEN: soft, nontender, nondistended, no palpable masses organomegaly with normal bowel sound.  MS: no gross musculoskeletal defects noted, no edema.  No focal weakness.  SKIN: no suspicious lesions or rashes.  Dry exam the rash was noted on the calf and upper extremity bilaterally.  NEURO: Normal strength and tone, mentation intact and speech normal.  No focal neurological deficit.  DTR +2 throughout.  PSYCH: mentation appears normal, affect normal/bright.  Thoughts are intact.    Results for orders placed or performed in " visit on 11/22/24   HEMOGLOBIN A1C     Status: Normal   Result Value Ref Range    Estimated Average Glucose 103 <117 mg/dL    Hemoglobin A1C 5.2 <5.7 %   Urine Drug Screen Panel     Status: Normal   Result Value Ref Range    Amphetamines Urine Screen Negative Screen Negative    Barbituates Urine Screen Negative Screen Negative    Benzodiazepine Urine Screen Negative Screen Negative    Cannabinoids Urine Screen Negative Screen Negative    Cocaine Urine Screen Negative Screen Negative    Fentanyl Qual Urine Screen Negative Screen Negative    Opiates Urine Screen Negative Screen Negative    PCP Urine Screen Negative Screen Negative   Urine Drug Screen     Status: Normal    Narrative    The following orders were created for panel order Urine Drug Screen.  Procedure                               Abnormality         Status                     ---------                               -----------         ------                     Urine Drug Screen Panel[[id number]]      Normal              Final result                 Please view results for these tests on the individual orders.           Signed Electronically by: Calos Ty Mai, MD

## 2024-11-22 NOTE — Clinical Note
Please let patient know that as discussed, I recommend him to stop the Jardiance, continue with the Ozempic and metformin for his diabetes.  I also encouraged him to get an eye exam done as soon as possible.

## 2024-11-23 PROBLEM — Z28.21 VACCINATION DECLINED: Status: ACTIVE | Noted: 2024-11-23

## 2024-11-23 PROBLEM — L30.9 ECZEMA, UNSPECIFIED TYPE: Status: ACTIVE | Noted: 2024-11-23

## 2024-11-25 ENCOUNTER — TELEPHONE (OUTPATIENT)
Dept: FAMILY MEDICINE | Facility: CLINIC | Age: 54
End: 2024-11-25
Payer: COMMERCIAL

## 2024-11-25 ENCOUNTER — MYC MEDICAL ADVICE (OUTPATIENT)
Dept: FAMILY MEDICINE | Facility: CLINIC | Age: 54
End: 2024-11-25
Payer: COMMERCIAL

## 2024-11-25 NOTE — LETTER
December 4, 2024      Humberto Roberts  [address] [facility] [address]        Dear Humberto,     Dr Hester wants us to let you know that as discussed, he recommends you to stop the Jardiance, continue with the Ozempic and metformin for your diabetes.  He also encourages you to get an eye exam done as soon as possible.  As questions please let me know.      Thank you      Care Team

## 2024-11-25 NOTE — TELEPHONE ENCOUNTER
Calos Hester MD  [facility]  Please let patient know that as discussed, I recommend him to stop the Jardiance, continue with the Ozempic and metformin for his diabetes.  I also encouraged him to get an eye exam done as soon as possible.

## 2024-11-28 ASSESSMENT — ANXIETY QUESTIONNAIRES: GAD7 TOTAL SCORE: 0

## 2024-12-09 ENCOUNTER — TELEPHONE (OUTPATIENT)
Dept: FAMILY MEDICINE | Facility: CLINIC | Age: 54
End: 2024-12-09
Payer: COMMERCIAL

## 2024-12-09 DIAGNOSIS — K21.9 GASTROESOPHAGEAL REFLUX DISEASE WITHOUT ESOPHAGITIS: ICD-10-CM

## 2024-12-09 RX ORDER — PANTOPRAZOLE SODIUM 40 MG/1
40 TABLET, DELAYED RELEASE ORAL DAILY
Qty: 90 TABLET | Refills: 1 | Status: SHIPPED | OUTPATIENT
Start: 2024-12-09

## 2024-12-09 NOTE — TELEPHONE ENCOUNTER
Patient is requesting a refill on jardiance 25 mg tablets and they are out of the medication. I don't see this on their med list. If you could please sent a refill to [facility] pharmacy that would be great. If you have any further questions please reach out to the patient.     Thank you,   Mariam Rosa, Pharmacy Tech  [facility]

## 2024-12-10 NOTE — TELEPHONE ENCOUNTER
"Office notes from visit 11/22/24: \"He takes metformin, Ozempic and Jardiance for diabetes.\"    Jardiance was then discontinued by PCP on 11/23/24 with reason \"none.\"    Called patient to gather more information. He states after he requested this from the pharmacy, he remembered Dr. Hester said he could stop the Jardiance, he no longer takes it, and he would like us to disregard this request.     Upon further review of chart, writer notes in telephone encounter 11/25/24 Dr. Hester did state that patient should stop the Jardiance.     Closing encounter.    Arabella Hernandez, WONN, RN    "

## 2024-12-10 NOTE — TELEPHONE ENCOUNTER
Clinic RN: Please investigate patient's chart or contact patient if the information cannot be found because  no pending medications. See previous note. If not active on med list, refill RN cannot complete request. In that case this should be routed to provider for review.     Arabella Hernandez, WONN, RN

## 2024-12-12 ENCOUNTER — OFFICE VISIT (OUTPATIENT)
Dept: AUDIOLOGY | Facility: CLINIC | Age: 54
End: 2024-12-12
Payer: COMMERCIAL

## 2024-12-12 DIAGNOSIS — H90.3 BILATERAL SENSORINEURAL HEARING LOSS: Primary | ICD-10-CM

## 2024-12-12 PROCEDURE — 92590 PR HEARING AID EXAM MONAURAL: CPT | Mod: LT | Performed by: AUDIOLOGIST

## 2024-12-16 NOTE — PROGRESS NOTES
AUDIOLOGY REPORT     SUBJECTIVE:  Humberto Roberts is a 54 year old male who was seen in the Audiology Clinic at the [facility] on 12/12/2024 for a hearing aid fitting, left ear. His hearing test 4/24/2024 revealed an asymmetrical sensorineural hearing loss, moderate sensorineural hearing loss right ear and minimal sloping to moderate sensorineural hearing loss. The patient was given medical clearance to pursue amplification by Miguel Sykes MD. He was fit with a right Phonak L70-R on 6/28/2024.       He is adjusting to amplification very well. He needed to cancel his appointment scheduled 7/19/2024 and this was the first available appointment when he rescheduled through central scheduling.     OBJECTIVE:   To make progress toward the goal of binaural amplification, Humberto was fit with demo Phonak L90-R hearing aids. Paired to the right hearing aid for call and audio streaming. Did not pair to the marva. Streamed a call successfully.     No charge visit today (in warranty hearing aid check).     ASSESSMENT:   A follow-up appointment for hearing aid fitting was completed today. Changes to hearing aid was completed as outlined above.      PLAN:  Humberto will return 12/30/2024 for hearing aid fitting, binaural.     MA HEARING AID MAKE: Left: Phonak Audeo L70-R  MA HEARING AID MODEL #: Left: [id number]    Provided Humberto with additional message options to request a message be sent to the provider about a sooner appointment. Discussed he should not have had to wait 5 months to get back in to see me.    Please call this clinic with any questions regarding today s appointment.         Heraclio Downey.  [place] Licensed Audiologist #[id number]

## 2024-12-19 NOTE — PROGRESS NOTES
History of Present Illness - Humberto Roberts is a 54 year old male presenting in clinic today for a recheck on Patient presents with:  Ear Tube Follow Up    Patient status post Dura-Vent tube placement about 10 months ago presents in follow-up for his left ear.  He feels that his ear is doing well.  He feels that hearing on the left actually is better than the right.  He has not had a lot of pressure plugging or discharge.      BP Readings from Last 1 Encounters:   11/22/24 124/66       BP noted to be well controlled today in office.     Humberto IS NOT a smoker/uses chewing tobacco.        Past Medical History -   Past Medical History:   Diagnosis Date    Acid reflux disease since 2006    Back pain chronic    Cellulitis of left upper arm and forearm 11/22/2013    Elevated serum creatinine 10/15/2015    Hypertension     Pre-diabetes 2/25/2021       Current Medications -   Current Outpatient Medications:     albuterol (PROAIR HFA/PROVENTIL HFA/VENTOLIN HFA) 108 (90 Base) MCG/ACT inhaler, Inhale 2 puffs into the lungs every 4 hours as needed for shortness of breath or wheezing, Disp: 18 g, Rfl: 0    ASPIRIN LOW DOSE 81 MG EC tablet, TAKE ONE TABLET BY MOUTH ONCE DAILY, Disp: 90 tablet, Rfl: 2    atenolol (TENORMIN) 50 MG tablet, TAKE ONE TABLET BY MOUTH ONCE DAILY, Disp: 90 tablet, Rfl: 1    atorvastatin (LIPITOR) 10 MG tablet, TAKE ONE TABLET BY MOUTH ONCE DAILY, Disp: 90 tablet, Rfl: 1    buPROPion (WELLBUTRIN XL) 150 MG 24 hr tablet, Take 1 tablet (150 mg) by mouth every morning., Disp: 30 tablet, Rfl: 3    cetirizine (ZYRTEC) 10 MG tablet, TAKE ONE TABLET BY MOUTH ONCE DAILY AS NEEDED FOR ALLERGIES, Disp: 30 tablet, Rfl: 5    Continuous Blood Gluc  (FREESTYLE FANNIE 2 READER) GORDO, Use to read blood sugars as per 's instructions., Disp: 1 each, Rfl: 0    Continuous Blood Gluc Sensor (FREESTYLE FANNIE 2 SENSOR) [facility], Change every 14 days., Disp: 2 each, Rfl: 11    cyclobenzaprine (FLEXERIL) 10 MG  tablet, Take 1 tablet (10 mg) by mouth 2 times daily as needed 2-3 hours before bed., Disp: 180 tablet, Rfl: 1    famotidine (PEPCID) 40 MG tablet, Take 1 tablet (40 mg) by mouth at bedtime, Disp: 90 tablet, Rfl: 3    fluticasone (FLONASE) 50 MCG/ACT nasal spray, SPRAY 1-2 SPRAYS IN EACH NOSTRIL EVERY DAY (Patient not taking: Reported on 12/30/2024), Disp: 16 g, Rfl: 5    hydrochlorothiazide (HYDRODIURIL) 25 MG tablet, TAKE ONE TABLET BY MOUTH ONCE DAILY, Disp: 90 tablet, Rfl: 2    metFORMIN (GLUCOPHAGE XR) 500 MG 24 hr tablet, TAKE 2 TABLETS (1,000 MG) BY MOUTH 2 TIMES DAILY (WITH MEALS), Disp: 120 tablet, Rfl: 3    order for DME, Equipment being ordered: shower chair, Disp: 1 Device, Rfl: 0    order for DME, 1 Device. Auto CPAP @@ 7-15 cm with heated humidity via mask of choice, Disp: , Rfl:     oxyCODONE-acetaminophen (PERCOCET) 5-325 MG tablet, Take 1 tablet by mouth 2 times daily as needed for pain. Please to make it last a month, Disp: 60 tablet, Rfl: 0    pantoprazole (PROTONIX) 40 MG EC tablet, TAKE 1 TABLET (40 MG) BY MOUTH DAILY STOP OMEPRAZOLE, Disp: 90 tablet, Rfl: 1    Semaglutide, 2 MG/DOSE, (OZEMPIC) 8 MG/3ML pen, Inject 2 mg subcutaneously every 7 days., Disp: 9 mL, Rfl: 5    traZODone (DESYREL) 100 MG tablet, Take 1 tablet (100 mg) by mouth at bedtime, Disp: 90 tablet, Rfl: 3    triamcinolone (KENALOG) 0.5 % external ointment, Apply to the rash twice a day as needed, Disp: 450 g, Rfl: 0    Allergies - No Known Allergies    Social History -   Social History     Socioeconomic History    Marital status:      Spouse name: Jordin   Tobacco Use    Smoking status: Never    Smokeless tobacco: Never   Vaping Use    Vaping status: Never Used   Substance and Sexual Activity    Alcohol use: No     Alcohol/week: 0.0 standard drinks of alcohol    Drug use: No    Sexual activity: Yes     Partners: Female     Social Drivers of Health     Financial Resource Strain: High Risk (8/20/2024)    Financial Resource  Strain     Within the past 12 months, have you or your family members you live with been unable to get utilities (heat, electricity) when it was really needed?: Yes   Food Insecurity: High Risk (8/20/2024)    Food Insecurity     Within the past 12 months, did you worry that your food would run out before you got money to buy more?: Yes     Within the past 12 months, did the food you bought just not last and you didn t have money to get more?: Yes   Transportation Needs: High Risk (8/20/2024)    Transportation Needs     Within the past 12 months, has lack of transportation kept you from medical appointments, getting your medicines, non-medical meetings or appointments, work, or from getting things that you need?: Yes   Physical Activity: Unknown (8/20/2024)    Exercise Vital Sign     Days of Exercise per Week: Patient declined   Stress: Patient Declined (8/20/2024)    [employer] [place] [employer] - Occupational Stress Questionnaire     Feeling of Stress : Patient declined   Social Connections: Unknown (8/20/2024)    Social Connection and Isolation Panel [NHANES]     Frequency of Social Gatherings with Friends and Family: Patient declined   Interpersonal Safety: Low Risk  (8/20/2024)    Interpersonal Safety     Do you feel physically and emotionally safe where you currently live?: Yes     Within the past 12 months, have you been hit, slapped, kicked or otherwise physically hurt by someone?: No     Within the past 12 months, have you been humiliated or emotionally abused in other ways by your partner or ex-partner?: No   Housing Stability: High Risk (8/20/2024)    Housing Stability     Do you have housing? : Yes     Are you worried about losing your housing?: Yes       Family History -   Family History   Problem Relation Age of Onset    Diabetes Mother     Hypertension Mother     Heart Disease Mother     Heart Disease Father     Diabetes Father     Unknown/Adopted Maternal Grandmother     Unknown/Adopted  "Maternal Grandfather     Unknown/Adopted Paternal Grandmother     Unknown/Adopted Paternal Grandfather     No Known Problems Sister     No Known Problems Son     No Known Problems Daughter     No Known Problems Sister     No Known Problems Son     No Known Problems Daughter        Review of Systems - As per HPI and PMHx, otherwise review of system review of the head and neck negative. Otherwise 10+ review of system is negative    Physical Exam  Pulse 81   Temp 97.4  F (36.3  C) (Temporal)   Ht 1.88 m (6' 2\")   Wt 121.6 kg (268 lb 3 oz)   BMI 34.43 kg/m    BMI: Body mass index is 34.43 kg/m .    General - The patient is well nourished and well developed, and appears to have good nutritional status.  Alert and oriented to person and place, answers questions and cooperates with examination appropriately.    SKIN - No suspicious lesions or rashes.  Respiration - No respiratory distress.  Head and Face - Normocephalic and atraumatic, with no gross asymmetry noted of the contour of the facial features.  The facial nerve is intact, with strong symmetric movements.    Voice and Breathing - The patient was breathing comfortably without the use of accessory muscles. The patients voice was clear and strong, and had appropriate pitch and quality.    Ears - Bilateral pinna and EACs with normal appearing overlying skin.  The t right tympanic membrane intact with good mobility on pneumatic otoscopy . Bony landmarks of the ossicular chain are normal. The tympanic membrane is normal in appearance. No retraction, perforation, or masses.  No fluid or purulence was seen in the external canal or the middle ear.   On the left side the tube which is essentially extruded.  Possibly tiny pinhole perforation noted superiorly.  Eyes - Extraocular movements intact.  Sclera were not icteric or injected, conjunctiva were pink and moist.        Nose - External contour is symmetric, no gross deflection or scars.  Nasal mucosa is pink and moist " with no abnormal mucus.  The septum was midline and non-obstructive, turbinates of normal size and position.  No polyps, masses, or purulence noted on examination.    Neuro - Nonfocal neuro exam is normal, CN 2 through 12 intact, normal gait and muscle tone.      Performed in clinic today:  Audiologic Studies - An audiogram and tympanogram were performed today as part of the evaluation and personally reviewed. The tympanogram shows Type AA curves on the right and Type B curves on the left, with normal on the right large on the left canal volumes and middle ear pressures.  The audiogram showed moderate sensorineural loss on the right and mild sensorineural loss on the left.        A/P - Humberto Roberts is a 54 year old male Patient presents with:  Ear Tube Follow Up    Patient with a bilateral hearing loss with some asymmetry of the little asymmetry and word recognition score.  He is doing well after tube placement.  Thank pinhole perforation should close.  Patient already received his hearing aids.    Humberto should follow up in 1 year.      At Humberto next appointment they will need a hearing test.      Miguel Sykes MD

## 2024-12-30 ENCOUNTER — OFFICE VISIT (OUTPATIENT)
Dept: OTOLARYNGOLOGY | Facility: CLINIC | Age: 54
End: 2024-12-30
Payer: COMMERCIAL

## 2024-12-30 ENCOUNTER — OFFICE VISIT (OUTPATIENT)
Dept: AUDIOLOGY | Facility: CLINIC | Age: 54
End: 2024-12-30
Payer: COMMERCIAL

## 2024-12-30 VITALS — BODY MASS INDEX: 34.42 KG/M2 | WEIGHT: 268.19 LBS | HEART RATE: 81 BPM | HEIGHT: 74 IN | TEMPERATURE: 97.4 F

## 2024-12-30 DIAGNOSIS — H72.92 PERFORATION OF TYMPANIC MEMBRANE, LEFT: ICD-10-CM

## 2024-12-30 DIAGNOSIS — H90.3 BILATERAL SENSORINEURAL HEARING LOSS: Primary | ICD-10-CM

## 2024-12-30 DIAGNOSIS — H90.3 ASYMMETRICAL SENSORINEURAL HEARING LOSS: Primary | ICD-10-CM

## 2024-12-30 PROCEDURE — 99213 OFFICE O/P EST LOW 20 MIN: CPT | Performed by: OTOLARYNGOLOGY

## 2024-12-30 PROCEDURE — 92567 TYMPANOMETRY: CPT | Performed by: AUDIOLOGIST

## 2024-12-30 PROCEDURE — 92557 COMPREHENSIVE HEARING TEST: CPT | Performed by: AUDIOLOGIST

## 2024-12-30 ASSESSMENT — PAIN SCALES - GENERAL: PAINLEVEL_OUTOF10: NO PAIN (0)

## 2024-12-30 NOTE — PATIENT INSTRUCTIONS
Today you were fit with new hearing aids(s).Your hearing aids were adjusted according to your hearing loss, and the adjustments verified in your ear. The following are important points to remember:  Red - Right, Blue - Left  Open the battery door to turn off the hearing aids whenever you are not using them (i.e., when you go to sleep, bathe, swim, etc.).  Otherwise, the battery will drain and you will go through batteries faster.  Remember to clean your hearing aids every day after use.  Wax accumulation can decrease hearing aid performance, lead to frequent repairs, and reduce hearing aid durability.  Wear your hearing instruments as much as you can so that your brain learns to process the sound you will hear through them. This process is called acclimatization.    What to expect when using your hearing aids  In the early days or weeks, some sounds may seem too loud, too soft or not natural. This is normal. Notice when sounds seem wrong. Write down any issues or concerns. This will help your audiologist (hearing specialist)  tune  your hearing aid at your next visit.   Other things to note as you adjust to your new hearing aids:  Hearing aids will not restore your hearing to  normal  levels.  Hearing aids are designed to improve the ability to understand speech.  Hearing aids will not block background noise, but they may reduce its impact on speech.  Noisy situations that are hard for others may also be hard for you.  Sounds that are so loud they cause discomfort for others may do the same for you.  Normal loud sounds should not be uncomfortably loud.    Battery use and warnings  Before you replace an old battery, peel off the sticker of the new battery and set it out for 2 to 5 minutes.  Place the battery with the negative side (bump) into the cup and the positive side (flat) up.  Open the battery door when not using your hearing aid to save on battery life.  Warning:  Batteries are dangerous if swallowed; Never  put batteries in your mouth.    Keep out of reach of children and pets.  Throw away batteries in the trash or through a recycling program.  Never let children handle batteries.  Do not keep batteries near medicine.  If someone swallows a battery, call the [employer] Hotline at 279-824-3089.    Return in 2-3 weeks for a hearing aid check appointment  Today you were fit with new hearing aids(s).Your hearing aids were adjusted according to your hearing loss, and the adjustments verified in your ear. The following are important points to remember:  Red - Right, Blue - Left  Open the battery door to turn off the hearing aids whenever you are not using them (i.e., when you go to sleep, bathe, swim, etc.).  Otherwise, the battery will drain and you will go through batteries faster.  Remember to clean your hearing aids every day after use.  Wax accumulation can decrease hearing aid performance, lead to frequent repairs, and reduce hearing aid durability.  Wear your hearing instruments as much as you can so that your brain learns to process the sound you will hear through them. This process is called acclimatization.    What to expect when using your hearing aids  In the early days or weeks, some sounds may seem too loud, too soft or not natural. This is normal. Notice when sounds seem wrong. Write down any issues or concerns. This will help your audiologist (hearing specialist)  tune  your hearing aid at your next visit.   Other things to note as you adjust to your new hearing aids:  Hearing aids will not restore your hearing to  normal  levels.  Hearing aids are designed to improve the ability to understand speech.  Hearing aids will not block background noise, but they may reduce its impact on speech.  Noisy situations that are hard for others may also be hard for you.  Sounds that are so loud they cause discomfort for others may do the same for you.  Normal loud sounds should not be uncomfortably loud.    Battery  use and warnings  Before you replace an old battery, peel off the sticker of the new battery and set it out for 2 to 5 minutes.  Place the battery with the negative side (bump) into the cup and the positive side (flat) up.  Open the battery door when not using your hearing aid to save on battery life.  Warning:  Batteries are dangerous if swallowed; Never put batteries in your mouth.    Keep out of reach of children and pets.  Throw away batteries in the trash or through a recycling program.  Never let children handle batteries.  Do not keep batteries near medicine.  If someone swallows a battery, call the [employer] Hotline at 129-524-1386.    Return in 2-3 weeks for a hearing aid check appointment

## 2024-12-30 NOTE — PROGRESS NOTES
AUDIOLOGY REPORT    SUBJECTIVE: Humberto Roberts, a 54 year old male, was seen in the Audiology Clinic at [facility] today for a Left hearing aid fitting. Previous results have revealed asymmetrical sensorineural hearing loss, moderate sensorineural hearing loss right ear and minimal sloping to moderate sensorineural hearing loss. The patient was given medical clearance to pursue amplification by Miguel Sykes MD. He was fit with a right Phonak L70-R on 6/28/2024.  Due to medical enrollment of the left ear hearing aid fitting was delayed.    OBJECTIVE:  Prior to fitting, a hearing aid check was performed to ensure device functionality. The hearing aid conformity evaluation was completed.The hearing aids were placed and they provided a good fit. Real-ear-probe-microphone measurements were completed on the [email or web address] system and were a good match to NAL-NL2 target with soft sounds audible, moderate sounds comfortable, and loud sounds below discomfort. UCLs are verified through maximum power output measures and demonstrate appropriate limiting of loud inputs. Mr. Roberts was oriented to proper hearing aid use, care, cleaning (no water, dry brush), batteries (size rechargeable , insertion/removal, toxicity, low-battery signal), aid insertion/removal, user booklet, warranty information, storage cases, and other hearing aid details. The patient confirmed understanding of hearing aid use and care, and showed proper insertion of hearing aid and batteries while in the office today. Mr. Roberts reported good volume and sound quality today.    EAR(S) FIT: Left, Right fit on 6/28/2024  MA HEARING AID MAKE: Right: Phonak [place] L70-R; Left: Phonak [place]o L70-R    MA HEARING AID MODEL #: Right: [id number]; Left: [id number]  HEARING AID STYLE: Right: BTE/KEIRA; Left: BTE/KEIRA  DOME SIZE: Right:  medium closed; Left::  medium open   LENGTH: Right:  2M SDS 5; Left:  2M SDS 5  EARMOLDS: Right:  ;  Left:     SERIAL NUMBERS: Right: [id number]; Left: [id number]  WARRANTY END DATE: Right: 7/10/2027; Left:: 1/15/2028    Prior Authorization Approval:  Approved For: AUD/HEARING AID (, , ,   # of Visits: 365 (NUMBER OF VISITS NOT SPECIFIED)   Start Date: 12/17/2024  End Date: 06/16/2025  Auth #: [id number]     ASSESSMENT: Left hearing aid fitting completed today. Verification measures were performed. The 45 day trial period was explained to patient, and they expressed understanding. Mr. Roberts signed the Hearing Aid Purchase Agreement and was given a copy, as well as details on his hearing aids. Patient was counseled that exact out of pocket amounts cannot be determined for hearing aid claims being sent to insurance. Any insurance coverage information presented to the patient is an estimate only, and is not a guarantee of payment. Patient has been advised to check with their own insurance.    PLAN: Mr. Roberts will return for follow-up in 2-3 weeks for a hearing aid review appointment. Please call this clinic with questions regarding today s appointment.    Francois Santamaria, [facility]  Audiologist, [place] #[id number]   December 30, 2024

## 2024-12-30 NOTE — PROGRESS NOTES
AUDIOLOGY REPORT     SUMMARY: Audiology visit completed. See audiogram for results.     RECOMMENDATIONS: Follow-up with ENT    Francois Downey Licensed Audiologist #[id number]

## 2024-12-30 NOTE — LETTER
12/30/2024      Humberto Roberts  [address] [address]      Dear Colleague,    Thank you for referring your patient, Humberto Roberts, to the [facility]. Please see a copy of my visit note below.    History of Present Illness - Humberto Roberts is a 54 year old male presenting in clinic today for a recheck on Patient presents with:  Ear Tube Follow Up    Patient status post Dura-Vent tube placement about 10 months ago presents in follow-up for his left ear.  He feels that his ear is doing well.  He feels that hearing on the left actually is better than the right.  He has not had a lot of pressure plugging or discharge.      BP Readings from Last 1 Encounters:   11/22/24 124/66       BP noted to be well controlled today in office.     Humberto IS NOT a smoker/uses chewing tobacco.        Past Medical History -   Past Medical History:   Diagnosis Date     Acid reflux disease since 2006     Back pain chronic     Cellulitis of left upper arm and forearm 11/22/2013     Elevated serum creatinine 10/15/2015     Hypertension      Pre-diabetes 2/25/2021       Current Medications -   Current Outpatient Medications:      albuterol (PROAIR HFA/PROVENTIL HFA/VENTOLIN HFA) 108 (90 Base) MCG/ACT inhaler, Inhale 2 puffs into the lungs every 4 hours as needed for shortness of breath or wheezing, Disp: 18 g, Rfl: 0     ASPIRIN LOW DOSE 81 MG EC tablet, TAKE ONE TABLET BY MOUTH ONCE DAILY, Disp: 90 tablet, Rfl: 2     atenolol (TENORMIN) 50 MG tablet, TAKE ONE TABLET BY MOUTH ONCE DAILY, Disp: 90 tablet, Rfl: 1     atorvastatin (LIPITOR) 10 MG tablet, TAKE ONE TABLET BY MOUTH ONCE DAILY, Disp: 90 tablet, Rfl: 1     buPROPion (WELLBUTRIN XL) 150 MG 24 hr tablet, Take 1 tablet (150 mg) by mouth every morning., Disp: 30 tablet, Rfl: 3     cetirizine (ZYRTEC) 10 MG tablet, TAKE ONE TABLET BY MOUTH ONCE DAILY AS NEEDED FOR ALLERGIES, Disp: 30 tablet, Rfl: 5     Continuous Blood Gluc  (FREESTYLE FANNIE 2  READER) GORDO, Use to read blood sugars as per 's instructions., Disp: 1 each, Rfl: 0     Continuous Blood Gluc Sensor (FREESTYLE FANNIE 2 SENSOR) [facility], Change every 14 days., Disp: 2 each, Rfl: 11     cyclobenzaprine (FLEXERIL) 10 MG tablet, Take 1 tablet (10 mg) by mouth 2 times daily as needed 2-3 hours before bed., Disp: 180 tablet, Rfl: 1     famotidine (PEPCID) 40 MG tablet, Take 1 tablet (40 mg) by mouth at bedtime, Disp: 90 tablet, Rfl: 3     fluticasone (FLONASE) 50 MCG/ACT nasal spray, SPRAY 1-2 SPRAYS IN EACH NOSTRIL EVERY DAY (Patient not taking: Reported on 12/30/2024), Disp: 16 g, Rfl: 5     hydrochlorothiazide (HYDRODIURIL) 25 MG tablet, TAKE ONE TABLET BY MOUTH ONCE DAILY, Disp: 90 tablet, Rfl: 2     metFORMIN (GLUCOPHAGE XR) 500 MG 24 hr tablet, TAKE 2 TABLETS (1,000 MG) BY MOUTH 2 TIMES DAILY (WITH MEALS), Disp: 120 tablet, Rfl: 3     order for DME, Equipment being ordered: shower chair, Disp: 1 Device, Rfl: 0     order for DME, 1 Device. Auto CPAP @@ 7-15 cm with heated humidity via mask of choice, Disp: , Rfl:      oxyCODONE-acetaminophen (PERCOCET) 5-325 MG tablet, Take 1 tablet by mouth 2 times daily as needed for pain. Please to make it last a month, Disp: 60 tablet, Rfl: 0     pantoprazole (PROTONIX) 40 MG EC tablet, TAKE 1 TABLET (40 MG) BY MOUTH DAILY STOP OMEPRAZOLE, Disp: 90 tablet, Rfl: 1     Semaglutide, 2 MG/DOSE, (OZEMPIC) 8 MG/3ML pen, Inject 2 mg subcutaneously every 7 days., Disp: 9 mL, Rfl: 5     traZODone (DESYREL) 100 MG tablet, Take 1 tablet (100 mg) by mouth at bedtime, Disp: 90 tablet, Rfl: 3     triamcinolone (KENALOG) 0.5 % external ointment, Apply to the rash twice a day as needed, Disp: 450 g, Rfl: 0    Allergies - No Known Allergies    Social History -   Social History     Socioeconomic History     Marital status:      Spouse name: Jordin   Tobacco Use     Smoking status: Never     Smokeless tobacco: Never   Vaping Use     Vaping status: Never Used    Substance and Sexual Activity     Alcohol use: No     Alcohol/week: 0.0 standard drinks of alcohol     Drug use: No     Sexual activity: Yes     Partners: Female     Social Drivers of Health     Financial Resource Strain: High Risk (8/20/2024)    Financial Resource Strain      Within the past 12 months, have you or your family members you live with been unable to get utilities (heat, electricity) when it was really needed?: Yes   Food Insecurity: High Risk (8/20/2024)    Food Insecurity      Within the past 12 months, did you worry that your food would run out before you got money to buy more?: Yes      Within the past 12 months, did the food you bought just not last and you didn t have money to get more?: Yes   Transportation Needs: High Risk (8/20/2024)    Transportation Needs      Within the past 12 months, has lack of transportation kept you from medical appointments, getting your medicines, non-medical meetings or appointments, work, or from getting things that you need?: Yes   Physical Activity: Unknown (8/20/2024)    Exercise Vital Sign      Days of Exercise per Week: Patient declined   Stress: Patient Declined (8/20/2024)    [employer] - Occupational Stress Questionnaire      Feeling of Stress : Patient declined   Social Connections: Unknown (8/20/2024)    Social Connection and Isolation Panel [NHANES]      Frequency of Social Gatherings with Friends and Family: Patient declined   Interpersonal Safety: Low Risk  (8/20/2024)    Interpersonal Safety      Do you feel physically and emotionally safe where you currently live?: Yes      Within the past 12 months, have you been hit, slapped, kicked or otherwise physically hurt by someone?: No      Within the past 12 months, have you been humiliated or emotionally abused in other ways by your partner or ex-partner?: No   Housing Stability: High Risk (8/20/2024)    Housing Stability      Do you have housing? : Yes      Are you worried  "about losing your housing?: Yes       Family History -   Family History   Problem Relation Age of Onset     Diabetes Mother      Hypertension Mother      Heart Disease Mother      Heart Disease Father      Diabetes Father      Unknown/Adopted Maternal Grandmother      Unknown/Adopted Maternal Grandfather      Unknown/Adopted Paternal Grandmother      Unknown/Adopted Paternal Grandfather      No Known Problems Sister      No Known Problems Son      No Known Problems Daughter      No Known Problems Sister      No Known Problems Son      No Known Problems Daughter        Review of Systems - As per HPI and PMHx, otherwise review of system review of the head and neck negative. Otherwise 10+ review of system is negative    Physical Exam  Pulse 81   Temp 97.4  F (36.3  C) (Temporal)   Ht 1.88 m (6' 2\")   Wt 121.6 kg (268 lb 3 oz)   BMI 34.43 kg/m    BMI: Body mass index is 34.43 kg/m .    General - The patient is well nourished and well developed, and appears to have good nutritional status.  Alert and oriented to person and place, answers questions and cooperates with examination appropriately.    SKIN - No suspicious lesions or rashes.  Respiration - No respiratory distress.  Head and Face - Normocephalic and atraumatic, with no gross asymmetry noted of the contour of the facial features.  The facial nerve is intact, with strong symmetric movements.    Voice and Breathing - The patient was breathing comfortably without the use of accessory muscles. The patients voice was clear and strong, and had appropriate pitch and quality.    Ears - Bilateral pinna and EACs with normal appearing overlying skin.  The t right tympanic membrane intact with good mobility on pneumatic otoscopy . Bony landmarks of the ossicular chain are normal. The tympanic membrane is normal in appearance. No retraction, perforation, or masses.  No fluid or purulence was seen in the external canal or the middle ear.   On the left side the tube which " is essentially extruded.  Possibly tiny pinhole perforation noted superiorly.  Eyes - Extraocular movements intact.  Sclera were not icteric or injected, conjunctiva were pink and moist.        Nose - External contour is symmetric, no gross deflection or scars.  Nasal mucosa is pink and moist with no abnormal mucus.  The septum was midline and non-obstructive, turbinates of normal size and position.  No polyps, masses, or purulence noted on examination.    Neuro - Nonfocal neuro exam is normal, CN 2 through 12 intact, normal gait and muscle tone.      Performed in clinic today:  Audiologic Studies - An audiogram and tympanogram were performed today as part of the evaluation and personally reviewed. The tympanogram shows Type AA curves on the right and Type B curves on the left, with normal on the right large on the left canal volumes and middle ear pressures.  The audiogram showed moderate sensorineural loss on the right and mild sensorineural loss on the left.        A/P - Humberto Roberts is a 54 year old male Patient presents with:  Ear Tube Follow Up    Patient with a bilateral hearing loss with some asymmetry of the little asymmetry and word recognition score.  He is doing well after tube placement.  Thank pinhole perforation should close.  Patient already received his hearing aids.    Humberto should follow up in 1 year.      At Humberto next appointment they will need a hearing test.      Miguel Sykes MD           Again, thank you for allowing me to participate in the care of your patient.        Sincerely,        Miguel Sykes MD, MD    Electronically signed

## 2025-01-27 DIAGNOSIS — E11.65 TYPE 2 DIABETES MELLITUS WITH HYPERGLYCEMIA, WITHOUT LONG-TERM CURRENT USE OF INSULIN (H): ICD-10-CM

## 2025-01-27 DIAGNOSIS — I10 ESSENTIAL HYPERTENSION: ICD-10-CM

## 2025-01-28 RX ORDER — ATENOLOL 50 MG/1
50 TABLET ORAL DAILY
Qty: 90 TABLET | Refills: 0 | Status: SHIPPED | OUTPATIENT
Start: 2025-01-28

## 2025-01-28 RX ORDER — ATORVASTATIN CALCIUM 10 MG/1
10 TABLET, FILM COATED ORAL DAILY
Qty: 90 TABLET | Refills: 0 | Status: SHIPPED | OUTPATIENT
Start: 2025-01-28

## 2025-03-04 ENCOUNTER — TELEPHONE (OUTPATIENT)
Dept: AUDIOLOGY | Facility: CLINIC | Age: 55
End: 2025-03-04
Payer: COMMERCIAL

## 2025-03-04 DIAGNOSIS — H90.3 ASYMMETRICAL SENSORINEURAL HEARING LOSS: Primary | ICD-10-CM

## 2025-03-04 NOTE — TELEPHONE ENCOUNTER
Humberto called states he had a call from Louis, Louis not available at this time, Humberto is requesting a call back.

## 2025-03-04 NOTE — TELEPHONE ENCOUNTER
Was able to get in contact with Humberto this afternoon for some hearing aid troubleshooting. After some time it was indicated that he will need to come in for a hearing aid check.     Humberto is scheduled for 3/5/2025 at 7:30 AM     Francois Santamaria, CCC-A  Doctor of Audiology, [place] #[id number]   March 4, 2025

## 2025-03-04 NOTE — TELEPHONE ENCOUNTER
Reason for Call:  Other appointment    Detailed comments:  Patient wants to talk to Louis about a setting that his hearing aids seem to be stuck in. Please call him     Phone Number Patient can be reached at: Home number on file [phone] (home)    Best Time: any    Can we leave a detailed message on this number? YES    Call taken on 3/4/2025 at 10:16 AM by Berenice Carnes

## 2025-03-05 ENCOUNTER — OFFICE VISIT (OUTPATIENT)
Dept: AUDIOLOGY | Facility: CLINIC | Age: 55
End: 2025-03-05
Payer: COMMERCIAL

## 2025-03-05 DIAGNOSIS — H90.3 ASYMMETRICAL SENSORINEURAL HEARING LOSS: Primary | ICD-10-CM

## 2025-03-05 PROCEDURE — V5010 ASSESSMENT FOR HEARING AID: HCPCS

## 2025-03-05 NOTE — PROGRESS NOTES
AUDIOLOGY REPORT    SUBJECTIVE:Humberto Roberts is a 54 year old male who was seen in the Audiology Clinic at the [facility] on 3/5/2025  for a hearing aid check. Previous results from 12/30/2024 revealed mild sloping to moderate sensorineural hearing loss for the right ear and mild sloping to moderate sensorineural hearing loss for the left ear.  The patient has been seen previously in this clinic and was fit with Phonak Audeo L70-R hearing aids on 12/30/2024 for the left ear and 6/28/204 for the right ear.  The patient was unaccompanied at today's appointment.     Humberto reports he hears a static sound when he turns the volume up on the hearing aids. Humberto also reports she has been having Bluetooth connection issues with his phone.    SIDE: Binaural:                          : Phonak                           TYPE: Audeo L70-R                          S/N:                                       R: [id number]                                       L: [id number]                           WARRANTY:      R: 7/10/27     L: 1/15/2028    OBJECTIVE:  General cleaning of the hearing aids was performed. Biologic listening check found the hearing aids producing a static static sound with volume change.      Based on findings the following changes were made:  1) Both hearing aids were connected to the programming software. A new feedback manager was run for both sides, and soft noise reduction was added to all programs. All Bluetooth pairings were deleted and the software.    2) the hearing aid  marva on his phone was deleted and reinstalled and devices were forgotten from his phone and repaired successfully today.    3) Wax trap and Domes were replaced.    A follow up listening check indicated they sound crisp and clear. Following cleaning, Humberto reported good volume and sound quality.    ASSESSMENT: A hearing aid check was completed today.  Changes to hearing aid was completed as  outlined above. No charge visit today (in warranty hearing aid check).    PLAN:Humberto will return for follow-up as needed, or at least every 9-12 months for cleaning and assessment of hearing aid.  . Please call this clinic with any questions regarding today s appointment.    Francois Santamaria, CCC-A  Doctor of Audiology, [place] #[id number]   March 5, 2025

## 2025-04-24 DIAGNOSIS — E66.01 MORBID OBESITY (H): ICD-10-CM

## 2025-04-24 DIAGNOSIS — I10 ESSENTIAL HYPERTENSION: ICD-10-CM

## 2025-04-24 RX ORDER — METFORMIN HYDROCHLORIDE 500 MG/1
1000 TABLET, EXTENDED RELEASE ORAL 2 TIMES DAILY WITH MEALS
Qty: 120 TABLET | Refills: 3 | OUTPATIENT
Start: 2025-04-24

## 2025-04-24 RX ORDER — ATENOLOL 50 MG/1
50 TABLET ORAL DAILY
Qty: 90 TABLET | Refills: 1 | Status: SHIPPED | OUTPATIENT
Start: 2025-04-24

## 2025-04-28 DIAGNOSIS — E66.01 MORBID OBESITY (H): ICD-10-CM

## 2025-04-28 RX ORDER — METFORMIN HYDROCHLORIDE 500 MG/1
1000 TABLET, EXTENDED RELEASE ORAL 2 TIMES DAILY WITH MEALS
Qty: 360 TABLET | Refills: 1 | Status: SHIPPED | OUTPATIENT
Start: 2025-04-28

## 2025-04-28 NOTE — TELEPHONE ENCOUNTER
Pharmacy requested Refill for Metformin 500 Er on  4/24/25 and it was denied stating pt has requested too soon . Last script was written on  7/26/24 for 120 tabs ( 30 day supply ) with 3 refills so total of  120 days  . Unsure how this would be too soon to request.       Could this be looked at again please      Thank you      Monae Minaya  Pharmacy Tech.  [facility]  [phone]

## 2025-04-29 ENCOUNTER — OFFICE VISIT (OUTPATIENT)
Dept: AUDIOLOGY | Facility: CLINIC | Age: 55
End: 2025-04-29
Payer: COMMERCIAL

## 2025-04-29 DIAGNOSIS — H90.3 ASYMMETRICAL SENSORINEURAL HEARING LOSS: Primary | ICD-10-CM

## 2025-04-29 PROCEDURE — V5010 ASSESSMENT FOR HEARING AID: HCPCS

## 2025-04-29 NOTE — PROGRESS NOTES
AUDIOLOGY REPORT    SUBJECTIVE:Humberto Roberts is a 55 year old male who was seen in the Audiology Clinic at the [facility] on 4/29/2025  for a hearing aid check. Previous results from 12/30/2024 and results revealed mild sloping to moderate sensorineural hearing loss for the right ear and mild sloping to moderate sensorineural hearing loss for the left ear.  The patient has been seen previously in this clinic and was fit with Phonak Audeo L70-R hearing aids on 12/30/2024 for the left ear and 6/28/204 for the right ear.  The patient was unaccompanied at today's appointment.    Humberto reports he has been having trouble with the left hearing aid staying connected with his phone and functioning appropriately. Humberto reports as she was sitting outside before today's appointment started to work again. Humberto reports this is been occurring for the last few weeks.    SIDE: Binaural:                          : Phonak                           TYPE: Audeo L70-R                          S/N:                                       R: [id number]                                       L: [id number]                           WARRANTY:      R: 7/10/27     L: 1/15/2028    OBJECTIVE:  General inspection, cleaning, and biologic listening check of the hearing aids was performed.  Based on findings the following changes were made:    The Wax trap and Domes were replaced. A follow up listening check indicated they sound crisp and clear. Following cleaning, Humberto reported good volume and sound quality.  Following the listening check the hearing aids were connected to the programming software and the Bluetooth pairings were reset and the type of Bluetooth was set fixed. Per Soriano request the hearing aids were also turned up slightly.   Following the Bluetooth reset the hearing aids were repaired to Soriano phone and  marva and were able to be adjusted in real time.    ASSESSMENT: A hearing aid  check was completed today.  Changes to hearing aid was completed as outlined above. No charge visit today (in warranty hearing aid check).    PLAN:Humberto will return for follow-up as needed, or at least every 9-12 months for cleaning and assessment of hearing aid.  . Please call this clinic with any questions regarding today s appointment.    Francois Santamaria, CCC-A  Doctor of Audiology, [place] #[id number]   April 29, 2025

## 2025-04-30 ENCOUNTER — TELEPHONE (OUTPATIENT)
Dept: FAMILY MEDICINE | Facility: CLINIC | Age: 55
End: 2025-04-30
Payer: COMMERCIAL

## 2025-04-30 DIAGNOSIS — I10 ESSENTIAL HYPERTENSION: ICD-10-CM

## 2025-04-30 RX ORDER — HYDROCHLOROTHIAZIDE 25 MG/1
25 TABLET ORAL DAILY
Qty: 90 TABLET | Refills: 1 | Status: SHIPPED | OUTPATIENT
Start: 2025-04-30

## 2025-04-30 NOTE — TELEPHONE ENCOUNTER
General Call      Reason for Call:  pt needs a call back     What are your questions or concerns:  pt wants to know if he can stop taking metformin for diabetes     Date of last appointment with provider: na    Could we send this information to you in [employer] or would you prefer to receive a phone call?:   Patient would prefer a phone call   Okay to leave a detailed message?: Yes at Home number on file [phone] (home) or Cell number on file:    Telephone Information:   Mobile [phone]

## 2025-04-30 NOTE — TELEPHONE ENCOUNTER
Phoned patient back.  Patient stated he would like to continue with Ozempic for his type 2 diabetes and his obesity, but would like to discontinue taking Metformin.  He stated for almost a year, he has only been taking the metformin one time in the morning, not the 2 times a day when he started and as directed.  He verbalized that he has lost a lot of weight and is feeling really good.  (Patient A1C 11/22/2024 = 5.2)    Patient stated he will not stop taking the Metformin unless his provider gives him approval.  Please call patient back after provider review.    Patient stated we can leave a detailed message on his answering machine, [phone].    Will forward to PCP for review.    Hollie Sosa RN

## 2025-06-05 DIAGNOSIS — K21.9 GASTROESOPHAGEAL REFLUX DISEASE WITHOUT ESOPHAGITIS: ICD-10-CM

## 2025-06-05 RX ORDER — PANTOPRAZOLE SODIUM 40 MG/1
40 TABLET, DELAYED RELEASE ORAL DAILY
Qty: 90 TABLET | Refills: 0 | Status: SHIPPED | OUTPATIENT
Start: 2025-06-05

## 2025-07-17 ENCOUNTER — TELEPHONE (OUTPATIENT)
Dept: FAMILY MEDICINE | Facility: CLINIC | Age: 55
End: 2025-07-17

## 2025-07-28 DIAGNOSIS — I10 HYPERTENSION GOAL BP (BLOOD PRESSURE) < 140/90: ICD-10-CM

## 2025-07-28 DIAGNOSIS — E11.65 TYPE 2 DIABETES MELLITUS WITH HYPERGLYCEMIA, WITHOUT LONG-TERM CURRENT USE OF INSULIN (H): ICD-10-CM

## 2025-07-28 RX ORDER — ASPIRIN 81 MG/1
81 TABLET, COATED ORAL DAILY
Qty: 90 TABLET | Refills: 0 | Status: SHIPPED | OUTPATIENT
Start: 2025-07-28

## 2025-07-28 RX ORDER — ATORVASTATIN CALCIUM 10 MG/1
10 TABLET, FILM COATED ORAL DAILY
Qty: 90 TABLET | Refills: 0 | Status: SHIPPED | OUTPATIENT
Start: 2025-07-28

## 2025-07-30 ENCOUNTER — OFFICE VISIT (OUTPATIENT)
Dept: AUDIOLOGY | Facility: CLINIC | Age: 55
End: 2025-07-30
Payer: COMMERCIAL

## 2025-07-30 DIAGNOSIS — H90.3 ASYMMETRICAL SENSORINEURAL HEARING LOSS: Primary | ICD-10-CM

## 2025-07-30 PROCEDURE — V5010 ASSESSMENT FOR HEARING AID: HCPCS

## 2025-07-30 NOTE — PROGRESS NOTES
AUDIOLOGY REPORT    SUBJECTIVE:Humberto Roberts is a 55 year old male who was seen in the Audiology Clinic at the [facility] on 7/30/2025  for a hearing aid check. Previous results from 12/30/2024 and results revealed a asymmetric sensorineural hearing loss.  The patient has been seen previously in this clinic and was fit with Phonak hearing aids.    Humberto reports he is having connection issues with his hearing aids currently for phone calls and media.                    SIDE: Binaural:                 HEARING AID MAKE: Right: Phonak Audeo L70-R ; Left: Phonak Audeo L70-R                  HEARING AID MODEL #: Right: [id number] ; Left: [id number]                  SERIAL NUMBERS: Right: [id number] ; Left: [id number]                  WARRANTY END DATE: Right: 7/10/2027 ; Left: 1/15/2028     OBJECTIVE:  General inspection, cleaning, and biologic listening check of the hearing aids was performed.  Based on findings the following changes were made:    Bluetooth settings were reset for the hearing aids and his phone improve stability of the connection. Hearing aid gain was increased slightly per Humberto's request as he has been needing to increase the volume  in difficult listening situations. A follow up listening check indicated they sound crisp and clear. Following cleaning, Humberto reported good volume and sound quality. A trial phone call was completed with success in office today.    ASSESSMENT: A hearing aid check was completed today.  Changes to hearing aid was completed as outlined above. No charge visit today (in warranty hearing aid check).  PLAN:Humberto will return for follow-up as needed, or at least every 9-12 months for cleaning and assessment of hearing aid.  . Please call this clinic with any questions regarding today s appointment.    Francois Santamaria, CCC-A  Doctor of Audiology, [place] #[id number]   July 30, 2025

## 2025-08-28 ENCOUNTER — OFFICE VISIT (OUTPATIENT)
Dept: FAMILY MEDICINE | Facility: CLINIC | Age: 55
End: 2025-08-28
Payer: COMMERCIAL

## 2025-08-28 VITALS
RESPIRATION RATE: 16 BRPM | DIASTOLIC BLOOD PRESSURE: 70 MMHG | SYSTOLIC BLOOD PRESSURE: 126 MMHG | BODY MASS INDEX: 32.64 KG/M2 | WEIGHT: 241 LBS | HEIGHT: 72 IN | OXYGEN SATURATION: 98 % | TEMPERATURE: 97.9 F | HEART RATE: 70 BPM

## 2025-08-28 DIAGNOSIS — E11.65 TYPE 2 DIABETES MELLITUS WITH HYPERGLYCEMIA, WITHOUT LONG-TERM CURRENT USE OF INSULIN (H): ICD-10-CM

## 2025-08-28 DIAGNOSIS — F51.04 PSYCHOPHYSIOLOGICAL INSOMNIA: ICD-10-CM

## 2025-08-28 DIAGNOSIS — F11.20 CONTINUOUS OPIOID DEPENDENCE (H): ICD-10-CM

## 2025-08-28 DIAGNOSIS — M47.816 LUMBAR FACET ARTHROPATHY: ICD-10-CM

## 2025-08-28 DIAGNOSIS — E55.9 VITAMIN D DEFICIENCY: ICD-10-CM

## 2025-08-28 DIAGNOSIS — M21.41 FLAT FEET, BILATERAL: ICD-10-CM

## 2025-08-28 DIAGNOSIS — R48.0 DYSLEXIA: ICD-10-CM

## 2025-08-28 DIAGNOSIS — E79.0 HYPERURICEMIA: ICD-10-CM

## 2025-08-28 DIAGNOSIS — I10 HYPERTENSION GOAL BP (BLOOD PRESSURE) < 140/90: ICD-10-CM

## 2025-08-28 DIAGNOSIS — M54.50 CHRONIC BILATERAL LOW BACK PAIN WITHOUT SCIATICA: ICD-10-CM

## 2025-08-28 DIAGNOSIS — M21.42 FLAT FEET, BILATERAL: ICD-10-CM

## 2025-08-28 DIAGNOSIS — G47.33 OSA (OBSTRUCTIVE SLEEP APNEA): ICD-10-CM

## 2025-08-28 DIAGNOSIS — K21.9 GASTROESOPHAGEAL REFLUX DISEASE WITHOUT ESOPHAGITIS: ICD-10-CM

## 2025-08-28 DIAGNOSIS — E66.09 CLASS 1 OBESITY DUE TO EXCESS CALORIES WITH SERIOUS COMORBIDITY AND BODY MASS INDEX (BMI) OF 32.0 TO 32.9 IN ADULT: ICD-10-CM

## 2025-08-28 DIAGNOSIS — E66.811 CLASS 1 OBESITY DUE TO EXCESS CALORIES WITH SERIOUS COMORBIDITY AND BODY MASS INDEX (BMI) OF 32.0 TO 32.9 IN ADULT: ICD-10-CM

## 2025-08-28 DIAGNOSIS — M47.816 LUMBAR SPONDYLOSIS: ICD-10-CM

## 2025-08-28 DIAGNOSIS — Z00.00 ROUTINE GENERAL MEDICAL EXAMINATION AT A HEALTH CARE FACILITY: Primary | ICD-10-CM

## 2025-08-28 DIAGNOSIS — G89.29 CHRONIC BILATERAL LOW BACK PAIN WITHOUT SCIATICA: ICD-10-CM

## 2025-08-28 DIAGNOSIS — F33.1 MODERATE EPISODE OF RECURRENT MAJOR DEPRESSIVE DISORDER (H): ICD-10-CM

## 2025-08-28 DIAGNOSIS — J30.1 SEASONAL ALLERGIC RHINITIS DUE TO POLLEN: ICD-10-CM

## 2025-08-28 PROBLEM — E66.01 MORBID OBESITY (H): Status: RESOLVED | Noted: 2017-12-06 | Resolved: 2025-08-28

## 2025-08-28 PROBLEM — L30.9 ECZEMA, UNSPECIFIED TYPE: Status: RESOLVED | Noted: 2024-11-23 | Resolved: 2025-08-28

## 2025-08-28 PROBLEM — E66.9 OBESITY: Status: ACTIVE | Noted: 2025-08-28

## 2025-08-28 LAB
ALBUMIN SERPL BCG-MCNC: 4.2 G/DL (ref 3.5–5.2)
ALP SERPL-CCNC: 67 U/L (ref 40–150)
ALT SERPL W P-5'-P-CCNC: 24 U/L (ref 0–70)
ANION GAP SERPL CALCULATED.3IONS-SCNC: 11 MMOL/L (ref 7–15)
AST SERPL W P-5'-P-CCNC: 16 U/L (ref 0–45)
BILIRUB SERPL-MCNC: 0.4 MG/DL
BUN SERPL-MCNC: 14.6 MG/DL (ref 6–20)
CALCIUM SERPL-MCNC: 9.1 MG/DL (ref 8.8–10.4)
CHLORIDE SERPL-SCNC: 102 MMOL/L (ref 98–107)
CHOLEST SERPL-MCNC: 74 MG/DL
CREAT SERPL-MCNC: 1.29 MG/DL (ref 0.67–1.17)
CREAT UR-MCNC: 281.7 MG/DL
EGFRCR SERPLBLD CKD-EPI 2021: 65 ML/MIN/1.73M2
EST. AVERAGE GLUCOSE BLD GHB EST-MCNC: 82 MG/DL
FASTING STATUS PATIENT QL REPORTED: YES
FASTING STATUS PATIENT QL REPORTED: YES
GLUCOSE SERPL-MCNC: 74 MG/DL (ref 70–99)
HBA1C MFR BLD: 4.5 %
HCO3 SERPL-SCNC: 28 MMOL/L (ref 22–29)
HDLC SERPL-MCNC: 34 MG/DL
LDLC SERPL CALC-MCNC: 24 MG/DL
MICROALBUMIN UR-MCNC: <12 MG/L
MICROALBUMIN/CREAT UR: NORMAL MG/G{CREAT}
NONHDLC SERPL-MCNC: 40 MG/DL
POTASSIUM SERPL-SCNC: 3.8 MMOL/L (ref 3.4–5.3)
PROT SERPL-MCNC: 6.7 G/DL (ref 6.4–8.3)
PSA SERPL DL<=0.01 NG/ML-MCNC: 0.85 NG/ML (ref 0–3.5)
SODIUM SERPL-SCNC: 141 MMOL/L (ref 135–145)
TRIGL SERPL-MCNC: 82 MG/DL
URATE SERPL-MCNC: 5.6 MG/DL (ref 3.4–7)
VIT D+METAB SERPL-MCNC: 27 NG/ML (ref 20–50)

## 2025-08-28 PROCEDURE — 3078F DIAST BP <80 MM HG: CPT | Performed by: FAMILY MEDICINE

## 2025-08-28 PROCEDURE — 3044F HG A1C LEVEL LT 7.0%: CPT | Performed by: FAMILY MEDICINE

## 2025-08-28 PROCEDURE — 80061 LIPID PANEL: CPT | Performed by: FAMILY MEDICINE

## 2025-08-28 PROCEDURE — 83036 HEMOGLOBIN GLYCOSYLATED A1C: CPT | Performed by: FAMILY MEDICINE

## 2025-08-28 PROCEDURE — 3048F LDL-C <100 MG/DL: CPT | Performed by: FAMILY MEDICINE

## 2025-08-28 PROCEDURE — 3074F SYST BP LT 130 MM HG: CPT | Performed by: FAMILY MEDICINE

## 2025-08-28 PROCEDURE — 36415 COLL VENOUS BLD VENIPUNCTURE: CPT | Performed by: FAMILY MEDICINE

## 2025-08-28 PROCEDURE — 99214 OFFICE O/P EST MOD 30 MIN: CPT | Mod: 25 | Performed by: FAMILY MEDICINE

## 2025-08-28 PROCEDURE — 99396 PREV VISIT EST AGE 40-64: CPT | Performed by: FAMILY MEDICINE

## 2025-08-28 PROCEDURE — 82043 UR ALBUMIN QUANTITATIVE: CPT | Performed by: FAMILY MEDICINE

## 2025-08-28 PROCEDURE — 84550 ASSAY OF BLOOD/URIC ACID: CPT | Performed by: FAMILY MEDICINE

## 2025-08-28 PROCEDURE — 82570 ASSAY OF URINE CREATININE: CPT | Performed by: FAMILY MEDICINE

## 2025-08-28 PROCEDURE — 82306 VITAMIN D 25 HYDROXY: CPT | Performed by: FAMILY MEDICINE

## 2025-08-28 PROCEDURE — 1125F AMNT PAIN NOTED PAIN PRSNT: CPT | Performed by: FAMILY MEDICINE

## 2025-08-28 PROCEDURE — G0103 PSA SCREENING: HCPCS | Performed by: FAMILY MEDICINE

## 2025-08-28 PROCEDURE — 96127 BRIEF EMOTIONAL/BEHAV ASSMT: CPT | Performed by: FAMILY MEDICINE

## 2025-08-28 PROCEDURE — 80053 COMPREHEN METABOLIC PANEL: CPT | Performed by: FAMILY MEDICINE

## 2025-08-28 PROCEDURE — G2211 COMPLEX E/M VISIT ADD ON: HCPCS | Performed by: FAMILY MEDICINE

## 2025-08-28 RX ORDER — CETIRIZINE HYDROCHLORIDE 10 MG/1
10 TABLET ORAL DAILY
Qty: 90 TABLET | Refills: 3 | Status: SHIPPED | OUTPATIENT
Start: 2025-08-28

## 2025-08-28 RX ORDER — CYCLOBENZAPRINE HCL 10 MG
10 TABLET ORAL 2 TIMES DAILY PRN
Qty: 90 TABLET | Refills: 3 | Status: SHIPPED | OUTPATIENT
Start: 2025-08-28

## 2025-08-28 RX ORDER — TRAZODONE HYDROCHLORIDE 150 MG/1
150 TABLET ORAL AT BEDTIME
Qty: 90 TABLET | Refills: 3 | Status: SHIPPED | OUTPATIENT
Start: 2025-08-28

## 2025-08-28 RX ORDER — OXYCODONE AND ACETAMINOPHEN 5; 325 MG/1; MG/1
1 TABLET ORAL DAILY PRN
Qty: 30 TABLET | Refills: 0 | Status: SHIPPED | OUTPATIENT
Start: 2025-08-28

## 2025-08-28 ASSESSMENT — PAIN SCALES - GENERAL: PAINLEVEL_OUTOF10: SEVERE PAIN (7)

## 2025-08-28 ASSESSMENT — PATIENT HEALTH QUESTIONNAIRE - PHQ9
10. IF YOU CHECKED OFF ANY PROBLEMS, HOW DIFFICULT HAVE THESE PROBLEMS MADE IT FOR YOU TO DO YOUR WORK, TAKE CARE OF THINGS AT HOME, OR GET ALONG WITH OTHER PEOPLE: SOMEWHAT DIFFICULT
SUM OF ALL RESPONSES TO PHQ QUESTIONS 1-9: 7
SUM OF ALL RESPONSES TO PHQ QUESTIONS 1-9: 7

## 2025-09-01 ENCOUNTER — PATIENT OUTREACH (OUTPATIENT)
Dept: CARE COORDINATION | Facility: CLINIC | Age: 55
End: 2025-09-01
Payer: COMMERCIAL

## (undated) DEVICE — SYR 05ML LL W/O NDL

## (undated) DEVICE — SYR 10ML LL W/O NDL

## (undated) DEVICE — NDL SPINAL 25GA 3.5" QUINCKE 405180

## (undated) DEVICE — GLOVE BIOGEL PI ULTRATOUCH G SZ 7.0 42170

## (undated) DEVICE — TUBING EXTENSION SET STD BORE 6" LL

## (undated) DEVICE — GLOVE BIOGEL PI ULTRATOUCH G SZ 7.5 42175

## (undated) DEVICE — TUBING IV EXTENSION SET 34"

## (undated) DEVICE — PREP CHLORAPREP 26ML TINTED ORANGE  260815

## (undated) DEVICE — NEEDLE SPINAL DISP 22GA X 5" QUINCKE 3333355

## (undated) DEVICE — TRAY PAIN INJECTION 97A 640

## (undated) DEVICE — TRAY EPDRL 3.5IN 17GA 19GA PRFX BPA DEHP 332079

## (undated) DEVICE — NDL SPINAL 22GA 5" QUINCKE 405148

## (undated) DEVICE — PREP CHLORAPREP W/ORANGE TINT 10.5ML 930715

## (undated) DEVICE — NDL SPINAL 22GA 3.5" QUINCKE 405181